# Patient Record
Sex: FEMALE | Race: WHITE | NOT HISPANIC OR LATINO | Employment: OTHER | URBAN - METROPOLITAN AREA
[De-identification: names, ages, dates, MRNs, and addresses within clinical notes are randomized per-mention and may not be internally consistent; named-entity substitution may affect disease eponyms.]

---

## 2017-08-08 ENCOUNTER — CONVERSION ENCOUNTER (OUTPATIENT)
Dept: GENERAL RADIOLOGY | Facility: HOSPITAL | Age: 62
End: 2017-08-08

## 2018-08-22 ENCOUNTER — OFFICE VISIT CONVERTED (OUTPATIENT)
Dept: FAMILY MEDICINE CLINIC | Facility: CLINIC | Age: 63
End: 2018-08-22
Attending: NURSE PRACTITIONER

## 2018-08-22 ENCOUNTER — CONVERSION ENCOUNTER (OUTPATIENT)
Dept: FAMILY MEDICINE CLINIC | Facility: CLINIC | Age: 63
End: 2018-08-22

## 2018-10-17 ENCOUNTER — CONVERSION ENCOUNTER (OUTPATIENT)
Dept: GENERAL RADIOLOGY | Facility: HOSPITAL | Age: 63
End: 2018-10-17

## 2018-11-05 ENCOUNTER — CONVERSION ENCOUNTER (OUTPATIENT)
Dept: GENERAL RADIOLOGY | Facility: HOSPITAL | Age: 63
End: 2018-11-05

## 2018-12-13 ENCOUNTER — CONVERSION ENCOUNTER (OUTPATIENT)
Dept: SURGERY | Facility: CLINIC | Age: 63
End: 2018-12-13

## 2018-12-13 ENCOUNTER — OFFICE VISIT CONVERTED (OUTPATIENT)
Dept: SURGERY | Facility: CLINIC | Age: 63
End: 2018-12-13
Attending: SURGERY

## 2019-01-21 ENCOUNTER — OFFICE VISIT CONVERTED (OUTPATIENT)
Dept: SURGERY | Facility: CLINIC | Age: 64
End: 2019-01-21
Attending: SURGERY

## 2019-01-21 ENCOUNTER — CONVERSION ENCOUNTER (OUTPATIENT)
Dept: SURGERY | Facility: CLINIC | Age: 64
End: 2019-01-21

## 2019-02-13 ENCOUNTER — OFFICE VISIT CONVERTED (OUTPATIENT)
Dept: FAMILY MEDICINE CLINIC | Facility: CLINIC | Age: 64
End: 2019-02-13
Attending: NURSE PRACTITIONER

## 2019-02-13 ENCOUNTER — HOSPITAL ENCOUNTER (OUTPATIENT)
Dept: LAB | Facility: HOSPITAL | Age: 64
Discharge: HOME | End: 2019-02-13
Attending: NURSE PRACTITIONER

## 2019-02-13 LAB
ALBUMIN SERPL-MCNC: 4 G/DL (ref 3.5–5)
ALBUMIN/GLOB SERPL: 1.1 {RATIO} (ref 1.4–2.6)
ALP SERPL-CCNC: 60 U/L (ref 43–160)
ALT SERPL-CCNC: 40 U/L (ref 10–40)
ANION GAP SERPL CALC-SCNC: 16 MMOL/L (ref 8–19)
AST SERPL-CCNC: 41 U/L (ref 15–50)
BASOPHILS # BLD AUTO: 0.07 10*3/UL (ref 0–0.2)
BASOPHILS NFR BLD AUTO: 0.55 % (ref 0–3)
BILIRUB SERPL-MCNC: 0.3 MG/DL (ref 0.2–1.3)
BUN SERPL-MCNC: 15 MG/DL (ref 5–25)
BUN/CREAT SERPL: 18 {RATIO} (ref 6–20)
CALCIUM SERPL-MCNC: 9.7 MG/DL (ref 8.7–10.4)
CHLORIDE SERPL-SCNC: 103 MMOL/L (ref 99–111)
CONV CO2: 29 MMOL/L (ref 22–32)
CONV TOTAL PROTEIN: 7.6 G/DL (ref 6.3–8.2)
CREAT UR-MCNC: 0.82 MG/DL (ref 0.5–0.9)
EOSINOPHIL # BLD AUTO: 0.27 10*3/UL (ref 0–0.7)
EOSINOPHIL # BLD AUTO: 2.18 % (ref 0–7)
ERYTHROCYTE [DISTWIDTH] IN BLOOD BY AUTOMATED COUNT: 12.5 % (ref 11.5–14.5)
GFR SERPLBLD BASED ON 1.73 SQ M-ARVRAT: >60 ML/MIN/{1.73_M2}
GLOBULIN UR ELPH-MCNC: 3.6 G/DL (ref 2–3.5)
GLUCOSE SERPL-MCNC: 85 MG/DL (ref 65–99)
HBA1C MFR BLD: 15.7 G/DL (ref 12–16)
HCT VFR BLD AUTO: 45.7 % (ref 37–47)
LYMPHOCYTES # BLD AUTO: 4.34 10*3/UL (ref 1–5)
MCH RBC QN AUTO: 30.2 PG (ref 27–31)
MCHC RBC AUTO-ENTMCNC: 34.4 G/DL (ref 33–37)
MCV RBC AUTO: 87.8 FL (ref 81–99)
MONOCYTES # BLD AUTO: 0.72 10*3/UL (ref 0.2–1.2)
MONOCYTES NFR BLD AUTO: 5.87 % (ref 3–10)
NEUTROPHILS # BLD AUTO: 6.93 10*3/UL (ref 2–8)
NEUTROPHILS NFR BLD AUTO: 56.2 % (ref 30–85)
NRBC BLD AUTO-RTO: 0 % (ref 0–0.01)
OSMOLALITY SERPL CALC.SUM OF ELEC: 298 MOSM/KG (ref 273–304)
PLATELET # BLD AUTO: 291 10*3/UL (ref 130–400)
PMV BLD AUTO: 7.9 FL (ref 7.4–10.4)
POTASSIUM SERPL-SCNC: 3.9 MMOL/L (ref 3.5–5.3)
RBC # BLD AUTO: 5.2 10*6/UL (ref 4.2–5.4)
SODIUM SERPL-SCNC: 144 MMOL/L (ref 135–147)
T4 FREE SERPL-MCNC: 1.2 NG/DL (ref 0.9–1.8)
TSH SERPL-ACNC: 2.24 M[IU]/L (ref 0.27–4.2)
VARIANT LYMPHS NFR BLD MANUAL: 35.2 % (ref 20–45)
WBC # BLD AUTO: 12.3 10*3/UL (ref 4.8–10.8)

## 2019-04-22 ENCOUNTER — HOSPITAL ENCOUNTER (OUTPATIENT)
Dept: OTHER | Facility: HOSPITAL | Age: 64
Discharge: HOME | End: 2019-04-22
Attending: PHYSICIAN ASSISTANT

## 2019-08-30 ENCOUNTER — HOSPITAL ENCOUNTER (OUTPATIENT)
Dept: GENERAL RADIOLOGY | Facility: HOSPITAL | Age: 64
Discharge: HOME | End: 2019-08-30
Attending: NURSE PRACTITIONER

## 2019-08-30 ENCOUNTER — OFFICE VISIT CONVERTED (OUTPATIENT)
Dept: FAMILY MEDICINE CLINIC | Facility: CLINIC | Age: 64
End: 2019-08-30
Attending: NURSE PRACTITIONER

## 2019-08-30 ENCOUNTER — CONVERSION ENCOUNTER (OUTPATIENT)
Dept: FAMILY MEDICINE CLINIC | Facility: CLINIC | Age: 64
End: 2019-08-30

## 2019-09-20 ENCOUNTER — OFFICE VISIT CONVERTED (OUTPATIENT)
Dept: FAMILY MEDICINE CLINIC | Facility: CLINIC | Age: 64
End: 2019-09-20
Attending: NURSE PRACTITIONER

## 2019-09-20 ENCOUNTER — CONVERSION ENCOUNTER (OUTPATIENT)
Dept: FAMILY MEDICINE CLINIC | Facility: CLINIC | Age: 64
End: 2019-09-20

## 2019-09-21 ENCOUNTER — HOSPITAL ENCOUNTER (OUTPATIENT)
Dept: OTHER | Facility: HOSPITAL | Age: 64
Discharge: HOME | End: 2019-09-21
Attending: NURSE PRACTITIONER

## 2019-09-21 LAB
ALBUMIN SERPL-MCNC: 4.3 G/DL (ref 3.5–5)
ALBUMIN/GLOB SERPL: 1.4 {RATIO} (ref 1.4–2.6)
ALP SERPL-CCNC: 61 U/L (ref 43–160)
ALT SERPL-CCNC: 42 U/L (ref 10–40)
ANION GAP SERPL CALC-SCNC: 15 MMOL/L (ref 8–19)
AST SERPL-CCNC: 38 U/L (ref 15–50)
BASOPHILS # BLD AUTO: 0.07 10*3/UL (ref 0–0.2)
BASOPHILS NFR BLD AUTO: 0.7 % (ref 0–3)
BILIRUB SERPL-MCNC: 0.47 MG/DL (ref 0.2–1.3)
BUN SERPL-MCNC: 19 MG/DL (ref 5–25)
BUN/CREAT SERPL: 24 {RATIO} (ref 6–20)
CALCIUM SERPL-MCNC: 9.5 MG/DL (ref 8.7–10.4)
CHLORIDE SERPL-SCNC: 102 MMOL/L (ref 99–111)
CHOLEST SERPL-MCNC: 145 MG/DL (ref 107–200)
CHOLEST/HDLC SERPL: 3.4 {RATIO} (ref 3–6)
CONV ABS IMM GRAN: 0.03 10*3/UL (ref 0–0.2)
CONV CO2: 29 MMOL/L (ref 22–32)
CONV IMMATURE GRAN: 0.3 % (ref 0–1.8)
CONV TOTAL PROTEIN: 7.4 G/DL (ref 6.3–8.2)
CREAT UR-MCNC: 0.79 MG/DL (ref 0.5–0.9)
DEPRECATED RDW RBC AUTO: 45.9 FL (ref 36.4–46.3)
EOSINOPHIL # BLD AUTO: 0.32 10*3/UL (ref 0–0.7)
EOSINOPHIL # BLD AUTO: 3.4 % (ref 0–7)
ERYTHROCYTE [DISTWIDTH] IN BLOOD BY AUTOMATED COUNT: 13.4 % (ref 11.7–14.4)
FERRITIN SERPL-MCNC: 147 NG/ML (ref 10–200)
GFR SERPLBLD BASED ON 1.73 SQ M-ARVRAT: >60 ML/MIN/{1.73_M2}
GLOBULIN UR ELPH-MCNC: 3.1 G/DL (ref 2–3.5)
GLUCOSE SERPL-MCNC: 104 MG/DL (ref 65–99)
HCT VFR BLD AUTO: 48.3 % (ref 37–47)
HDLC SERPL-MCNC: 43 MG/DL (ref 40–60)
HGB BLD-MCNC: 15.3 G/DL (ref 12–16)
IRON SATN MFR SERPL: 29 % (ref 20–55)
IRON SERPL-MCNC: 122 UG/DL (ref 60–170)
LDLC SERPL CALC-MCNC: 75 MG/DL (ref 70–100)
LYMPHOCYTES # BLD AUTO: 4.03 10*3/UL (ref 1–5)
LYMPHOCYTES NFR BLD AUTO: 42.2 % (ref 20–45)
MAGNESIUM SERPL-MCNC: 2.05 MG/DL (ref 1.6–2.3)
MCH RBC QN AUTO: 29.3 PG (ref 27–31)
MCHC RBC AUTO-ENTMCNC: 31.7 G/DL (ref 33–37)
MCV RBC AUTO: 92.4 FL (ref 81–99)
MONOCYTES # BLD AUTO: 0.57 10*3/UL (ref 0.2–1.2)
MONOCYTES NFR BLD AUTO: 6 % (ref 3–10)
NEUTROPHILS # BLD AUTO: 4.52 10*3/UL (ref 2–8)
NEUTROPHILS NFR BLD AUTO: 47.4 % (ref 30–85)
NRBC CBCN: 0 % (ref 0–0.7)
OSMOLALITY SERPL CALC.SUM OF ELEC: 297 MOSM/KG (ref 273–304)
PLATELET # BLD AUTO: 249 10*3/UL (ref 130–400)
PMV BLD AUTO: 10.2 FL (ref 9.4–12.3)
POTASSIUM SERPL-SCNC: 4.2 MMOL/L (ref 3.5–5.3)
RBC # BLD AUTO: 5.23 10*6/UL (ref 4.2–5.4)
SODIUM SERPL-SCNC: 142 MMOL/L (ref 135–147)
T4 FREE SERPL-MCNC: 1.1 NG/DL (ref 0.9–1.8)
TIBC SERPL-MCNC: 420 UG/DL (ref 245–450)
TRANSFERRIN SERPL-MCNC: 294 MG/DL (ref 250–380)
TRIGL SERPL-MCNC: 135 MG/DL (ref 40–150)
TSH SERPL-ACNC: 1.55 M[IU]/L (ref 0.27–4.2)
VLDLC SERPL-MCNC: 27 MG/DL (ref 5–37)
WBC # BLD AUTO: 9.54 10*3/UL (ref 4.8–10.8)

## 2019-10-21 ENCOUNTER — HOSPITAL ENCOUNTER (OUTPATIENT)
Dept: GENERAL RADIOLOGY | Facility: HOSPITAL | Age: 64
Discharge: HOME | End: 2019-10-21
Attending: NURSE PRACTITIONER

## 2019-11-26 ENCOUNTER — HOSPITAL ENCOUNTER (OUTPATIENT)
Dept: GENERAL RADIOLOGY | Facility: HOSPITAL | Age: 64
Discharge: HOME | End: 2019-11-26
Attending: NURSE PRACTITIONER

## 2020-01-16 ENCOUNTER — OFFICE VISIT CONVERTED (OUTPATIENT)
Dept: FAMILY MEDICINE CLINIC | Facility: CLINIC | Age: 65
End: 2020-01-16
Attending: NURSE PRACTITIONER

## 2020-08-31 ENCOUNTER — OFFICE VISIT CONVERTED (OUTPATIENT)
Dept: FAMILY MEDICINE CLINIC | Facility: CLINIC | Age: 65
End: 2020-08-31
Attending: NURSE PRACTITIONER

## 2020-09-03 ENCOUNTER — HOSPITAL ENCOUNTER (OUTPATIENT)
Dept: LAB | Facility: HOSPITAL | Age: 65
Discharge: HOME | End: 2020-09-03
Attending: NURSE PRACTITIONER

## 2020-09-03 LAB
BASOPHILS # BLD AUTO: 0.07 10*3/UL (ref 0–0.2)
BASOPHILS NFR BLD AUTO: 0.7 % (ref 0–3)
CONV ABS IMM GRAN: 0.05 10*3/UL (ref 0–0.2)
CONV IMMATURE GRAN: 0.5 % (ref 0–1.8)
DEPRECATED RDW RBC AUTO: 45.7 FL (ref 36.4–46.3)
EOSINOPHIL # BLD AUTO: 0.26 10*3/UL (ref 0–0.7)
EOSINOPHIL # BLD AUTO: 2.6 % (ref 0–7)
ERYTHROCYTE [DISTWIDTH] IN BLOOD BY AUTOMATED COUNT: 13.5 % (ref 11.7–14.4)
HCT VFR BLD AUTO: 48.8 % (ref 37–47)
HGB BLD-MCNC: 15.7 G/DL (ref 12–16)
LYMPHOCYTES # BLD AUTO: 3.41 10*3/UL (ref 1–5)
LYMPHOCYTES NFR BLD AUTO: 34.5 % (ref 20–45)
MCH RBC QN AUTO: 29.5 PG (ref 27–31)
MCHC RBC AUTO-ENTMCNC: 32.2 G/DL (ref 33–37)
MCV RBC AUTO: 91.7 FL (ref 81–99)
MONOCYTES # BLD AUTO: 0.71 10*3/UL (ref 0.2–1.2)
MONOCYTES NFR BLD AUTO: 7.2 % (ref 3–10)
NEUTROPHILS # BLD AUTO: 5.38 10*3/UL (ref 2–8)
NEUTROPHILS NFR BLD AUTO: 54.5 % (ref 30–85)
NRBC CBCN: 0 % (ref 0–0.7)
PLATELET # BLD AUTO: 273 10*3/UL (ref 130–400)
PMV BLD AUTO: 10.6 FL (ref 9.4–12.3)
RBC # BLD AUTO: 5.32 10*6/UL (ref 4.2–5.4)
WBC # BLD AUTO: 9.88 10*3/UL (ref 4.8–10.8)

## 2020-09-04 LAB
25(OH)D3 SERPL-MCNC: 41.9 NG/ML (ref 30–100)
ALBUMIN SERPL-MCNC: 4 G/DL (ref 3.5–5)
ALBUMIN/GLOB SERPL: 1.2 {RATIO} (ref 1.4–2.6)
ALP SERPL-CCNC: 49 U/L (ref 43–160)
ALT SERPL-CCNC: 39 U/L (ref 10–40)
ANION GAP SERPL CALC-SCNC: 19 MMOL/L (ref 8–19)
AST SERPL-CCNC: 55 U/L (ref 15–50)
BILIRUB SERPL-MCNC: 0.35 MG/DL (ref 0.2–1.3)
BUN SERPL-MCNC: 15 MG/DL (ref 5–25)
BUN/CREAT SERPL: 16 {RATIO} (ref 6–20)
CALCIUM SERPL-MCNC: 9.6 MG/DL (ref 8.7–10.4)
CHLORIDE SERPL-SCNC: 100 MMOL/L (ref 99–111)
CHOLEST SERPL-MCNC: 180 MG/DL (ref 107–200)
CHOLEST/HDLC SERPL: 5.8 {RATIO} (ref 3–6)
CONV CO2: 23 MMOL/L (ref 22–32)
CONV TOTAL PROTEIN: 7.3 G/DL (ref 6.3–8.2)
CREAT UR-MCNC: 0.93 MG/DL (ref 0.5–0.9)
GFR SERPLBLD BASED ON 1.73 SQ M-ARVRAT: >60 ML/MIN/{1.73_M2}
GLOBULIN UR ELPH-MCNC: 3.3 G/DL (ref 2–3.5)
GLUCOSE SERPL-MCNC: 110 MG/DL (ref 65–99)
HDLC SERPL-MCNC: 31 MG/DL (ref 40–60)
LDLC SERPL CALC-MCNC: 101 MG/DL (ref 70–100)
OSMOLALITY SERPL CALC.SUM OF ELEC: 287 MOSM/KG (ref 273–304)
POTASSIUM SERPL-SCNC: 3.8 MMOL/L (ref 3.5–5.3)
SODIUM SERPL-SCNC: 138 MMOL/L (ref 135–147)
T4 FREE SERPL-MCNC: 0.9 NG/DL (ref 0.9–1.8)
TRIGL SERPL-MCNC: 238 MG/DL (ref 40–150)
TSH SERPL-ACNC: 3.6 M[IU]/L (ref 0.27–4.2)
VLDLC SERPL-MCNC: 48 MG/DL (ref 5–37)

## 2020-09-10 ENCOUNTER — HOSPITAL ENCOUNTER (OUTPATIENT)
Dept: LAB | Facility: HOSPITAL | Age: 65
Discharge: HOME | End: 2020-09-10
Attending: NURSE PRACTITIONER

## 2020-09-10 LAB
EST. AVERAGE GLUCOSE BLD GHB EST-MCNC: 134 MG/DL
HBA1C MFR BLD: 6.3 % (ref 3.5–5.7)

## 2020-12-22 ENCOUNTER — HOSPITAL ENCOUNTER (OUTPATIENT)
Dept: GENERAL RADIOLOGY | Facility: HOSPITAL | Age: 65
Discharge: HOME | End: 2020-12-22
Attending: NURSE PRACTITIONER

## 2021-03-01 ENCOUNTER — OFFICE VISIT CONVERTED (OUTPATIENT)
Dept: INTERNAL MEDICINE | Facility: CLINIC | Age: 66
End: 2021-03-01
Attending: INTERNAL MEDICINE

## 2021-03-01 ENCOUNTER — CONVERSION ENCOUNTER (OUTPATIENT)
Dept: INTERNAL MEDICINE | Facility: CLINIC | Age: 66
End: 2021-03-01

## 2021-03-01 ENCOUNTER — HOSPITAL ENCOUNTER (OUTPATIENT)
Dept: INTERNAL MEDICINE | Facility: CLINIC | Age: 66
Discharge: HOME | End: 2021-03-01
Attending: INTERNAL MEDICINE

## 2021-03-01 LAB
ALBUMIN SERPL-MCNC: 3.8 G/DL (ref 3.5–5)
ALBUMIN/GLOB SERPL: 1.2 {RATIO} (ref 1.4–2.6)
ALP SERPL-CCNC: 61 U/L (ref 43–160)
ALT SERPL-CCNC: 35 U/L (ref 10–40)
AMPHET UR QL CFM: NEGATIVE
ANION GAP SERPL CALC-SCNC: 12 MMOL/L (ref 8–19)
AST SERPL-CCNC: 41 U/L (ref 15–50)
BARBITURATES UR QL: NEGATIVE
BASOPHILS # BLD AUTO: 0.08 10*3/UL (ref 0–0.2)
BASOPHILS NFR BLD AUTO: 0.7 % (ref 0–3)
BENZODIAZ UR QL SCN: NEGATIVE
BILIRUB SERPL-MCNC: 0.42 MG/DL (ref 0.2–1.3)
BUN SERPL-MCNC: 15 MG/DL (ref 5–25)
BUN/CREAT SERPL: 19 {RATIO} (ref 6–20)
CALCIUM SERPL-MCNC: 9.6 MG/DL (ref 8.7–10.4)
CHLORIDE SERPL-SCNC: 102 MMOL/L (ref 99–111)
CHOLEST SERPL-MCNC: 160 MG/DL (ref 107–200)
CHOLEST/HDLC SERPL: 4.6 {RATIO} (ref 3–6)
CONV ABS IMM GRAN: 0.05 10*3/UL (ref 0–0.2)
CONV AMP/METHAMP UR: NEGATIVE
CONV CO2: 28 MMOL/L (ref 22–32)
CONV COCAINE, UR: NEGATIVE
CONV IMMATURE GRAN: 0.5 % (ref 0–1.8)
CONV TOTAL PROTEIN: 7 G/DL (ref 6.3–8.2)
CREAT UR-MCNC: 0.79 MG/DL (ref 0.5–0.9)
DEPRECATED RDW RBC AUTO: 46.6 FL (ref 36.4–46.3)
EOSINOPHIL # BLD AUTO: 0.23 10*3/UL (ref 0–0.7)
EOSINOPHIL # BLD AUTO: 2.2 % (ref 0–7)
ERYTHROCYTE [DISTWIDTH] IN BLOOD BY AUTOMATED COUNT: 14 % (ref 11.7–14.4)
EST. AVERAGE GLUCOSE BLD GHB EST-MCNC: 137 MG/DL
GFR SERPLBLD BASED ON 1.73 SQ M-ARVRAT: >60 ML/MIN/{1.73_M2}
GLOBULIN UR ELPH-MCNC: 3.2 G/DL (ref 2–3.5)
GLUCOSE SERPL-MCNC: 103 MG/DL (ref 65–99)
HBA1C MFR BLD: 6.4 % (ref 3.5–5.7)
HCT VFR BLD AUTO: 50.5 % (ref 37–47)
HDLC SERPL-MCNC: 35 MG/DL (ref 40–60)
HGB BLD-MCNC: 15.9 G/DL (ref 12–16)
LDLC SERPL CALC-MCNC: 96 MG/DL (ref 70–100)
LYMPHOCYTES # BLD AUTO: 3.95 10*3/UL (ref 1–5)
LYMPHOCYTES NFR BLD AUTO: 37 % (ref 20–45)
MCH RBC QN AUTO: 28.5 PG (ref 27–31)
MCHC RBC AUTO-ENTMCNC: 31.5 G/DL (ref 33–37)
MCV RBC AUTO: 90.5 FL (ref 81–99)
MDMA UR QL SCN: NEGATIVE
METHADONE UR QL SCN: NEGATIVE
MONOCYTES # BLD AUTO: 0.65 10*3/UL (ref 0.2–1.2)
MONOCYTES NFR BLD AUTO: 6.1 % (ref 3–10)
NEUTROPHILS # BLD AUTO: 5.71 10*3/UL (ref 2–8)
NEUTROPHILS NFR BLD AUTO: 53.5 % (ref 30–85)
NRBC CBCN: 0 % (ref 0–0.7)
OPIATES UR QL SCN: NEGATIVE
OSMOLALITY SERPL CALC.SUM OF ELEC: 287 MOSM/KG (ref 273–304)
OXYCODONE UR QL SCN: NEGATIVE
PCP UR QL: NEGATIVE
PLATELET # BLD AUTO: 313 10*3/UL (ref 130–400)
PMV BLD AUTO: 10.7 FL (ref 9.4–12.3)
POTASSIUM SERPL-SCNC: 4.4 MMOL/L (ref 3.5–5.3)
RBC # BLD AUTO: 5.58 10*6/UL (ref 4.2–5.4)
SODIUM SERPL-SCNC: 138 MMOL/L (ref 135–147)
THC SERPLBLD CFM-MCNC: NEGATIVE NG/ML
TRIGL SERPL-MCNC: 144 MG/DL (ref 40–150)
TSH SERPL-ACNC: 1.89 M[IU]/L (ref 0.27–4.2)
VLDLC SERPL-MCNC: 29 MG/DL (ref 5–37)
WBC # BLD AUTO: 10.67 10*3/UL (ref 4.8–10.8)

## 2021-03-16 ENCOUNTER — HOSPITAL ENCOUNTER (OUTPATIENT)
Dept: VACCINE CLINIC | Facility: HOSPITAL | Age: 66
Discharge: HOME | End: 2021-03-16
Attending: INTERNAL MEDICINE

## 2021-03-29 ENCOUNTER — HOSPITAL ENCOUNTER (OUTPATIENT)
Dept: GENERAL RADIOLOGY | Facility: HOSPITAL | Age: 66
Discharge: HOME | End: 2021-03-29
Attending: INTERNAL MEDICINE

## 2021-04-06 ENCOUNTER — HOSPITAL ENCOUNTER (OUTPATIENT)
Dept: VACCINE CLINIC | Facility: HOSPITAL | Age: 66
Discharge: HOME | End: 2021-04-06
Attending: INTERNAL MEDICINE

## 2021-04-14 ENCOUNTER — HOSPITAL ENCOUNTER (OUTPATIENT)
Dept: GENERAL RADIOLOGY | Facility: HOSPITAL | Age: 66
Discharge: HOME | End: 2021-04-14
Attending: INTERNAL MEDICINE

## 2021-05-10 NOTE — H&P
"   History and Physical      Patient Name: Randi Fajardo   Patient ID: [id number]   Sex: Female   YOB: 1955    Primary Care Provider: Mainor Sanders MD   Referring Provider: Mainor Sanders MD    Visit Date: March 1, 2021    Provider: Mainor Sanders MD   Location: [facility] [place]   Location Address: [address] [place] [address]; [address] [place] [address]  [id number]   Location Phone: [phone]          Chief Complaint  · new patient - establish care  · medication refills  · dermatology referal      History Of Present Illness  Randi Fajardo is a 65 year old /White female who presents for evaluation and treatment of:      previous PCP - Dilma Shea  flu shot - yes  mammo- done 12/2020  Colon Ca-   DEXA- due  ppsv23- due  hep A- done  tobacco- pt reports starting at age 15. pt reports 1ppd x30 years.     HTN- pt denies HAs, dizziness, CP  hypothyroid- due for recheck.   GERD- doing well with Pepcid as needed.    RLS- doing well on requip.   anxiety- pt doing well onc Cymbalta. pt denies HI and SI.   impaired fasting glucose- due for recheck. pt has lost weight. pt is eating healthier.   lumbago - pt with DDD in back. pt reports help with gabapentin as needed. pt also on Cymbalta to help. pt does not take ibuprofen often       Vitals  Date Time BP Position Site L\R Cuff Size HR RR TEMP (F) WT  HT  BMI kg/m2 BSA m2 O2 Sat FR L/min FiO2 HC       01/16/2020 02:37 /70 Sitting    73 - R   283lbs 0oz 5'  3\" 50.13 2.39 99 %      08/31/2020 01:42 /59 Sitting    98 - R  98 289lbs 0oz 5'  3\" 51.19 2.41 98 %  21%    03/01/2021 11:54 /80 Sitting    91 - R 16 98.6 274lbs 0oz 5'  3.5\" 47.78 2.36 91 %  21%          Physical Examination  · Constitutional  o Appearance  o : no acute distress, well-nourished  · Head and Face  o Head  o :   § Inspection  § : atraumatic, normocephalic  · Eyes  o Eyes  o : extraocular movements intact, no scleral " icterus, no conjunctival injection  · Ears, Nose, Mouth and Throat  o Ears  o :   § External Ears  § : normal  o Nose  o :   § Intranasal Exam  § : nares patent  o Oral Cavity  o :   § Oral Mucosa  § : moist mucous membranes  · Respiratory  o Respiratory Effort  o : breathing comfortably, symmetric chest rise  o Auscultation of Lungs  o : clear to asculatation bilaterally, no wheezes, rales, or rhonchii  · Cardiovascular  o Heart  o :   § Auscultation of Heart  § : regular rate and rhythm, no murmurs, rubs, or gallops  o Peripheral Vascular System  o :   § Extremities  § : no edema  · Neurologic  o Mental Status Examination  o :   § Orientation  § : grossly oriented to person, place and time  o Gait and Station  o :   § Gait Screening  § : normal gait  · Psychiatric  o General  o : normal mood and affect          Results  · In-Office Procedures  o Lab procedure  § IOP - Urine Drug Screen In-House [facility] (01142)   § Amphetamines Ur Ql: Negative   § Barbiturates Ur Ql: Negative   § Buprenorphine+Nor Ur Ql Scn: Negative   § Benzodiaz Ur Ql: Negative   § Cocaine Ur Ql: Negative   § Methadone Ur Ql: Negative   § Methamphet Ur Ql: Negative   § MDMA Ur Ql Scn: Negative   § Opiates Ur Ql: Negative   § Oxycodone Ur Ql: Negative   § PCP Ur Ql: Negative   § THC Ur Ql: Negative   § Temp in Range?: Within/Acceptable   § Control Seen?: Yes       Assessment  · Need for pneumococcal vaccination     V03.82/Z23  · Screening for AAA (abdominal aortic aneurysm)     V81.2/Z13.6  · Anxiety disorder     300.00/F41.9  cont Cymbalta at current dose. pt doing well.   · Essential hypertension     401.9/I10  well controlled on regimen. check labs.   · GERD (gastroesophageal reflux disease)     530.81/K21.9  cont Pepcid as needed.   · Hypothyroidism     244.9/E03.9  check TSH and adjust Synthroid accordingly.   · Impaired fasting glucose     790.21/R73.01  check HGbA1c. encouraged by weight loss and pt encouraged to cont trend.   · Nicotine  dependence     305.1/F17.200  due for low dose chest CT.   · RLS (restless legs syndrome)     333.94/G25.81  cont requip as needed.       Plan  · Orders  o Abdominal Aortic Aneurysm Medicare Screening U/S [facility]. (20747, ) - V81.2/Z13.6 - 03/01/2021  o Hgb A1c [facility] (78168) - 790.21/R73.01 - 03/01/2021  o ACO-17: Screened for tobacco use AND received tobacco cessation intervention (4004F) - 305.1/F17.200 - 03/01/2021  o Low dose CT scan (LDCT) for lung cancer screening [facility] () - 305.1/F17.200 - 03/01/2021  o Immunization Admin Fee (Single) ([facility]) (88611) - - 03/01/2021  o Physical, Primary Care Panel (CBC, CMP, Lipid, TSH) [facility] (23587, 27556, 56698, 31669) - 244.9/E03.9, 401.9/I10 - 03/01/2021  o ACO-14: Influenza immunization administered or previously received [facility] () - - 03/01/2021  o ACO-39: Current medications updated and reviewed (, 1159F) - - 03/01/2021  o [id number] Vaccine (11771) - - 03/01/2021   Vaccine - [id number]; Dose: 0.5; Site: Left Deltoid; Route: Intramuscular; Date: 03/01/2021 13:18:00; Exp: 05/30/2021; Lot: [id number]; Mfg: [employer] TradeName: [id number]; Administered By: Radha Wan MA; Comment: Pt tolerated well and left office in stable condition  · Medications  o gabapentin 600 mg oral tablet   SIG: take 1 tablet by oral route 3 times a day for 90 days prn pain   DISP: (270) Tablet with 0 refills  Adjusted on 03/01/2021     o Cymbalta 60 mg oral capsule,delayed release(DR/EC)   SIG: take 1 capsule (60 mg) by oral route once daily   DISP: (90) Capsule with 3 refills  Refilled on 03/01/2021     o hydrochlorothiazide 25 mg oral tablet   SIG: take 1 tablet (25 mg) by oral route once daily   DISP: (90) Tablet with 3 refills  Refilled on 03/01/2021     o minocycline 100 mg oral capsule   SIG: take 1 capsule (100 mg) by oral route QD for 90 days   DISP: (90) Capsule with 3 refills  Refilled on 03/01/2021     o Synthroid 25 mcg oral tablet   SIG: take 1 tablet (25 mcg) by  oral route once daily for 90 days   DISP: (90) Tablet with 3 refills  Refilled on 03/01/2021     o Medications have been Reconciled  o Transition of Care or Provider Policy  · Instructions  o Patient was educated/instructed on their diagnosis, treatment and medications prior to discharge from the clinic today.  · Disposition  o f/u in 3 months  o labs done in clinic            Electronically Signed by: Mainor Sanders MD -Author on March 1, 2021 01:49:14 PM

## 2021-05-13 NOTE — PROGRESS NOTES
Progress Note      Patient Name: Randi Fajardo   Patient ID: [id number]   Sex: Female   YOB: 1955    Primary Care Provider: Dilma MOGRAN   Referring Provider: Dilma MORGAN    Visit Date: August 31, 2020    Provider: EDDIE Watkins   Location: [facility]   Location Address: [address] [place] [address], [address]  [place]  [id number]   Location Phone: [phone]          Chief Complaint  · med refills      History Of Present Illness  Randi Fajardo is a 64 year old /White female who presents for evaluation and treatment of:      Patient is here today to have medications refilled.    Patient was seen in office by a previous provider.     Pt is retired  and this is her first year of MCFP since Covid. She is enjoying her MCFP thus far.    Insomnia/ Restless leg syndrome: pt is taking Melatonin and Requip with good results.     Hypothyroidism: Synthroid 25mcg, pt has been stable on this dose. She does take correctly.    LE edema: HCTZ, taking daily with good results.     Depression: Cymbalta 60mg and doing well, states her symptoms are well controlled.  Pt is requesting refill for local pharmacy and 90 day supply for mail order.     Arthritis: taking gabapentin with good results.     GERD: Famotidine daily. Pt states this works well for her, she does not have to supplement with any additional medications.     Lesions on neck: Pt has history of neck boils and numerous I & Ds on neck. She was started on Minocycline and doing well. She takes daily.    Patient is doing well with all the medications and states that she has no complaints with them.           Past Medical History  Disease Name Date Onset Notes   *Other --  neck lesions   Anxiety (GREGG) --  --    Depressed --  --    Gastroesophageal Reflux --  --    Hypertension --  --    Hypothyroid --  --    Osteoarthritis --  --    Restless leg syndrome --  --    Sleep apnea in adult --  --          Past  Surgical History  Procedure Name Date Notes   Ankle repair  Rt ankle with hardware   Cesarian Section  --    Colonoscopy --  --    Neck  Neck lesion removed. Total of 3.         Medication List  Name Date Started Instructions   Calcium 500 + D 500 mg(1,250mg) -400 unit oral tablet 2020 take 1 tablet by oral route daily for 30 days   Cymbalta 60 mg oral capsule,delayed release(DR/EC) 2020 take 1 capsule (60 mg) by oral route once daily   famotidine 40 mg oral tablet 2020 take 1 tablet (40 mg) by oral route 2 times per day for 90 days   gabapentin 600 mg oral tablet 2020 take 1/2 am 1/2 noon and 1 po qhs   hydrochlorothiazide 25 mg oral tablet 2020 take 1 tablet (25 mg) by oral route once daily   ibuprofen 200 mg oral tablet  take 1 tablet (200 mg) by oral route every 6 hours as needed with food   melatonin 5 mg oral tablet  take 1 tablet by oral route once a day (at bedtime)   minocycline 100 mg oral capsule 2020 take 1 capsule (100 mg) by oral route QD for 90 days   multivitamin with iron oral tablet  take 1 tablet by oral route daily   Requip 1 mg oral tablet 2020 TAKE 1 TABLET 1-3 HOURS BEFORE BEDTIME   Synthroid 25 mcg oral tablet 2020 take 1 tablet (25 mcg) by oral route once daily for 90 days         Allergy List  Allergen Name Date Reaction Notes   Nexium --  --  --    Prilosec --  --  --          Family Medical History  Disease Name Relative/Age Notes   Neoplasm of Lungs, Malignant Father/   --    Heart Disease Father/   --    Colon Neoplasm Father/   --          Reproductive History  Menstrual   Age Menarche: 8   Pregnancy Summary   Total Pregnancies: 2 Full Term: 2 Premature: 0   Ab Induced: 0 Ab Spontaneous: 0 Ectopics: 0   Multiples: 0 Livin         Social History  Finding Status Start/Stop Quantity Notes   Alcohol Current some day --/-- rarely --     --  --/-- --  --    No known infection risk --  --/-- --  --   "  Tobacco Former 16/62 0.5 PPD 12/29/2017 - former 11/01/2017 - former 06/27/2017 - former          Immunizations  NameDate Admin Mfg Trade Name Lot Number Route Inj VIS Given VIS Publication   Arnifdcyq57/14/2019 [facility] Fluzone Quadrivalent  NE NE 01/16/2020    Comments: [facility]   Prevnar 1302/13/2019 [facility] PREVNAR 13 [id number] IM  02/14/2019 11/05/2015   Comments: tolerated well   Lmbufgxr77/21/2015 NE ZOSTAVAX  NE NE 05/21/2015    Comments: Given at [facility]   Tdap05/21/2015 [facility] BOOSTRIX [id number] IM RD 04/07/2016 02/24/2015   Comments: Pt tolerated well.         Review of Systems  · Constitutional  o Denies  o : fatigue  · Eyes  o Denies  o : blurred vision, changes in vision  · HENT  o Denies  o : headaches  · Cardiovascular  o Denies  o : chest pain, irregular heart beats, rapid heart rate, dyspnea on exertion  · Respiratory  o Denies  o : shortness of breath, wheezing, cough  · Gastrointestinal  o Denies  o : nausea, vomiting, diarrhea, constipation, abdominal pain, blood in stools, melena  · Genitourinary  o Denies  o : frequency, dysuria, hematuria  · Integument  o Denies  o : rash, new skin lesions  · Musculoskeletal  o Denies  o : joint pain, joint swelling, muscle pain  · Endocrine  o Denies  o : polyuria, polydipsia      Vitals  Date Time BP Position Site L\R Cuff Size HR RR TEMP (F) WT  HT  BMI kg/m2 BSA m2 O2 Sat        08/31/2020 01:42 /59 Sitting    98 - R  98 289lbs 0oz 5'  3\" 51.19 2.41 98 %          Physical Examination  · Constitutional  o Appearance  o : well developed, well-nourished, no acute distress  · Head and Face  o Head  o : normocephalic, atraumatic  · Neck  o Inspection/Palpation  o : normal appearance, no masses or tenderness, trachea midline  o Thyroid  o : gland size normal, nontender, no nodules or masses present on palpation  · Respiratory  o Respiratory Effort  o : breathing unlabored  o Inspection of Chest  o : chest rise symmetric bilaterally  o Auscultation of Lungs  o : " clear to auscultation bilaterally throughout inspiration and expiration  · Cardiovascular  o Heart  o :   § Auscultation of Heart  § : regular rate and rhythm, no murmurs, gallops or rubs  o Peripheral Vascular System  o :   § Extremities  § : no edema  · Lymphatic  o Neck  o : no cervical lymphadenopathy, no supraclavicular lymphadenopathy  · Psychiatric  o Mood and Affect  o : mood normal, affect appropriate          Results  · In-Office Procedures  o Lab procedure  § IOP - Urine Drug Screen In-House [facility] (63923)   § Amphetamines Ur Ql: Negative   § Barbiturates Ur Ql: Negative   § Buprenorphine+Nor Ur Ql Scn: Negative   § Benzodiaz Ur Ql: Negative   § Cocaine Ur Ql: Negative   § Methadone Ur Ql: Negative   § Methamphet Ur Ql: Negative   § MDMA Ur Ql Scn: Negative   § Opiates Ur Ql: Negative   § Oxycodone Ur Ql: Negative   § PCP Ur Ql: Negative   § THC Ur Ql: Negative   § Temp in Range?: Within/Acceptable   § Control Seen?: Yes       Assessment  · Screening for depression     V79.0/Z13.89  · Screening for lipid disorders     V77.91/Z13.220  · Visit for screening mammogram     V76.12/Z12.31  · Anxiety disorder     300.00/F41.9  · Depression     311/F32.9  · Essential hypertension     401.9/I10  · GERD (gastroesophageal reflux disease)     530.81/K21.9  · Hypothyroidism     244.9/E03.9  · Obesity     278.00/E66.9  · Osteopenia     733.90/M85.80  · Routine lab draw     V72.60/Z01.89  · Neuropathy     355.9/G62.9  · Restless leg syndrome     333.94/G25.81    Problems Reconciled  Plan  · Orders  o ACO-18: Positive screen for clinical depression using a standardized tool and a follow-up plan documented () - V79.0/Z13.89 - 08/31/2020  o Screening Mammography; Bilateral 3D (17433, 21672, ) - V76.12/Z12.31 - 08/31/2020   due after 10/21/2019  o HTN/Lipid Panel (CMP, Lipid) [facility] (02155, 00035) - 401.9/I10 - 08/31/2020  o CBC with Auto Diff [facility] (01469) - 401.9/I10 - 08/31/2020  o Thyroid Profile (THYII, 62826, 63700)  - 244.9/E03.9 - 08/31/2020  o LEON Report (KASPR) - - 08/31/2020  o ACO-39: Current medications updated and reviewed () - - 08/31/2020  o Vitamin D Level (61510) - 733.90/M85.80 - 08/31/2020  · Medications  o Cymbalta 60 mg oral capsule,delayed release(DR/EC)   SIG: take 1 capsule (60 mg) by oral route once daily   DISP: (90) capsules with 1 refills  Refilled on 08/31/2020     o ropinirole 0.5 mg oral tablet   SIG: TAKE 1 TABLET (0.5 MG) BY ORAL ROUTE 1-3 HOURS BEFORE BEDTIME   DISP: (90) Tablet with 2 refills  Discontinued on 08/31/2020     o Medications have been Reconciled  o Transition of Care or Provider Policy  · Instructions  o Depression Screen completed and scanned into the EMR under the designated folder within the patient's documents.  o Today's PHQ-9 result is 7__pt denies depression. She states the Covid pandemic is causing her issues, as her  has multiple co-morbid conditions and refuses to leave the house. She feels the Cymbalta is working well. Declines any medication or dose change at OV.  o Obtained a written consent for LEON query. Discussed the risk and benefits of the use of controlled substances with the patient, including the risk of tolerance and drug dependence. The patient has been counseled on the need to have an exit strategy, including potentially discontinuing the use of controlled substances. LEON has or will be reviewed as soon as it becomes avaliable.  o See note for indication of controlled substance. Leon and drug screen have been reviewed. Controlled substance agreement signed and scanned into chart. After discussion of risks and benefits of medication, I have determined patient is suitable for Rx while demonstrating the ability to safely follow and administer the medication plan. Patient understands the expectation that medication directions cannot be adjusted without a provider's written approval.   o Patient is taking medications as prescribed and doing  well.   o Patient was educated/instructed on their diagnosis, treatment and medications prior to discharge from the clinic today.  o Call the office with any concerns or questions.  o Discussed Covid-19 precautions including, but not limited to, social distancing, avoid touching your face, and hand washing.   o Electronically Identified Patient Education Materials Provided Electronically  · Disposition  o Follow Up in 6 months            Electronically Signed by: EDDIE Watkins -Author on August 31, 2020 06:29:41 PM

## 2021-05-14 VITALS
HEIGHT: 63 IN | WEIGHT: 274 LBS | BODY MASS INDEX: 48.55 KG/M2 | HEART RATE: 91 BPM | DIASTOLIC BLOOD PRESSURE: 80 MMHG | OXYGEN SATURATION: 91 % | RESPIRATION RATE: 16 BRPM | SYSTOLIC BLOOD PRESSURE: 131 MMHG | TEMPERATURE: 98.6 F

## 2021-05-14 VITALS
SYSTOLIC BLOOD PRESSURE: 114 MMHG | DIASTOLIC BLOOD PRESSURE: 59 MMHG | BODY MASS INDEX: 51.21 KG/M2 | HEIGHT: 63 IN | OXYGEN SATURATION: 98 % | HEART RATE: 98 BPM | TEMPERATURE: 98 F | WEIGHT: 289 LBS

## 2021-05-15 VITALS
DIASTOLIC BLOOD PRESSURE: 70 MMHG | BODY MASS INDEX: 50.14 KG/M2 | HEIGHT: 63 IN | SYSTOLIC BLOOD PRESSURE: 110 MMHG | WEIGHT: 283 LBS | OXYGEN SATURATION: 99 % | HEART RATE: 73 BPM

## 2021-05-15 VITALS
OXYGEN SATURATION: 94 % | SYSTOLIC BLOOD PRESSURE: 120 MMHG | DIASTOLIC BLOOD PRESSURE: 64 MMHG | WEIGHT: 272 LBS | HEART RATE: 89 BPM | HEIGHT: 63 IN | BODY MASS INDEX: 48.2 KG/M2

## 2021-05-15 VITALS
BODY MASS INDEX: 49.48 KG/M2 | DIASTOLIC BLOOD PRESSURE: 70 MMHG | WEIGHT: 279.25 LBS | OXYGEN SATURATION: 96 % | SYSTOLIC BLOOD PRESSURE: 118 MMHG | HEART RATE: 9 BPM | HEIGHT: 63 IN

## 2021-05-16 VITALS — RESPIRATION RATE: 16 BRPM | BODY MASS INDEX: 50.94 KG/M2 | HEIGHT: 63 IN | WEIGHT: 287.5 LBS

## 2021-05-16 VITALS — HEIGHT: 63 IN | WEIGHT: 287 LBS | RESPIRATION RATE: 14 BRPM | BODY MASS INDEX: 50.85 KG/M2

## 2021-05-16 VITALS
HEART RATE: 89 BPM | TEMPERATURE: 98.2 F | HEIGHT: 63 IN | BODY MASS INDEX: 49.52 KG/M2 | DIASTOLIC BLOOD PRESSURE: 62 MMHG | SYSTOLIC BLOOD PRESSURE: 140 MMHG | WEIGHT: 279.5 LBS

## 2021-05-16 VITALS
HEART RATE: 75 BPM | BODY MASS INDEX: 51.56 KG/M2 | DIASTOLIC BLOOD PRESSURE: 73 MMHG | SYSTOLIC BLOOD PRESSURE: 135 MMHG | WEIGHT: 291 LBS | HEIGHT: 63 IN

## 2021-05-23 ENCOUNTER — TRANSCRIBE ORDERS (OUTPATIENT)
Dept: CARDIOLOGY | Facility: HOSPITAL | Age: 66
End: 2021-05-23

## 2021-05-23 DIAGNOSIS — Z78.0 POST-MENOPAUSAL: Primary | ICD-10-CM

## 2021-06-01 ENCOUNTER — OFFICE VISIT CONVERTED (OUTPATIENT)
Dept: INTERNAL MEDICINE | Facility: CLINIC | Age: 66
End: 2021-06-01
Attending: INTERNAL MEDICINE

## 2021-06-01 ENCOUNTER — CONVERSION ENCOUNTER (OUTPATIENT)
Dept: INTERNAL MEDICINE | Facility: CLINIC | Age: 66
End: 2021-06-01

## 2021-06-01 ENCOUNTER — HOSPITAL ENCOUNTER (OUTPATIENT)
Dept: INTERNAL MEDICINE | Facility: CLINIC | Age: 66
Discharge: HOME | End: 2021-06-01
Attending: INTERNAL MEDICINE

## 2021-06-01 LAB
ALBUMIN SERPL-MCNC: 3.9 G/DL (ref 3.5–5)
ALBUMIN/GLOB SERPL: 1.1 {RATIO} (ref 1.4–2.6)
ALP SERPL-CCNC: 55 U/L (ref 43–160)
ALT SERPL-CCNC: 31 U/L (ref 10–40)
ANION GAP SERPL CALC-SCNC: 14 MMOL/L (ref 8–19)
AST SERPL-CCNC: 38 U/L (ref 15–50)
BASOPHILS # BLD AUTO: 0.08 10*3/UL (ref 0–0.2)
BASOPHILS NFR BLD AUTO: 0.8 % (ref 0–3)
BILIRUB SERPL-MCNC: 0.31 MG/DL (ref 0.2–1.3)
BUN SERPL-MCNC: 14 MG/DL (ref 5–25)
BUN/CREAT SERPL: 17 {RATIO} (ref 6–20)
CALCIUM SERPL-MCNC: 9.5 MG/DL (ref 8.7–10.4)
CHLORIDE SERPL-SCNC: 100 MMOL/L (ref 99–111)
CHOLEST SERPL-MCNC: 172 MG/DL (ref 107–200)
CHOLEST/HDLC SERPL: 4 {RATIO} (ref 3–6)
CONV ABS IMM GRAN: 0.04 10*3/UL (ref 0–0.2)
CONV CO2: 28 MMOL/L (ref 22–32)
CONV IMMATURE GRAN: 0.4 % (ref 0–1.8)
CONV TOTAL PROTEIN: 7.4 G/DL (ref 6.3–8.2)
CREAT UR-MCNC: 0.84 MG/DL (ref 0.5–0.9)
DEPRECATED RDW RBC AUTO: 46.8 FL (ref 36.4–46.3)
EOSINOPHIL # BLD AUTO: 0.22 10*3/UL (ref 0–0.7)
EOSINOPHIL # BLD AUTO: 2.3 % (ref 0–7)
ERYTHROCYTE [DISTWIDTH] IN BLOOD BY AUTOMATED COUNT: 14.3 % (ref 11.7–14.4)
EST. AVERAGE GLUCOSE BLD GHB EST-MCNC: 137 MG/DL
GFR SERPLBLD BASED ON 1.73 SQ M-ARVRAT: >60 ML/MIN/{1.73_M2}
GLOBULIN UR ELPH-MCNC: 3.5 G/DL (ref 2–3.5)
GLUCOSE SERPL-MCNC: 112 MG/DL (ref 65–99)
HBA1C MFR BLD: 6.4 % (ref 3.5–5.7)
HCT VFR BLD AUTO: 48.3 % (ref 37–47)
HDLC SERPL-MCNC: 43 MG/DL (ref 40–60)
HGB BLD-MCNC: 15.3 G/DL (ref 12–16)
LDLC SERPL CALC-MCNC: 108 MG/DL (ref 70–100)
LYMPHOCYTES # BLD AUTO: 3.39 10*3/UL (ref 1–5)
LYMPHOCYTES NFR BLD AUTO: 34.7 % (ref 20–45)
MCH RBC QN AUTO: 28.5 PG (ref 27–31)
MCHC RBC AUTO-ENTMCNC: 31.7 G/DL (ref 33–37)
MCV RBC AUTO: 90.1 FL (ref 81–99)
MONOCYTES # BLD AUTO: 0.59 10*3/UL (ref 0.2–1.2)
MONOCYTES NFR BLD AUTO: 6 % (ref 3–10)
NEUTROPHILS # BLD AUTO: 5.44 10*3/UL (ref 2–8)
NEUTROPHILS NFR BLD AUTO: 55.8 % (ref 30–85)
NRBC CBCN: 0 % (ref 0–0.7)
OSMOLALITY SERPL CALC.SUM OF ELEC: 287 MOSM/KG (ref 273–304)
PLATELET # BLD AUTO: 286 10*3/UL (ref 130–400)
PMV BLD AUTO: 10.5 FL (ref 9.4–12.3)
POTASSIUM SERPL-SCNC: 4.3 MMOL/L (ref 3.5–5.3)
RBC # BLD AUTO: 5.36 10*6/UL (ref 4.2–5.4)
SODIUM SERPL-SCNC: 138 MMOL/L (ref 135–147)
TRIGL SERPL-MCNC: 106 MG/DL (ref 40–150)
TSH SERPL-ACNC: 1.95 M[IU]/L (ref 0.27–4.2)
VLDLC SERPL-MCNC: 21 MG/DL (ref 5–37)
WBC # BLD AUTO: 9.76 10*3/UL (ref 4.8–10.8)

## 2021-06-05 NOTE — PROGRESS NOTES
"   Progress Note      Patient Name: Randi Fajardo   Patient ID: [id number]   Sex: Female   YOB: 1955    Primary Care Provider: Mainor Sanders MD   Referring Provider: Dilma MORGAN    Visit Date: June 1, 2021    Provider: Mainor Sanders MD   Location: [facility] [place]   Location Address: [address] [place] [address]; [address] [place] [address]  [id number]   Location Phone: [phone]          Chief Complaint  · follow up   · Middle finger on Left hand locks up sometimes   · there is a mole on left arm want to get checked out   · \"lump on right shoulder- wants to get checked out\"      History Of Present Illness  Randi Fajardo is a 65 year old /White female who presents for evaluation and treatment of:      impaired fasting glucose- due for recheck. pt has lost weight.   HTN- pt denies HAs, dizziness, CP.   pt reports left middle finger locks up, especially at nighttime. pt reports previously having trigger finger and got relief with steroid injections.   pt also wants left distal extremity lesion removed. pt reports hitting it frequently against things with recurrent bleeding. pt also concerned with hyperpigmented lesions on face that have grown in number.       Vitals  Date Time BP Position Site L\R Cuff Size HR RR TEMP (F) WT  HT  BMI kg/m2 BSA m2 O2 Sat FR L/min FiO2 HC       08/30/2019 03:11 /70 Sitting    9 - R   279lbs 4oz 5'  3\" 49.47 2.37 96 %      09/20/2019 12:09 /64 Sitting    89 - R   272lbs 0oz 5'  3\" 48.18 2.34 94 %      01/16/2020 02:37 /70 Sitting    73 - R   283lbs 0oz 5'  3\" 50.13 2.39 99 %      08/31/2020 01:42 /59 Sitting    98 - R  98 289lbs 0oz 5'  3\" 51.19 2.41 98 %  21%    03/01/2021 11:54 /80 Sitting    91 - R 16 98.6 274lbs 0oz 5'  3.5\" 47.78 2.36 91 %  21%    06/01/2021 11:14 /75 Sitting    88 - R  97.8 272lbs 2oz 5'  3.5\" 47.45 2.35 93 %  21%          Physical " Examination  · Constitutional  o Appearance  o : no acute distress, well-nourished  · Head and Face  o Head  o :   § Inspection  § : atraumatic, normocephalic  · Eyes  o Eyes  o : extraocular movements intact, no scleral icterus, no conjunctival injection  · Ears, Nose, Mouth and Throat  o Ears  o :   § External Ears  § : normal  o Nose  o :   § Intranasal Exam  § : nares patent  o Oral Cavity  o :   § Oral Mucosa  § : moist mucous membranes  · Respiratory  o Respiratory Effort  o : breathing comfortably, symmetric chest rise  o Auscultation of Lungs  o : clear to asculatation bilaterally, no wheezes, rales, or rhonchii  · Cardiovascular  o Heart  o :   § Auscultation of Heart  § : regular rate and rhythm, no murmurs, rubs, or gallops  o Peripheral Vascular System  o :   § Extremities  § : no edema  · Skin and Subcutaneous Tissue  o General Inspection  o : +popular, scabbed lesions approx. 1 cm in diameter on distal LUE without induration or exudate. multiple hyperpigmented lesions throughout sun exposed areas.   · Neurologic  o Mental Status Examination  o :   § Orientation  § : grossly oriented to person, place and time  o Gait and Station  o :   § Gait Screening  § : normal gait  · Psychiatric  o General  o : normal mood and affect          Assessment  · Essential hypertension     401.9/I10  well controlled currently  · GERD (gastroesophageal reflux disease)     530.81/K21.9  cont H2 blocker.   · Hypothyroidism     244.9/E03.9  check TSH and adjust synthroid accordingly  · Impaired fasting glucose     790.21/R73.01  check Hgba1c.   · Hyperpigmentation     709.00/L81.9  refer to dermatology for further eval and management of skin lesions  · Basal cell carcinoma     173.91/C44.91  · Epidermal inclusion cyst     706.2/L72.0    Problems Reconciled  Plan  · Orders  o Hgb A1c [facility] (13133) - 790.21/R73.01 - 06/01/2021  o Physical, Primary Care Panel (CBC, CMP, Lipid, TSH) [facility] (52916, 32221, 80871, 16011) - 401.9/I10,  244.9/E03.9 - 06/01/2021  o ACO-39: Current medications updated and reviewed (, 1159F) - - 06/01/2021  o DERMATOLOGY CONSULTATION (DERMA) - 709.00/L81.9, 173.91/C44.91, 706.2/L72.0 - 06/01/2021  · Medications  o Medications have been Reconciled  o Transition of Care or Provider Policy  · Instructions  o Patient was educated/instructed on their diagnosis, treatment and medications prior to discharge from the clinic today.  · Disposition  o f/u in 3 months  o labs done in clinic  o Care Transition  o MACK Sent            Electronically Signed by: Mainor Sanders MD -Author on Nancy 3, 2021 10:48:39 AM

## 2021-07-15 VITALS
BODY MASS INDEX: 48.21 KG/M2 | WEIGHT: 272.12 LBS | TEMPERATURE: 97.8 F | OXYGEN SATURATION: 93 % | HEART RATE: 88 BPM | HEIGHT: 63 IN | SYSTOLIC BLOOD PRESSURE: 119 MMHG | DIASTOLIC BLOOD PRESSURE: 75 MMHG

## 2021-08-20 ENCOUNTER — TELEPHONE (OUTPATIENT)
Dept: INTERNAL MEDICINE | Facility: CLINIC | Age: 66
End: 2021-08-20

## 2021-08-20 NOTE — TELEPHONE ENCOUNTER
Caller: Randi Fajardo    Relationship to patient: Self    Best call back number: [phone]     Patient is needing: PATIENT IS IN NEED OF ASYMPTOMATIC COVID TESTING.     AN ATTEMPT WAS MADE TO WARM TRANSFER, HOWEVER, I WAS UNABLE TO REACH THE OFFICE.

## 2021-08-25 ENCOUNTER — TELEPHONE (OUTPATIENT)
Dept: INTERNAL MEDICINE | Facility: CLINIC | Age: 66
End: 2021-08-25

## 2021-08-25 PROCEDURE — U0003 INFECTIOUS AGENT DETECTION BY NUCLEIC ACID (DNA OR RNA); SEVERE ACUTE RESPIRATORY SYNDROME CORONAVIRUS 2 (SARS-COV-2) (CORONAVIRUS DISEASE [COVID-19]), AMPLIFIED PROBE TECHNIQUE, MAKING USE OF HIGH THROUGHPUT TECHNOLOGIES AS DESCRIBED BY CMS-2020-01-R: HCPCS | Performed by: INTERNAL MEDICINE

## 2021-08-25 NOTE — TELEPHONE ENCOUNTER
Caller: Randi Fajardo    Relationship to patient: Self    Best call back number: [phone]    Patient is needing: PATIENT CALLED UPSET AND IS WANTING TO LEAVE A MESSAGE TO DR SOLIS. SHE STATED SHE WAS TOLD THAT THE RAPID TEST WOULD TAKE ABOUT 15 MINUTES AND NOBODY HAS CALLED HER BACK TO LET HER KNOW THE RESULTS AND SHE HAS CALLED ABOUT IT [facility] AND COULDN'T BE TRANSFERRED TO ANYBODY. PATIENT IS WANTING A CALL BACK TO LET HER KNOW THE RESULTS PLEASE ADVISE THANK YOU.

## 2021-08-25 NOTE — TELEPHONE ENCOUNTER
Caller: Randi Fajardo    Relationship: Self    Best call back number: [phone]    Caller requesting test results: PATIENT    What test was performed: RAPID COVID TEST    When was the test performed: 8/25/21    Where was the test performed: IN OFFICE    Additional notes: PATIENT WOULD LIKE THE RESULTS OF THE RAPID COVID TEST FROM [facility]. ATTEMPTED TO WARM TRANSFER. PLEASE CALL PATIENT TO ADVISE.

## 2021-09-02 ENCOUNTER — OFFICE VISIT (OUTPATIENT)
Dept: INTERNAL MEDICINE | Facility: CLINIC | Age: 66
End: 2021-09-02

## 2021-09-02 VITALS
WEIGHT: 282.8 LBS | BODY MASS INDEX: 48.28 KG/M2 | DIASTOLIC BLOOD PRESSURE: 80 MMHG | TEMPERATURE: 97.3 F | SYSTOLIC BLOOD PRESSURE: 143 MMHG | HEART RATE: 88 BPM | HEIGHT: 64 IN | OXYGEN SATURATION: 91 %

## 2021-09-02 DIAGNOSIS — I10 ESSENTIAL HYPERTENSION: Primary | ICD-10-CM

## 2021-09-02 DIAGNOSIS — E03.9 HYPOTHYROIDISM, UNSPECIFIED TYPE: ICD-10-CM

## 2021-09-02 DIAGNOSIS — R73.02 IMPAIRED GLUCOSE TOLERANCE: ICD-10-CM

## 2021-09-02 DIAGNOSIS — E78.5 HYPERLIPIDEMIA, UNSPECIFIED HYPERLIPIDEMIA TYPE: ICD-10-CM

## 2021-09-02 PROCEDURE — 85025 COMPLETE CBC W/AUTO DIFF WBC: CPT | Performed by: INTERNAL MEDICINE

## 2021-09-02 PROCEDURE — 80061 LIPID PANEL: CPT | Performed by: INTERNAL MEDICINE

## 2021-09-02 PROCEDURE — 84443 ASSAY THYROID STIM HORMONE: CPT | Performed by: INTERNAL MEDICINE

## 2021-09-02 PROCEDURE — 80053 COMPREHEN METABOLIC PANEL: CPT | Performed by: INTERNAL MEDICINE

## 2021-09-02 PROCEDURE — 83036 HEMOGLOBIN GLYCOSYLATED A1C: CPT | Performed by: INTERNAL MEDICINE

## 2021-09-02 PROCEDURE — 85027 COMPLETE CBC AUTOMATED: CPT | Performed by: INTERNAL MEDICINE

## 2021-09-02 PROCEDURE — 85007 BL SMEAR W/DIFF WBC COUNT: CPT | Performed by: INTERNAL MEDICINE

## 2021-09-02 PROCEDURE — 99214 OFFICE O/P EST MOD 30 MIN: CPT | Performed by: INTERNAL MEDICINE

## 2021-09-02 RX ORDER — ROPINIROLE 2 MG/1
2 TABLET, FILM COATED ORAL NIGHTLY
COMMUNITY
End: 2021-09-02 | Stop reason: SDUPTHER

## 2021-09-02 RX ORDER — DULOXETIN HYDROCHLORIDE 60 MG/1
60 CAPSULE, DELAYED RELEASE ORAL DAILY
COMMUNITY
Start: 2021-08-31 | End: 2022-03-14 | Stop reason: SDUPTHER

## 2021-09-02 RX ORDER — GABAPENTIN 600 MG/1
600 TABLET ORAL 3 TIMES DAILY PRN
COMMUNITY
Start: 2021-07-10 | End: 2022-03-14 | Stop reason: SDUPTHER

## 2021-09-02 RX ORDER — FAMOTIDINE 40 MG/1
40 TABLET, FILM COATED ORAL 2 TIMES DAILY
COMMUNITY
Start: 2021-08-31 | End: 2022-03-14 | Stop reason: SDUPTHER

## 2021-09-02 RX ORDER — HYDROCHLOROTHIAZIDE 25 MG/1
25 TABLET ORAL DAILY
COMMUNITY
Start: 2021-07-18 | End: 2022-03-14 | Stop reason: SDUPTHER

## 2021-09-02 RX ORDER — LEVOTHYROXINE SODIUM 25 MCG
25 TABLET ORAL DAILY
COMMUNITY
Start: 2021-07-18 | End: 2022-03-14 | Stop reason: SDUPTHER

## 2021-09-02 RX ORDER — MINOCYCLINE HYDROCHLORIDE 100 MG/1
100 CAPSULE ORAL DAILY
COMMUNITY
Start: 2021-07-18 | End: 2022-03-14 | Stop reason: SDUPTHER

## 2021-09-02 RX ORDER — ROPINIROLE 2 MG/1
2 TABLET, FILM COATED ORAL NIGHTLY
Qty: 90 TABLET | Refills: 3 | Status: SHIPPED | OUTPATIENT
Start: 2021-09-02 | End: 2022-03-13 | Stop reason: SDUPTHER

## 2021-09-02 NOTE — PROGRESS NOTES
"Chief Complaint  Follow-up DM    Subjective          Randi Fajardo presents to [facility]  History of Present Illness  OA- pt reports gabapentin helps tremendously. Pt reports she is able to stay active.  Hypothyroid- due for recheck  HTN- pt reports home readings 120s/70s.   Impaired glucose tolerance - due for recheck. Pt is trying to take better care of eating and exercising.     Objective   Vital Signs:   /80 (BP Location: Left arm, Patient Position: Sitting, Cuff Size: Large Adult)   Pulse 88   Temp 97.3 °F (36.3 °C) (Temporal)   Ht 161.3 cm (63.5\")   Wt 128 kg (282 lb 12.8 oz)   SpO2 91%   BMI 49.31 kg/m²     Physical Exam   Appearance: No acute distress, well-nourished  Head: normocephalic, atraumatic  Eyes: extraocular movements intact, no scleral icterus, no conjunctival injection  Ears, Nose, and Throat: external ears normal, nares patent, moist mucous membranes  Cardiovascular: regular rate and rhythm. no murmurs, rales, or rhonchi. no edema  Respiratory: breathing comfortably, symmetric chest rise, clear to auscultation bilaterally. No wheezes, rales, or rhonchi.  Neuro: alert and oriented to time, place, and person. Normal gait  Psych: normal mood and affect     Result Review :   The following data was reviewed by: Mainor Sanders Jr, MD on 09/02/2021:  Common labs    Common Labsle 9/10/20 3/1/21 3/1/21 6/1/21 6/1/21     1300 1300 1211 1211   Glucose  103 (A)  112 (A)    BUN  15  14    Creatinine  0.79  0.84    Sodium  138  138    Potassium  4.4  4.3    Chloride  102  100    Calcium  9.6  9.5    Albumin  3.8  3.9    Total Bilirubin  0.42  0.31    Alkaline Phosphatase  61  55    AST (SGOT)  41  38    ALT (SGPT)  35  31    WBC  10.67  9.76    Hemoglobin  15.9  15.3    Hematocrit  50.5 (A)  48.3 (A)    Platelets  313  286    Total Cholesterol  160  172    Triglycerides  144  106    HDL Cholesterol  35 (A)  43    LDL Cholesterol   96  108 " (A)    Hemoglobin A1C 6.3 (A)  6.4 (A)  6.4 (A)   (A) Abnormal value       Comments are available for some flowsheets but are not being displayed.           Assessment and Plan    Diagnoses and all orders for this visit:    1. Essential hypertension (Primary)  Comments:  well controlled on regimen  Orders:  -     CBC & Differential  -     Comprehensive Metabolic Panel    2. Hypothyroidism, unspecified type  -     TSH    3. Impaired glucose tolerance  -     Hemoglobin A1c    4. Hyperlipidemia, unspecified hyperlipidemia type  -     Lipid Panel    Other orders  -     rOPINIRole (REQUIP) 2 MG tablet; Take 1 tablet by mouth Every Night.  Dispense: 90 tablet; Refill: 3        Follow Up   Return in about 6 months (around 3/2/2022) for Recheck.  Patient was given instructions and counseling regarding her condition or for health maintenance advice. Please see specific information pulled into the AVS if appropriate.

## 2021-09-03 LAB
ALBUMIN SERPL-MCNC: 4 G/DL (ref 3.5–5.2)
ALBUMIN/GLOB SERPL: 1.5 G/DL
ALP SERPL-CCNC: 49 U/L (ref 39–117)
ALT SERPL W P-5'-P-CCNC: 45 U/L (ref 1–33)
ANION GAP SERPL CALCULATED.3IONS-SCNC: 10.5 MMOL/L (ref 5–15)
AST SERPL-CCNC: 44 U/L (ref 1–32)
BILIRUB SERPL-MCNC: 0.4 MG/DL (ref 0–1.2)
BUN SERPL-MCNC: 17 MG/DL (ref 8–23)
BUN/CREAT SERPL: 18.1 (ref 7–25)
CALCIUM SPEC-SCNC: 10.2 MG/DL (ref 8.6–10.5)
CHLORIDE SERPL-SCNC: 101 MMOL/L (ref 98–107)
CHOLEST SERPL-MCNC: 154 MG/DL (ref 0–200)
CO2 SERPL-SCNC: 24.5 MMOL/L (ref 22–29)
CREAT SERPL-MCNC: 0.94 MG/DL (ref 0.57–1)
DEPRECATED RDW RBC AUTO: 43.6 FL (ref 37–54)
EOSINOPHIL # BLD MANUAL: 0.22 10*3/MM3 (ref 0–0.4)
EOSINOPHIL NFR BLD MANUAL: 2.1 % (ref 0.3–6.2)
ERYTHROCYTE [DISTWIDTH] IN BLOOD BY AUTOMATED COUNT: 13.5 % (ref 12.3–15.4)
GFR SERPL CREATININE-BSD FRML MDRD: 59 ML/MIN/1.73
GLOBULIN UR ELPH-MCNC: 2.7 GM/DL
GLUCOSE SERPL-MCNC: 95 MG/DL (ref 65–99)
HBA1C MFR BLD: 6.58 % (ref 4.8–5.6)
HCT VFR BLD AUTO: 47.7 % (ref 34–46.6)
HDLC SERPL-MCNC: 36 MG/DL (ref 40–60)
HGB BLD-MCNC: 15.5 G/DL (ref 12–15.9)
LDLC SERPL CALC-MCNC: 91 MG/DL (ref 0–100)
LDLC/HDLC SERPL: 2.44 {RATIO}
LYMPHOCYTES # BLD MANUAL: 4.85 10*3/MM3 (ref 0.7–3.1)
LYMPHOCYTES NFR BLD MANUAL: 47.4 % (ref 19.6–45.3)
LYMPHOCYTES NFR BLD MANUAL: 5.2 % (ref 5–12)
MCH RBC QN AUTO: 28.8 PG (ref 26.6–33)
MCHC RBC AUTO-ENTMCNC: 32.5 G/DL (ref 31.5–35.7)
MCV RBC AUTO: 88.5 FL (ref 79–97)
MONOCYTES # BLD AUTO: 0.53 10*3/MM3 (ref 0.1–0.9)
NEUTROPHILS # BLD AUTO: 4.65 10*3/MM3 (ref 1.7–7)
NEUTROPHILS NFR BLD MANUAL: 45.4 % (ref 42.7–76)
PLAT MORPH BLD: NORMAL
PLATELET # BLD AUTO: 286 10*3/MM3 (ref 140–450)
PMV BLD AUTO: 10.8 FL (ref 6–12)
POTASSIUM SERPL-SCNC: 4.4 MMOL/L (ref 3.5–5.2)
PROT SERPL-MCNC: 6.7 G/DL (ref 6–8.5)
RBC # BLD AUTO: 5.39 10*6/MM3 (ref 3.77–5.28)
RBC MORPH BLD: NORMAL
SMUDGE CELLS BLD QL SMEAR: ABNORMAL
SODIUM SERPL-SCNC: 136 MMOL/L (ref 136–145)
TRIGL SERPL-MCNC: 151 MG/DL (ref 0–150)
TSH SERPL DL<=0.05 MIU/L-ACNC: 2.13 UIU/ML (ref 0.27–4.2)
VLDLC SERPL-MCNC: 27 MG/DL (ref 5–40)
WBC # BLD AUTO: 10.24 10*3/MM3 (ref 3.4–10.8)

## 2021-10-13 ENCOUNTER — PATIENT MESSAGE (OUTPATIENT)
Dept: INTERNAL MEDICINE | Facility: CLINIC | Age: 66
End: 2021-10-13

## 2021-10-16 NOTE — TELEPHONE ENCOUNTER
From: Randi Fajardo  To: Mainor Sanders Jr, MD  Sent: 10/13/2021 4:00 PM EDT  Subject: Non-Urgent Medical Question    Since I was recently diagnosed with diabetes, should I be monitoring my blood sugar on a regular basis or will the blood work done as part of my routine visits take care of that? Thank you!

## 2021-10-20 ENCOUNTER — TELEPHONE (OUTPATIENT)
Dept: INTERNAL MEDICINE | Facility: CLINIC | Age: 66
End: 2021-10-20

## 2021-10-20 NOTE — TELEPHONE ENCOUNTER
Caller: Randi Fajardo    Relationship: Self    Best call back number: [phone]    What medications are you currently taking:   Current Outpatient Medications on File Prior to Visit   Medication Sig Dispense Refill   • DULoxetine (CYMBALTA) 60 MG capsule      • famotidine (PEPCID) 40 MG tablet      • gabapentin (NEURONTIN) 600 MG tablet Take 600 mg by mouth 3 (Three) Times a Day As Needed.     • hydroCHLOROthiazide (HYDRODIURIL) 25 MG tablet      • metFORMIN (Glucophage) 500 MG tablet Take 1 tablet by mouth 2 (Two) Times a Day With Meals. 180 tablet 3   • minocycline (MINOCIN,DYNACIN) 100 MG capsule      • rOPINIRole (REQUIP) 2 MG tablet Take 1 tablet by mouth Every Night. 90 tablet 3   • Synthroid 25 MCG tablet        No current facility-administered medications on file prior to visit.          When did you start taking these medications: SEVERAL YEARS    Which medication are you concerned about: hydroCHLOROthiazide (HYDRODIURIL) 25 MG tablet    Who prescribed you this medication: MERCEDEZ GALVEZ    What are your concerns:PATIENT STATED SHE READ AN ARTICLE THAT THIS MEDICATION HAS BEEN RECALLED DUE TO CONTAINING CARCINOGENS.  SHE WOULD LIKE A CALL BACK FROM PCP TO DISCUSS.      How long have you had these concerns: TODAY 10/20/2021

## 2021-10-24 NOTE — TELEPHONE ENCOUNTER
Would call and ask pharmacy if her medication was included in the lots that were effected by the recall.

## 2021-10-25 NOTE — TELEPHONE ENCOUNTER
ATTEMPTED TO CONTACT PT PER PROVIDER'S INSTRUCTIONS     NO ANSWER     LEFT VOICEMAIL WITH INSTRUCTIONS TO RETURN CALL TO OFFICE AT [phone]    OK FOR HUB TO ADVISE/READ   PT hydroCHLOROthiazide (HYDRODIURIL) 25 MG tablet (07/18/2021) WAS NOT AFFECTED BY THE CURRENT RECALL OF SIMILAR MEDICATIONS

## 2021-10-25 NOTE — TELEPHONE ENCOUNTER
[facility]    PHARMACY STATES PT HASN'T PICKED UP ANY hydroCHLOROthiazide (HYDRODIURIL) 25 MG tablet (07/18/2021) IN THE LAST 18 MONTHS

## 2021-10-25 NOTE — TELEPHONE ENCOUNTER
CESAR TRANSFERRED TO PHARMACY TECH    PHARMACY [facility] STATES HCTZ IS NOT PART OF THE RECALL    PHARMACY STATES RECALLED IS FOR LOSARTAN VARIANTS, AND DOES NOT INCLUDE SOLE HCTZ VARIANTS

## 2021-10-25 NOTE — TELEPHONE ENCOUNTER
OPT 2 PROVIDER LINE    BRITTANY AT [facility]    CANNOT VALIDATE MEDICATION    PT WILL NEED TO CALL PHARMACY [facility] ON BACK ON INSURANCE CARD

## 2021-11-23 ENCOUNTER — OFFICE VISIT (OUTPATIENT)
Dept: INTERNAL MEDICINE | Facility: CLINIC | Age: 66
End: 2021-11-23

## 2021-11-23 VITALS
TEMPERATURE: 97.4 F | BODY MASS INDEX: 49.75 KG/M2 | HEIGHT: 63 IN | WEIGHT: 280.8 LBS | DIASTOLIC BLOOD PRESSURE: 67 MMHG | OXYGEN SATURATION: 92 % | SYSTOLIC BLOOD PRESSURE: 105 MMHG | HEART RATE: 96 BPM

## 2021-11-23 DIAGNOSIS — E03.9 HYPOTHYROIDISM, UNSPECIFIED TYPE: ICD-10-CM

## 2021-11-23 DIAGNOSIS — R73.02 IMPAIRED GLUCOSE TOLERANCE: ICD-10-CM

## 2021-11-23 DIAGNOSIS — R06.09 DOE (DYSPNEA ON EXERTION): ICD-10-CM

## 2021-11-23 DIAGNOSIS — R21 RASH: ICD-10-CM

## 2021-11-23 DIAGNOSIS — E78.5 HYPERLIPIDEMIA, UNSPECIFIED HYPERLIPIDEMIA TYPE: ICD-10-CM

## 2021-11-23 DIAGNOSIS — I10 ESSENTIAL HYPERTENSION: ICD-10-CM

## 2021-11-23 DIAGNOSIS — Z00.00 ANNUAL PHYSICAL EXAM: Primary | ICD-10-CM

## 2021-11-23 PROCEDURE — G0439 PPPS, SUBSEQ VISIT: HCPCS | Performed by: INTERNAL MEDICINE

## 2021-11-23 PROCEDURE — 1159F MED LIST DOCD IN RCRD: CPT | Performed by: INTERNAL MEDICINE

## 2021-11-23 PROCEDURE — 99213 OFFICE O/P EST LOW 20 MIN: CPT | Performed by: INTERNAL MEDICINE

## 2021-11-23 PROCEDURE — 1170F FXNL STATUS ASSESSED: CPT | Performed by: INTERNAL MEDICINE

## 2021-11-23 NOTE — PROGRESS NOTES
The ABCs of the Annual Wellness Visit  Welcome to Medicare Visit    Chief Complaint   Patient presents with   • Welcome To Medicare   • Skin Problem     referral to derm      Subjective {   History of Present Illness:  Randi Fajardo is a 66 y.o. female who presents for a  Welcome to Medicare Visit.    Patient reports increase FUENTES more than previously. Patient denies any chest pain. Patient without history of lung disease but did smoke for several years.   Patient needs updated referral to dermatology.     The following portions of the patient's history were reviewed and   updated as appropriate: allergies, current medications, past family history, past medical history, past social history, past surgical history and problem list.     Compared to one year ago, the patient feels her physical   health is the same.    Compared to one year ago, the patient feels her mental   health is the same.    Recent Hospitalizations:  She was not admitted to the hospital during the last year.       Current Medical Providers:  Patient Care Team:  Mainor Sanders Jr., MD as PCP - General (Internal Medicine)    Outpatient Medications Prior to Visit   Medication Sig Dispense Refill   • DULoxetine (CYMBALTA) 60 MG capsule      • famotidine (PEPCID) 40 MG tablet      • gabapentin (NEURONTIN) 600 MG tablet Take 600 mg by mouth 3 (Three) Times a Day As Needed.     • hydroCHLOROthiazide (HYDRODIURIL) 25 MG tablet      • metFORMIN (Glucophage) 500 MG tablet Take 1 tablet by mouth 2 (Two) Times a Day With Meals. 180 tablet 3   • minocycline (MINOCIN,DYNACIN) 100 MG capsule      • rOPINIRole (REQUIP) 2 MG tablet Take 1 tablet by mouth Every Night. 90 tablet 3   • Synthroid 25 MCG tablet        No facility-administered medications prior to visit.       No opioid medication identified on active medication list. I have reviewed chart for other potential  high risk medication/s and harmful drug interactions in the elderly.        Aspirin  "is not on active medication list.  Aspirin use is not indicated based on review of current medical condition/s. Risk of harm outweighs potential benefits.  .    Patient Active Problem List   Diagnosis   • Hypothyroidism   • Essential hypertension   • Impaired glucose tolerance   • Hyperlipidemia     Advance Care Planning  Advance Directive is not on file.  ACP discussion was held with the patient during this visit. Patient has an advance directive (not in EMR), copy requested.        Objective      Vitals:    11/23/21 1419   BP: 105/67   BP Location: Left arm   Patient Position: Sitting   Cuff Size: Large Adult   Pulse: 96   Temp: 97.4 °F (36.3 °C)   TempSrc: Temporal   SpO2: 92%   Weight: 127 kg (280 lb 12.8 oz)   Height: 160 cm (63\")     BMI Readings from Last 1 Encounters:   11/23/21 49.74 kg/m²   BMI is above normal parameters. Recommendations include: exercise counseling    Does the patient have evidence of cognitive impairment? No    Physical Exam  Appearance: No acute distress, well-nourished  Head: normocephalic, atraumatic  Eyes: extraocular movements intact, no scleral icterus, no conjunctival injection  Ears, Nose, and Throat: external ears normal, nares patent, moist mucous membranes  Cardiovascular: regular rate and rhythm. no murmurs, rales, or rhonchi. no edema  Respiratory: breathing comfortably, symmetric chest rise, clear to auscultation bilaterally. No wheezes, rales, or rhonchi.  Neuro: alert and oriented to time, place, and person. Normal gait  Psych: normal mood and affect     Lab Results   Component Value Date    TRIG 151 (H) 09/02/2021    HDL 36 (L) 09/02/2021    LDL 91 09/02/2021    VLDL 27 09/02/2021    HGBA1C 6.58 (H) 09/02/2021       HEALTH RISK ASSESSMENT    Smoking Status:  Social History     Tobacco Use   Smoking Status Former Smoker   • Packs/day: 0.50   • Types: Cigarettes   Smokeless Tobacco Former User   Tobacco Comment    started at age 14, stopped at 60     Alcohol " Consumption:  Social History     Substance and Sexual Activity   Alcohol Use Yes    Comment: rarely       Fall Risk Screen:    AJITHADI Fall Risk Assessment was completed, and patient is at MODERATE risk for falls. Assessment completed on:11/23/2021    Depression Screen:   PHQ-2/PHQ-9 Depression Screening 9/2/2021   Little interest or pleasure in doing things 0   Feeling down, depressed, or hopeless 0   Total Score 0       Health Habits and Functional and Cognitive Screening:  Functional & Cognitive Status 11/23/2021   Do you have difficulty preparing food and eating? No   Do you have difficulty bathing yourself, getting dressed or grooming yourself? No   Do you have difficulty using the toilet? No   Do you have difficulty moving around from place to place? No   Do you have trouble with steps or getting out of a bed or a chair? No   Current Diet Low Carb Diet   Dental Exam Up to date   Eye Exam Up to date   Exercise (times per week) 3 times per week   Current Exercises Include Home Fitness Gym   Do you need help using the phone?  No   Are you deaf or do you have serious difficulty hearing?  No   Do you need help with transportation? Yes   Do you need help shopping? No   Do you need help preparing meals?  No   Do you need help with housework?  No   Do you need help with laundry? No   Do you need help taking your medications? No   Do you need help managing money? No   Do you ever drive or ride in a car without wearing a seat belt? No   Have you felt unusual stress, anger or loneliness in the last month? Yes   Who do you live with? Alone   If you need help, do you have trouble finding someone available to you? No   Have you been bothered in the last four weeks by sexual problems? No   Do you have difficulty concentrating, remembering or making decisions? No       Visual Acuity:    No exam data present    Age-appropriate Screening Schedule:  Refer to the list below for future screening recommendations based on patient's  age, sex and/or medical conditions. Orders for these recommended tests are listed in the plan section. The patient has been provided with a written plan.    Health Maintenance   Topic Date Due   • DXA SCAN  11/26/2021   • LIPID PANEL  09/02/2022   • MAMMOGRAM  12/23/2022   • TDAP/TD VACCINES (2 - Td or Tdap) 05/21/2025   • INFLUENZA VACCINE  Completed   • ZOSTER VACCINE  Completed          Assessment/Plan   CMS Preventative Services Quick Reference  Risk Factors Identified During Encounter  Obesity/Overweight   Tobacco Use/Dependance (use dotphrase .tobaccocessation for documentation)  The above risks/problems have been discussed with the patient.  Pertinent information has been shared with the patient in the After Visit Summary.  Follow up plans and orders are seen below in the Assessment/Plan Section.    Diagnoses and all orders for this visit:    1. Annual physical exam (Primary)    2. Essential hypertension  Comments:  well controlled  Orders:  -     CBC & Differential  -     Comprehensive Metabolic Panel    3. Hypothyroidism, unspecified type  -     TSH    4. Hyperlipidemia, unspecified hyperlipidemia type  -     Lipid Panel  -     Comprehensive Metabolic Panel    5. Impaired glucose tolerance  -     Hemoglobin A1c    6. Rash  -     Ambulatory Referral to Dermatology    7. FUENTES (dyspnea on exertion)  Comments:  chest CT reviewed with pt. will obtain echo and PFT to further eval.   Orders:  -     Adult Transthoracic Echo Complete W/ Cont if Necessary Per Protocol; Future  -     Full Pulmonary Function Test With Bronchodilator; Future        Follow Up:   Return for Next scheduled follow up.     An After Visit Summary and PPPS were made available to the patient.

## 2021-11-29 ENCOUNTER — HOSPITAL ENCOUNTER (OUTPATIENT)
Dept: BONE DENSITY | Facility: HOSPITAL | Age: 66
End: 2021-11-29

## 2021-11-29 ENCOUNTER — TELEPHONE (OUTPATIENT)
Dept: INTERNAL MEDICINE | Facility: CLINIC | Age: 66
End: 2021-11-29

## 2021-11-29 DIAGNOSIS — N95.1 POSTMENOPAUSAL SYNDROME: Primary | ICD-10-CM

## 2021-12-02 ENCOUNTER — HOSPITAL ENCOUNTER (OUTPATIENT)
Dept: BONE DENSITY | Facility: HOSPITAL | Age: 66
Discharge: HOME | End: 2021-12-02
Admitting: INTERNAL MEDICINE

## 2021-12-02 PROCEDURE — 77080 DXA BONE DENSITY AXIAL: CPT

## 2021-12-15 ENCOUNTER — PATIENT MESSAGE (OUTPATIENT)
Dept: INTERNAL MEDICINE | Facility: CLINIC | Age: 66
End: 2021-12-15

## 2021-12-15 DIAGNOSIS — E11.9 TYPE 2 DIABETES MELLITUS WITHOUT COMPLICATION, WITHOUT LONG-TERM CURRENT USE OF INSULIN (HCC): Primary | ICD-10-CM

## 2021-12-17 RX ORDER — LANCETS 28 GAUGE
1 EACH MISCELLANEOUS AS NEEDED
Qty: 100 EACH | Refills: 3 | Status: SHIPPED | OUTPATIENT
Start: 2021-12-17 | End: 2022-01-12 | Stop reason: SDUPTHER

## 2021-12-17 RX ORDER — GLUCOSAMINE HCL/CHONDROITIN SU 500-400 MG
1 CAPSULE ORAL
Qty: 100 EACH | Refills: 3 | Status: SHIPPED | OUTPATIENT
Start: 2021-12-17 | End: 2022-01-12 | Stop reason: SDUPTHER

## 2021-12-17 RX ORDER — PEN NEEDLE, DIABETIC 31 GX5/16"
1 NEEDLE, DISPOSABLE MISCELLANEOUS
Qty: 200 EACH | Refills: 3 | Status: SHIPPED | OUTPATIENT
Start: 2021-12-17 | End: 2022-12-19

## 2021-12-17 RX ORDER — BLOOD-GLUCOSE METER
1 KIT MISCELLANEOUS ONCE
Qty: 1 EACH | Refills: 0 | Status: SHIPPED | OUTPATIENT
Start: 2021-12-17 | End: 2022-10-31

## 2021-12-17 NOTE — TELEPHONE ENCOUNTER
From: Randi Fajardo  To: Mainor Sanders Jr, MD  Sent: 12/15/2021 9:05 PM EST  Subject: Bone density results    After reviewing my bone density results, I have a question. How much calcium and vitamin D do you recommend? I currently take a calcium tablet daily with 600 mg of calcium and 20 mcg of D3. In addition my daily vitamin has 300 mg of calcium and 25 mcg of D3. I also eat quite a bit of cheese. Thank you!

## 2021-12-17 NOTE — TELEPHONE ENCOUNTER
From: Randi Fajardo  To: Mainor Sanders Jr, MD  Sent: 12/15/2021 8:42 PM EST  Subject: Diabetes    You recently diagnosed me with diabetes. I have wondered if I need to check my blood sugar periodically. I am trying very hard to control my diet, but I am concerned. I was recently on a trip with a friend who also has diabetes. She checks her blood sugar daily. She offered to check mine while on the trip. It was after the evening meal where I had eaten a biscuit. It was 173. That concerns me. My daughter stated that her diabetic patients check blood sugar at least three times per week. My friend said that she discovered that zucchini raises her blood sugar. Would monitoring my sugar help me find which foods affect my blood sugar the most? I really want to keep mine under control. I look forward to hearing from you. Thank you so much!

## 2021-12-20 PROBLEM — E11.9 TYPE 2 DIABETES MELLITUS WITHOUT COMPLICATION, WITHOUT LONG-TERM CURRENT USE OF INSULIN: Status: ACTIVE | Noted: 2021-12-20

## 2021-12-20 PROBLEM — R73.02 IMPAIRED GLUCOSE TOLERANCE: Status: RESOLVED | Noted: 2021-09-02 | Resolved: 2021-12-20

## 2021-12-23 ENCOUNTER — TELEPHONE (OUTPATIENT)
Dept: INTERNAL MEDICINE | Facility: CLINIC | Age: 66
End: 2021-12-23

## 2021-12-28 NOTE — TELEPHONE ENCOUNTER
PA APPROVED    Outcome  Approved today  PA Case: [id number], Status: Approved, Coverage Starts on: 9/28/2021 12:00:00 AM, Coverage Ends on: 12/28/2022 12:00:00 AM.  Drug  Accu-Chek Guide strips  Form  [employer] Medicare Electronic PA Form (2017 NCPDP)  Original Claim Info  76 PLAN ALLOWS QL UP /90 DAYS MAXIMUM DAILY DOSE OF 3.6000

## 2022-01-10 ENCOUNTER — EDUCATION (OUTPATIENT)
Dept: NUTRITION | Facility: HOSPITAL | Age: 67
End: 2022-01-10

## 2022-01-10 ENCOUNTER — HOSPITAL ENCOUNTER (OUTPATIENT)
Dept: NUTRITION | Facility: HOSPITAL | Age: 67
Discharge: HOME | End: 2022-01-10
Admitting: DIETITIAN, REGISTERED

## 2022-01-10 PROCEDURE — 97802 MEDICAL NUTRITION INDIV IN: CPT | Performed by: DIETITIAN, REGISTERED

## 2022-01-11 ENCOUNTER — PATIENT MESSAGE (OUTPATIENT)
Dept: INTERNAL MEDICINE | Facility: CLINIC | Age: 67
End: 2022-01-11

## 2022-01-12 RX ORDER — GLUCOSAMINE HCL/CHONDROITIN SU 500-400 MG
1 CAPSULE ORAL
Qty: 100 EACH | Refills: 3 | Status: SHIPPED | OUTPATIENT
Start: 2022-01-12 | End: 2022-02-08 | Stop reason: SDUPTHER

## 2022-01-12 RX ORDER — LANCETS 28 GAUGE
1 EACH MISCELLANEOUS AS NEEDED
Qty: 100 EACH | Refills: 3 | Status: SHIPPED | OUTPATIENT
Start: 2022-01-12 | End: 2022-02-08 | Stop reason: SDUPTHER

## 2022-01-12 NOTE — TELEPHONE ENCOUNTER
From: Randi Fajardo  To: Mainor Sanders Jr, MD  Sent: 1/11/2022 11:50 AM EST  Subject: Blood glucose monitoring supplies    I spoke with [facility] on Friday, because I have not received my blood glucose test strips and will need more than the 10 lancets that were received with my Accu-Chek. They requested that you call them for a prescription for the lancets. The test strips are being mailed. They suggested a short-term prescription to be filled locally for these supplies so that I can begin testing. I called your office yesterday to relay this information but have not received a call back (now 11:48 am) after number was left due to lengthy wait. Please advise. Thank you!

## 2022-01-13 ENCOUNTER — LAB (OUTPATIENT)
Dept: LAB | Facility: HOSPITAL | Age: 67
End: 2022-01-13

## 2022-01-13 LAB
BASOPHILS # BLD AUTO: 0.07 10*3/MM3 (ref 0–0.2)
BASOPHILS NFR BLD AUTO: 0.7 % (ref 0–1.5)
CHOLEST SERPL-MCNC: 169 MG/DL (ref 0–200)
DEPRECATED RDW RBC AUTO: 42.1 FL (ref 37–54)
EOSINOPHIL # BLD AUTO: 0.23 10*3/MM3 (ref 0–0.4)
EOSINOPHIL NFR BLD AUTO: 2.2 % (ref 0.3–6.2)
ERYTHROCYTE [DISTWIDTH] IN BLOOD BY AUTOMATED COUNT: 13.1 % (ref 12.3–15.4)
HBA1C MFR BLD: 6.1 % (ref 4.8–5.6)
HCT VFR BLD AUTO: 48.9 % (ref 34–46.6)
HDLC SERPL-MCNC: 38 MG/DL (ref 40–60)
HGB BLD-MCNC: 16.2 G/DL (ref 12–15.9)
IMM GRANULOCYTES # BLD AUTO: 0.05 10*3/MM3 (ref 0–0.05)
IMM GRANULOCYTES NFR BLD AUTO: 0.5 % (ref 0–0.5)
LDLC SERPL CALC-MCNC: 104 MG/DL (ref 0–100)
LDLC/HDLC SERPL: 2.64 {RATIO}
LYMPHOCYTES # BLD AUTO: 3.82 10*3/MM3 (ref 0.7–3.1)
LYMPHOCYTES NFR BLD AUTO: 36.9 % (ref 19.6–45.3)
MCH RBC QN AUTO: 29.2 PG (ref 26.6–33)
MCHC RBC AUTO-ENTMCNC: 33.1 G/DL (ref 31.5–35.7)
MCV RBC AUTO: 88.1 FL (ref 79–97)
MONOCYTES # BLD AUTO: 0.79 10*3/MM3 (ref 0.1–0.9)
MONOCYTES NFR BLD AUTO: 7.6 % (ref 5–12)
NEUTROPHILS NFR BLD AUTO: 5.38 10*3/MM3 (ref 1.7–7)
NEUTROPHILS NFR BLD AUTO: 52.1 % (ref 42.7–76)
NRBC BLD AUTO-RTO: 0 /100 WBC (ref 0–0.2)
PLATELET # BLD AUTO: 301 10*3/MM3 (ref 140–450)
PMV BLD AUTO: 10.9 FL (ref 6–12)
RBC # BLD AUTO: 5.55 10*6/MM3 (ref 3.77–5.28)
TRIGL SERPL-MCNC: 153 MG/DL (ref 0–150)
TSH SERPL DL<=0.05 MIU/L-ACNC: 2.23 UIU/ML (ref 0.27–4.2)
VLDLC SERPL-MCNC: 27 MG/DL (ref 5–40)
WBC NRBC COR # BLD: 10.34 10*3/MM3 (ref 3.4–10.8)

## 2022-01-13 PROCEDURE — 84443 ASSAY THYROID STIM HORMONE: CPT | Performed by: INTERNAL MEDICINE

## 2022-01-13 PROCEDURE — 85025 COMPLETE CBC W/AUTO DIFF WBC: CPT | Performed by: INTERNAL MEDICINE

## 2022-01-13 PROCEDURE — 83036 HEMOGLOBIN GLYCOSYLATED A1C: CPT | Performed by: INTERNAL MEDICINE

## 2022-01-13 PROCEDURE — 80061 LIPID PANEL: CPT | Performed by: INTERNAL MEDICINE

## 2022-01-19 VITALS — BODY MASS INDEX: 49.41 KG/M2 | WEIGHT: 278.88 LBS | HEIGHT: 63 IN

## 2022-01-19 NOTE — CONSULTS
"Randi Fajardo is a 66 y.o. female who presents to [facility] for nutrition consult r/t diagnosis of T2DM, newly diagnosed.  Randi Fajardo is referred by Dr. Sanders.     Past Medical History:   Diagnosis Date   • Depressed    • GREGG (generalized anxiety disorder)    • Gastroesophageal reflux    • HTN (hypertension)    • Hypothyroid    • Lesion of neck    • Osteoarthritis    • RLS (restless legs syndrome)    • Sleep apnea in adult        Anthropometrics  Ht Readings from Last 1 Encounters:   01/10/22 160 cm (63\")     Wt Readings from Last 1 Encounters:   01/10/22 126 kg (278 lb 14.1 oz)     Body mass index is 49.4 kg/m².    Diabetes History  Diabetes History  What type of diabetes do you have?: Type 2  Length of Diabetes Diagnosis: Newly diagnosed <6 months  Current DM knowledge: good  Do you test your blood sugar at home?: no    Education Preferences  Education Preferences  What areas of diabetes would you like to learn about?: diet information    Nutrition Information  Nutrition Information  Enter everything you can remember eating in the last 24 hours (1 day): breakfast/brunch- eggs and ring/grits w/ cheese/oatmeal; dinner- salad/quesadilla low carb wrap w/ chicken, salsa and cheese; snacks- cheese, pimento cheese, multigrain crackers; beverages- water, diet soda (rare), coffee w/ creamer  How many meals do you eat each day?: 2  How many snacks do you eat each day?: 2  What is the biggest challenge you have with your diet?: Knowledge    Education Needs  DM Education Needs  Medication: Oral  Healthy Eating: RD consult, Reviewed meal plan, Basic meal plan provided  Motivation: Engaged  Teaching Method: Explanation, Discussion, Handouts  Patient Response: Verbalized understanding    DM Goals  DM Goals  Healthy Eating - Goal: Today  Being Active - Goal: Today  Taking Medication - Goal: Today    Medications  Current Outpatient Medications   Medication Sig Dispense Refill   • Alcohol " Swabs (Alcohol Prep) pads 1 pad 5 (Five) Times a Day. 200 each 3   • DULoxetine (CYMBALTA) 60 MG capsule      • famotidine (PEPCID) 40 MG tablet      • gabapentin (NEURONTIN) 600 MG tablet Take 600 mg by mouth 3 (Three) Times a Day As Needed.     • Glucose Blood (Blood Glucose Test) strip 1 strip by Other route 5 (Five) Times a Day As Needed (blood glucose). 100 each 3   • hydroCHLOROthiazide (HYDRODIURIL) 25 MG tablet      • Lancets (freestyle) lancets 1 each by Other route As Needed (glucose check). 100 each 3   • metFORMIN (Glucophage) 500 MG tablet Take 1 tablet by mouth 2 (Two) Times a Day With Meals. 180 tablet 3   • minocycline (MINOCIN,DYNACIN) 100 MG capsule      • rOPINIRole (REQUIP) 2 MG tablet Take 1 tablet by mouth Every Night. 90 tablet 3   • Synthroid 25 MCG tablet        No current facility-administered medications for this visit.       Labs   Lab Results   Component Value Date    HGBA1C 6.10 (H) 01/13/2022       Nutrition counseling provided on carbohydrate counting, portion control, measuring and reading labels. Discussed eating out and gave suggestions on controlling carbohydrate intake and making healthier food choices.     Meal Plan:   Total Carbohydrates per meal: 2-3 carb servings/meal, at least 3 meals/day  Lean protein with meals.  Limit added fats.  Snacks: 1 carbohydrate serving (</= 22 g) + 1 protein serving.     Daily exercise encouraged (as recommended by healthcare provider). Discussed the benefits of exercise in lowering blood glucose, blood pressure, cholesterol, stress and controlling body weight.     Advised to continue daily blood glucose monitoring to assist with understanding of factors affecting blood glucose and assist with management of diabetes.  Discussed and provided with target BG ranges.     Literature provided: Diabetes Nutrition Placemat, Choose Your Foods Booklet    Dietitian contact number provided.  Patient encouraged to call with questions or concerns.     Time  spent with patient: 45 minutes    Any Castillo RDN, LD  01/10/2022

## 2022-02-08 DIAGNOSIS — E11.9 TYPE 2 DIABETES MELLITUS WITHOUT COMPLICATION, WITHOUT LONG-TERM CURRENT USE OF INSULIN: Primary | ICD-10-CM

## 2022-02-08 NOTE — TELEPHONE ENCOUNTER
Caller: Randi Fajardo    Relationship: Self    Best call back number: [phone]    Requested Prescriptions:   ACCUCHEK LANCETS  ACCUCHEK STRIPS    [facility] [place] [address] [facility] [phone] [facility] [phone] [facility]     Does the patient have less than a 3 day supply:  [x] Yes  [] No    Sina Cole Rep   02/08/22 15:56 EST

## 2022-02-09 RX ORDER — GLUCOSAMINE HCL/CHONDROITIN SU 500-400 MG
1 CAPSULE ORAL
Qty: 100 EACH | Refills: 3 | Status: SHIPPED | OUTPATIENT
Start: 2022-02-09 | End: 2022-03-14 | Stop reason: SDUPTHER

## 2022-02-09 RX ORDER — LANCETS 28 GAUGE
1 EACH MISCELLANEOUS AS NEEDED
Qty: 100 EACH | Refills: 3 | Status: SHIPPED | OUTPATIENT
Start: 2022-02-09 | End: 2022-03-14 | Stop reason: SDUPTHER

## 2022-02-10 NOTE — TELEPHONE ENCOUNTER
Call from [employer] in regards to lancets and test strips.    Verbal order to change lancets and test strips to work for AccuCheck meter.

## 2022-03-07 ENCOUNTER — OFFICE VISIT (OUTPATIENT)
Dept: INTERNAL MEDICINE | Facility: CLINIC | Age: 67
End: 2022-03-07

## 2022-03-07 VITALS
SYSTOLIC BLOOD PRESSURE: 115 MMHG | OXYGEN SATURATION: 95 % | TEMPERATURE: 97.7 F | DIASTOLIC BLOOD PRESSURE: 72 MMHG | BODY MASS INDEX: 47.86 KG/M2 | WEIGHT: 270.13 LBS | HEIGHT: 63 IN | HEART RATE: 78 BPM

## 2022-03-07 DIAGNOSIS — I10 ESSENTIAL HYPERTENSION: ICD-10-CM

## 2022-03-07 DIAGNOSIS — E11.9 TYPE 2 DIABETES MELLITUS WITHOUT COMPLICATION, WITHOUT LONG-TERM CURRENT USE OF INSULIN: Primary | ICD-10-CM

## 2022-03-07 PROCEDURE — 99214 OFFICE O/P EST MOD 30 MIN: CPT | Performed by: INTERNAL MEDICINE

## 2022-03-07 RX ORDER — MULTIPLE VITAMINS W/ MINERALS TAB 9MG-400MCG
1 TAB ORAL DAILY
COMMUNITY

## 2022-03-07 NOTE — PROGRESS NOTES
"Chief Complaint  Diabetes (6 month follow-up)    Subjective          Randi Fajardo presents to [facility]  History of Present Illness  hypertension- patient denies HAs, dizziness, chest pain.   DM2- patient is losing weight. Patient concerned with some lows.       Current Outpatient Medications   Medication Instructions   • Alcohol Swabs (Alcohol Prep) pads 1 pad, Does not apply, 5 Times Daily   • CALCIUM  mg, Oral, Daily   • DULoxetine (CYMBALTA) 60 mg, Oral, Daily   • famotidine (PEPCID) 40 mg, Oral, 2 Times Daily   • gabapentin (NEURONTIN) 600 mg, Oral, 3 Times Daily PRN   • Glucose Blood (Blood Glucose Test) strip 1 strip, Other, 5 Times Daily PRN   • hydroCHLOROthiazide (HYDRODIURIL) 25 mg, Oral, Daily   • Lancets (freestyle) lancets 1 each, Other, As Needed   • metFORMIN (GLUCOPHAGE) 500 mg, Oral, Daily With Dinner   • minocycline (MINOCIN,DYNACIN) 100 mg, Oral, Daily   • multivitamin with minerals (MULTIVITAMIN ADULTS 50+ PO) 1 tablet, Oral, Daily   • rOPINIRole (REQUIP) 2 mg, Oral, Nightly   • Synthroid 25 mcg, Oral, Daily       The following portions of the patient's history were reviewed and updated as appropriate: allergies, current medications, past family history, past medical history, past social history, past surgical history, and problem list.    Objective   Vital Signs:   /72 (BP Location: Left arm, Patient Position: Sitting, Cuff Size: Large Adult)   Pulse 78   Temp 97.7 °F (36.5 °C) (Temporal)   Ht 160 cm (63\")   Wt 123 kg (270 lb 2 oz)   SpO2 95%   BMI 47.85 kg/m²     Wt Readings from Last 3 Encounters:   03/07/22 123 kg (270 lb 2 oz)   01/10/22 126 kg (278 lb 14.1 oz)   11/23/21 127 kg (280 lb 12.8 oz)     BP Readings from Last 3 Encounters:   03/07/22 115/72   11/23/21 105/67   09/02/21 143/80     Diabetic foot exam:   Left: Filament test present   Pulses Dorsalis Pedis:  present   Vibratory sensation normal   Proprioception " normal   Sharp/dull discrimination normal       Right: Filament test present   Pulses Dorsalis Pedis:  present   Vibratory sensation normal   Proprioception normal   Sharp/dull discrimination normal      Physical Exam   Appearance: No acute distress, well-nourished  Head: normocephalic, atraumatic  Eyes: extraocular movements intact, no scleral icterus, no conjunctival injection  Ears, Nose, and Throat: external ears normal, nares patent, moist mucous membranes  Cardiovascular: regular rate and rhythm. no murmurs, rales, or rhonchi. no edema  Respiratory: breathing comfortably, symmetric chest rise, clear to auscultation bilaterally. No wheezes, rales, or rhonchi.  Neuro: alert and oriented to time, place, and person. Normal gait  Psych: normal mood and affect     Result Review :   The following data was reviewed by: Mainor Sanders Jr, MD on 03/07/2022:  Common labs    Common Labsle 6/1/21 6/1/21 9/2/21 9/2/21 9/2/21 9/2/21 1/13/22 1/13/22 1/13/22    1211 1211 1207 1207 1207 1207 1029 1029 1029   Glucose 112 (A)    95       BUN 14    17       Creatinine 0.84    0.94       eGFR Non  Am     59 (A)       Sodium 138    136       Potassium 4.3    4.4       Chloride 100    101       Calcium 9.5    10.2       Albumin 3.9    4.00       Total Bilirubin 0.31    0.4       Alkaline Phosphatase 55    49       AST (SGOT) 38    44 (A)       ALT (SGPT) 31    45 (A)       WBC 9.76  10.24    10.34     Hemoglobin 15.3  15.5    16.2 (A)     Hematocrit 48.3 (A)  47.7 (A)    48.9 (A)     Platelets 286  286    301     Total Cholesterol    154     169   Total Cholesterol 172           Triglycerides 106   151 (A)     153 (A)   HDL Cholesterol 43   36 (A)     38 (A)   LDL Cholesterol  108 (A)   91     104 (A)   Hemoglobin A1C  6.4 (A)    6.58 (A)  6.10 (A)    (A) Abnormal value       Comments are available for some flowsheets but are not being displayed.             Lab Results   Component Value Date    SARSANTIGEN Not  Detected 08/25/2021    COVID19 Not Detected 08/25/2021    FLUAAG Not Detected 08/25/2021    FLUBAG Not Detected 08/25/2021          Assessment and Plan {CC Problem List  Visit Diagnosis   ROS  Review (Popup)  Health Maintenance  Quality  BestPractice  Medications  SmartSets  SnapShot Encounters  Media :23}   Diagnoses and all orders for this visit:    1. Type 2 diabetes mellitus without complication, without long-term current use of insulin (HCC) (Primary)  Comments:  encourage dby weight loss. reduce metformin to once dialy dosing. f/u in approx 4 months for repeat eval.  Orders:  -     Ambulatory Referral for Diabetic Eye Exam-Ophthalmology  -     metFORMIN (Glucophage) 500 MG tablet; Take 1 tablet by mouth Daily With Dinner.  Dispense: 90 tablet; Refill: 1    2. Essential hypertension  Comments:  well controlled on current regimen. encouraged by weight loss. may be able to reduce HCTZ at next visit.           Medications Discontinued During This Encounter   Medication Reason   • metFORMIN (Glucophage) 500 MG tablet           Follow Up   No follow-ups on file.  Patient was given instructions and counseling regarding her condition or for health maintenance advice. Please see specific information pulled into the AVS if appropriate.       Mainor Sanders Jr, MD  03/07/22  13:08 EST

## 2022-03-11 ENCOUNTER — HOSPITAL ENCOUNTER (OUTPATIENT)
Dept: CARDIOLOGY | Facility: HOSPITAL | Age: 67
Discharge: HOME | End: 2022-03-11

## 2022-03-11 ENCOUNTER — HOSPITAL ENCOUNTER (OUTPATIENT)
Dept: RESPIRATORY THERAPY | Facility: HOSPITAL | Age: 67
Discharge: HOME | End: 2022-03-11

## 2022-03-11 DIAGNOSIS — R06.09 DOE (DYSPNEA ON EXERTION): ICD-10-CM

## 2022-03-11 PROCEDURE — 94060 EVALUATION OF WHEEZING: CPT | Performed by: INTERNAL MEDICINE

## 2022-03-11 PROCEDURE — 94060 EVALUATION OF WHEEZING: CPT

## 2022-03-11 PROCEDURE — 94726 PLETHYSMOGRAPHY LUNG VOLUMES: CPT

## 2022-03-11 PROCEDURE — 93306 TTE W/DOPPLER COMPLETE: CPT

## 2022-03-11 PROCEDURE — 94729 DIFFUSING CAPACITY: CPT

## 2022-03-11 PROCEDURE — 94726 PLETHYSMOGRAPHY LUNG VOLUMES: CPT | Performed by: INTERNAL MEDICINE

## 2022-03-11 PROCEDURE — 94729 DIFFUSING CAPACITY: CPT | Performed by: INTERNAL MEDICINE

## 2022-03-11 PROCEDURE — 93306 TTE W/DOPPLER COMPLETE: CPT | Performed by: INTERNAL MEDICINE

## 2022-03-11 RX ORDER — ALBUTEROL SULFATE 2.5 MG/3ML
2.5 SOLUTION RESPIRATORY (INHALATION) ONCE
Status: COMPLETED | OUTPATIENT
Start: 2022-03-11 | End: 2022-03-11

## 2022-03-11 RX ADMIN — ALBUTEROL SULFATE 2.5 MG: 2.5 SOLUTION RESPIRATORY (INHALATION) at 16:16

## 2022-03-12 LAB
BH CV ECHO MEAS - AO ROOT DIAM: 2.8 CM
BH CV ECHO MEAS - EF(MOD-BP): 62.3 %
BH CV ECHO MEAS - IVSD: 1.3 CM
BH CV ECHO MEAS - LA DIMENSION(2D): 2.5 CM
BH CV ECHO MEAS - LAT PEAK E' VEL: 5.2 CM/SEC
BH CV ECHO MEAS - LVIDD: 4.4 CM
BH CV ECHO MEAS - LVIDS: 2.3 CM
BH CV ECHO MEAS - LVPWD: 1.1 CM
BH CV ECHO MEAS - MED PEAK E' VEL: 5.8 CM/SEC
BH CV ECHO MEAS - MV A MAX VEL: 94.9 CM/SEC
BH CV ECHO MEAS - MV DEC TIME: 172 MSEC
BH CV ECHO MEAS - MV E MAX VEL: 63.9 CM/SEC
BH CV ECHO MEAS - MV E/A: 0.7
BH CV ECHO MEASUREMENTS AVERAGE E/E' RATIO: 11.62
LEFT ATRIUM VOLUME INDEX: 14.5 ML/M2
MAXIMAL PREDICTED HEART RATE: 152 BPM
STRESS TARGET HR: 129 BPM

## 2022-03-13 DIAGNOSIS — E11.9 TYPE 2 DIABETES MELLITUS WITHOUT COMPLICATION, WITHOUT LONG-TERM CURRENT USE OF INSULIN: ICD-10-CM

## 2022-03-14 DIAGNOSIS — R93.1 ECHOCARDIOGRAM FINDINGS ABNORMAL, WITHOUT DIAGNOSIS: Primary | ICD-10-CM

## 2022-03-14 RX ORDER — LANCETS 28 GAUGE
1 EACH MISCELLANEOUS
Qty: 100 EACH | Refills: 3 | Status: SHIPPED | OUTPATIENT
Start: 2022-03-14 | End: 2022-12-18 | Stop reason: SDUPTHER

## 2022-03-14 RX ORDER — MINOCYCLINE HYDROCHLORIDE 100 MG/1
100 CAPSULE ORAL DAILY
Qty: 90 CAPSULE | Refills: 3 | Status: SHIPPED | OUTPATIENT
Start: 2022-03-14 | End: 2023-03-17

## 2022-03-14 RX ORDER — HYDROCHLOROTHIAZIDE 25 MG/1
25 TABLET ORAL DAILY
Qty: 90 TABLET | Refills: 3 | Status: SHIPPED | OUTPATIENT
Start: 2022-03-14 | End: 2023-02-24

## 2022-03-14 RX ORDER — ROPINIROLE 2 MG/1
2 TABLET, FILM COATED ORAL NIGHTLY
Qty: 90 TABLET | Refills: 3 | Status: SHIPPED | OUTPATIENT
Start: 2022-03-14

## 2022-03-14 RX ORDER — GLUCOSAMINE HCL/CHONDROITIN SU 500-400 MG
1 CAPSULE ORAL
Qty: 100 EACH | Refills: 3 | Status: SHIPPED | OUTPATIENT
Start: 2022-03-14 | End: 2022-04-21 | Stop reason: SDUPTHER

## 2022-03-14 RX ORDER — LEVOTHYROXINE SODIUM 25 MCG
25 TABLET ORAL DAILY
Qty: 90 TABLET | Refills: 3 | Status: SHIPPED | OUTPATIENT
Start: 2022-03-14 | End: 2022-11-29 | Stop reason: ALTCHOICE

## 2022-03-14 RX ORDER — FAMOTIDINE 40 MG/1
40 TABLET, FILM COATED ORAL 2 TIMES DAILY
Qty: 90 TABLET | Refills: 3 | Status: SHIPPED | OUTPATIENT
Start: 2022-03-14 | End: 2022-10-05

## 2022-03-14 RX ORDER — GABAPENTIN 600 MG/1
600 TABLET ORAL 3 TIMES DAILY PRN
Qty: 90 TABLET | Refills: 0 | Status: SHIPPED | OUTPATIENT
Start: 2022-03-14 | End: 2022-05-16 | Stop reason: SDUPTHER

## 2022-03-14 RX ORDER — DULOXETIN HYDROCHLORIDE 60 MG/1
60 CAPSULE, DELAYED RELEASE ORAL DAILY
Qty: 90 CAPSULE | Refills: 3 | Status: SHIPPED | OUTPATIENT
Start: 2022-03-14 | End: 2023-02-24

## 2022-03-14 RX ORDER — ROPINIROLE 2 MG/1
2 TABLET, FILM COATED ORAL NIGHTLY
Qty: 90 TABLET | Refills: 3 | Status: SHIPPED | OUTPATIENT
Start: 2022-03-14 | End: 2022-03-14 | Stop reason: SDUPTHER

## 2022-03-14 NOTE — TELEPHONE ENCOUNTER
From: Randi Fajardo  To: Office of Mainor Sanders Jr, MD  Sent: 3/13/2022 8:48 PM EDT  Subject: Medication Renewal Request    Refills have been requested for the following medications:   Other - Synthroid, 25 Mcg    Preferred pharmacy: [facility] - [place] [address] [address] [facility] - [phone]  - [phone]  Delivery method: Mail      Medication renewals requested in this message routed separately:     rOPINIRole (REQUIP) 2 MG tablet [Mainor Sanders Jr]

## 2022-03-24 ENCOUNTER — PATIENT MESSAGE (OUTPATIENT)
Dept: INTERNAL MEDICINE | Facility: CLINIC | Age: 67
End: 2022-03-24

## 2022-03-24 DIAGNOSIS — R06.09 DOE (DYSPNEA ON EXERTION): Primary | ICD-10-CM

## 2022-03-24 NOTE — TELEPHONE ENCOUNTER
From: Randi Fajardo  To: Mainor Sanders Jr, MD  Sent: 3/24/2022 1:59 PM EDT  Subject: Pulmonary function test on March 11    Are there results from the pulmonary function test that was conducted on March 11?

## 2022-03-26 RX ORDER — TIOTROPIUM BROMIDE AND OLODATEROL 3.124; 2.736 UG/1; UG/1
2 SPRAY, METERED RESPIRATORY (INHALATION)
Qty: 4 G | Refills: 3 | Status: SHIPPED | OUTPATIENT
Start: 2022-03-26 | End: 2022-10-14

## 2022-03-31 ENCOUNTER — OFFICE VISIT (OUTPATIENT)
Dept: CARDIOLOGY | Facility: CLINIC | Age: 67
End: 2022-03-31

## 2022-03-31 VITALS
HEART RATE: 78 BPM | BODY MASS INDEX: 49.08 KG/M2 | WEIGHT: 277 LBS | DIASTOLIC BLOOD PRESSURE: 70 MMHG | HEIGHT: 63 IN | SYSTOLIC BLOOD PRESSURE: 140 MMHG

## 2022-03-31 DIAGNOSIS — R06.09 DYSPNEA ON EXERTION: Primary | ICD-10-CM

## 2022-03-31 DIAGNOSIS — G47.33 OBSTRUCTIVE SLEEP APNEA: ICD-10-CM

## 2022-03-31 DIAGNOSIS — I10 ESSENTIAL HYPERTENSION: ICD-10-CM

## 2022-03-31 PROCEDURE — 99214 OFFICE O/P EST MOD 30 MIN: CPT | Performed by: INTERNAL MEDICINE

## 2022-03-31 PROCEDURE — 93000 ELECTROCARDIOGRAM COMPLETE: CPT | Performed by: INTERNAL MEDICINE

## 2022-03-31 RX ORDER — CHLORAL HYDRATE 500 MG
CAPSULE ORAL
COMMUNITY

## 2022-03-31 NOTE — PROGRESS NOTES
Chief Complaint  Hypertension and Hyperlipidemia      History of Present Illness  Randi Fajardo presents to [facility]    This is a very pleasant 66-year-old lady with morbid obesity, hypertension, dyslipidemia, diabetes and obstructive sleep apnea was referred to clinic for cardiac evaluation.  She has dyspnea with mild to moderate exertion.  She states she has good days and bad days.  She feels good otherwise.  She takes a water pill for lower extremity swelling which has been well controlled.  She has no chest discomfort, palpitations, dizziness, presyncope or syncope.  She underwent recent pulmonary function test which demonstrated ?  Emphysema and was started on pulmonary inhalers.      Past Medical History:   Diagnosis Date   • Depressed    • GREGG (generalized anxiety disorder)    • Gastroesophageal reflux    • HTN (hypertension)    • Hypothyroid    • Lesion of neck    • Osteoarthritis    • RLS (restless legs syndrome)    • Sleep apnea in adult    • Type 2 diabetes mellitus (HCC)          Current Outpatient Medications:   •  Alcohol Swabs (Alcohol Prep) pads, 1 pad 5 (Five) Times a Day., Disp: 200 each, Rfl: 3  •  CALCIUM PO, Take 600 mg by mouth Daily., Disp: , Rfl:   •  DULoxetine (CYMBALTA) 60 MG capsule, Take 1 capsule by mouth Daily., Disp: 90 capsule, Rfl: 3  •  famotidine (PEPCID) 40 MG tablet, Take 1 tablet by mouth 2 (Two) Times a Day., Disp: 90 tablet, Rfl: 3  •  gabapentin (NEURONTIN) 600 MG tablet, Take 1 tablet by mouth 3 (Three) Times a Day As Needed (pain)., Disp: 90 tablet, Rfl: 0  •  Glucose Blood (Blood Glucose Test) strip, 1 strip by Other route 5 (Five) Times a Day As Needed (blood glucose)., Disp: 100 each, Rfl: 3  •  hydroCHLOROthiazide (HYDRODIURIL) 25 MG tablet, Take 1 tablet by mouth Daily., Disp: 90 tablet, Rfl: 3  •  Lancets (freestyle) lancets, 1 each by Other route 5 (Five) Times a Day As Needed (glucose check)., Disp: 100 each, Rfl: 3  •  metFORMIN  "(Glucophage) 500 MG tablet, Take 1 tablet by mouth Daily With Dinner., Disp: 90 tablet, Rfl: 3  •  minocycline (MINOCIN,DYNACIN) 100 MG capsule, Take 1 capsule by mouth Daily., Disp: 90 capsule, Rfl: 3  •  multivitamin with minerals tablet tablet, Take 1 tablet by mouth Daily., Disp: , Rfl:   •  Omega-3 Fatty Acids (fish oil) 1000 MG capsule capsule, Take  by mouth Daily With Breakfast., Disp: , Rfl:   •  rOPINIRole (REQUIP) 2 MG tablet, Take 1 tablet by mouth Every Night., Disp: 90 tablet, Rfl: 3  •  Synthroid 25 MCG tablet, Take 1 tablet by mouth Daily., Disp: 90 tablet, Rfl: 3  •  tiotropium bromide-olodaterol (Stiolto Respimat) 2.5-2.5 MCG/ACT aerosol solution inhaler, Inhale 2 puffs Daily., Disp: 4 g, Rfl: 3    There are no discontinued medications.  Allergies   Allergen Reactions   • Nexium [Esomeprazole] Hives   • Prilosec [Omeprazole] Hives        Social History     Tobacco Use   • Smoking status: Former Smoker     Packs/day: 0.50     Years: 40.00     Pack years: 20.00     Types: Cigarettes   • Smokeless tobacco: Never Used   • Tobacco comment: started at age 14, stopped at 60   Vaping Use   • Vaping Use: Never used   Substance Use Topics   • Alcohol use: Yes     Comment: Rarely   • Drug use: Never       Family History   Problem Relation Age of Onset   • Lung cancer Father    • Heart disease Father    • Colon cancer Father         Objective     /70   Pulse 78   Ht 160 cm (63\")   Wt 126 kg (277 lb)   BMI 49.07 kg/m²       Physical Exam  Constitutional:       General: Awake. Not in acute distress.     Appearance: Normal appearance.   Neck:      Vascular: No carotid bruit, hepatojugular reflux or JVD.   Cardiovascular:      Rate and Rhythm: Normal rate and regular rhythm.      Chest Wall: PMI is not displaced.      Heart sounds: Normal heart sounds, S1 normal and S2 normal.  Grade 2/6 systolic ejection murmur was noted at the base. No friction rub. No gallop. No S3 or S4 sounds.    Pulmonary:      " Effort: Pulmonary effort is normal.      Breath sounds: Diffusely reduced airway entry.  Normal breath sounds. No wheezing, rhonchi or rales.   Ext.      Right lower leg: No edema.      Left lower leg: No edema.   Skin:     General: Skin is warm and dry.      Coloration: Skin is not cyanotic.      Findings: No petechiae or rash.   Neurological:      Mental Status: Alert and oriented x 3  Psychiatric:         Behavior: Behavior is cooperative.       Result Review :     No results found for: PROBNP  CMP    CMP 6/1/21 9/2/21   Glucose 112 (A) 95   BUN 14 17   Creatinine 0.84 0.94   eGFR Non African Am  59 (A)   Sodium 138 136   Potassium 4.3 4.4   Chloride 100 101   Calcium 9.5 10.2   Albumin 3.9 4.00   Total Bilirubin 0.31 0.4   Alkaline Phosphatase 55 49   AST (SGOT) 38 44 (A)   ALT (SGPT) 31 45 (A)   (A) Abnormal value       Comments are available for some flowsheets but are not being displayed.           CBC w/diff    CBC w/Diff 6/1/21 9/2/21 1/13/22   WBC 9.76 10.24 10.34   RBC 5.36 5.39 (A) 5.55 (A)   Hemoglobin 15.3 15.5 16.2 (A)   Hematocrit 48.3 (A) 47.7 (A) 48.9 (A)   MCV 90.1 88.5 88.1   MCH 28.5 28.8 29.2   MCHC 31.7 (A) 32.5 33.1   RDW 14.3 13.5 13.1   Platelets 286 286 301   Neutrophil Rel % 55.8  52.1   Immature Granulocyte Rel %   0.5   Lymphocyte Rel % 34.7  36.9   Monocyte Rel % 6.0  7.6   Eosinophil Rel % 2.3  2.2   Basophil Rel % 0.8  0.7   (A) Abnormal value             Lab Results   Component Value Date    TSH 2.230 01/13/2022      Lab Results   Component Value Date    FREET4 0.9 09/03/2020      No results found for: DDIMERQUANT  Magnesium   Date Value Ref Range Status   09/21/2019 2.05 1.60 - 2.30 mg/dL Final      No results found for: DIGOXIN   No results found for: TROPONINT   No results found for: POCTROP(       Lipid Panel    Lipid Panel 6/1/21 9/2/21 1/13/22   Total Cholesterol  154 169   Total Cholesterol 172     Triglycerides 106 151 (A) 153 (A)   HDL Cholesterol 43 36 (A) 38 (A)   VLDL  Cholesterol 21 27 27   LDL Cholesterol  108 (A) 91 104 (A)   LDL/HDL Ratio  2.44 2.64   (A) Abnormal value       Comments are available for some flowsheets but are not being displayed.           Results for orders placed during the hospital encounter of 03/11/22    Adult Transthoracic Echo Complete W/ Cont if Necessary Per Protocol    Interpretation Summary  · Estimated left ventricular EF was in agreement with the calculated left ventricular EF. Left ventricular ejection fraction appears to be 56 - 60%. Left ventricular systolic function is normal.  · Abnormal motion of the interventricular septum is noted. Most likely secondary to underlying bundle branch block.  · Left ventricular diastolic function was indeterminate.  · The right ventricular cavity is mildly dilated.       ECG 12 Lead    Date/Time: 3/31/2022 1:29 PM  Performed by: Saturnino Mcnamara MD  Authorized by: Saturnino Mcnamara MD   Comparison: not compared with previous ECG   Previous ECG: no previous ECG available  Rhythm: sinus rhythm  Rate: normal  BPM: 76  Conduction: right bundle branch block  QRS axis: normal    Clinical impression: normal ECG           Results for orders placed during the hospital encounter of 03/11/22    Adult Transthoracic Echo Complete W/ Cont if Necessary Per Protocol    Interpretation Summary  · Estimated left ventricular EF was in agreement with the calculated left ventricular EF. Left ventricular ejection fraction appears to be 56 - 60%. Left ventricular systolic function is normal.  · Abnormal motion of the interventricular septum is noted. Most likely secondary to underlying bundle branch block.  · Left ventricular diastolic function was indeterminate.  · The right ventricular cavity is mildly dilated.     No results found for this or any previous visit.                Diagnoses and all orders for this visit:    1. Dyspnea on exertion (Primary)  -     Stress Test With Myocardial Perfusion Two Day; Future  -     ECG 12  Lead    2. Essential hypertension    3. Mixed dyslipidemia        Assessment:     -Dyspnea: Patient has been having dyspnea on exertion which seems to be multifactorial related to obesity, obstructive sleep apnea and pulmonary disease.  Cardiac etiologies need to be excluded.  She had recent echocardiogram which demonstrated normal LV systolic function and mildly dilated right ventricular cavity.  She will be scheduled for exercise nuclear stress test to rule out myocardial ischemia.  Continue current medical therapy.  Further recommendations to follow.    -Hypertension: Blood pressures well controlled on current regimen.  Continue the same.    -Obstructive sleep apnea: On nightly CPAP.      Follow Up       No follow-ups on file.    Patient was given instructions and counseling regarding her condition or for health maintenance advice. Please see specific information pulled into the AVS if appropriate.

## 2022-05-09 ENCOUNTER — CLINICAL SUPPORT (OUTPATIENT)
Dept: INTERNAL MEDICINE | Facility: CLINIC | Age: 67
End: 2022-05-09

## 2022-05-09 ENCOUNTER — PATIENT MESSAGE (OUTPATIENT)
Dept: INTERNAL MEDICINE | Facility: CLINIC | Age: 67
End: 2022-05-09

## 2022-05-09 ENCOUNTER — TELEPHONE (OUTPATIENT)
Dept: INTERNAL MEDICINE | Facility: CLINIC | Age: 67
End: 2022-05-09

## 2022-05-09 DIAGNOSIS — R05.9 COUGH: Primary | ICD-10-CM

## 2022-05-09 DIAGNOSIS — R53.83 OTHER FATIGUE: ICD-10-CM

## 2022-05-09 PROCEDURE — U0004 COV-19 TEST NON-CDC HGH THRU: HCPCS | Performed by: INTERNAL MEDICINE

## 2022-05-09 RX ORDER — BENZONATATE 200 MG/1
200 CAPSULE ORAL 3 TIMES DAILY PRN
Qty: 30 CAPSULE | Refills: 0 | Status: SHIPPED | OUTPATIENT
Start: 2022-05-09 | End: 2022-06-07

## 2022-05-09 NOTE — TELEPHONE ENCOUNTER
Caller: Randi Fajardo    Relationship: Self    Best call back number:[phone]    What medications are you currently taking:   Current Outpatient Medications on File Prior to Visit   Medication Sig Dispense Refill   • Alcohol Swabs (Alcohol Prep) pads 1 pad 5 (Five) Times a Day. 200 each 3   • benzonatate (TESSALON) 200 MG capsule Take 1 capsule by mouth 3 (Three) Times a Day As Needed for Cough. 30 capsule 0   • CALCIUM PO Take 600 mg by mouth Daily.     • DULoxetine (CYMBALTA) 60 MG capsule Take 1 capsule by mouth Daily. 90 capsule 3   • famotidine (PEPCID) 40 MG tablet Take 1 tablet by mouth 2 (Two) Times a Day. 90 tablet 3   • gabapentin (NEURONTIN) 600 MG tablet Take 1 tablet by mouth 3 (Three) Times a Day As Needed (pain). 90 tablet 0   • Glucose Blood (Blood Glucose Test) strip 1 strip by Other route 5 (Five) Times a Day As Needed (blood glucose). 100 each 3   • hydroCHLOROthiazide (HYDRODIURIL) 25 MG tablet Take 1 tablet by mouth Daily. 90 tablet 3   • Lancets (freestyle) lancets 1 each by Other route 5 (Five) Times a Day As Needed (glucose check). 100 each 3   • metFORMIN (Glucophage) 500 MG tablet Take 1 tablet by mouth Daily With Dinner. 90 tablet 3   • minocycline (MINOCIN,DYNACIN) 100 MG capsule Take 1 capsule by mouth Daily. 90 capsule 3   • multivitamin with minerals tablet tablet Take 1 tablet by mouth Daily.     • Omega-3 Fatty Acids (fish oil) 1000 MG capsule capsule Take  by mouth Daily With Breakfast.     • rOPINIRole (REQUIP) 2 MG tablet Take 1 tablet by mouth Every Night. 90 tablet 3   • Synthroid 25 MCG tablet Take 1 tablet by mouth Daily. 90 tablet 3   • tiotropium bromide-olodaterol (Stiolto Respimat) 2.5-2.5 MCG/ACT aerosol solution inhaler Inhale 2 puffs Daily. 4 g 3     No current facility-administered medications on file prior to visit.        Which medication are you concerned about: benzonatate (TESSALON) 200 MG capsule     Who prescribed you this medication: DR SOLIS    What  are your concerns: PATIENT STATES THAT THIS MEDICATION IS NOT COVERED ON HER INSURANCE AND IS NEEDING SOMETHING ELSE SENT IN PLACE OF THIS MEDICATION.       [facility]/pharm[id number] - [place] - [address] - [phone]  - [phone]   [phone]

## 2022-05-09 NOTE — TELEPHONE ENCOUNTER
Spoke with pharmacy. The patient has Medicare Part D. That insurance does not cover any cough or cold medication. I then informed doctor answer. He told me to tell the patient to take anything over the counter with DM in it.         Called patient and spoke with her. Verified . Patient is aware regarding the tessalon perles and insurance not covering them along with any other cold/cough medication. Patient was advised to take over the counter medication that has DM in it. Patient is of understanding.

## 2022-05-10 ENCOUNTER — TRANSCRIBE ORDERS (OUTPATIENT)
Dept: INTERNAL MEDICINE | Facility: CLINIC | Age: 67
End: 2022-05-10

## 2022-05-10 ENCOUNTER — TELEPHONE (OUTPATIENT)
Dept: INTERNAL MEDICINE | Facility: CLINIC | Age: 67
End: 2022-05-10

## 2022-05-10 ENCOUNTER — TRANSCRIBE ORDERS (OUTPATIENT)
Dept: ADMINISTRATIVE | Facility: HOSPITAL | Age: 67
End: 2022-05-10

## 2022-05-10 DIAGNOSIS — U07.1 COVID-19: Primary | ICD-10-CM

## 2022-05-10 LAB — SARS-COV-2 RNA PNL SPEC NAA+PROBE: DETECTED

## 2022-05-10 RX ORDER — BROMPHENIRAMINE MALEATE, PSEUDOEPHEDRINE HYDROCHLORIDE, AND DEXTROMETHORPHAN HYDROBROMIDE 2; 30; 10 MG/5ML; MG/5ML; MG/5ML
5 SYRUP ORAL 4 TIMES DAILY PRN
Qty: 118 ML | Refills: 0 | Status: SHIPPED | OUTPATIENT
Start: 2022-05-10 | End: 2022-06-07

## 2022-05-10 NOTE — TELEPHONE ENCOUNTER
PT(PATIENT) VERIFIED     CONTACTED PT(PATIENT) PER PROVIDER'S INSTRUCTIONS    PT(PATIENT) STATES SHE WOULD LIKE TO GET COVID-19 ANTIBODY INFUSION COMPLETED    PROVIDER PLEASE ADVISE

## 2022-05-10 NOTE — TELEPHONE ENCOUNTER
----- Message from Mainor Sanders Jr., MD sent at 5/10/2022 12:24 PM EDT -----  Covid positive. Will work to get antibody infusion treatment.

## 2022-05-11 RX ORDER — DIPHENHYDRAMINE HYDROCHLORIDE 50 MG/ML
50 INJECTION INTRAMUSCULAR; INTRAVENOUS ONCE AS NEEDED
Status: DISCONTINUED | OUTPATIENT
Start: 2022-05-13 | End: 2022-05-15 | Stop reason: HOSPADM

## 2022-05-11 RX ORDER — BEBTELOVIMAB 87.5 MG/ML
175 INJECTION, SOLUTION INTRAVENOUS ONCE
Status: COMPLETED | OUTPATIENT
Start: 2022-05-13 | End: 2022-05-13

## 2022-05-11 RX ORDER — SODIUM CHLORIDE 9 MG/ML
30 INJECTION, SOLUTION INTRAVENOUS ONCE
Status: DISCONTINUED | OUTPATIENT
Start: 2022-05-13 | End: 2022-05-15 | Stop reason: HOSPADM

## 2022-05-11 RX ORDER — DIPHENHYDRAMINE HCL 50 MG
50 CAPSULE ORAL ONCE AS NEEDED
Status: DISCONTINUED | OUTPATIENT
Start: 2022-05-13 | End: 2022-05-15 | Stop reason: HOSPADM

## 2022-05-11 RX ORDER — METHYLPREDNISOLONE SODIUM SUCCINATE 125 MG/2ML
125 INJECTION, POWDER, LYOPHILIZED, FOR SOLUTION INTRAMUSCULAR; INTRAVENOUS ONCE AS NEEDED
Status: DISCONTINUED | OUTPATIENT
Start: 2022-05-13 | End: 2022-05-15 | Stop reason: HOSPADM

## 2022-05-11 NOTE — TELEPHONE ENCOUNTER
ATTEMPTED TO CONTACT PT PER PROVIDER'S INSTRUCTIONS     NO ANSWER     UNABLE TO LEAVE A VOICEMAIL WITH INSTRUCTIONS TO RETURN CALL TO OFFICE AT [phone]    OK FOR HUB TO ADVISE/READ   INFUSION (05/13/2022) 8AM    PT(PATIENT) SENT INSTRUCTIONS VIA [employer]  Patient Message with Mychart, Generic Provider (05/10/2022)    APPOINTMENT IS FOR FRIDAY MAY 13TH AT 8 AM     INFUSION (05/13/2022)    ARRIVE BY 730AM    PARK AT MAIN ENTRANCE/WAIT IN CAR (THERE SHOULD BE SIGNS FOR ONS)    CALL  TO REGISTER OVER THE PHONE    REGISTRATION WILL DIRECT PATIENT WHEN IT IS SAFE TO COME INSIDE FOR TREATMENT    THANK YOU

## 2022-05-12 ENCOUNTER — APPOINTMENT (OUTPATIENT)
Dept: NUCLEAR MEDICINE | Facility: HOSPITAL | Age: 67
End: 2022-05-12

## 2022-05-13 ENCOUNTER — HOSPITAL ENCOUNTER (OUTPATIENT)
Dept: INFUSION THERAPY | Facility: HOSPITAL | Age: 67
Discharge: HOME | End: 2022-05-13
Admitting: INTERNAL MEDICINE

## 2022-05-13 ENCOUNTER — APPOINTMENT (OUTPATIENT)
Dept: NUCLEAR MEDICINE | Facility: HOSPITAL | Age: 67
End: 2022-05-13

## 2022-05-13 VITALS
DIASTOLIC BLOOD PRESSURE: 80 MMHG | SYSTOLIC BLOOD PRESSURE: 140 MMHG | HEIGHT: 63 IN | BODY MASS INDEX: 48.73 KG/M2 | TEMPERATURE: 98.5 F | RESPIRATION RATE: 20 BRPM | WEIGHT: 275 LBS

## 2022-05-13 DIAGNOSIS — U07.1 COVID-19: ICD-10-CM

## 2022-05-13 PROCEDURE — 96374 THER/PROPH/DIAG INJ IV PUSH: CPT

## 2022-05-13 PROCEDURE — 25010000002 INJECTION, BEBTELOVIMAB, 175 MG: Performed by: INTERNAL MEDICINE

## 2022-05-13 PROCEDURE — M0222 HC INJECTION BEBTELOVIMAB: HCPCS | Performed by: INTERNAL MEDICINE

## 2022-05-13 RX ADMIN — BEBTELOVIMAB 175 MG: 87.5 INJECTION, SOLUTION INTRAVENOUS at 08:32

## 2022-05-16 RX ORDER — GABAPENTIN 600 MG/1
600 TABLET ORAL 3 TIMES DAILY PRN
Qty: 90 TABLET | Refills: 0 | Status: SHIPPED | OUTPATIENT
Start: 2022-05-16 | End: 2022-06-07 | Stop reason: SDUPTHER

## 2022-05-17 ENCOUNTER — TELEPHONE (OUTPATIENT)
Dept: INTERNAL MEDICINE | Facility: CLINIC | Age: 67
End: 2022-05-17

## 2022-06-07 ENCOUNTER — OFFICE VISIT (OUTPATIENT)
Dept: INTERNAL MEDICINE | Facility: CLINIC | Age: 67
End: 2022-06-07

## 2022-06-07 VITALS
HEIGHT: 63 IN | WEIGHT: 276.4 LBS | SYSTOLIC BLOOD PRESSURE: 118 MMHG | BODY MASS INDEX: 48.97 KG/M2 | HEART RATE: 80 BPM | TEMPERATURE: 97.5 F | DIASTOLIC BLOOD PRESSURE: 76 MMHG | OXYGEN SATURATION: 93 %

## 2022-06-07 DIAGNOSIS — I10 ESSENTIAL HYPERTENSION: ICD-10-CM

## 2022-06-07 DIAGNOSIS — Z12.31 BREAST CANCER SCREENING BY MAMMOGRAM: ICD-10-CM

## 2022-06-07 DIAGNOSIS — E78.5 HYPERLIPIDEMIA, UNSPECIFIED HYPERLIPIDEMIA TYPE: ICD-10-CM

## 2022-06-07 DIAGNOSIS — E11.9 TYPE 2 DIABETES MELLITUS WITHOUT COMPLICATION, WITHOUT LONG-TERM CURRENT USE OF INSULIN: Primary | ICD-10-CM

## 2022-06-07 DIAGNOSIS — Z87.891 HISTORY OF TOBACCO ABUSE: ICD-10-CM

## 2022-06-07 DIAGNOSIS — E03.9 HYPOTHYROIDISM, UNSPECIFIED TYPE: ICD-10-CM

## 2022-06-07 DIAGNOSIS — G62.9 NEUROPATHY: ICD-10-CM

## 2022-06-07 DIAGNOSIS — Z23 NEED FOR PNEUMOCOCCAL VACCINATION: ICD-10-CM

## 2022-06-07 DIAGNOSIS — Z11.59 NEED FOR HEPATITIS C SCREENING TEST: ICD-10-CM

## 2022-06-07 LAB
ALBUMIN SERPL-MCNC: 4.2 G/DL (ref 3.5–5.2)
ALBUMIN UR-MCNC: <1.2 MG/DL
ALBUMIN/GLOB SERPL: 1.4 G/DL
ALP SERPL-CCNC: 49 U/L (ref 39–117)
ALT SERPL W P-5'-P-CCNC: 44 U/L (ref 1–33)
ANION GAP SERPL CALCULATED.3IONS-SCNC: 13.5 MMOL/L (ref 5–15)
AST SERPL-CCNC: 41 U/L (ref 1–32)
BASOPHILS # BLD AUTO: 0.07 10*3/MM3 (ref 0–0.2)
BASOPHILS NFR BLD AUTO: 0.8 % (ref 0–1.5)
BILIRUB SERPL-MCNC: 0.4 MG/DL (ref 0–1.2)
BUN SERPL-MCNC: 15 MG/DL (ref 8–23)
BUN/CREAT SERPL: 14.2 (ref 7–25)
CALCIUM SPEC-SCNC: 9.7 MG/DL (ref 8.6–10.5)
CHLORIDE SERPL-SCNC: 99 MMOL/L (ref 98–107)
CHOLEST SERPL-MCNC: 154 MG/DL (ref 0–200)
CO2 SERPL-SCNC: 24.5 MMOL/L (ref 22–29)
CREAT SERPL-MCNC: 1.06 MG/DL (ref 0.57–1)
CREAT UR-MCNC: 22 MG/DL
DEPRECATED RDW RBC AUTO: 43.6 FL (ref 37–54)
EGFRCR SERPLBLD CKD-EPI 2021: 57.3 ML/MIN/1.73
EOSINOPHIL # BLD AUTO: 0.23 10*3/MM3 (ref 0–0.4)
EOSINOPHIL NFR BLD AUTO: 2.6 % (ref 0.3–6.2)
ERYTHROCYTE [DISTWIDTH] IN BLOOD BY AUTOMATED COUNT: 13.5 % (ref 12.3–15.4)
GLOBULIN UR ELPH-MCNC: 2.9 GM/DL
GLUCOSE SERPL-MCNC: 87 MG/DL (ref 65–99)
HBA1C MFR BLD: 6 % (ref 4.8–5.6)
HCT VFR BLD AUTO: 47.5 % (ref 34–46.6)
HCV AB SER DONR QL: NORMAL
HDLC SERPL-MCNC: 34 MG/DL (ref 40–60)
HGB BLD-MCNC: 15.6 G/DL (ref 12–15.9)
IMM GRANULOCYTES # BLD AUTO: 0.04 10*3/MM3 (ref 0–0.05)
IMM GRANULOCYTES NFR BLD AUTO: 0.4 % (ref 0–0.5)
LDLC SERPL CALC-MCNC: 88 MG/DL (ref 0–100)
LDLC/HDLC SERPL: 2.45 {RATIO}
LYMPHOCYTES # BLD AUTO: 3.08 10*3/MM3 (ref 0.7–3.1)
LYMPHOCYTES NFR BLD AUTO: 34.5 % (ref 19.6–45.3)
MCH RBC QN AUTO: 29.3 PG (ref 26.6–33)
MCHC RBC AUTO-ENTMCNC: 32.8 G/DL (ref 31.5–35.7)
MCV RBC AUTO: 89.3 FL (ref 79–97)
MICROALBUMIN/CREAT UR: NORMAL MG/G{CREAT}
MONOCYTES # BLD AUTO: 0.6 10*3/MM3 (ref 0.1–0.9)
MONOCYTES NFR BLD AUTO: 6.7 % (ref 5–12)
NEUTROPHILS NFR BLD AUTO: 4.91 10*3/MM3 (ref 1.7–7)
NEUTROPHILS NFR BLD AUTO: 55 % (ref 42.7–76)
NRBC BLD AUTO-RTO: 0 /100 WBC (ref 0–0.2)
PLATELET # BLD AUTO: 297 10*3/MM3 (ref 140–450)
PMV BLD AUTO: 10.6 FL (ref 6–12)
POTASSIUM SERPL-SCNC: 4.4 MMOL/L (ref 3.5–5.2)
PROT SERPL-MCNC: 7.1 G/DL (ref 6–8.5)
RBC # BLD AUTO: 5.32 10*6/MM3 (ref 3.77–5.28)
SODIUM SERPL-SCNC: 137 MMOL/L (ref 136–145)
TRIGL SERPL-MCNC: 184 MG/DL (ref 0–150)
TSH SERPL DL<=0.05 MIU/L-ACNC: 1.83 UIU/ML (ref 0.27–4.2)
VLDLC SERPL-MCNC: 32 MG/DL (ref 5–40)
WBC NRBC COR # BLD: 8.93 10*3/MM3 (ref 3.4–10.8)

## 2022-06-07 PROCEDURE — 85025 COMPLETE CBC W/AUTO DIFF WBC: CPT | Performed by: INTERNAL MEDICINE

## 2022-06-07 PROCEDURE — G0009 ADMIN PNEUMOCOCCAL VACCINE: HCPCS | Performed by: INTERNAL MEDICINE

## 2022-06-07 PROCEDURE — 99214 OFFICE O/P EST MOD 30 MIN: CPT | Performed by: INTERNAL MEDICINE

## 2022-06-07 PROCEDURE — 80061 LIPID PANEL: CPT | Performed by: INTERNAL MEDICINE

## 2022-06-07 PROCEDURE — 82043 UR ALBUMIN QUANTITATIVE: CPT | Performed by: INTERNAL MEDICINE

## 2022-06-07 PROCEDURE — 83036 HEMOGLOBIN GLYCOSYLATED A1C: CPT | Performed by: INTERNAL MEDICINE

## 2022-06-07 PROCEDURE — 90677 PCV20 VACCINE IM: CPT | Performed by: INTERNAL MEDICINE

## 2022-06-07 PROCEDURE — 86803 HEPATITIS C AB TEST: CPT | Performed by: INTERNAL MEDICINE

## 2022-06-07 PROCEDURE — 84443 ASSAY THYROID STIM HORMONE: CPT | Performed by: INTERNAL MEDICINE

## 2022-06-07 PROCEDURE — 82570 ASSAY OF URINE CREATININE: CPT | Performed by: INTERNAL MEDICINE

## 2022-06-07 PROCEDURE — 80053 COMPREHEN METABOLIC PANEL: CPT | Performed by: INTERNAL MEDICINE

## 2022-06-07 RX ORDER — GABAPENTIN 600 MG/1
600 TABLET ORAL 3 TIMES DAILY PRN
Qty: 90 TABLET | Refills: 0 | Status: SHIPPED | OUTPATIENT
Start: 2022-06-07 | End: 2022-08-11

## 2022-06-07 NOTE — PROGRESS NOTES
"Chief Complaint  Hypertension and Diabetes (Type 2 )    Subjective          Randi Fajardo presents to [facility]  History of Present Illness  hypertension- patient denies Has, dizziness, chest pain.   Diabetes Mellitus 2- patient with blood glucose avg that rang . Patient denies hypoglycemic events. Patient reports lowest reading was 72.   Hyperlipidemia and hypothyroid- due for recheck.     Current Outpatient Medications   Medication Instructions   • Alcohol Swabs (Alcohol Prep) pads 1 pad, Does not apply, 5 Times Daily   • CALCIUM  mg, Oral, Daily   • DULoxetine (CYMBALTA) 60 mg, Oral, Daily   • famotidine (PEPCID) 40 mg, Oral, 2 Times Daily   • gabapentin (NEURONTIN) 600 mg, Oral, 3 Times Daily PRN   • Glucose Blood (Blood Glucose Test) strip 1 strip, Other, 5 Times Daily PRN   • hydroCHLOROthiazide (HYDRODIURIL) 25 mg, Oral, Daily   • Lancets (freestyle) lancets 1 each, Other, 5 Times Daily PRN   • metFORMIN (GLUCOPHAGE) 500 mg, Oral, 2 Times Daily With Meals   • minocycline (MINOCIN,DYNACIN) 100 mg, Oral, Daily   • multivitamin with minerals tablet tablet 1 tablet, Oral, Daily   • Omega-3 Fatty Acids (fish oil) 1000 MG capsule capsule Oral, Daily With Breakfast   • rOPINIRole (REQUIP) 2 mg, Oral, Nightly   • Synthroid 25 mcg, Oral, Daily   • tiotropium bromide-olodaterol (Stiolto Respimat) 2.5-2.5 MCG/ACT aerosol solution inhaler 2 puffs, Inhalation, Daily - RT       The following portions of the patient's history were reviewed and updated as appropriate: allergies, current medications, past family history, past medical history, past social history, past surgical history, and problem list.    Objective   Vital Signs:   /76 (BP Location: Left arm, Patient Position: Sitting, Cuff Size: Large Adult)   Pulse 80   Temp 97.5 °F (36.4 °C) (Temporal)   Ht 160 cm (63\")   Wt 125 kg (276 lb 6.4 oz)   SpO2 93%   BMI 48.96 kg/m²     Wt Readings from " Last 3 Encounters:   06/07/22 125 kg (276 lb 6.4 oz)   05/13/22 125 kg (275 lb)   03/31/22 126 kg (277 lb)     BP Readings from Last 3 Encounters:   06/07/22 118/76   05/13/22 140/80   03/31/22 140/70     Physical Exam   Appearance: No acute distress, well-nourished  Head: normocephalic, atraumatic  Eyes: extraocular movements intact, no scleral icterus, no conjunctival injection  Ears, Nose, and Throat: external ears normal, nares patent, moist mucous membranes  Cardiovascular: regular rate and rhythm. no murmurs, rubs, or gallops. no edema  Respiratory: breathing comfortably, symmetric chest rise, clear to auscultation bilaterally. No wheezes, rales, or rhonchi.  Neuro: alert and oriented to time, place, and person. Normal gait  Psych: normal mood and affect     Result Review :   The following data was reviewed by: Mainor Sanders Jr, MD on 06/07/2022:  Common labs    Common Labsle 9/2/21 9/2/21 9/2/21 9/2/21 1/13/22 1/13/22 1/13/22 6/7/22 6/7/22 6/7/22 6/7/22 6/7/22    1207 1207 1207 1207 1029 1029 1029 1524 1524 1524 1524 1524   Glucose   95       87     BUN   17       15     Creatinine   0.94       1.06 (A)     eGFR Non  Am   59 (A)            Sodium   136       137     Potassium   4.4       4.4     Chloride   101       99     Calcium   10.2       9.7     Albumin   4.00       4.20     Total Bilirubin   0.4       0.4     Alkaline Phosphatase   49       49     AST (SGOT)   44 (A)       41 (A)     ALT (SGPT)   45 (A)       44 (A)     WBC 10.24    10.34   8.93       Hemoglobin 15.5    16.2 (A)   15.6       Hematocrit 47.7 (A)    48.9 (A)   47.5 (A)       Platelets 286    301   297       Total Cholesterol  154     169    154    Triglycerides  151 (A)     153 (A)    184 (A)    HDL Cholesterol  36 (A)     38 (A)    34 (A)    LDL Cholesterol   91     104 (A)    88    Hemoglobin A1C    6.58 (A)  6.10 (A)   6.00 (A)      Microalbumin, Urine            <1.2   (A) Abnormal value              Lab Results    Component Value Date    SARSANTIGEN Not Detected 08/25/2021    COVID19 Detected (C) 05/09/2022    FLUAAG Not Detected 08/25/2021    FLUBAG Not Detected 08/25/2021     Last Urine Toxicity     LAST URINE TOXICITY RESULTS Latest Ref Rng & Units 6/7/2022 3/1/2021    CREATININE UR mg/dL 22.0 -    BARBITURATES SCREEN - - Negative    BENZODIAZEPINE SCREEN, URINE - - Negative    COCAINE SCREEN, URINE - - Negative    METHADONE SCREEN, URINE - - Negative             Assessment and Plan    Diagnoses and all orders for this visit:    1. Type 2 diabetes mellitus without complication, without long-term current use of insulin (HCC) (Primary)  Comments:  check labs. cont regimen.   Orders:  -     Hemoglobin A1c  -     Microalbumin / Creatinine Urine Ratio - Urine, Clean Catch  -     metFORMIN (Glucophage) 500 MG tablet; Take 1 tablet by mouth 2 (Two) Times a Day With Meals.  Dispense: 180 tablet; Refill: 3    2. Essential hypertension  Comments:  well controlled.   Orders:  -     CBC & Differential  -     Comprehensive Metabolic Panel    3. Hyperlipidemia, unspecified hyperlipidemia type  -     Comprehensive Metabolic Panel  -     Lipid Panel    4. Hypothyroidism, unspecified type  -     TSH    5. Need for hepatitis C screening test  -     Hepatitis C Antibody    6. History of tobacco abuse  -      CT Chest Low Dose Cancer Screening WO; Future    7. Need for pneumococcal vaccination  -     Pneumococcal Conjugate Vaccine 20-Valent All    8. Breast cancer screening by mammogram  -     Mammo Screening Bilateral With CAD; Future    9. Neuropathy  Comments:  doing well on gabapentin. UDS and nimisha reviewed.   Orders:  -     gabapentin (NEURONTIN) 600 MG tablet; Take 1 tablet by mouth 3 (Three) Times a Day As Needed (pain).  Dispense: 90 tablet; Refill: 0        Medications Discontinued During This Encounter   Medication Reason   • brompheniramine-pseudoephedrine-DM 30-2-10 MG/5ML syrup *Therapy completed   • benzonatate (TESSALON)  200 MG capsule *Therapy completed   • metFORMIN (Glucophage) 500 MG tablet Reorder   • gabapentin (NEURONTIN) 600 MG tablet Reorder        Follow Up   Return in about 6 months (around 12/7/2022) for Recheck, DM, HTN.  Patient was given instructions and counseling regarding her condition or for health maintenance advice. Please see specific information pulled into the AVS if appropriate.       Mainor Sanders Jr, MD  06/10/22  17:55 EDT

## 2022-06-09 ENCOUNTER — TRANSCRIBE ORDERS (OUTPATIENT)
Dept: ADMINISTRATIVE | Facility: HOSPITAL | Age: 67
End: 2022-06-09

## 2022-06-09 ENCOUNTER — TELEPHONE (OUTPATIENT)
Dept: INTERNAL MEDICINE | Facility: CLINIC | Age: 67
End: 2022-06-09

## 2022-06-09 DIAGNOSIS — Z12.31 VISIT FOR SCREENING MAMMOGRAM: Primary | ICD-10-CM

## 2022-06-09 NOTE — TELEPHONE ENCOUNTER
----- Message from Mainor Sanders Jr., MD sent at 6/9/2022  8:53 AM EDT -----  Elevated triglycerides, which are the storage form of sugar - recommend lower carbohydrate/sugar intake.     HDL low - this is protective cholesterol and generally like the number to be >50. encourage exercise to increase level.     HgbA1c and lilver enzymes stable

## 2022-06-16 ENCOUNTER — APPOINTMENT (OUTPATIENT)
Dept: NUCLEAR MEDICINE | Facility: HOSPITAL | Age: 67
End: 2022-06-16

## 2022-06-17 ENCOUNTER — APPOINTMENT (OUTPATIENT)
Dept: NUCLEAR MEDICINE | Facility: HOSPITAL | Age: 67
End: 2022-06-17

## 2022-07-20 ENCOUNTER — HOSPITAL ENCOUNTER (OUTPATIENT)
Dept: NUCLEAR MEDICINE | Facility: HOSPITAL | Age: 67
Discharge: HOME | End: 2022-07-20

## 2022-07-20 DIAGNOSIS — R06.09 DYSPNEA ON EXERTION: ICD-10-CM

## 2022-07-20 PROCEDURE — 93018 CV STRESS TEST I&R ONLY: CPT | Performed by: INTERNAL MEDICINE

## 2022-07-20 PROCEDURE — 78452 HT MUSCLE IMAGE SPECT MULT: CPT

## 2022-07-20 PROCEDURE — 25010000002 REGADENOSON 0.4 MG/5ML SOLUTION: Performed by: INTERNAL MEDICINE

## 2022-07-20 PROCEDURE — 93017 CV STRESS TEST TRACING ONLY: CPT

## 2022-07-20 PROCEDURE — A9502 TC99M TETROFOSMIN: HCPCS | Performed by: INTERNAL MEDICINE

## 2022-07-20 PROCEDURE — 78452 HT MUSCLE IMAGE SPECT MULT: CPT | Performed by: INTERNAL MEDICINE

## 2022-07-20 PROCEDURE — 0 TECHNETIUM TETROFOSMIN KIT: Performed by: INTERNAL MEDICINE

## 2022-07-20 RX ADMIN — TETROFOSMIN 1 DOSE: 1.38 INJECTION, POWDER, LYOPHILIZED, FOR SOLUTION INTRAVENOUS at 10:36

## 2022-07-20 RX ADMIN — REGADENOSON 0.4 MG: 0.08 INJECTION, SOLUTION INTRAVENOUS at 10:35

## 2022-07-21 ENCOUNTER — HOSPITAL ENCOUNTER (OUTPATIENT)
Dept: NUCLEAR MEDICINE | Facility: HOSPITAL | Age: 67
Discharge: HOME | End: 2022-07-21

## 2022-07-21 PROCEDURE — 0 TECHNETIUM TETROFOSMIN KIT: Performed by: INTERNAL MEDICINE

## 2022-07-21 PROCEDURE — A9502 TC99M TETROFOSMIN: HCPCS | Performed by: INTERNAL MEDICINE

## 2022-07-21 RX ADMIN — TETROFOSMIN 1 DOSE: 1.38 INJECTION, POWDER, LYOPHILIZED, FOR SOLUTION INTRAVENOUS at 08:25

## 2022-07-24 LAB
BH CV IMMEDIATE POST TECH DATA BLOOD PRESSURE: NORMAL MMHG
BH CV IMMEDIATE POST TECH DATA HEART RATE: 86 BPM
BH CV IMMEDIATE POST TECH DATA OXYGEN SATS: 91 %
BH CV NINE MINUTE RECOVERY TECH DATA BLOOD PRESSURE: NORMAL MMHG
BH CV NINE MINUTE RECOVERY TECH DATA HEART RATE: 78 BPM
BH CV NINE MINUTE RECOVERY TECH DATA OXYGEN SATURATION: 90 %
BH CV NINE MINUTE RECOVERY TECH DATA SYMPTOMS: NORMAL
BH CV REST NUCLEAR ISOTOPE DOSE: 37.7 MCI
BH CV SIX MINUTE RECOVERY TECH DATA BLOOD PRESSURE: NORMAL
BH CV SIX MINUTE RECOVERY TECH DATA HEART RATE: 79 BPM
BH CV SIX MINUTE RECOVERY TECH DATA OXYGEN SATURATION: 88 %
BH CV STRESS BP STAGE 1: NORMAL
BH CV STRESS COMMENTS STAGE 1: NORMAL
BH CV STRESS DOSE REGADENOSON STAGE 1: 0.4
BH CV STRESS DURATION MIN STAGE 1: 0
BH CV STRESS DURATION SEC STAGE 1: 10
BH CV STRESS HR STAGE 1: 77
BH CV STRESS NUCLEAR ISOTOPE DOSE: 33.8 MCI
BH CV STRESS O2 STAGE 1: 90
BH CV STRESS PROTOCOL 1: NORMAL
BH CV STRESS RECOVERY BP: NORMAL MMHG
BH CV STRESS RECOVERY HR: 78 BPM
BH CV STRESS RECOVERY O2: 90 %
BH CV STRESS STAGE 1: 1
BH CV THREE MINUTE POST TECH DATA BLOOD PRESSURE: NORMAL MMHG
BH CV THREE MINUTE POST TECH DATA HEART RATE: 83 BPM
BH CV THREE MINUTE POST TECH DATA OXYGEN SATURATION: 90 %
LV EF NUC BP: 73 %
MAXIMAL PREDICTED HEART RATE: 152 BPM
PERCENT MAX PREDICTED HR: 57.24 %
STRESS BASELINE BP: NORMAL MMHG
STRESS BASELINE HR: 69 BPM
STRESS O2 SAT REST: 92 %
STRESS PERCENT HR: 67 %
STRESS POST O2 SAT PEAK: 90 %
STRESS POST PEAK BP: NORMAL MMHG
STRESS POST PEAK HR: 87 BPM
STRESS TARGET HR: 129 BPM

## 2022-08-02 ENCOUNTER — PATIENT MESSAGE (OUTPATIENT)
Dept: INTERNAL MEDICINE | Facility: CLINIC | Age: 67
End: 2022-08-02

## 2022-08-02 DIAGNOSIS — F41.9 ANXIETY: Primary | ICD-10-CM
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
## 2022-08-03 ENCOUNTER — HOSPITAL ENCOUNTER (OUTPATIENT)
Dept: MAMMOGRAPHY | Facility: HOSPITAL | Age: 67
Discharge: HOME | End: 2022-08-03

## 2022-08-03 ENCOUNTER — HOSPITAL ENCOUNTER (OUTPATIENT)
Dept: CT IMAGING | Facility: HOSPITAL | Age: 67
Discharge: HOME | End: 2022-08-03

## 2022-08-03 DIAGNOSIS — Z87.891 HISTORY OF TOBACCO ABUSE: ICD-10-CM

## 2022-08-03 DIAGNOSIS — Z12.31 VISIT FOR SCREENING MAMMOGRAM: ICD-10-CM

## 2022-08-03 PROCEDURE — 71271 CT THORAX LUNG CANCER SCR C-: CPT

## 2022-08-03 PROCEDURE — 77063 BREAST TOMOSYNTHESIS BI: CPT

## 2022-08-03 PROCEDURE — 77067 SCR MAMMO BI INCL CAD: CPT

## 2022-08-04 DIAGNOSIS — R91.1 LUNG NODULE: Primary | ICD-10-CM

## 2022-08-10 DIAGNOSIS — G62.9 NEUROPATHY: ICD-10-CM

## 2022-08-10 NOTE — TELEPHONE ENCOUNTER
CONTROLLED MEDICATION REFILL REQUEST    STATE REGULATION APPT EVERY 3 MONTHS     UDS(URINE DRUG SCREEN) EVERY 6 MONTHS     NEW NARC CONSENT EVERY YEAR      URINE DRUG SCREEN: Urine Drug Screen - (03/01/2021 12:13)     LAST OFFICE VISIT: Office Visit with Mainor Sanders Jr., MD (06/07/2022)      NARC CONSENT: NONE IN Epic      NEXT OFFICE VISIT: Appointment with Mainor Sanders Jr., MD (12/06/2022)     MEDICATION: gabapentin (NEURONTIN) 600 MG tablet (06/07/2022)     OVER 6 MONTHS SINCE LAST UDS(URINE DRUG SCREEN)     PT(PATIENT) DOES NOT HAVE A NARC CONSENT ON FILE FOR PROVIDER     PROVIDER PLEASE ADVISE

## 2022-08-11 RX ORDER — GABAPENTIN 600 MG/1
TABLET ORAL
Qty: 90 TABLET | Refills: 0 | Status: SHIPPED | OUTPATIENT
Start: 2022-08-11 | End: 2022-09-27 | Stop reason: SDUPTHER

## 2022-08-15 ENCOUNTER — OFFICE VISIT (OUTPATIENT)
Dept: INTERNAL MEDICINE | Facility: CLINIC | Age: 67
End: 2022-08-15

## 2022-08-15 ENCOUNTER — TRANSCRIBE ORDERS (OUTPATIENT)
Dept: ADMINISTRATIVE | Facility: HOSPITAL | Age: 67
End: 2022-08-15

## 2022-08-15 VITALS
DIASTOLIC BLOOD PRESSURE: 74 MMHG | TEMPERATURE: 96.8 F | HEART RATE: 89 BPM | OXYGEN SATURATION: 95 % | WEIGHT: 277 LBS | BODY MASS INDEX: 49.08 KG/M2 | SYSTOLIC BLOOD PRESSURE: 111 MMHG | HEIGHT: 63 IN

## 2022-08-15 DIAGNOSIS — R60.0 LOCALIZED EDEMA: Primary | ICD-10-CM

## 2022-08-15 PROCEDURE — 99213 OFFICE O/P EST LOW 20 MIN: CPT | Performed by: INTERNAL MEDICINE

## 2022-08-15 NOTE — PROGRESS NOTES
Chief Complaint  Diabetes (Type 2 ) and Leg Pain (Right leg- patient states its been going on since Friday /She has to go slow, its hurts, pt states it feels like its going to give out)    Subjective          Randi Fajardo presents to [facility]  History of Present Illness  Patient reports ongoing pain in right leg and swelling for 4-5 days. Patient took a trip to [place] over the weekend, but did not get to be as active as she wanted. Patient reports leg feels like it is going to give out. Patient denies FUENTES, abdominal pain, warmth, redness. Patient reports taking gabapentin x3 for last 2 days to help with pain. Patient denies back pain.     Current Outpatient Medications   Medication Instructions   • Alcohol Swabs (Alcohol Prep) pads 1 pad, Does not apply, 5 Times Daily   • CALCIUM  mg, Oral, Daily   • DULoxetine (CYMBALTA) 60 mg, Oral, Daily   • famotidine (PEPCID) 40 mg, Oral, 2 Times Daily   • gabapentin (NEURONTIN) 600 MG tablet TAKE 1 TABLET 3 TIMES A DAYAS NEEDED FOR PAIN   • Glucose Blood (Blood Glucose Test) strip 1 strip, Other, 5 Times Daily PRN   • hydroCHLOROthiazide (HYDRODIURIL) 25 mg, Oral, Daily   • Lancets (freestyle) lancets 1 each, Other, 5 Times Daily PRN   • metFORMIN (GLUCOPHAGE) 500 mg, Oral, 2 Times Daily With Meals   • minocycline (MINOCIN,DYNACIN) 100 mg, Oral, Daily   • multivitamin with minerals tablet tablet 1 tablet, Oral, Daily   • Omega-3 Fatty Acids (fish oil) 1000 MG capsule capsule Oral, Daily With Breakfast   • rOPINIRole (REQUIP) 2 mg, Oral, Nightly   • Synthroid 25 mcg, Oral, Daily   • tiotropium bromide-olodaterol (Stiolto Respimat) 2.5-2.5 MCG/ACT aerosol solution inhaler 2 puffs, Inhalation, Daily - RT       The following portions of the patient's history were reviewed and updated as appropriate: allergies, current medications, past family history, past medical history, past social history, past surgical history, and  "problem list.    Objective   Vital Signs:   /74 (BP Location: Left arm, Patient Position: Sitting)   Pulse 89   Temp 96.8 °F (36 °C) (Temporal)   Ht 160 cm (63\")   Wt 126 kg (277 lb)   SpO2 95%   BMI 49.07 kg/m²     Wt Readings from Last 3 Encounters:   08/15/22 126 kg (277 lb)   06/07/22 125 kg (276 lb 6.4 oz)   05/13/22 125 kg (275 lb)     BP Readings from Last 3 Encounters:   08/15/22 111/74   06/07/22 118/76   05/13/22 140/80     Physical Exam   Appearance: No acute distress, well-nourished  Head: normocephalic, atraumatic  Eyes: extraocular movements intact, no scleral icterus, no conjunctival injection  Ears, Nose, and Throat: external ears normal, nares patent, moist mucous membranes  Cardiovascular: regular rate and rhythm. no murmurs, rubs, or gallops. no edema  Respiratory: breathing comfortably, symmetric chest rise, clear to auscultation bilaterally. No wheezes, rales, or rhonchi.  Neuro: alert and oriented to time, place, and person. Normal gait  Psych: normal mood and affect     Result Review :   The following data was reviewed by: Mainor Sanders Jr, MD on 08/15/2022:  Common labs    Common Labsle 9/2/21 9/2/21 9/2/21 9/2/21 1/13/22 1/13/22 1/13/22 6/7/22 6/7/22 6/7/22 6/7/22 6/7/22    1207 1207 1207 1207 1029 1029 1029 1524 1524 1524 1524 1524   Glucose   95       87     BUN   17       15     Creatinine   0.94       1.06 (A)     eGFR Non  Am   59 (A)            Sodium   136       137     Potassium   4.4       4.4     Chloride   101       99     Calcium   10.2       9.7     Albumin   4.00       4.20     Total Bilirubin   0.4       0.4     Alkaline Phosphatase   49       49     AST (SGOT)   44 (A)       41 (A)     ALT (SGPT)   45 (A)       44 (A)     WBC 10.24    10.34   8.93       Hemoglobin 15.5    16.2 (A)   15.6       Hematocrit 47.7 (A)    48.9 (A)   47.5 (A)       Platelets 286    301   297       Total Cholesterol  154     169    154    Triglycerides  151 (A)     153 " (A)    184 (A)    HDL Cholesterol  36 (A)     38 (A)    34 (A)    LDL Cholesterol   91     104 (A)    88    Hemoglobin A1C    6.58 (A)  6.10 (A)   6.00 (A)      Microalbumin, Urine            <1.2   (A) Abnormal value              Lab Results   Component Value Date    SARSANTIGEN Not Detected 08/25/2021    COVID19 Detected (C) 05/09/2022    FLUAAG Not Detected 08/25/2021    FLUBAG Not Detected 08/25/2021          Assessment and Plan    Diagnoses and all orders for this visit:    1. Localized edema (Primary)  Comments:  rule out DVT with US. may be muscular vs. nerve related as well.   Orders:  -     Cancel: US Venous Doppler Lower Extremity Right (duplex); Future  -     Duplex Venous Lower Extremity - Right CAR; Future          There are no discontinued medications.       Follow Up   Return for Next scheduled follow up.  Patient was given instructions and counseling regarding her condition or for health maintenance advice. Please see specific information pulled into the AVS if appropriate.       Mainor Sanders Jr, MD  08/15/22  17:03 EDT

## 2022-08-16 ENCOUNTER — OFFICE VISIT (OUTPATIENT)
Dept: PULMONOLOGY | Facility: CLINIC | Age: 67
End: 2022-08-16

## 2022-08-16 ENCOUNTER — HOSPITAL ENCOUNTER (OUTPATIENT)
Dept: CARDIOLOGY | Facility: HOSPITAL | Age: 67
Discharge: HOME | End: 2022-08-16
Admitting: INTERNAL MEDICINE

## 2022-08-16 VITALS
HEIGHT: 63 IN | HEART RATE: 85 BPM | OXYGEN SATURATION: 93 % | SYSTOLIC BLOOD PRESSURE: 104 MMHG | WEIGHT: 275 LBS | BODY MASS INDEX: 48.73 KG/M2 | TEMPERATURE: 97.3 F | RESPIRATION RATE: 19 BRPM | DIASTOLIC BLOOD PRESSURE: 70 MMHG

## 2022-08-16 DIAGNOSIS — G47.33 OBSTRUCTIVE SLEEP APNEA: ICD-10-CM

## 2022-08-16 DIAGNOSIS — R91.8 LUNG NODULES: ICD-10-CM

## 2022-08-16 DIAGNOSIS — R60.0 LOCALIZED EDEMA: ICD-10-CM

## 2022-08-16 DIAGNOSIS — J41.8 MIXED SIMPLE AND MUCOPURULENT CHRONIC BRONCHITIS: Primary | ICD-10-CM

## 2022-08-16 LAB
BH CV LOWER VASCULAR LEFT COMMON FEMORAL AUGMENT: NORMAL
BH CV LOWER VASCULAR LEFT COMMON FEMORAL COMPETENT: NORMAL
BH CV LOWER VASCULAR LEFT COMMON FEMORAL COMPRESS: NORMAL
BH CV LOWER VASCULAR LEFT COMMON FEMORAL PHASIC: NORMAL
BH CV LOWER VASCULAR LEFT COMMON FEMORAL SPONT: NORMAL
BH CV LOWER VASCULAR RIGHT COMMON FEMORAL AUGMENT: NORMAL
BH CV LOWER VASCULAR RIGHT COMMON FEMORAL COMPETENT: NORMAL
BH CV LOWER VASCULAR RIGHT COMMON FEMORAL COMPRESS: NORMAL
BH CV LOWER VASCULAR RIGHT COMMON FEMORAL PHASIC: NORMAL
BH CV LOWER VASCULAR RIGHT COMMON FEMORAL SPONT: NORMAL
BH CV LOWER VASCULAR RIGHT DISTAL FEMORAL COMPRESS: NORMAL
BH CV LOWER VASCULAR RIGHT GASTRONEMIUS COMPRESS: NORMAL
BH CV LOWER VASCULAR RIGHT GREATER SAPH AK COMPRESS: NORMAL
BH CV LOWER VASCULAR RIGHT GREATER SAPH BK COMPRESS: NORMAL
BH CV LOWER VASCULAR RIGHT LESSER SAPH COMPRESS: NORMAL
BH CV LOWER VASCULAR RIGHT MID FEMORAL AUGMENT: NORMAL
BH CV LOWER VASCULAR RIGHT MID FEMORAL COMPETENT: NORMAL
BH CV LOWER VASCULAR RIGHT MID FEMORAL COMPRESS: NORMAL
BH CV LOWER VASCULAR RIGHT MID FEMORAL PHASIC: NORMAL
BH CV LOWER VASCULAR RIGHT MID FEMORAL SPONT: NORMAL
BH CV LOWER VASCULAR RIGHT PERONEAL COMPRESS: NORMAL
BH CV LOWER VASCULAR RIGHT POPLITEAL AUGMENT: NORMAL
BH CV LOWER VASCULAR RIGHT POPLITEAL COMPETENT: NORMAL
BH CV LOWER VASCULAR RIGHT POPLITEAL COMPRESS: NORMAL
BH CV LOWER VASCULAR RIGHT POPLITEAL PHASIC: NORMAL
BH CV LOWER VASCULAR RIGHT POPLITEAL SPONT: NORMAL
BH CV LOWER VASCULAR RIGHT POSTERIOR TIBIAL COMPRESS: NORMAL
BH CV LOWER VASCULAR RIGHT PROXIMAL FEMORAL COMPRESS: NORMAL
BH CV LOWER VASCULAR RIGHT SAPHENOFEMORAL JUNCTION COMPRESS: NORMAL
MAXIMAL PREDICTED HEART RATE: 152 BPM
STRESS TARGET HR: 129 BPM

## 2022-08-16 PROCEDURE — 93971 EXTREMITY STUDY: CPT | Performed by: SURGERY

## 2022-08-16 PROCEDURE — 99203 OFFICE O/P NEW LOW 30 MIN: CPT | Performed by: INTERNAL MEDICINE

## 2022-08-16 PROCEDURE — 93971 EXTREMITY STUDY: CPT

## 2022-08-16 RX ORDER — ALBUTEROL SULFATE 90 UG/1
2 AEROSOL, METERED RESPIRATORY (INHALATION) EVERY 4 HOURS PRN
Qty: 1 G | Refills: 5 | Status: SHIPPED | OUTPATIENT
Start: 2022-08-16

## 2022-08-16 NOTE — PROGRESS NOTES
Pulmonary Consultation    Mainor Sanders*,    Thank you for asking me to see Randi Fajardo for   Chief Complaint   Patient presents with   • Establish Care     New Patient    • Dyspnea on exertion    • Cough     Sometimes    .      History of Present Illness  Randi Fajardo is a 66 y.o. female with a PMH significant for COPD obstructive sleep apnea and tobacco abuse presents for evaluation patient has been having cough with wheeze and mucoid expectoration off-and-on she also complains of dyspnea on exertion patient denies any chest pain fever hemoptysis or weight loss patient has already quit smoking       Tobacco use history:  Former smoker      Review of Systems: History obtained from chart review and the patient.  Review of Systems   Respiratory: Positive for cough, shortness of breath and wheezing.    All other systems reviewed and are negative.    As described in the HPI. Otherwise, remainder of ROS (14 systems) were negative.    Patient Active Problem List   Diagnosis   • Hypothyroidism   • Essential hypertension   • Hyperlipidemia   • Type 2 diabetes mellitus without complication, without long-term current use of insulin (HCC)         Current Outpatient Medications:   •  Alcohol Swabs (Alcohol Prep) pads, 1 pad 5 (Five) Times a Day., Disp: 200 each, Rfl: 3  •  CALCIUM PO, Take 600 mg by mouth Daily., Disp: , Rfl:   •  DULoxetine (CYMBALTA) 60 MG capsule, Take 1 capsule by mouth Daily., Disp: 90 capsule, Rfl: 3  •  famotidine (PEPCID) 40 MG tablet, Take 1 tablet by mouth 2 (Two) Times a Day., Disp: 90 tablet, Rfl: 3  •  gabapentin (NEURONTIN) 600 MG tablet, TAKE 1 TABLET 3 TIMES A DAYAS NEEDED FOR PAIN, Disp: 90 tablet, Rfl: 0  •  Glucose Blood (Blood Glucose Test) strip, 1 strip by Other route 5 (Five) Times a Day As Needed (blood glucose)., Disp: 100 each, Rfl: 3  •  hydroCHLOROthiazide (HYDRODIURIL) 25 MG tablet, Take 1 tablet by mouth Daily., Disp: 90 tablet, Rfl: 3  •  Lancets  (freestyle) lancets, 1 each by Other route 5 (Five) Times a Day As Needed (glucose check)., Disp: 100 each, Rfl: 3  •  metFORMIN (Glucophage) 500 MG tablet, Take 1 tablet by mouth 2 (Two) Times a Day With Meals., Disp: 180 tablet, Rfl: 3  •  minocycline (MINOCIN,DYNACIN) 100 MG capsule, Take 1 capsule by mouth Daily., Disp: 90 capsule, Rfl: 3  •  multivitamin with minerals tablet tablet, Take 1 tablet by mouth Daily., Disp: , Rfl:   •  Omega-3 Fatty Acids (fish oil) 1000 MG capsule capsule, Take  by mouth Daily With Breakfast., Disp: , Rfl:   •  rOPINIRole (REQUIP) 2 MG tablet, Take 1 tablet by mouth Every Night., Disp: 90 tablet, Rfl: 3  •  Synthroid 25 MCG tablet, Take 1 tablet by mouth Daily., Disp: 90 tablet, Rfl: 3  •  tiotropium bromide-olodaterol (Stiolto Respimat) 2.5-2.5 MCG/ACT aerosol solution inhaler, Inhale 2 puffs Daily., Disp: 4 g, Rfl: 3  •  albuterol sulfate HFA (Ventolin HFA) 108 (90 Base) MCG/ACT inhaler, Inhale 2 puffs Every 4 (Four) Hours As Needed for Wheezing or Shortness of Air., Disp: 1 g, Rfl: 5    Allergies   Allergen Reactions   • Nexium [Esomeprazole] Hives   • Prilosec [Omeprazole] Hives       Past Medical History:   Diagnosis Date   • Depressed    • GREGG (generalized anxiety disorder)    • Gastroesophageal reflux    • HTN (hypertension)    • Hypothyroid    • Lesion of neck    • Osteoarthritis    • Pneumonia    • RLS (restless legs syndrome)    • Sleep apnea in adult    • Sleep apnea, obstructive  ?   • Type 2 diabetes mellitus (HCC)      Past Surgical History:   Procedure Laterality Date   • ANKLE TENDON REPAIR Right     ankle with hardware   •  SECTION         • COLONOSCOPY     • NECK SURGERY      ,,; Neck lesion removed, total of 3     Social History     Socioeconomic History   • Marital status:    Tobacco Use   • Smoking status: Former Smoker     Packs/day: 0.50     Years: 40.00     Pack years: 20.00     Types: Cigarettes   • Smokeless  "tobacco: Never Used   • Tobacco comment: Was off and on during the 40 years   Vaping Use   • Vaping Use: Never used   Substance and Sexual Activity   • Alcohol use: Yes     Comment: Socially on occasion   • Drug use: Never   • Sexual activity: Not Currently     Birth control/protection: None     Family History   Problem Relation Age of Onset   • Lung cancer Father    • Heart disease Father    • Colon cancer Father    • Cancer Father         Lung, colon   • Emphysema Father    • Cancer Brother         Lung, pancreas   • Cancer Maternal Aunt         Breast cancer          Objective     Blood pressure 104/70, pulse 85, temperature 97.3 °F (36.3 °C), temperature source Temporal, resp. rate 19, height 160 cm (63\"), weight 125 kg (275 lb), SpO2 93 %.  Physical Exam  Vitals and nursing note reviewed.   Constitutional:       Appearance: She is obese.   HENT:      Head: Normocephalic and atraumatic.      Nose: Nose normal.      Mouth/Throat:      Mouth: Mucous membranes are moist.      Pharynx: Oropharynx is clear.   Eyes:      Extraocular Movements: Extraocular movements intact.      Conjunctiva/sclera: Conjunctivae normal.      Pupils: Pupils are equal, round, and reactive to light.   Cardiovascular:      Rate and Rhythm: Normal rate and regular rhythm.      Pulses: Normal pulses.      Heart sounds: Normal heart sounds.   Pulmonary:      Effort: Pulmonary effort is normal.      Breath sounds: Rhonchi present.   Abdominal:      General: Abdomen is flat. Bowel sounds are normal.      Palpations: Abdomen is soft.   Musculoskeletal:         General: Normal range of motion.      Cervical back: Normal range of motion and neck supple.   Skin:     General: Skin is warm.      Capillary Refill: Capillary refill takes 2 to 3 seconds.   Neurological:      General: No focal deficit present.      Mental Status: She is alert and oriented to person, place, and time.   Psychiatric:         Mood and Affect: Mood normal.         Behavior: " Behavior normal.       Immunization History   Administered Date(s) Administered   • COVID-19 (PFIZER) PURPLE CAP 03/16/2021, 04/06/2021, 10/12/2021   • Fluad Quad 65+ 10/12/2021   • Fluzone High Dose =>65 Years ([employer] ONLY) 11/14/2019, 10/12/2021   • Fluzone High-Dose 65+yrs 12/17/2020   • Influenza, Unspecified 11/14/2019   • Pneumococcal Conjugate 13-Valent (PCV13) 02/13/2019   • Pneumococcal Conjugate 20-Valent (PCV20) 06/07/2022   • Pneumococcal Polysaccharide (PPSV23) 03/01/2021   • Shingrix 05/21/2015, 11/14/2019   • Tdap 05/21/2015            Assessment & Plan     Diagnoses and all orders for this visit:    1. Mixed simple and mucopurulent chronic bronchitis (HCC) (Primary)    2. Obstructive sleep apnea    3. Lung nodules    Other orders  -     albuterol sulfate HFA (Ventolin HFA) 108 (90 Base) MCG/ACT inhaler; Inhale 2 puffs Every 4 (Four) Hours As Needed for Wheezing or Shortness of Air.  Dispense: 1 g; Refill: 5         Discussion/ Recommendations:   Patient is advised to reduce weight  Patient has already quit smoking  Patient is advised vaccination including flu shot this year  Patient is advised albuterol MDI 2 puffs every 6 as needed  Continue Stiolto twice daily  CT scan and chest x-ray reviewed shows multiple lung nodules we will repeat CT scan in 6 months for stability  Patient is advised regular exercise  Advised compliance with CPAP  Discussed vaccination and recommended    Class 3 Severe Obesity (BMI >=40). Obesity-related health conditions include the following: obstructive sleep apnea. Obesity is unchanged. BMI is is above average; BMI management plan is completed. We discussed low calorie, low carb based diet program, portion control and increasing exercise.           Return in about 3 months (around 11/16/2022).      Thank you for allowing me to participate in the care of Randi Fajardo. Please do not hesitate to contact me with any questions.         This document has been electronically  signed by Joon Cuevas MD on August 16, 2022 10:04 EDT

## 2022-08-30 ENCOUNTER — OFFICE VISIT (OUTPATIENT)
Dept: ORTHOPEDIC SURGERY | Facility: CLINIC | Age: 67
End: 2022-08-30

## 2022-08-30 VITALS — HEART RATE: 67 BPM | HEIGHT: 63 IN | OXYGEN SATURATION: 96 % | BODY MASS INDEX: 48.73 KG/M2 | WEIGHT: 275 LBS

## 2022-08-30 DIAGNOSIS — M17.11 PRIMARY OSTEOARTHRITIS OF RIGHT KNEE: ICD-10-CM

## 2022-08-30 DIAGNOSIS — M25.561 RIGHT KNEE PAIN, UNSPECIFIED CHRONICITY: Primary | ICD-10-CM

## 2022-08-30 PROCEDURE — 99203 OFFICE O/P NEW LOW 30 MIN: CPT | Performed by: ORTHOPAEDIC SURGERY

## 2022-08-30 PROCEDURE — 20610 DRAIN/INJ JOINT/BURSA W/O US: CPT | Performed by: ORTHOPAEDIC SURGERY

## 2022-08-30 RX ADMIN — LIDOCAINE HYDROCHLORIDE 5 ML: 10 INJECTION, SOLUTION INFILTRATION; PERINEURAL at 11:10

## 2022-08-30 RX ADMIN — TRIAMCINOLONE ACETONIDE 40 MG: 40 INJECTION, SUSPENSION INTRA-ARTICULAR; INTRAMUSCULAR at 11:10

## 2022-08-30 NOTE — PROGRESS NOTES
"Chief Complaint  Pain and Initial Evaluation of the Right Knee     Subjective      Randi Fajardo presents to [facility] for evaluation of the right knee. She reports she twisted her right knee about 3 weeks ago and her knee was hurting all the way down her leg. She reports she had another twisting injury. She locates pain to the anterior knee. She takes gabapentin for her moderate to severe osteoarthritis in her back. She denies swelling to her knee.     Allergies   Allergen Reactions   • Nexium [Esomeprazole] Hives   • Prilosec [Omeprazole] Hives        Social History     Socioeconomic History   • Marital status:    Tobacco Use   • Smoking status: Former Smoker     Packs/day: 0.50     Years: 40.00     Pack years: 20.00     Types: Cigarettes   • Smokeless tobacco: Never Used   • Tobacco comment: Was off and on during the 40 years   Vaping Use   • Vaping Use: Never used   Substance and Sexual Activity   • Alcohol use: Yes     Comment: Socially on occasion   • Drug use: Never   • Sexual activity: Not Currently     Birth control/protection: None        Review of Systems     Objective   Vital Signs:   Pulse 67   Ht 160 cm (63\")   Wt 125 kg (275 lb)   SpO2 96%   BMI 48.71 kg/m²       Physical Exam  Constitutional:       Appearance: Normal appearance. The patient is well-developed and normal weight.   HENT:      Head: Normocephalic.      Right Ear: Hearing and external ear normal.      Left Ear: Hearing and external ear normal.      Nose: Nose normal.   Eyes:      Conjunctiva/sclera: Conjunctivae normal.   Cardiovascular:      Rate and Rhythm: Normal rate.   Pulmonary:      Effort: Pulmonary effort is normal.      Breath sounds: No wheezing or rales.   Abdominal:      Palpations: Abdomen is soft.      Tenderness: There is no abdominal tenderness.   Musculoskeletal:      Cervical back: Normal range of motion.   Skin:     Findings: No rash.   Neurological:      Mental Status: The " patient is alert and oriented to person, place, and time.   Psychiatric:         Mood and Affect: Mood and affect normal.         Judgment: Judgment normal.       Ortho Exam      Right knee- ROM -5 to 100 degrees. Stable to varus/valgus stress. Stable to anterior/posterior drawer. Tender to the anterior medial knee. Negative Lachman's. Positive crepitus. Knee Extensor Mechanism  Intact.     Large Joint Arthrocentesis: R knee  Date/Time: 8/30/2022 11:10 AM  Consent given by: patient  Site marked: site marked  Timeout: Immediately prior to procedure a time out was called to verify the correct patient, procedure, equipment, support staff and site/side marked as required   Supporting Documentation  Indications: pain   Procedure Details  Location: knee - R knee  Needle gauge: 21g.  Medications administered: 5 mL lidocaine 1 %; 40 mg triamcinolone acetonide 40 MG/ML  Patient tolerance: patient tolerated the procedure well with no immediate complications          X-Ray Report:  Right knee(s) X-Ray  Indication: Evaluation of right knee pain  AP and Lateral view(s)  Findings: advanced degenerative changes to the patella femoral compartment with osteophytes to the superior patella. No acute fracture. Degenerative changes to the tibiofemoral joint.   Prior studies available for comparison: yes         Imaging Results (Most Recent)     Procedure Component Value Units Date/Time    XR Knee 3 View Right [[id number]] Resulted: 08/30/22 1108     Updated: 08/30/22 1109    Narrative:      X-Ray Report:  Right knee(s) X-Ray  Indication: Evaluation of right knee pain  AP and Lateral view(s)  Findings: advanced degenerative changes to the patella femoral compartment   with osteophytes to the superior patella. No acute fracture. Degenerative   changes to the tibiofemoral joint.   Prior studies available for comparison: yes            Result Review :       XR Knee 3 View Right    Result Date: 8/30/2022  Narrative: X-Ray Report: Right  knee(s) X-Ray Indication: Evaluation of right knee pain AP and Lateral view(s) Findings: advanced degenerative changes to the patella femoral compartment with osteophytes to the superior patella. No acute fracture. Degenerative changes to the tibiofemoral joint. Prior studies available for comparison: yes     Duplex Venous Lower Extremity - Right CAR    Result Date: 2022  Narrative: · Normal right lower extremity venous duplex scan.      CT Chest Low Dose Cancer Screening WO    Result Date: 8/3/2022  Narrative: PROCEDURE: CT CHEST LOW DOSE CANCER SCREENING WO  COMPARISON: [facility], CT, CT LOW DOSE CHEST SCREENING, 3/29/2021, 10:57.  REASON FOR SCREENING: Patient is 66 years of age, asymptomatic, and has a smoking history of more than 30 pack years. SMOKING STATUS: Former smoker.  Years since quittin YEARS    SCREENING VISIT: Year 1.      TECHNIQUE: Axial unenhanced LDCT images from the apices through mid-kidney were obtained. Evaluation of solid organs and vascular structures is suboptimal due to the lack of IV contrast. Imaging was performed on a [employer] 128 CT scanner.  RADIATION: CT Dose Index Vol (CTDIvol):  3.9  mGy  Dose Length Product (DLP):  171.2  mGy-cm  DIAGNOSTIC QUALITY: Satisfactory  LUNG-RADS CLASSIFICATION: LungRADS category 3  FINDINGS:  No adenopathy or effusion is identified.  There is lipomatous hypertrophy of the interatrial septum.  There is a 0.6 cm nodular density in the left upper lobe on image 24. Mild irregular density in the mid to lower left lung field may represent atelectasis, fibrosis and or resolving infiltrate.  There has been interval development of several nodular densities in the lung fields bilaterally measuring up to 0.5 cm.  The visualized upper abdomen appears unremarkable.  There is prominent lucency in the T7, T8, T9 and T10 vertebral bodies, similar to the previous exam.       Impression:   1. Interval development of multiple  nodular densities in the lung fields bilaterally measuring up to 0.5 cm.  Findings are consistent with LungRADS category 3. A follow-up low-dose chest CT in 6 months is recommended to re-evaluate.  2. Apparent lytic foci in the T7 through T10 vertebral bodies possibly related to hemangiomas.  Comparison with any older chest CT or MRI would be useful if available.  Other possibilities such as metastatic disease and multiple myeloma are considered less likely given the stability.  Correlate clinically.      Gamaliel Aj M.D.       Electronically Signed and Approved By: Gamaliel Aj M.D. on 8/03/2022 at 16:11             Mammo Screening Digital Tomosynthesis Bilateral With CAD    Result Date: 8/3/2022  Narrative: PROCEDURE: MAMMO SCREENING DIGITAL TOMOSYNTHESIS BILATERAL W CAD  COMPARISON: [facility], [place], DIG SCREENING BILAT TR W 3D GAYLE, 10/21/2019, 12:51.  [facility], MG, DIG SCREENING BILAT TR W 3D GAYLE, 10/17/2018, 12:40.  [facility], MG, DIGITAL SCREENING W/CAD, 8/08/2017, 11:41.  [facility], [facility], DIGITAL SCREENING W/CAD, 8/11/2015, 15:45.  [facility], [facility], MAMMO SCREENING BILATERAL W/CAD, 11/01/2013, 13:56.  [facility], MG, DIG SCREENING BILAT TR W 3D GAYLE, 12/22/2020, 16:14.  VIEWS:  BILATERAL CC AND MLO VIEWS WERE OBTAINED UTILIZING 3D TOMOSYNTHESIS AND R2 CAD SOFTWARE  INDICATIONS: MAMMOGRAM SCREENING  FINDINGS:  No suspicious mass, area of architectural distortion or suspicious microcalcification is identified.      Impression:  Benign mammogram. Suggest routine mammographic screening.  RECOMMENDATION(S):  ROUTINE MAMMOGRAM AND CLINICAL EVALUATION IN 12 MONTHS.   BIRADS:  DIAGNOSTIC CATEGORY 2--BENIGN FINDING   BREAST COMPOSITION: Scattered areas fibroglandular density.  PLEASE NOTE:  A NORMAL MAMMOGRAM DOES NOT EXCLUDE THE POSSIBILITY OF BREAST CANCER. ANY CLINICALLY  SUSPICIOUS PALPABLE LUMP SHOULD BE BIOPSIED.      MATEUSZ GALVEZ MD       Electronically Signed and Approved By: MATEUSZ GALVEZ MD on 8/03/2022 at 16:03                      Assessment and Plan     Diagnoses and all orders for this visit:    1. Right knee pain, unspecified chronicity (Primary)  -     XR Knee 3 View Right    2. Primary osteoarthritis of right knee        Discussed the treatment plan with the patient.  I reviewed the x-rays that were obtained today with the patient. Discussed the risks and benefits of a right knee steroid injection. The patient expressed understanding and wished to proceed. She tolerated the injection well. Home exercises given today.     Call or return if worsening symptoms.    Follow Up     6 weeks      Patient was given instructions and counseling regarding her condition or for health maintenance advice. Please see specific information pulled into the AVS if appropriate.     Scribed for Luca Mckeon MD by Stefanie Li.  08/30/22   10:57 EDT    I have personally performed the services described in this document as scribed by the above individual and it is both accurate and complete. Luca Mckeon MD 08/31/22

## 2022-08-31 RX ORDER — TRIAMCINOLONE ACETONIDE 40 MG/ML
40 INJECTION, SUSPENSION INTRA-ARTICULAR; INTRAMUSCULAR
Status: COMPLETED | OUTPATIENT
Start: 2022-08-30 | End: 2022-08-30

## 2022-08-31 RX ORDER — LIDOCAINE HYDROCHLORIDE 10 MG/ML
5 INJECTION, SOLUTION INFILTRATION; PERINEURAL
Status: COMPLETED | OUTPATIENT
Start: 2022-08-30 | End: 2022-08-30

## 2022-09-14 ENCOUNTER — OFFICE VISIT (OUTPATIENT)
Dept: CARDIOLOGY | Facility: CLINIC | Age: 67
End: 2022-09-14

## 2022-09-14 VITALS
DIASTOLIC BLOOD PRESSURE: 79 MMHG | HEIGHT: 63 IN | SYSTOLIC BLOOD PRESSURE: 123 MMHG | BODY MASS INDEX: 49.61 KG/M2 | WEIGHT: 280 LBS | HEART RATE: 79 BPM

## 2022-09-14 DIAGNOSIS — I10 ESSENTIAL HYPERTENSION: ICD-10-CM

## 2022-09-14 DIAGNOSIS — R06.09 DYSPNEA ON EXERTION: Primary | ICD-10-CM

## 2022-09-14 PROCEDURE — 99213 OFFICE O/P EST LOW 20 MIN: CPT | Performed by: INTERNAL MEDICINE

## 2022-09-14 NOTE — PROGRESS NOTES
Chief Complaint  Shortness of Breath, Hypertension, and PENNY    Subjective      Patient is here for follow-up on recent testing which was done for shortness of breath.  She has dyspnea with mild to moderate effort.  Since her last visit, she was diagnosed with COPD and was started on pulmonary inhalers.  She reports improvement of her symptoms.  She has no cardiac complaints today.  She has no chest discomfort, palpitations, presyncope or syncope.  Despite her dyspnea, she remains physically active.    Past Medical History:   Diagnosis Date   • Depressed    • GREGG (generalized anxiety disorder)    • Gastroesophageal reflux    • HTN (hypertension)    • Hypothyroid    • Lesion of neck    • Osteoarthritis    • Pneumonia 1960   • RLS (restless legs syndrome)    • Sleep apnea in adult    • Sleep apnea, obstructive 2005 ?   • Type 2 diabetes mellitus (HCC)          Current Outpatient Medications:   •  albuterol sulfate HFA (Ventolin HFA) 108 (90 Base) MCG/ACT inhaler, Inhale 2 puffs Every 4 (Four) Hours As Needed for Wheezing or Shortness of Air., Disp: 1 g, Rfl: 5  •  Alcohol Swabs (Alcohol Prep) pads, 1 pad 5 (Five) Times a Day., Disp: 200 each, Rfl: 3  •  CALCIUM PO, Take 600 mg by mouth Daily., Disp: , Rfl:   •  DULoxetine (CYMBALTA) 60 MG capsule, Take 1 capsule by mouth Daily., Disp: 90 capsule, Rfl: 3  •  famotidine (PEPCID) 40 MG tablet, Take 1 tablet by mouth 2 (Two) Times a Day., Disp: 90 tablet, Rfl: 3  •  gabapentin (NEURONTIN) 600 MG tablet, TAKE 1 TABLET 3 TIMES A DAYAS NEEDED FOR PAIN, Disp: 90 tablet, Rfl: 0  •  Glucose Blood (Blood Glucose Test) strip, 1 strip by Other route 5 (Five) Times a Day As Needed (blood glucose)., Disp: 100 each, Rfl: 3  •  hydroCHLOROthiazide (HYDRODIURIL) 25 MG tablet, Take 1 tablet by mouth Daily., Disp: 90 tablet, Rfl: 3  •  Lancets (freestyle) lancets, 1 each by Other route 5 (Five) Times a Day As Needed (glucose check)., Disp: 100 each, Rfl: 3  •  metFORMIN (Glucophage) 500  "MG tablet, Take 1 tablet by mouth 2 (Two) Times a Day With Meals., Disp: 180 tablet, Rfl: 3  •  minocycline (MINOCIN,DYNACIN) 100 MG capsule, Take 1 capsule by mouth Daily., Disp: 90 capsule, Rfl: 3  •  multivitamin with minerals tablet tablet, Take 1 tablet by mouth Daily., Disp: , Rfl:   •  Omega-3 Fatty Acids (fish oil) 1000 MG capsule capsule, Take  by mouth Daily With Breakfast., Disp: , Rfl:   •  rOPINIRole (REQUIP) 2 MG tablet, Take 1 tablet by mouth Every Night., Disp: 90 tablet, Rfl: 3  •  Synthroid 25 MCG tablet, Take 1 tablet by mouth Daily., Disp: 90 tablet, Rfl: 3  •  tiotropium bromide-olodaterol (Stiolto Respimat) 2.5-2.5 MCG/ACT aerosol solution inhaler, Inhale 2 puffs Daily., Disp: 4 g, Rfl: 3    There are no discontinued medications.  Allergies   Allergen Reactions   • Nexium [Esomeprazole] Hives   • Prilosec [Omeprazole] Hives        Social History     Tobacco Use   • Smoking status: Former Smoker     Packs/day: 0.50     Years: 40.00     Pack years: 20.00     Types: Cigarettes   • Smokeless tobacco: Never Used   • Tobacco comment: Was off and on during the 40 years   Vaping Use   • Vaping Use: Never used   Substance Use Topics   • Alcohol use: Yes     Comment: Socially on occasion   • Drug use: Never       Family History   Problem Relation Age of Onset   • Lung cancer Father    • Heart disease Father    • Colon cancer Father    • Cancer Father         Lung, colon   • Emphysema Father    • Cancer Brother         Lung, pancreas   • Cancer Maternal Aunt         Breast cancer        Objective     /79   Pulse 79   Ht 160 cm (63\")   Wt 127 kg (280 lb)   BMI 49.60 kg/m²       Physical Exam    General Appearance:   · no acute distress  · Alert and oriented x3  HENT:   · lips not cyanotic  · Atraumatic  Neck:  · No jvd   · supple  Respiratory:  · no respiratory distress  · normal breath sounds  · no rales  Cardiovascular:  · no S3, no S4   · no murmur  · no rub  Extremities  · No " cyanosis  · lower extremity edema: Trace pedal edema bilaterally  Skin:   · warm, dry  · No rashes      Result Review :     No results found for: PROBNP  CMP    CMP 6/7/22   Glucose 87   BUN 15   Creatinine 1.06 (A)   Sodium 137   Potassium 4.4   Chloride 99   Calcium 9.7   Albumin 4.20   Total Bilirubin 0.4   Alkaline Phosphatase 49   AST (SGOT) 41 (A)   ALT (SGPT) 44 (A)   (A) Abnormal value            CBC w/diff    CBC w/Diff 1/13/22 6/7/22   WBC 10.34 8.93   RBC 5.55 (A) 5.32 (A)   Hemoglobin 16.2 (A) 15.6   Hematocrit 48.9 (A) 47.5 (A)   MCV 88.1 89.3   MCH 29.2 29.3   MCHC 33.1 32.8   RDW 13.1 13.5   Platelets 301 297   Neutrophil Rel % 52.1 55.0   Immature Granulocyte Rel % 0.5 0.4   Lymphocyte Rel % 36.9 34.5   Monocyte Rel % 7.6 6.7   Eosinophil Rel % 2.2 2.6   Basophil Rel % 0.7 0.8   (A) Abnormal value             Lab Results   Component Value Date    TSH 1.830 06/07/2022      Lab Results   Component Value Date    FREET4 0.9 09/03/2020      No results found for: DDIMERQUANT  Magnesium   Date Value Ref Range Status   09/21/2019 2.05 1.60 - 2.30 mg/dL Final      No results found for: DIGOXIN   No results found for: TROPONINT        Lipid Panel    Lipid Panel 1/13/22 6/7/22   Total Cholesterol 169 154   Triglycerides 153 (A) 184 (A)   HDL Cholesterol 38 (A) 34 (A)   VLDL Cholesterol 27 32   LDL Cholesterol  104 (A) 88   LDL/HDL Ratio 2.64 2.45   (A) Abnormal value            No results found for: POCTROP    Results for orders placed during the hospital encounter of 03/11/22    Adult Transthoracic Echo Complete W/ Cont if Necessary Per Protocol    Interpretation Summary  · Estimated left ventricular EF was in agreement with the calculated left ventricular EF. Left ventricular ejection fraction appears to be 56 - 60%. Left ventricular systolic function is normal.  · Abnormal motion of the interventricular septum is noted. Most likely secondary to underlying bundle branch block.  · Left ventricular diastolic  function was indeterminate.  · The right ventricular cavity is mildly dilated.                 Diagnoses and all orders for this visit:    1. Dyspnea on exertion (Primary)    2. Essential hypertension      Assessment:    -Dyspnea: She has dyspnea on exertion which is multifactorial and related to COPD, obesity, obstructive sleep apnea and physical deconditioning.  Echocardiogram was mostly benign.  Myocardial perfusion scan showed possible myocardial ischemia in the distal anterior and apical segments without high risk features.  Patient has no chest pain per se.  Most likely, her stress test findings are related to attenuation artifact.  Her dyspnea improved with pulmonary inhalers.  Continue the same.  Lifestyle changes including regular exercise, dietary changes and weight loss were recommended.    -Hypertension: Well-controlled on current regimen.  Continue same.    Follow Up     Return in about 1 year (around 9/14/2023).        Patient was given instructions and counseling regarding her condition or for health maintenance advice. Please see specific information pulled into the AVS if appropriate.

## 2022-09-27 DIAGNOSIS — G62.9 NEUROPATHY: ICD-10-CM

## 2022-09-28 NOTE — TELEPHONE ENCOUNTER
Rx Refill Note  Requested Prescriptions     Pending Prescriptions Disp Refills   • gabapentin (NEURONTIN) 600 MG tablet 90 tablet 0      Last office visit with prescribing clinician: 8/15/2022      Next office visit with prescribing clinician: 12/6/2022            Casey Rodney  09/28/22, 12:45 EDT       Urine drug screen:03/01/2021  Controlled agreement:unsure not scanned into chart     Last fill 8/11/2022 with 90 tablet 0 RF

## 2022-09-29 RX ORDER — GABAPENTIN 600 MG/1
600 TABLET ORAL 3 TIMES DAILY PRN
Qty: 90 TABLET | Refills: 0 | Status: SHIPPED | OUTPATIENT
Start: 2022-09-29 | End: 2022-11-10 | Stop reason: SDUPTHER

## 2022-10-03 RX ORDER — FAMOTIDINE 40 MG/1
TABLET, FILM COATED ORAL
Qty: 90 TABLET | Refills: 3 | OUTPATIENT
Start: 2022-10-03

## 2022-10-05 RX ORDER — FAMOTIDINE 40 MG/1
TABLET, FILM COATED ORAL
Qty: 90 TABLET | Refills: 3 | Status: SHIPPED | OUTPATIENT
Start: 2022-10-05 | End: 2023-03-02

## 2022-10-14 RX ORDER — TIOTROPIUM BROMIDE AND OLODATEROL 3.124; 2.736 UG/1; UG/1
SPRAY, METERED RESPIRATORY (INHALATION)
Qty: 4 G | Refills: 3 | Status: SHIPPED | OUTPATIENT
Start: 2022-10-14 | End: 2023-01-16

## 2022-10-31 RX ORDER — BLOOD-GLUCOSE METER
EACH MISCELLANEOUS
Qty: 1 KIT | Refills: 0 | Status: SHIPPED | OUTPATIENT
Start: 2022-10-31 | End: 2023-02-15 | Stop reason: SDUPTHER

## 2022-11-10 ENCOUNTER — PATIENT MESSAGE (OUTPATIENT)
Dept: INTERNAL MEDICINE | Facility: CLINIC | Age: 67
End: 2022-11-10

## 2022-11-10 DIAGNOSIS — G62.9 NEUROPATHY: ICD-10-CM

## 2022-11-14 ENCOUNTER — APPOINTMENT (OUTPATIENT)
Dept: GENERAL RADIOLOGY | Facility: HOSPITAL | Age: 67
End: 2022-11-14

## 2022-11-14 ENCOUNTER — TELEPHONE (OUTPATIENT)
Dept: INTERNAL MEDICINE | Facility: CLINIC | Age: 67
End: 2022-11-14

## 2022-11-14 VITALS
DIASTOLIC BLOOD PRESSURE: 78 MMHG | SYSTOLIC BLOOD PRESSURE: 135 MMHG | BODY MASS INDEX: 49.38 KG/M2 | HEART RATE: 88 BPM | OXYGEN SATURATION: 97 % | TEMPERATURE: 97.9 F | HEIGHT: 63 IN | WEIGHT: 278.66 LBS | RESPIRATION RATE: 20 BRPM

## 2022-11-14 PROCEDURE — 99283 EMERGENCY DEPT VISIT LOW MDM: CPT

## 2022-11-14 PROCEDURE — 73502 X-RAY EXAM HIP UNI 2-3 VIEWS: CPT

## 2022-11-14 RX ORDER — GABAPENTIN 600 MG/1
600 TABLET ORAL 3 TIMES DAILY PRN
Qty: 90 TABLET | Refills: 0 | Status: SHIPPED | OUTPATIENT
Start: 2022-11-14 | End: 2022-12-22 | Stop reason: SDUPTHER

## 2022-11-14 NOTE — TELEPHONE ENCOUNTER
Pt called in through the hub stating she fell as her daughters on Saturday, she lost her balance as she was bending over and reaching down. She fell on concrete. I informed patient this would most possibly be an ER visit as she has not been evaluated since she fell. I informed the patient and ER visit is best to get XRays and an evaluation, afterwards I informed the patient to call and make an appointment with our office to further treat and evaluate.

## 2022-11-15 ENCOUNTER — OFFICE VISIT (OUTPATIENT)
Dept: PULMONOLOGY | Facility: CLINIC | Age: 67
End: 2022-11-15

## 2022-11-15 ENCOUNTER — TELEPHONE (OUTPATIENT)
Dept: INTERNAL MEDICINE | Facility: CLINIC | Age: 67
End: 2022-11-15

## 2022-11-15 ENCOUNTER — HOSPITAL ENCOUNTER (EMERGENCY)
Facility: HOSPITAL | Age: 67
Discharge: HOME | End: 2022-11-15
Attending: EMERGENCY MEDICINE | Admitting: EMERGENCY MEDICINE

## 2022-11-15 VITALS
SYSTOLIC BLOOD PRESSURE: 127 MMHG | OXYGEN SATURATION: 95 % | WEIGHT: 277 LBS | DIASTOLIC BLOOD PRESSURE: 76 MMHG | HEART RATE: 97 BPM | BODY MASS INDEX: 49.08 KG/M2 | RESPIRATION RATE: 18 BRPM | HEIGHT: 63 IN | TEMPERATURE: 98.2 F

## 2022-11-15 DIAGNOSIS — J41.8 MIXED SIMPLE AND MUCOPURULENT CHRONIC BRONCHITIS: Primary | ICD-10-CM

## 2022-11-15 DIAGNOSIS — G47.33 OBSTRUCTIVE SLEEP APNEA: ICD-10-CM

## 2022-11-15 DIAGNOSIS — E66.01 MORBID (SEVERE) OBESITY DUE TO EXCESS CALORIES: ICD-10-CM

## 2022-11-15 DIAGNOSIS — S70.02XA CONTUSION OF LEFT HIP, INITIAL ENCOUNTER: Primary | ICD-10-CM

## 2022-11-15 DIAGNOSIS — R91.8 LUNG NODULES: ICD-10-CM

## 2022-11-15 PROCEDURE — 99214 OFFICE O/P EST MOD 30 MIN: CPT | Performed by: INTERNAL MEDICINE

## 2022-11-15 RX ORDER — COVID-19 ANTIGEN TEST
KIT MISCELLANEOUS SEE ADMIN INSTRUCTIONS
COMMUNITY
Start: 2022-10-15 | End: 2022-11-28

## 2022-11-15 RX ORDER — CHOLECALCIFEROL (VITAMIN D3) 125 MCG
5 CAPSULE ORAL NIGHTLY
COMMUNITY

## 2022-11-15 NOTE — PROGRESS NOTES
Pulmonary Office Follow-up    Subjective     Randi Fajardo is seen today at the office for   Chief Complaint   Patient presents with   • Bronchitis   • Apnea   • Lung Nodule   • Shortness of Breath   • Cough   • Wheezing         HPI  Radni Fajardo is a 67 y.o. female with a PMH significant for COPD and tobacco abuse with obstructive sleep apnea presents for follow-up patient has been doing fairly well on her present bronchodilators and is using CPAP regularly at night for her sleep apnea  Patient denies any chest pain fever or hemoptysis and has already quit smoking      Tobacco use history:  Former smoker      Patient Active Problem List   Diagnosis   • Hypothyroidism   • Essential hypertension   • Hyperlipidemia   • Type 2 diabetes mellitus without complication, without long-term current use of insulin (HCC)       Review of Systems  Review of Systems   Respiratory: Positive for shortness of breath.    All other systems reviewed and are negative.    As described in the HPI. Otherwise, remainder of ROS (14 systems) were negative.    Medications, Allergies, Social, and Family Histories reviewed as per EMR.    Objective     Vitals:    11/15/22 1101   BP: 127/76   Pulse: 97   Resp: 18   Temp: 98.2 °F (36.8 °C)   SpO2: 95%         11/15/22  1101   Weight: 126 kg (277 lb)       Physical Exam  Vitals and nursing note reviewed.   Constitutional:       Appearance: She is obese.   HENT:      Head: Normocephalic and atraumatic.      Nose: Nose normal.      Mouth/Throat:      Mouth: Mucous membranes are moist.      Pharynx: Oropharynx is clear.   Eyes:      Conjunctiva/sclera: Conjunctivae normal.      Pupils: Pupils are equal, round, and reactive to light.   Cardiovascular:      Rate and Rhythm: Normal rate and regular rhythm.      Pulses: Normal pulses.      Heart sounds: Normal heart sounds.   Pulmonary:      Effort: Pulmonary effort is normal.      Breath sounds: Normal breath sounds.   Abdominal:      General:  Abdomen is flat. Bowel sounds are normal.      Palpations: Abdomen is soft.   Musculoskeletal:         General: Normal range of motion.      Cervical back: Normal range of motion and neck supple.   Skin:     General: Skin is warm.      Capillary Refill: Capillary refill takes 2 to 3 seconds.   Neurological:      General: No focal deficit present.      Mental Status: She is alert and oriented to person, place, and time.   Psychiatric:         Mood and Affect: Mood normal.         Behavior: Behavior normal.         No radiology results for the last 90 days.     Assessment & Plan     Diagnoses and all orders for this visit:    1. Mixed simple and mucopurulent chronic bronchitis (HCC) (Primary)    2. Obstructive sleep apnea    3. Lung nodules         Discussion/ Recommendations:   Patient is advised regular exercise and weight reduction her body mass index was 49.07  She is advised to continue her Stiolto daily along with albuterol inhaler as needed  Compliance with CPAP  Vaccinations discussed and recommended    Class 3 Severe Obesity (BMI >=40). Obesity-related health conditions include the following: obstructive sleep apnea. Obesity is unchanged. BMI is is above average; BMI management plan is completed. We discussed low calorie, low carb based diet program, portion control and increasing exercise.        Return in about 3 months (around 2/15/2023).          This document has been electronically signed by Joon Cuevas MD on November 15, 2022 11:15 EST

## 2022-11-15 NOTE — ED PROVIDER NOTES
Time: 01:40 EST  Arrived by: Private vehicle  Chief Complaint: Hip pain and injury  History provided by: Patient  History is limited by: N/A    History of Present Illness:  Patient is a 67 y.o. year old female that presents to the emergency department with hip pain      History provided by:  Patient  Hip Pain  Location:  Left  Quality:  Sore  Severity:  Moderate  Onset quality:  Sudden  Duration:  24 hours  Timing:  Constant  Progression:  Unchanged  Chronicity:  New  Context:  Patient went to go reach for something on the ground yesterday and lost her balance and fell over landing on her hip  Relieved by:  Patient's home gabapentin has seemed to help take some of the pain away  Worsened by:  Touch or walking  Associated symptoms: no abdominal pain, no chest pain and no shortness of breath    Fall  Associated symptoms: no abdominal pain, no back pain and no chest pain        Similar Symptoms Previously: No    Recently seen: No      Patient Care Team  Primary Care Provider: Marilyn    Past Medical History:     Allergies   Allergen Reactions   • Nexium [Esomeprazole] Hives   • Prilosec [Omeprazole] Hives     Past Medical History:   Diagnosis Date   • Depressed    • GREGG (generalized anxiety disorder)    • Gastroesophageal reflux    • HTN (hypertension)    • Hypothyroid    • Lesion of neck    • Osteoarthritis    • Pneumonia    • RLS (restless legs syndrome)    • Sleep apnea in adult    • Sleep apnea, obstructive  ?   • Type 2 diabetes mellitus (HCC)      Past Surgical History:   Procedure Laterality Date   • ANKLE TENDON REPAIR Right     ankle with hardware   •  SECTION         • COLONOSCOPY     • NECK SURGERY      ,,; Neck lesion removed, total of 3     Family History   Problem Relation Age of Onset   • Lung cancer Father    • Heart disease Father    • Colon cancer Father    • Cancer Father         Lung, colon   • Emphysema Father    • Cancer Brother         Lung, pancreas   •  Cancer Maternal Aunt         Breast cancer       Home Medications:  Prior to Admission medications    Medication Sig Start Date End Date Taking? Authorizing Provider   albuterol sulfate HFA (Ventolin HFA) 108 (90 Base) MCG/ACT inhaler Inhale 2 puffs Every 4 (Four) Hours As Needed for Wheezing or Shortness of Air. 8/16/22   Joon Cuevas MD   Alcohol Swabs (Alcohol Prep) pads 1 pad 5 (Five) Times a Day. 12/17/21   Mainor Sanders Jr., MD   Blood Glucose Monitoring Suppl (Accu-Chek Guide Me) w/Device kit USE AS DIRECTED. 10/31/22   Mainor Sanders Jr., MD   CALCIUM PO Take 600 mg by mouth Daily.    ProviderGeorgette MD   DULoxetine (CYMBALTA) 60 MG capsule Take 1 capsule by mouth Daily. 3/14/22   Mainor Sanders Jr., MD   famotidine (PEPCID) 40 MG tablet TAKE 1 TABLET TWICE A DAY 10/5/22   Mainor Sanders Jr., MD   gabapentin (NEURONTIN) 600 MG tablet Take 1 tablet by mouth 3 (Three) Times a Day As Needed (pain). 11/14/22   Mainor Sanders Jr., MD   Glucose Blood (Blood Glucose Test) strip 1 strip by Other route 5 (Five) Times a Day As Needed (blood glucose). 4/21/22   Mainor Sanders Jr., MD   hydroCHLOROthiazide (HYDRODIURIL) 25 MG tablet Take 1 tablet by mouth Daily. 3/14/22   Mainor Sanders Jr., MD   Lancets (freestyle) lancets 1 each by Other route 5 (Five) Times a Day As Needed (glucose check). 3/14/22   Mainor Sanders Jr., MD   metFORMIN (Glucophage) 500 MG tablet Take 1 tablet by mouth 2 (Two) Times a Day With Meals. 6/7/22   Mainor Sanders Jr., MD   minocycline (MINOCIN,DYNACIN) 100 MG capsule Take 1 capsule by mouth Daily. 3/14/22   Mainor Sanders Jr., MD   multivitamin with minerals tablet tablet Take 1 tablet by mouth Daily.    ProviderGeorgette MD   Omega-3 Fatty Acids (fish oil) 1000 MG capsule capsule Take  by mouth Daily With Breakfast.    Georgette Navarro MD   rOPINIRole (REQUIP) 2 MG tablet Take 1  "tablet by mouth Every Night. 3/14/22   Mainor Sanders Jr., MD   Stiolto Respimat 2.5-2.5 MCG/ACT aerosol solution inhaler USE 2 INHALATIONS ORALLY   DAILY 10/14/22   Mainor Sanders Jr., MD   Synthroid 25 MCG tablet Take 1 tablet by mouth Daily. 3/14/22   Mainor Sanders Jr., MD        Social History:   PT  reports that she has quit smoking. Her smoking use included cigarettes. She has a 20.00 pack-year smoking history. She has never used smokeless tobacco. She reports current alcohol use. She reports that she does not use drugs.    Record Review:  I have reviewed the patient's records in [facility].     Review of Systems  Review of Systems   Respiratory: Negative for shortness of breath.    Cardiovascular: Negative for chest pain.   Gastrointestinal: Negative for abdominal pain.   Genitourinary: Negative for flank pain.   Musculoskeletal: Positive for arthralgias ( Left hip) and gait problem ( Mildly painful.  Patient states she has a cane at home). Negative for back pain and joint swelling.   Skin: Negative.    Neurological: Negative for weakness and numbness.   Hematological: Negative.    Psychiatric/Behavioral: Negative.    All other systems reviewed and are negative.   Physical Exam  /78 (BP Location: Left arm, Patient Position: Sitting)   Pulse 88   Temp 97.9 °F (36.6 °C) (Oral)   Resp 20   Ht 160 cm (63\")   Wt 126 kg (278 lb 10.6 oz)   SpO2 97%   BMI 49.36 kg/m²     Physical Exam  Vitals and nursing note reviewed.   Constitutional:       Appearance: Normal appearance.   HENT:      Head: Atraumatic.      Nose: Nose normal.      Mouth/Throat:      Mouth: Mucous membranes are moist.   Eyes:      Conjunctiva/sclera: Conjunctivae normal.   Cardiovascular:      Pulses: Normal pulses.   Pulmonary:      Effort: Pulmonary effort is normal.   Musculoskeletal:         General: Tenderness ( left posterior hip mild) present. No swelling.      Cervical back: Normal range of motion.   Skin:   " "  General: Skin is warm and dry.      Capillary Refill: Capillary refill takes less than 2 seconds.   Neurological:      Mental Status: She is alert.      Sensory: No sensory deficit.      Motor: No weakness.      Gait: Gait abnormal ( mild limp with gait).   Psychiatric:         Mood and Affect: Mood normal.         Behavior: Behavior normal.          ED Course  /78 (BP Location: Left arm, Patient Position: Sitting)   Pulse 88   Temp 97.9 °F (36.6 °C) (Oral)   Resp 20   Ht 160 cm (63\")   Wt 126 kg (278 lb 10.6 oz)   SpO2 97%   BMI 49.36 kg/m²   Results for orders placed or performed during the hospital encounter of 08/16/22   Duplex Venous Lower Extremity - Right CAR   Result Value Ref Range    Target HR (85%) 129 bpm    Max. Pred. HR (100%) 152 bpm    Right Common Femoral Spont Y     Right Common Femoral Phasic Y     Right Common Femoral Augment Y     Right Common Femoral Competent Y     Right Common Femoral Compress C     Right Saphenofemoral Junction Compress C     Right Proximal Femoral Compress C     Right Mid Femoral Spont Y     Right Mid Femoral Phasic Y     Right Mid Femoral Augment Y     Right Mid Femoral Competent Y     Right Mid Femoral Compress C     Right Distal Femoral Compress C     Right Popliteal Spont Y     Right Popliteal Phasic Y     Right Popliteal Augment Y     Right Popliteal Competent Y     Right Popliteal Compress C     Right Posterior Tibial Compress C     Right Peroneal Compress C     Right Gastronemius Compress C     Right Greater Saph AK Compress C     Right Greater Saph BK Compress C     Right Lesser Saph Compress C     Left Common Femoral Spont Y     Left Common Femoral Phasic Y     Left Common Femoral Augment Y     Left Common Femoral Competent Y     Left Common Femoral Compress C      Medications - No data to display  No results found.      Medical Decision Making:                     MDM  Number of Diagnoses or Management Options  Contusion of left hip, initial " encounter  Diagnosis management comments: I have spoken with the patient. I have explained the patient´s condition, diagnoses and treatment plan based on the information available to me at this time. I have answered the patients questions and addressed any concerns. The patient has a good  understanding of the patient´s diagnosis, condition, and treatment plan as can be expected at this point. The vital signs have been stable. The patient´s condition is stable and appropriate for discharge from the emergency department.      The patient will pursue further outpatient evaluation with the primary care physician or other designated or consulting physician as outlined in the discharge instructions. They are agreeable to this plan of care and follow-up instructions have been explained in detail. The patient has received these instructions in written format and have expressed an understanding of the discharge instructions. The patient is aware that any significant change in condition or worsening of symptoms should prompt an immediate return to this or the closest emergency department or call to 911.         Amount and/or Complexity of Data Reviewed  Tests in the radiology section of CPT®: ordered and reviewed    Risk of Complications, Morbidity, and/or Mortality  Presenting problems: low  Diagnostic procedures: low  Management options: low    Patient Progress  Patient progress: stable       Final diagnoses:   Contusion of left hip, initial encounter        Disposition:  ED Disposition     ED Disposition   Discharge    Condition   Stable    Comment   --              Gilma Lucio, APRN  11/15/22 0244

## 2022-11-15 NOTE — DISCHARGE INSTRUCTIONS
Your x-ray was negative for any fracture     rest.  Ice.  Use your cane for ambulation as needed.    Take your home gabapentin or Tylenol for pain.    Follow-up with PCP if no better

## 2022-11-15 NOTE — ED PROVIDER NOTES
Time: 01:40 EST  Arrived by: Private vehicle  Chief Complaint: Hip pain and injury  History provided by: Patient  History is limited by: N/A    History of Present Illness:  Patient is a 67 y.o. year old female that presents to the emergency department with hip pain      History provided by:  Patient  Hip Pain  Location:  Left  Quality:  Sore  Severity:  Moderate  Onset quality:  Sudden  Duration:  24 hours  Timing:  Constant  Progression:  Unchanged  Chronicity:  New  Context:  Patient went to go reach for something on the ground yesterday and lost her balance and fell over landing on her hip  Relieved by:  Patient's home gabapentin has seemed to help take some of the pain away  Worsened by:  Touch or walking  Associated symptoms: no abdominal pain, no chest pain and no shortness of breath    Fall  Associated symptoms: no abdominal pain, no back pain and no chest pain        Similar Symptoms Previously: No    Recently seen: No      Patient Care Team  Primary Care Provider: Marliyn    Past Medical History:     Allergies   Allergen Reactions   • Nexium [Esomeprazole] Hives   • Prilosec [Omeprazole] Hives     Past Medical History:   Diagnosis Date   • Depressed    • GREGG (generalized anxiety disorder)    • Gastroesophageal reflux    • HTN (hypertension)    • Hypothyroid    • Lesion of neck    • Osteoarthritis    • Pneumonia    • RLS (restless legs syndrome)    • Sleep apnea in adult    • Sleep apnea, obstructive  ?   • Type 2 diabetes mellitus (HCC)      Past Surgical History:   Procedure Laterality Date   • ANKLE TENDON REPAIR Right     ankle with hardware   •  SECTION         • COLONOSCOPY     • NECK SURGERY      ,,; Neck lesion removed, total of 3     Family History   Problem Relation Age of Onset   • Lung cancer Father    • Heart disease Father    • Colon cancer Father    • Cancer Father         Lung, colon   • Emphysema Father    • Cancer Brother         Lung, pancreas   •  Cancer Maternal Aunt         Breast cancer       Home Medications:  Prior to Admission medications    Medication Sig Start Date End Date Taking? Authorizing Provider   albuterol sulfate HFA (Ventolin HFA) 108 (90 Base) MCG/ACT inhaler Inhale 2 puffs Every 4 (Four) Hours As Needed for Wheezing or Shortness of Air. 8/16/22   Joon Cuevas MD   Alcohol Swabs (Alcohol Prep) pads 1 pad 5 (Five) Times a Day. 12/17/21   Mainor Sanders Jr., MD   Blood Glucose Monitoring Suppl (Accu-Chek Guide Me) w/Device kit USE AS DIRECTED. 10/31/22   Mainor Sanders Jr., MD   CALCIUM PO Take 600 mg by mouth Daily.    ProviderGeorgette MD   DULoxetine (CYMBALTA) 60 MG capsule Take 1 capsule by mouth Daily. 3/14/22   Mainor Sanders Jr., MD   famotidine (PEPCID) 40 MG tablet TAKE 1 TABLET TWICE A DAY 10/5/22   Mainor Sanders Jr., MD   gabapentin (NEURONTIN) 600 MG tablet Take 1 tablet by mouth 3 (Three) Times a Day As Needed (pain). 11/14/22   Mainor Sanders Jr., MD   Glucose Blood (Blood Glucose Test) strip 1 strip by Other route 5 (Five) Times a Day As Needed (blood glucose). 4/21/22   Mainor Sanders Jr., MD   hydroCHLOROthiazide (HYDRODIURIL) 25 MG tablet Take 1 tablet by mouth Daily. 3/14/22   Mainor Sanders Jr., MD   Lancets (freestyle) lancets 1 each by Other route 5 (Five) Times a Day As Needed (glucose check). 3/14/22   Mainor Sanders Jr., MD   metFORMIN (Glucophage) 500 MG tablet Take 1 tablet by mouth 2 (Two) Times a Day With Meals. 6/7/22   Mainor Sanders Jr., MD   minocycline (MINOCIN,DYNACIN) 100 MG capsule Take 1 capsule by mouth Daily. 3/14/22   Mainor Sanders Jr., MD   multivitamin with minerals tablet tablet Take 1 tablet by mouth Daily.    ProviderGeorgette MD   Omega-3 Fatty Acids (fish oil) 1000 MG capsule capsule Take  by mouth Daily With Breakfast.    Georgette Navarro MD   rOPINIRole (REQUIP) 2 MG tablet Take 1  "tablet by mouth Every Night. 3/14/22   Mainor Sanders Jr., MD   Stiolto Respimat 2.5-2.5 MCG/ACT aerosol solution inhaler USE 2 INHALATIONS ORALLY   DAILY 10/14/22   Mainor Sanders Jr., MD   Synthroid 25 MCG tablet Take 1 tablet by mouth Daily. 3/14/22   Mainor Sanders Jr., MD        Social History:   PT  reports that she has quit smoking. Her smoking use included cigarettes. She has a 20.00 pack-year smoking history. She has never used smokeless tobacco. She reports current alcohol use. She reports that she does not use drugs.    Record Review:  I have reviewed the patient's records in [facility].     Review of Systems  Review of Systems   Respiratory: Negative for shortness of breath.    Cardiovascular: Negative for chest pain.   Gastrointestinal: Negative for abdominal pain.   Genitourinary: Negative for flank pain.   Musculoskeletal: Positive for arthralgias ( Left hip) and gait problem ( Mildly painful.  Patient states she has a cane at home). Negative for back pain and joint swelling.   Skin: Negative.    Neurological: Negative for weakness and numbness.   Hematological: Negative.    Psychiatric/Behavioral: Negative.    All other systems reviewed and are negative.   Physical Exam  /78 (BP Location: Left arm, Patient Position: Sitting)   Pulse 88   Temp 97.9 °F (36.6 °C) (Oral)   Resp 20   Ht 160 cm (63\")   Wt 126 kg (278 lb 10.6 oz)   SpO2 97%   BMI 49.36 kg/m²     Physical Exam  Vitals and nursing note reviewed.   Constitutional:       Appearance: Normal appearance.   HENT:      Head: Atraumatic.      Nose: Nose normal.      Mouth/Throat:      Mouth: Mucous membranes are moist.   Eyes:      Conjunctiva/sclera: Conjunctivae normal.   Cardiovascular:      Pulses: Normal pulses.   Pulmonary:      Effort: Pulmonary effort is normal.   Musculoskeletal:         General: Tenderness ( left posterior hip mild) present. No swelling.      Cervical back: Normal range of motion.   Skin:   " "  General: Skin is warm and dry.      Capillary Refill: Capillary refill takes less than 2 seconds.   Neurological:      Mental Status: She is alert.      Sensory: No sensory deficit.      Motor: No weakness.      Gait: Gait abnormal ( mild limp with gait).   Psychiatric:         Mood and Affect: Mood normal.         Behavior: Behavior normal.          ED Course  /78 (BP Location: Left arm, Patient Position: Sitting)   Pulse 88   Temp 97.9 °F (36.6 °C) (Oral)   Resp 20   Ht 160 cm (63\")   Wt 126 kg (278 lb 10.6 oz)   SpO2 97%   BMI 49.36 kg/m²   Results for orders placed or performed during the hospital encounter of 08/16/22   Duplex Venous Lower Extremity - Right CAR   Result Value Ref Range    Target HR (85%) 129 bpm    Max. Pred. HR (100%) 152 bpm    Right Common Femoral Spont Y     Right Common Femoral Phasic Y     Right Common Femoral Augment Y     Right Common Femoral Competent Y     Right Common Femoral Compress C     Right Saphenofemoral Junction Compress C     Right Proximal Femoral Compress C     Right Mid Femoral Spont Y     Right Mid Femoral Phasic Y     Right Mid Femoral Augment Y     Right Mid Femoral Competent Y     Right Mid Femoral Compress C     Right Distal Femoral Compress C     Right Popliteal Spont Y     Right Popliteal Phasic Y     Right Popliteal Augment Y     Right Popliteal Competent Y     Right Popliteal Compress C     Right Posterior Tibial Compress C     Right Peroneal Compress C     Right Gastronemius Compress C     Right Greater Saph AK Compress C     Right Greater Saph BK Compress C     Right Lesser Saph Compress C     Left Common Femoral Spont Y     Left Common Femoral Phasic Y     Left Common Femoral Augment Y     Left Common Femoral Competent Y     Left Common Femoral Compress C      Medications - No data to display  No results found.      Medical Decision Making:                     MDM  Number of Diagnoses or Management Options  Contusion of left hip, initial " encounter  Diagnosis management comments: I have spoken with the patient. I have explained the patient´s condition, diagnoses and treatment plan based on the information available to me at this time. I have answered the patients questions and addressed any concerns. The patient has a good  understanding of the patient´s diagnosis, condition, and treatment plan as can be expected at this point. The vital signs have been stable. The patient´s condition is stable and appropriate for discharge from the emergency department.      The patient will pursue further outpatient evaluation with the primary care physician or other designated or consulting physician as outlined in the discharge instructions. They are agreeable to this plan of care and follow-up instructions have been explained in detail. The patient has received these instructions in written format and have expressed an understanding of the discharge instructions. The patient is aware that any significant change in condition or worsening of symptoms should prompt an immediate return to this or the closest emergency department or call to 911.         Amount and/or Complexity of Data Reviewed  Tests in the radiology section of CPT®: ordered and reviewed    Risk of Complications, Morbidity, and/or Mortality  Presenting problems: low  Diagnostic procedures: low  Management options: low    Patient Progress  Patient progress: stable       Final diagnoses:   Contusion of left hip, initial encounter        Disposition:  ED Disposition     ED Disposition   Discharge    Condition   Stable    Comment   --

## 2022-11-16 NOTE — TELEPHONE ENCOUNTER
From: Randi Fajardo  To: Mainor Sanders Jr, MD  Sent: 11/10/2022 10:00 PM EST  Subject: Metformin    My previous prescription was for two tablets daily. The current one that was received says one daily with dinner. Which should I do?

## 2022-11-23 NOTE — TELEPHONE ENCOUNTER
Called pt no answer left vm to call back     Okay for hub to read/advise    Pa required Blood Glucose Monitoring Suppl (Accu-Chek Guide Me) w/Device kit (10/31/2022)  DENIED.  Please notify patient. It may be cheaper for her to by supplies OTC

## 2022-11-28 ENCOUNTER — OFFICE VISIT (OUTPATIENT)
Dept: INTERNAL MEDICINE | Facility: CLINIC | Age: 67
End: 2022-11-28

## 2022-11-28 VITALS
OXYGEN SATURATION: 97 % | DIASTOLIC BLOOD PRESSURE: 76 MMHG | WEIGHT: 283.4 LBS | BODY MASS INDEX: 50.21 KG/M2 | SYSTOLIC BLOOD PRESSURE: 115 MMHG | HEART RATE: 85 BPM | HEIGHT: 63 IN | TEMPERATURE: 97.5 F

## 2022-11-28 DIAGNOSIS — Z00.00 ANNUAL PHYSICAL EXAM: Primary | ICD-10-CM

## 2022-11-28 DIAGNOSIS — Z91.81 AT HIGH RISK FOR INJURY RELATED TO FALL: ICD-10-CM

## 2022-11-28 DIAGNOSIS — E78.5 HYPERLIPIDEMIA, UNSPECIFIED HYPERLIPIDEMIA TYPE: ICD-10-CM

## 2022-11-28 DIAGNOSIS — E11.9 TYPE 2 DIABETES MELLITUS WITHOUT COMPLICATION, WITHOUT LONG-TERM CURRENT USE OF INSULIN: ICD-10-CM

## 2022-11-28 DIAGNOSIS — E03.9 HYPOTHYROIDISM, UNSPECIFIED TYPE: ICD-10-CM

## 2022-11-28 DIAGNOSIS — I10 ESSENTIAL HYPERTENSION: ICD-10-CM

## 2022-11-28 LAB
ALBUMIN SERPL-MCNC: 3.9 G/DL (ref 3.5–5.2)
ALBUMIN/GLOB SERPL: 1.2 G/DL
ALP SERPL-CCNC: 47 U/L (ref 39–117)
ALT SERPL W P-5'-P-CCNC: 35 U/L (ref 1–33)
ANION GAP SERPL CALCULATED.3IONS-SCNC: 10.1 MMOL/L (ref 5–15)
AST SERPL-CCNC: 39 U/L (ref 1–32)
BASOPHILS # BLD AUTO: 0.08 10*3/MM3 (ref 0–0.2)
BASOPHILS NFR BLD AUTO: 0.7 % (ref 0–1.5)
BILIRUB SERPL-MCNC: <0.2 MG/DL (ref 0–1.2)
BUN SERPL-MCNC: 16 MG/DL (ref 8–23)
BUN/CREAT SERPL: 18 (ref 7–25)
CALCIUM SPEC-SCNC: 10 MG/DL (ref 8.6–10.5)
CHLORIDE SERPL-SCNC: 102 MMOL/L (ref 98–107)
CHOLEST SERPL-MCNC: 179 MG/DL (ref 0–200)
CO2 SERPL-SCNC: 27.9 MMOL/L (ref 22–29)
CREAT SERPL-MCNC: 0.89 MG/DL (ref 0.57–1)
DEPRECATED RDW RBC AUTO: 43.4 FL (ref 37–54)
EGFRCR SERPLBLD CKD-EPI 2021: 70.3 ML/MIN/1.73
EOSINOPHIL # BLD AUTO: 0.2 10*3/MM3 (ref 0–0.4)
EOSINOPHIL NFR BLD AUTO: 1.8 % (ref 0.3–6.2)
ERYTHROCYTE [DISTWIDTH] IN BLOOD BY AUTOMATED COUNT: 13.4 % (ref 12.3–15.4)
GLOBULIN UR ELPH-MCNC: 3.2 GM/DL
GLUCOSE SERPL-MCNC: 80 MG/DL (ref 65–99)
HBA1C MFR BLD: 5.8 % (ref 4.8–5.6)
HCT VFR BLD AUTO: 45 % (ref 34–46.6)
HDLC SERPL-MCNC: 38 MG/DL (ref 40–60)
HGB BLD-MCNC: 14.8 G/DL (ref 12–15.9)
IMM GRANULOCYTES # BLD AUTO: 0.06 10*3/MM3 (ref 0–0.05)
IMM GRANULOCYTES NFR BLD AUTO: 0.5 % (ref 0–0.5)
LDLC SERPL CALC-MCNC: 96 MG/DL (ref 0–100)
LDLC/HDLC SERPL: 2.32 {RATIO}
LYMPHOCYTES # BLD AUTO: 4.07 10*3/MM3 (ref 0.7–3.1)
LYMPHOCYTES NFR BLD AUTO: 37 % (ref 19.6–45.3)
MCH RBC QN AUTO: 29.1 PG (ref 26.6–33)
MCHC RBC AUTO-ENTMCNC: 32.9 G/DL (ref 31.5–35.7)
MCV RBC AUTO: 88.6 FL (ref 79–97)
MONOCYTES # BLD AUTO: 0.68 10*3/MM3 (ref 0.1–0.9)
MONOCYTES NFR BLD AUTO: 6.2 % (ref 5–12)
NEUTROPHILS NFR BLD AUTO: 5.92 10*3/MM3 (ref 1.7–7)
NEUTROPHILS NFR BLD AUTO: 53.8 % (ref 42.7–76)
NRBC BLD AUTO-RTO: 0 /100 WBC (ref 0–0.2)
PLATELET # BLD AUTO: 339 10*3/MM3 (ref 140–450)
PMV BLD AUTO: 10.6 FL (ref 6–12)
POTASSIUM SERPL-SCNC: 4.3 MMOL/L (ref 3.5–5.2)
PROT SERPL-MCNC: 7.1 G/DL (ref 6–8.5)
RBC # BLD AUTO: 5.08 10*6/MM3 (ref 3.77–5.28)
SODIUM SERPL-SCNC: 140 MMOL/L (ref 136–145)
TRIGL SERPL-MCNC: 264 MG/DL (ref 0–150)
TSH SERPL DL<=0.05 MIU/L-ACNC: 2.53 UIU/ML (ref 0.27–4.2)
VLDLC SERPL-MCNC: 45 MG/DL (ref 5–40)
WBC NRBC COR # BLD: 11.01 10*3/MM3 (ref 3.4–10.8)

## 2022-11-28 PROCEDURE — 1170F FXNL STATUS ASSESSED: CPT | Performed by: INTERNAL MEDICINE

## 2022-11-28 PROCEDURE — 1160F RVW MEDS BY RX/DR IN RCRD: CPT | Performed by: INTERNAL MEDICINE

## 2022-11-28 PROCEDURE — 84443 ASSAY THYROID STIM HORMONE: CPT | Performed by: INTERNAL MEDICINE

## 2022-11-28 PROCEDURE — 80061 LIPID PANEL: CPT | Performed by: INTERNAL MEDICINE

## 2022-11-28 PROCEDURE — 85025 COMPLETE CBC W/AUTO DIFF WBC: CPT | Performed by: INTERNAL MEDICINE

## 2022-11-28 PROCEDURE — G0439 PPPS, SUBSEQ VISIT: HCPCS | Performed by: INTERNAL MEDICINE

## 2022-11-28 PROCEDURE — 83036 HEMOGLOBIN GLYCOSYLATED A1C: CPT | Performed by: INTERNAL MEDICINE

## 2022-11-28 PROCEDURE — 80053 COMPREHEN METABOLIC PANEL: CPT | Performed by: INTERNAL MEDICINE

## 2022-11-28 NOTE — PROGRESS NOTES
The ABCs of the Annual Wellness Visit  Subsequent Medicare Wellness Visit    Chief Complaint   Patient presents with   • Medicare Wellness-subsequent   • Diabetes     Type 2    • lump on back      Several months   • balance is off       Subjective    History of Present Illness:  Randi Fajardo is a 67 y.o. female who presents for a Subsequent Medicare Wellness Visit.    The following portions of the patient's history were reviewed and   updated as appropriate: allergies, current medications, past family history, past medical history, past social history, past surgical history and problem list.    Compared to one year ago, the patient feels her physical   health is the same.    Compared to one year ago, the patient feels her mental   health is the same.     Recent Hospitalizations:  She was not admitted to the hospital during the last year.       Current Medical Providers:  Patient Care Team:  Mainor Sanders Jr., MD as PCP - General (Internal Medicine)    Outpatient Medications Prior to Visit   Medication Sig Dispense Refill   • albuterol sulfate HFA (Ventolin HFA) 108 (90 Base) MCG/ACT inhaler Inhale 2 puffs Every 4 (Four) Hours As Needed for Wheezing or Shortness of Air. 1 g 5   • Alcohol Swabs (Alcohol Prep) pads 1 pad 5 (Five) Times a Day. 200 each 3   • Blood Glucose Monitoring Suppl (Accu-Chek Guide Me) w/Device kit USE AS DIRECTED. 1 kit 0   • CALCIUM PO Take 600 mg by mouth Daily.     • DULoxetine (CYMBALTA) 60 MG capsule Take 1 capsule by mouth Daily. 90 capsule 3   • famotidine (PEPCID) 40 MG tablet TAKE 1 TABLET TWICE A DAY 90 tablet 3   • gabapentin (NEURONTIN) 600 MG tablet Take 1 tablet by mouth 3 (Three) Times a Day As Needed (pain). 90 tablet 0   • Glucose Blood (Blood Glucose Test) strip 1 strip by Other route 5 (Five) Times a Day As Needed (blood glucose). 100 each 3   • hydroCHLOROthiazide (HYDRODIURIL) 25 MG tablet Take 1 tablet by mouth Daily. 90 tablet 3   • Lancets (freestyle)  "lancets 1 each by Other route 5 (Five) Times a Day As Needed (glucose check). 100 each 3   • melatonin 5 MG tablet tablet Take 5 mg by mouth Every Night.     • metFORMIN (Glucophage) 500 MG tablet Take 1 tablet by mouth 2 (Two) Times a Day With Meals. 180 tablet 3   • minocycline (MINOCIN,DYNACIN) 100 MG capsule Take 1 capsule by mouth Daily. 90 capsule 3   • multivitamin with minerals tablet tablet Take 1 tablet by mouth Daily.     • Omega-3 Fatty Acids (fish oil) 1000 MG capsule capsule Take  by mouth Daily With Breakfast.     • rOPINIRole (REQUIP) 2 MG tablet Take 1 tablet by mouth Every Night. 90 tablet 3   • Stiolto Respimat 2.5-2.5 MCG/ACT aerosol solution inhaler USE 2 INHALATIONS ORALLY   DAILY 4 g 3   • Synthroid 25 MCG tablet Take 1 tablet by mouth Daily. 90 tablet 3   • Flowflex COVID-19 Ag Home Test kit See Admin Instructions.       No facility-administered medications prior to visit.       No opioid medication identified on active medication list. I have reviewed chart for other potential  high risk medication/s and harmful drug interactions in the elderly.          Aspirin is not on active medication list.  Aspirin use is not indicated based on review of current medical condition/s. Risk of harm outweighs potential benefits.  .    Patient Active Problem List   Diagnosis   • Hypothyroidism   • Essential hypertension   • Hyperlipidemia   • Type 2 diabetes mellitus without complication, without long-term current use of insulin (HCC)           Objective    Vitals:    11/28/22 1341   BP: 115/76   BP Location: Right arm   Pulse: 85   Temp: 97.5 °F (36.4 °C)   TempSrc: Temporal   SpO2: 97%   Weight: 129 kg (283 lb 6.4 oz)   Height: 160 cm (63\")     Estimated body mass index is 50.2 kg/m² as calculated from the following:    Height as of this encounter: 160 cm (63\").    Weight as of this encounter: 129 kg (283 lb 6.4 oz).    Wt Readings from Last 3 Encounters:   11/28/22 129 kg (283 lb 6.4 oz)   11/15/22 126 " kg (277 lb)   11/14/22 126 kg (278 lb 10.6 oz)       Does the patient have evidence of cognitive impairment? No    Physical Exam  Appearance: No acute distress, well-nourished  Head: normocephalic, atraumatic  Eyes: extraocular movements intact, no scleral icterus, no conjunctival injection  Ears, Nose, and Throat: external ears normal, nares patent, moist mucous membranes  Cardiovascular: regular rate and rhythm. no murmurs, rubs, or gallops. no edema  Respiratory: breathing comfortably, symmetric chest rise, clear to auscultation bilaterally. No wheezes, rales, or rhonchi.  Neuro: alert and oriented to time, place, and person. Normal gait  Psych: normal mood and affect       HEALTH RISK ASSESSMENT    Smoking Status:  Social History     Tobacco Use   Smoking Status Former   • Packs/day: 0.50   • Years: 40.00   • Pack years: 20.00   • Types: Cigarettes   Smokeless Tobacco Never   Tobacco Comments    Was off and on during the 40 years     Alcohol Consumption:  Social History     Substance and Sexual Activity   Alcohol Use Yes    Comment: Socially on occasion     Fall Risk Screen:    STEADI Fall Risk Assessment was completed, and patient is at MODERATE risk for falls. Assessment completed on:11/28/2022    Depression Screening:  PHQ-2/PHQ-9 Depression Screening 11/28/2022   Retired PHQ-9 Total Score -   Retired Total Score -   Little Interest or Pleasure in Doing Things 0-->not at all   Feeling Down, Depressed or Hopeless 1-->several days   PHQ-9: Brief Depression Severity Measure Score 1       Health Habits and Functional and Cognitive Screening:  Functional & Cognitive Status 11/28/2022   Do you have difficulty preparing food and eating? No   Do you have difficulty bathing yourself, getting dressed or grooming yourself? No   Do you have difficulty using the toilet? No   Do you have difficulty moving around from place to place? No   Do you have trouble with steps or getting out of a bed or a chair? Yes   Current Diet  Well Balanced Diet   Dental Exam Up to date   Eye Exam Up to date   Exercise (times per week) 0 times per week   Current Exercises Include No Regular Exercise   Do you need help using the phone?  No   Are you deaf or do you have serious difficulty hearing?  Yes   Do you need help with transportation? Yes   Do you need help shopping? No   Do you need help preparing meals?  No   Do you need help with housework?  No   Do you need help with laundry? No   Do you need help taking your medications? No   Do you need help managing money? No   Do you ever drive or ride in a car without wearing a seat belt? No   Have you felt unusual stress, anger or loneliness in the last month? No   Who do you live with? Alone   If you need help, do you have trouble finding someone available to you? No   Have you been bothered in the last four weeks by sexual problems? No   Do you have difficulty concentrating, remembering or making decisions? No       Age-appropriate Screening Schedule:  Refer to the list below for future screening recommendations based on patient's age, sex and/or medical conditions. Orders for these recommended tests are listed in the plan section. The patient has been provided with a written plan.    Health Maintenance   Topic Date Due   • HEMOGLOBIN A1C  12/07/2022   • DIABETIC FOOT EXAM  03/07/2023   • DIABETIC EYE EXAM  04/08/2023   • LIPID PANEL  06/07/2023   • URINE MICROALBUMIN  06/07/2023   • DXA SCAN  12/02/2023   • MAMMOGRAM  08/03/2024   • TDAP/TD VACCINES (2 - Td or Tdap) 05/21/2025   • INFLUENZA VACCINE  Completed   • ZOSTER VACCINE  Completed              Assessment & Plan   CMS Preventative Services Quick Reference  Risk Factors Identified During Encounter  Cardiovascular Disease  Fall Risk-High or Moderate  Obesity/Overweight   The above risks/problems have been discussed with the patient.  Follow up actions/plans if indicated are seen below in the Assessment/Plan Section.  Pertinent information has been  shared with the patient in the After Visit Summary.    Diagnoses and all orders for this visit:    1. Annual physical exam (Primary)    2. Essential hypertension  -     CBC & Differential  -     Comprehensive Metabolic Panel    3. Hyperlipidemia, unspecified hyperlipidemia type  -     Lipid Panel    4. Hypothyroidism, unspecified type  -     TSH    5. Type 2 diabetes mellitus without complication, without long-term current use of insulin (HCC)  -     Hemoglobin A1c    6. At high risk for injury related to fall  -     Ambulatory Referral to Physical Therapy Evaluate and treat        Follow Up:   Return for Next scheduled follow up.     An After Visit Summary and PPPS were made available to the patient.

## 2022-11-29 ENCOUNTER — TELEPHONE (OUTPATIENT)
Dept: INTERNAL MEDICINE | Facility: CLINIC | Age: 67
End: 2022-11-29

## 2022-11-29 RX ORDER — LEVOTHYROXINE SODIUM 0.05 MG/1
50 TABLET ORAL DAILY
Qty: 90 TABLET | Refills: 1 | Status: SHIPPED | OUTPATIENT
Start: 2022-11-29

## 2022-11-29 NOTE — TELEPHONE ENCOUNTER
----- Message from Mainor Sanders Jr., MD sent at 11/29/2022  1:27 PM EST -----  TSH midrange- can be more aggressive with synthroid to help with metabolism. Will send in 50mcg dosage.     Elevated triglycerides, which are the storage form of sugar - recommend lower carbohydrate/sugar intake.     HDL low - this is protective cholesterol and generally like the number to be >50. encourage exercise to increase level.     Good control of blood glucose. Keep up the good work!

## 2022-11-29 NOTE — TELEPHONE ENCOUNTER
Contacted pt regarding lab results, patient verified . I informed the patient of the below results. Patient is aware that new script of Synthroid will be coming in the mail. Patient voiced understanding and had no further questions.

## 2022-12-05 ENCOUNTER — OFFICE VISIT (OUTPATIENT)
Dept: ORTHOPEDIC SURGERY | Facility: CLINIC | Age: 67
End: 2022-12-05

## 2022-12-05 VITALS — BODY MASS INDEX: 50.18 KG/M2 | HEIGHT: 63 IN | WEIGHT: 283.2 LBS | OXYGEN SATURATION: 91 % | HEART RATE: 106 BPM

## 2022-12-05 DIAGNOSIS — M25.561 RIGHT KNEE PAIN, UNSPECIFIED CHRONICITY: Primary | ICD-10-CM

## 2022-12-05 DIAGNOSIS — M17.11 PRIMARY OSTEOARTHRITIS OF RIGHT KNEE: ICD-10-CM

## 2022-12-05 PROCEDURE — 99213 OFFICE O/P EST LOW 20 MIN: CPT | Performed by: PHYSICIAN ASSISTANT

## 2022-12-05 NOTE — PROGRESS NOTES
"Chief Complaint  Follow-up of the Right Knee    Subjective          Randi Fajardo is a 67 y.o. female  presents to [facility] for   History of Present Illness      Patient presents for follow-up evaluation of right knee pain, right knee osteoarthritis, right knee injury.  She was last seen by Dr. Mckeon on 8/30/2022, he examined her, did x-rays and gave her a steroid injection which she states helped and has continued to help.  She states she has occasional catching sensation in the knee but states her pain is gone and states she has full range of motion and strength.  She also takes gabapentin for other issues that helps her knee as well.  No new complaints today      Allergies   Allergen Reactions   • Nexium [Esomeprazole] Hives   • Prilosec [Omeprazole] Hives        Social History     Socioeconomic History   • Marital status:    Tobacco Use   • Smoking status: Former     Packs/day: 0.50     Years: 40.00     Pack years: 20.00     Types: Cigarettes   • Smokeless tobacco: Never   • Tobacco comments:     Was off and on during the 40 years   Vaping Use   • Vaping Use: Never used   Substance and Sexual Activity   • Alcohol use: Yes     Comment: Socially on occasion   • Drug use: Never   • Sexual activity: Not Currently     Birth control/protection: None        REVIEW OF SYSTEMS    Constitutional: Denies fevers, chills, weight loss  Cardiovascular: Denies chest pain, shortness of breath  Skin: Denies rashes, acute skin changes  Neurologic: Denies headache, loss of consciousness  MSK: Right knee pain      Objective   Vital Signs:   Pulse 106   Ht 160 cm (63\")   Wt 128 kg (283 lb 3.2 oz)   SpO2 91%   BMI 50.17 kg/m²     Body mass index is 50.17 kg/m².    Physical Exam    Right knee: Skin is intact, no erythema, ecchymosis, no swelling, no effusion, no signs of infection, full extension, flexion 120, stable anterior/posterior drawer, stable to varus/valgus stress.  Nontender " to palpation, no pain with range of motion.    Procedures    Imaging Results (Most Recent)     None           Result Review :   The following data was reviewed by: BRIGIDO Villa on 12/05/2022:               Assessment and Plan    Diagnoses and all orders for this visit:    1. Right knee pain, unspecified chronicity (Primary)    2. Primary osteoarthritis of right knee        Discussed diagnosis and treatment options with the patient, patient was advised recommend continued activity and weightbearing as tolerated if any new or concerning symptoms occur follow-up sooner otherwise follow-up as needed for future injections or treatment, patient agreed    Call or return if worsening symptoms.    Follow Up   Return if symptoms worsen or fail to improve.  Patient was given instructions and counseling regarding her condition or for health maintenance advice. Please see specific information pulled into the AVS if appropriate.

## 2022-12-06 ENCOUNTER — OFFICE VISIT (OUTPATIENT)
Dept: INTERNAL MEDICINE | Facility: CLINIC | Age: 67
End: 2022-12-06

## 2022-12-06 VITALS
HEIGHT: 63 IN | DIASTOLIC BLOOD PRESSURE: 74 MMHG | SYSTOLIC BLOOD PRESSURE: 109 MMHG | BODY MASS INDEX: 49.96 KG/M2 | OXYGEN SATURATION: 94 % | WEIGHT: 282 LBS | HEART RATE: 94 BPM | TEMPERATURE: 97.5 F

## 2022-12-06 DIAGNOSIS — I10 ESSENTIAL HYPERTENSION: Primary | ICD-10-CM

## 2022-12-06 DIAGNOSIS — E11.9 TYPE 2 DIABETES MELLITUS WITHOUT COMPLICATION, WITHOUT LONG-TERM CURRENT USE OF INSULIN: ICD-10-CM

## 2022-12-06 DIAGNOSIS — E78.5 HYPERLIPIDEMIA, UNSPECIFIED HYPERLIPIDEMIA TYPE: ICD-10-CM

## 2022-12-06 DIAGNOSIS — E03.9 HYPOTHYROIDISM, UNSPECIFIED TYPE: ICD-10-CM

## 2022-12-06 PROCEDURE — 99214 OFFICE O/P EST MOD 30 MIN: CPT | Performed by: INTERNAL MEDICINE

## 2022-12-06 NOTE — PROGRESS NOTES
Chief Complaint  Diabetes (Type 2 / Follow up )    Subjective     {Problem List  Visit Diagnosis   Encounters  Notes  Medications  Labs  Result Review Imaging  Media :23}     Randi Fajardo presents to [facility]  History of Present Illness  hypertension- patient denies Has, dizziness, chest pain.   DM2- improved from previous check. Patient is losing weight.    hyperlipidemia- labs reviewed with patient    Current Outpatient Medications   Medication Instructions   • albuterol sulfate HFA (Ventolin HFA) 108 (90 Base) MCG/ACT inhaler 2 puffs, Inhalation, Every 4 Hours PRN   • Alcohol Swabs (Alcohol Prep) pads 1 pad, Does not apply, 5 Times Daily   • Blood Glucose Monitoring Suppl (Accu-Chek Guide Me) w/Device kit USE AS DIRECTED.   • CALCIUM  mg, Oral, Daily   • DULoxetine (CYMBALTA) 60 mg, Oral, Daily   • famotidine (PEPCID) 40 MG tablet TAKE 1 TABLET TWICE A DAY   • gabapentin (NEURONTIN) 600 mg, Oral, 3 Times Daily PRN   • Glucose Blood (Blood Glucose Test) strip 1 strip, Other, 5 Times Daily PRN   • hydroCHLOROthiazide (HYDRODIURIL) 25 mg, Oral, Daily   • Lancets (freestyle) lancets 1 each, Other, 5 Times Daily PRN   • levothyroxine (SYNTHROID) 50 mcg, Oral, Daily   • melatonin 5 mg, Oral, Nightly   • metFORMIN (GLUCOPHAGE) 500 mg, Oral, 2 Times Daily With Meals   • minocycline (MINOCIN,DYNACIN) 100 mg, Oral, Daily   • multivitamin with minerals tablet tablet 1 tablet, Oral, Daily   • Omega-3 Fatty Acids (fish oil) 1000 MG capsule capsule Oral, Daily With Breakfast   • rOPINIRole (REQUIP) 2 mg, Oral, Nightly   • Stiolto Respimat 2.5-2.5 MCG/ACT aerosol solution inhaler USE 2 INHALATIONS ORALLY   DAILY       The following portions of the patient's history were reviewed and updated as appropriate: allergies, current medications, past family history, past medical history, past social history, past surgical history, and problem list.    Objective   Vital  "Signs:   /74 (BP Location: Right arm)   Pulse 94   Temp 97.5 °F (36.4 °C) (Temporal)   Ht 160 cm (63\")   Wt 128 kg (282 lb)   SpO2 94%   BMI 49.95 kg/m²     Wt Readings from Last 3 Encounters:   12/06/22 128 kg (282 lb)   12/05/22 128 kg (283 lb 3.2 oz)   11/28/22 129 kg (283 lb 6.4 oz)     BP Readings from Last 3 Encounters:   12/06/22 109/74   11/28/22 115/76   11/15/22 127/76     Physical Exam   Appearance: No acute distress, well-nourished  Head: normocephalic, atraumatic  Eyes: extraocular movements intact, no scleral icterus, no conjunctival injection  Ears, Nose, and Throat: external ears normal, nares patent, moist mucous membranes  Cardiovascular: regular rate and rhythm. no murmurs, rubs, or gallops. no edema  Respiratory: breathing comfortably, symmetric chest rise, clear to auscultation bilaterally. No wheezes, rales, or rhonchi.  Neuro: alert and oriented to time, place, and person. Normal gait  Psych: normal mood and affect     Result Review :   The following data was reviewed by: Mainor Sanders Jr, MD on 12/06/2022:  Common labs    Common Labs 1/13/22 1/13/22 1/13/22 6/7/22 6/7/22 6/7/22 6/7/22 6/7/22 11/28/22 11/28/22 11/28/22 11/28/22    1029 1029 1029 1524 1524 1524 1524 1524 1439 1439 1439 1439   Glucose      87     80    BUN      15     16    Creatinine      1.06 (A)     0.89    Sodium      137     140    Potassium      4.4     4.3    Chloride      99     102    Calcium      9.7     10.0    Albumin      4.20     3.90    Total Bilirubin      0.4     <0.2    Alkaline Phosphatase      49     47    AST (SGOT)      41 (A)     39 (A)    ALT (SGPT)      44 (A)     35 (A)    WBC 10.34   8.93     11.01 (A)      Hemoglobin 16.2 (A)   15.6     14.8      Hematocrit 48.9 (A)   47.5 (A)     45.0      Platelets 301   297     339      Total Cholesterol   169    154     179   Triglycerides   153 (A)    184 (A)     264 (A)   HDL Cholesterol   38 (A)    34 (A)     38 (A)   LDL Cholesterol    " 104 (A)    88     96   Hemoglobin A1C  6.10 (A)   6.00 (A)     5.80 (A)     Microalbumin, Urine        <1.2       (A) Abnormal value              Lab Results   Component Value Date    SARSANTIGEN Not Detected 08/25/2021    COVID19 Detected (C) 05/09/2022    FLUAAG Not Detected 08/25/2021    FLUBAG Not Detected 08/25/2021          Assessment and Plan    Diagnoses and all orders for this visit:    1. Essential hypertension (Primary)  Comments:  well controlled. may be able to reduce meds as pt cont to lose weight.     2. Hyperlipidemia, unspecified hyperlipidemia type  Comments:  lipid profile reviewed. cont current med regimen    3. Hypothyroidism, unspecified type  Comments:  TSH wnl. discussed risks/benefits of higher dose of synthroid and will inc dosage to help lida loss efforts.     4. Type 2 diabetes mellitus without complication, without long-term current use of insulin (HCC)  Comments:  encouraged by weight loss. labs reviewed with pt. cont regimen        There are no discontinued medications.     Follow Up   Return in about 6 months (around 6/6/2023) for Recheck.  Patient was given instructions and counseling regarding her condition or for health maintenance advice. Please see specific information pulled into the AVS if appropriate.       Mainor Sanders Jr, MD  12/06/22  12:49 EST

## 2022-12-07 ENCOUNTER — TELEPHONE (OUTPATIENT)
Dept: INTERNAL MEDICINE | Facility: CLINIC | Age: 67
End: 2022-12-07

## 2022-12-07 NOTE — TELEPHONE ENCOUNTER
CHELSEY WITH [employer] CALLED AND STATES THEY RECOMMEND SINCE THE PATIENT IS A DIABETIC THAT SHE BE PLACED STATIN TO DECREASE CARDIOVASCULAR RISK      [phone]

## 2022-12-18 DIAGNOSIS — E11.9 TYPE 2 DIABETES MELLITUS WITHOUT COMPLICATION, WITHOUT LONG-TERM CURRENT USE OF INSULIN: ICD-10-CM

## 2022-12-19 RX ORDER — GLUCOSAMINE HCL/CHONDROITIN SU 500-400 MG
1 CAPSULE ORAL
Qty: 100 EACH | Refills: 3 | Status: SHIPPED | OUTPATIENT
Start: 2022-12-19 | End: 2023-01-16

## 2022-12-19 RX ORDER — ISOPROPYL ALCOHOL 0.75 G/1
SWAB TOPICAL
Qty: 200 EACH | Refills: 3 | Status: SHIPPED | OUTPATIENT
Start: 2022-12-19

## 2022-12-19 RX ORDER — LANCETS 28 GAUGE
1 EACH MISCELLANEOUS
Qty: 100 EACH | Refills: 3 | Status: SHIPPED | OUTPATIENT
Start: 2022-12-19 | End: 2023-01-09 | Stop reason: SDUPTHER

## 2022-12-21 ENCOUNTER — TELEPHONE (OUTPATIENT)
Dept: INTERNAL MEDICINE | Facility: CLINIC | Age: 67
End: 2022-12-21

## 2022-12-21 NOTE — TELEPHONE ENCOUNTER
[facility] called our office stating patient contacted them and asked to change the current order of Lancets Freestyle to Accucheck soft click lancets -Used to check blood sugar 5 times day prn to monitor blood glucose.  I gave them a verbal for them to be able to do this for the patient

## 2022-12-22 DIAGNOSIS — G62.9 NEUROPATHY: ICD-10-CM

## 2022-12-22 RX ORDER — GABAPENTIN 600 MG/1
600 TABLET ORAL 3 TIMES DAILY PRN
Qty: 90 TABLET | Refills: 0 | Status: SHIPPED | OUTPATIENT
Start: 2022-12-22 | End: 2023-02-02 | Stop reason: SDUPTHER

## 2022-12-22 NOTE — TELEPHONE ENCOUNTER
CONTROLLED MEDICATION. PLEASE ADVISE.     Last Filled: 11/14/22 #90-no refills  Last Visit:  12/6/22  Next Appt: 6/6/23  Last UDS: 3/1/21

## 2022-12-27 ENCOUNTER — TELEPHONE (OUTPATIENT)
Dept: INTERNAL MEDICINE | Facility: CLINIC | Age: 67
End: 2022-12-27

## 2022-12-27 NOTE — TELEPHONE ENCOUNTER
"[facility] has not replied to your PA request. Turnaround time for review of a PA request is dependent upon insurance plan and can range from 24 hours to 5 calendar days. Depending on the information you've provided, additional questions may be returned by the plan.    You may close this dialog, return to your dashboard, and perform other tasks. To check for an update later, go to the \"Search\" tab and click on \"Search [facility] Detailed Status.\"    If this search option is not available or [facility] has not replied to your request within this timeframe, please contact the number on the back of the patient's insurance card.  "

## 2023-01-04 ENCOUNTER — TREATMENT (OUTPATIENT)
Dept: PHYSICAL THERAPY | Facility: CLINIC | Age: 68
End: 2023-01-04
Payer: MEDICARE

## 2023-01-04 DIAGNOSIS — Z91.81 AT HIGH RISK FOR FALLS: ICD-10-CM

## 2023-01-04 DIAGNOSIS — R26.89 BALANCE PROBLEM: Primary | ICD-10-CM

## 2023-01-04 PROCEDURE — 97161 PT EVAL LOW COMPLEX 20 MIN: CPT | Performed by: PHYSICAL THERAPIST

## 2023-01-04 PROCEDURE — 97110 THERAPEUTIC EXERCISES: CPT | Performed by: PHYSICAL THERAPIST

## 2023-01-04 NOTE — PROGRESS NOTES
Physical Therapy Initial Evaluation and Plan of Care                    [facility] [address] [place] [address]    Patient: Randi Fajardo   : 1955  Diagnosis/ICD-10 Code:  Balance problem [R26.89]  Referring practitioner: Mainor Sanders *  Date of Initial Visit: 2023  Today's Date: 2023  Patient seen for 1 sessions           Subjective Questionnaire: LEFS: 37/80 = 46.25% Function      Subjective Evaluation    History of Present Illness  Mechanism of injury: The patient presents to physical therapy with complaints of decreased balance and a recent history of a fall on 22. She bent over at her daughters house and fell over landing on her hip. She went to the ER for imaging on 11/15/22 which showed some left hip osteoarthritis and bone spurs in the right knee. She estimates that she has fallen 3-4 times in the past year. She isn't having any specific pains today. She is more unsteady on her feet if she is fatigued or has been standing/walking for too long. She has difficulty going up/down stairs. Her garage is in the basement so she normally drives around to the back of her house so that she only has to go up 3-4 stairs. She plans on starting to go to the gym with a friend in the coming months.      Patient Occupation: Retired (Teacher) Patient Goals  Patient goal: The patient would like to improve her balance and be able to go to the gym.            Objective          Neurological Testing     Additional Neurological Details  Sensation to light touch intact and equal bilaterally.    Strength/Myotome Testing     Left Hip   Planes of Motion   Flexion: 4  Extension: 4-  Abduction: 4-    Right Hip   Planes of Motion   Flexion: 4  Extension: 4-  Abduction: 4-    Left Knee   Flexion: 4+  Extension: 4+    Right Knee   Flexion: 4+  Extension: 4+    Left Ankle/Foot   Dorsiflexion: 4+    Right Ankle/Foot   Dorsiflexion: 4+    Ambulation     Ambulation: Level Surfaces      Additional Level Surfaces Ambulation Details  The patient ambulates without an AD with fairly normal gait mechanics. She demonstrates a mild limitation in toe clearance bilaterally.    Functional Assessment     Comments  TUG: 10.4'' without an AD  30 second sit to stand test: 13 reps  2 minute walk test: 385 feet    Narrow stance: 30'' with no sway  Narrow stance with eyes closed: 30'' with mild sway  Tandem stance (R forward): 27'' with moderate sway  Tandem stance (L forward): 15'' with moderate sway  SLS L: <3 seconds  SLS R: <3 seconds        See Exercise, Manual, and Modality Logs for complete treatment.     Assessment & Plan     Assessment  Impairments: abnormal gait, abnormal muscle firing, activity intolerance, impaired balance, impaired physical strength, lacks appropriate home exercise program, pain with function, safety issue and weight-bearing intolerance  Functional Limitations: walking, uncomfortable because of pain, standing and stooping  Assessment details: The patient presents to physical therapy with complaints of decreased balance and a recent history of falling. She presents with associated lower extremity weakness and functional deficits (LESF). She would benefit from skilled physical therapy as described below to address these limitations and allow the patient to return to her prior level of function.  Prognosis: good    Goals  Plan Goals: 1. The patient ambulates with a slow anoop.   LTG 1: 12 weeks:  The patient will ambulate 400 feet or greater on the 2 minute walk test to demonstrate improved ability to perform community ambulation.    STATUS:  New    2. The patient demonstrates weakness of the bilateral hips.   LTG 2: 12 weeks:  The patient will demonstrate 4+/5 strength for bilateral hip flexion, abduction,  and extension in order to improve hip stability.    STATUS:  New   STG 2a: 6 weeks:  The patient will demonstrate 4/5 strength for bilateral hip flexion, abduction,  and  extension.    STATUS:  New    3. The patient has gait dysfunction.   LTG 3: 12 weeks:  The patient will ambulate without assistive device, independently, for community distances with normal biomechanics in order to improve mobility and allow patient to perform activities such as grocery shopping with greater ease.    STATUS:  New   STG 3a: The patient will be independent in HEP.    STATUS:  New    4. Mobility: Walking/Moving Around Functional Limitation     LTG 4: 12 weeks:  The patient will demonstrate 25% limitation by achieving a score of 60/80 on the Lower Extremity Functional Scale.    STATUS:  New   STG 4a: 6 weeks:  The patient will demonstrate 37.5% limitation by achieving a score of 50/80 on the Lower Extremity Functional Scale.      STATUS:  New    Plan  Therapy options: will be seen for skilled therapy services  Planned modality interventions: cryotherapy, electrical stimulation/Russian stimulation and TENS  Planned therapy interventions: balance/weight-bearing training, ADL retraining, soft tissue mobilization, strengthening, stretching, therapeutic activities, joint mobilization, home exercise program, gait training, functional ROM exercises, flexibility, body mechanics training, postural training, neuromuscular re-education and manual therapy  Frequency: 3x week  Duration in weeks: 12  Treatment plan discussed with: patient        Visit Diagnoses:    ICD-10-CM ICD-9-CM   1. Balance problem  R26.89 781.99   2. At high risk for falls  Z91.81 V15.88       History # of Personal Factors and/or Comorbidities: MODERATE (1-2)  Examination of Body System(s): # of elements: LOW (1-2)  Clinical Presentation: STABLE   Clinical Decision Making: LOW       Timed:         Manual Therapy:    0     mins  71225;     Therapeutic Exercise:    10     mins  97303;     Neuromuscular Randa:    0    mins  66115;    Therapeutic Activity:     0     mins  42607;     Gait Trainin     mins  93342;     Ultrasound:     0      mins  04518;    Ionto                               0    mins   59069  Self Care                       0     mins   79168  Canalith Repos    0     mins 92419      Un-Timed:  Electrical Stimulation:    0     mins  15086 ( );  Dry Needling     0     mins self-pay  Traction     0     mins 29710  Low Eval     30     Mins  05068  Mod Eval     0     Mins  18502  High Eval                       0     Mins  00842  Re-Eval                           0    mins  25226    Timed Treatment:   10   mins   Total Treatment:     40   mins    PT SIGNATURE: Mike Gifford PT    Electronically signed 1/4/2023    [place] License: PT - [id number]      Initial Certification  Certification Period: 1/4/2023 thru 4/3/2023  I certify that the therapy services are furnished while this patient is under my care.  The services outlined above are required by this patient, and will be reviewed every 90 days.     PHYSICIAN: Mainor Sanders Jr., MD  NPI: [id number]      DATE:     Please sign and return via fax to [phone]. Thank you, [facility].

## 2023-01-06 ENCOUNTER — TREATMENT (OUTPATIENT)
Dept: PHYSICAL THERAPY | Facility: CLINIC | Age: 68
End: 2023-01-06
Payer: MEDICARE

## 2023-01-06 DIAGNOSIS — Z91.81 AT HIGH RISK FOR FALLS: ICD-10-CM

## 2023-01-06 DIAGNOSIS — R26.89 BALANCE PROBLEM: Primary | ICD-10-CM

## 2023-01-06 PROCEDURE — 97110 THERAPEUTIC EXERCISES: CPT | Performed by: PHYSICAL THERAPIST

## 2023-01-06 PROCEDURE — 97112 NEUROMUSCULAR REEDUCATION: CPT | Performed by: PHYSICAL THERAPIST

## 2023-01-06 NOTE — PROGRESS NOTES
Physical Therapy Daily Treatment Note      Patient: Randi Fajardo   : 1955  Referring practitioner: No ref. provider found  Date of Initial Visit: Type: THERAPY  Noted: 2023  Today's Date: 2023  Patient seen for 2 sessions           Subjective Questionnaire:       Subjective Evaluation    History of Present Illness    Subjective comment: Pt reported feeling good today and no pain.       Objective   See Exercise, Manual, and Modality Logs for complete treatment.       Assessment & Plan     Assessment    Assessment details: Pt required several standing rest breaks during session secondary to being SOA and reporting she has COPD.  Pt reported B anterior legs were hurting after session.  Continue with POC.        Visit Diagnoses:    ICD-10-CM ICD-9-CM   1. Balance problem  R26.89 781.99   2. At high risk for falls  Z91.81 V15.88       Progress per Plan of Care and Progress strengthening /stabilization /functional activity           Timed:  Manual Therapy:         mins  22934;  Therapeutic Exercise:    10     mins  77924;     Neuromuscular Randa:    14    mins  55232;    Therapeutic Activity:          mins  78589;     Gait Training:           mins  06706;     Ultrasound:          mins  53424;    Electrical Stimulation:         mins  65599 ( );  Aquatic Therapy          mins  25008    Untimed:  Electrical Stimulation:         mins  56623 ( );  Mechanical Traction:         mins  49073;     Timed Treatment:   24   mins   Total Treatment:     24   mins    Electronically signed    Lucinda Starr PTA  Physical Therapist Assistant    [place] license: [id number]

## 2023-01-09 ENCOUNTER — TREATMENT (OUTPATIENT)
Dept: PHYSICAL THERAPY | Facility: CLINIC | Age: 68
End: 2023-01-09
Payer: MEDICARE

## 2023-01-09 DIAGNOSIS — R26.89 BALANCE PROBLEM: Primary | ICD-10-CM

## 2023-01-09 DIAGNOSIS — Z91.81 AT HIGH RISK FOR FALLS: ICD-10-CM

## 2023-01-09 DIAGNOSIS — E11.9 TYPE 2 DIABETES MELLITUS WITHOUT COMPLICATION, WITHOUT LONG-TERM CURRENT USE OF INSULIN: ICD-10-CM

## 2023-01-09 PROCEDURE — 97110 THERAPEUTIC EXERCISES: CPT | Performed by: PHYSICAL THERAPIST

## 2023-01-09 PROCEDURE — 97112 NEUROMUSCULAR REEDUCATION: CPT | Performed by: PHYSICAL THERAPIST

## 2023-01-09 NOTE — PROGRESS NOTES
Physical Therapy Daily Treatment Note      Patient: Randi Fajardo   : 1955  Referring practitioner: Mainor Sanders *  Date of Initial Visit: Type: THERAPY  Noted: 2023  Today's Date: 2023  Patient seen for 3 sessions           Subjective Questionnaire:       Subjective Evaluation    History of Present Illness    Subjective comment: Pt had no new complaints today.       Objective   See Exercise, Manual, and Modality Logs for complete treatment.       Assessment & Plan     Assessment    Assessment details: Pt requires frequent rest breaks secondary to COPD otherwise tolerated session well.  Pt performs dynamic balance activties with CGA and 2 finger hold to counter. Continue with POC.        Visit Diagnoses:    ICD-10-CM ICD-9-CM   1. Balance problem  R26.89 781.99   2. At high risk for falls  Z91.81 V15.88       Progress per Plan of Care and Progress strengthening /stabilization /functional activity           Timed:  Manual Therapy:         mins  94565;  Therapeutic Exercise:    16     mins  67432;     Neuromuscular Randa:  10      mins  25168;    Therapeutic Activity:          mins  49268;     Gait Training:           mins  29373;     Ultrasound:          mins  49146;    Electrical Stimulation:         mins  62631 ( );  Aquatic Therapy          mins  84375    Untimed:  Electrical Stimulation:         mins  05306 ( );  Mechanical Traction:         mins  21887;     Timed Treatment:   26   mins   Total Treatment:     26   mins    Electronically signed    Lucinda Starr PTA  Physical Therapist Assistant    [place] license: [id number]

## 2023-01-10 RX ORDER — LANCETS 28 GAUGE
1 EACH MISCELLANEOUS
Qty: 100 EACH | Refills: 3 | Status: SHIPPED | OUTPATIENT
Start: 2023-01-10 | End: 2023-01-16

## 2023-01-14 DIAGNOSIS — E11.9 TYPE 2 DIABETES MELLITUS WITHOUT COMPLICATION, WITHOUT LONG-TERM CURRENT USE OF INSULIN: ICD-10-CM

## 2023-01-16 ENCOUNTER — TREATMENT (OUTPATIENT)
Dept: PHYSICAL THERAPY | Facility: CLINIC | Age: 68
End: 2023-01-16
Payer: MEDICARE

## 2023-01-16 DIAGNOSIS — Z91.81 AT HIGH RISK FOR FALLS: ICD-10-CM

## 2023-01-16 DIAGNOSIS — R26.89 BALANCE PROBLEM: Primary | ICD-10-CM

## 2023-01-16 PROCEDURE — 97112 NEUROMUSCULAR REEDUCATION: CPT | Performed by: PHYSICAL THERAPIST

## 2023-01-16 PROCEDURE — 97530 THERAPEUTIC ACTIVITIES: CPT | Performed by: PHYSICAL THERAPIST

## 2023-01-16 PROCEDURE — 97110 THERAPEUTIC EXERCISES: CPT | Performed by: PHYSICAL THERAPIST

## 2023-01-16 RX ORDER — TIOTROPIUM BROMIDE AND OLODATEROL 3.124; 2.736 UG/1; UG/1
SPRAY, METERED RESPIRATORY (INHALATION)
Qty: 4 G | Refills: 3 | Status: SHIPPED | OUTPATIENT
Start: 2023-01-16

## 2023-01-16 RX ORDER — LANCETS
EACH MISCELLANEOUS
Qty: 100 EACH | Refills: 3 | Status: SHIPPED | OUTPATIENT
Start: 2023-01-16

## 2023-01-16 RX ORDER — BLOOD SUGAR DIAGNOSTIC
STRIP MISCELLANEOUS
Qty: 100 EACH | Refills: 3 | Status: SHIPPED | OUTPATIENT
Start: 2023-01-16

## 2023-01-16 NOTE — PROGRESS NOTES
Physical Therapy Daily Treatment Note      Patient: Randi Fajardo   : 1955  Referring practitioner: Mainor Sanders *  Date of Initial Visit: Type: THERAPY  Noted: 2023  Today's Date: 2023  Patient seen for 4 sessions           Subjective Questionnaire:       Subjective Evaluation    History of Present Illness    Subjective comment: Pt reported there is nothing new today.       Objective   See Exercise, Manual, and Modality Logs for complete treatment.       Assessment & Plan     Assessment    Assessment details: Pt report was able to add 20 seconds to her marching on [employer] today.  Pt requires frequent rest breaks throughout session. 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 Continue with POC.        Visit Diagnoses:    ICD-10-CM ICD-9-CM   1. Balance problem  R26.89 781.99   2. At high risk for falls  Z91.81 V15.88       Progress per Plan of Care and Progress strengthening /stabilization /functional activity           Timed:  Manual Therapy:         mins  76005;  Therapeutic Exercise:   15     mins  36599;     Neuromuscular Randa:   15     mins  96889;    Therapeutic Activity:     8     mins  34748;     Gait Training:           mins  63495;     Ultrasound:          mins  30725;    Electrical Stimulation:         mins  31392 ( );  Aquatic Therapy          mins  06005    Untimed:  Electrical Stimulation:         mins  87229 ( );  Mechanical Traction:         mins  84758;     Timed Treatment:   38   mins   Total Treatment:     38   mins    Electronically signed    Lucinda Starr PTA  Physical Therapist Assistant    [place] license: [id number]

## 2023-01-18 ENCOUNTER — TELEPHONE (OUTPATIENT)
Dept: PHYSICAL THERAPY | Facility: CLINIC | Age: 68
End: 2023-01-18

## 2023-01-30 ENCOUNTER — TREATMENT (OUTPATIENT)
Dept: PHYSICAL THERAPY | Facility: CLINIC | Age: 68
End: 2023-01-30
Payer: MEDICARE

## 2023-01-30 DIAGNOSIS — R26.89 BALANCE PROBLEM: Primary | ICD-10-CM

## 2023-01-30 DIAGNOSIS — Z91.81 AT HIGH RISK FOR FALLS: ICD-10-CM

## 2023-01-30 PROCEDURE — 97110 THERAPEUTIC EXERCISES: CPT | Performed by: PHYSICAL THERAPIST

## 2023-01-30 PROCEDURE — 97112 NEUROMUSCULAR REEDUCATION: CPT | Performed by: PHYSICAL THERAPIST

## 2023-01-30 NOTE — PROGRESS NOTES
"Physical Therapy Daily Treatment Note      Patient: Randi Fajardo   : 1955  Referring practitioner: Mainor Sanders *  Date of Initial Visit: Type: THERAPY  Noted: 2023  Today's Date: 2023  Patient seen for 5 sessions           Subjective Questionnaire:       Subjective Evaluation    History of Present Illness    Subjective comment: Pt reported her arthritis is  acting up today.  Pt reports her her balance has been \"soso.\"  Pt reports balance has been better at times than others.       Objective   See Exercise, Manual, and Modality Logs for complete treatment.       Assessment & Plan     Assessment    Assessment details: Pt reports improved balance stating it is better at times than others she reports she doesn't \"wobble\" when ambulating.  Pt tolerated session well and will benefit from continued PT.        Visit Diagnoses:    ICD-10-CM ICD-9-CM   1. Balance problem  R26.89 781.99   2. At high risk for falls  Z91.81 V15.88       Progress per Plan of Care and Progress strengthening /stabilization /functional activity           Timed:  Manual Therapy:         mins  43217;  Therapeutic Exercise:    15     mins  75505;     Neuromuscular Randa:    14    mins  81289;    Therapeutic Activity:          mins  70861;     Gait Training:           mins  75086;     Ultrasound:          mins  91005;    Electrical Stimulation:         mins  95014 ( );  Aquatic Therapy          mins  18308    Untimed:  Electrical Stimulation:         mins  44177 ( );  Mechanical Traction:         mins  31392;     Timed Treatment:   29   mins   Total Treatment:     29   mins    Electronically signed    Lucinda Starr PTA  Physical Therapist Assistant    [place] license: [id number]    "

## 2023-02-01 ENCOUNTER — TREATMENT (OUTPATIENT)
Dept: PHYSICAL THERAPY | Facility: CLINIC | Age: 68
End: 2023-02-01
Payer: MEDICARE

## 2023-02-01 DIAGNOSIS — R26.89 BALANCE PROBLEM: Primary | ICD-10-CM

## 2023-02-01 DIAGNOSIS — Z91.81 AT HIGH RISK FOR FALLS: ICD-10-CM

## 2023-02-01 PROCEDURE — 97112 NEUROMUSCULAR REEDUCATION: CPT | Performed by: PHYSICAL THERAPIST

## 2023-02-01 PROCEDURE — 97530 THERAPEUTIC ACTIVITIES: CPT | Performed by: PHYSICAL THERAPIST

## 2023-02-01 PROCEDURE — 97110 THERAPEUTIC EXERCISES: CPT | Performed by: PHYSICAL THERAPIST

## 2023-02-01 NOTE — PROGRESS NOTES
Physical Therapy Daily Treatment Note  [facility] [place] [address] [place] [address]    Patient: Randi Fajardo   : 1955  Referring practitioner: Mainor Sanders *  Date of Initial Visit: Type: THERAPY  Noted: 2023  Today's Date: 2023  Patient seen for 6 sessions           Subjective  Randi Fajardo reports: she is aware of tightness in her knees during  total gym squats. Randi noted that her knees felt a little better after session, commenting that the squats may have aggravated her arthritis.         Objective   Mask makes it difficult for Randi to breath well, she was able to lift it often to take good breaths.    See Exercise, Manual, and Modality Logs for complete treatment.       Assessment/Plan  Randi tolerated advancement in resistance for hip strengthening today. Randi is to undergo progress note/reassessment next session for further determination as to progress made/further need for skilled care.     Visit Diagnoses:    ICD-10-CM ICD-9-CM   1. Balance problem  R26.89 781.99   2. At high risk for falls  Z91.81 V15.88                  Timed:  Manual Therapy:         mins  78565;  Therapeutic Exercise:    16     mins  67559;     Neuromuscular Randa:    10    mins  03918;    Therapeutic Activity:     13     mins  95847;     Gait Training:           mins  00991;     Ultrasound:          mins  17508;    Electrical Stimulation:         mins  13129 ( );  Aquatics  __   mins   31052    Untimed:  Electrical Stimulation:         mins  90640 ( );  Mechanical Traction:         mins  82925;     Timed Treatment:   39   mins   Total Treatment:     39   mins    Electronically Signed:  Adela Santacruz PTA  Physical Therapist Assistant    [place] PTA license [id number]

## 2023-02-02 DIAGNOSIS — G62.9 NEUROPATHY: ICD-10-CM

## 2023-02-02 NOTE — TELEPHONE ENCOUNTER
Refill request for  controlled substance.      Date of request: 2/2/2023   Medication requested: Gabapentin  Last OV: 12/06/2023  Last UDS: not on file  Contract signed: no    Date:none on file  Next office visit: 06/06/2023    Marly Peck

## 2023-02-03 RX ORDER — GABAPENTIN 600 MG/1
600 TABLET ORAL 3 TIMES DAILY PRN
Qty: 90 TABLET | Refills: 0 | Status: SHIPPED | OUTPATIENT
Start: 2023-02-03 | End: 2023-03-19 | Stop reason: SDUPTHER

## 2023-02-06 ENCOUNTER — CLINICAL SUPPORT (OUTPATIENT)
Dept: INTERNAL MEDICINE | Facility: CLINIC | Age: 68
End: 2023-02-06
Payer: MEDICARE

## 2023-02-06 ENCOUNTER — HOSPITAL ENCOUNTER (OUTPATIENT)
Dept: CT IMAGING | Facility: HOSPITAL | Age: 68
Discharge: HOME | End: 2023-02-06
Admitting: INTERNAL MEDICINE
Payer: MEDICARE

## 2023-02-06 DIAGNOSIS — R91.1 LUNG NODULE: ICD-10-CM

## 2023-02-06 DIAGNOSIS — Z79.899 ENCOUNTER FOR LONG-TERM (CURRENT) USE OF OTHER MEDICATIONS: Primary | ICD-10-CM

## 2023-02-06 LAB
AMPHET+METHAMPHET UR QL: NEGATIVE
AMPHETAMINE INTERNAL CONTROL: NORMAL
AMPHETAMINES UR QL: NEGATIVE
BARBITURATE INTERNAL CONTROL: NORMAL
BARBITURATES UR QL SCN: NEGATIVE
BENZODIAZ UR QL SCN: NEGATIVE
BENZODIAZEPINE INTERNAL CONTROL: NORMAL
BUPRENORPHINE INTERNAL CONTROL: NORMAL
BUPRENORPHINE SERPL-MCNC: NEGATIVE NG/ML
CANNABINOIDS SERPL QL: NEGATIVE
COCAINE INTERNAL CONTROL: NORMAL
COCAINE UR QL: NEGATIVE
CREAT BLDA-MCNC: 0.9 MG/DL
EGFRCR SERPLBLD CKD-EPI 2021: 69.3 ML/MIN/1.73
EXPIRATION DATE: NORMAL
Lab: NORMAL
MDMA (ECSTASY) INTERNAL CONTROL: NORMAL
MDMA UR QL SCN: NEGATIVE
METHADONE INTERNAL CONTROL: NORMAL
METHADONE UR QL SCN: NEGATIVE
METHAMPHETAMINE INTERNAL CONTROL: NORMAL
OPIATES INTERNAL CONTROL: NORMAL
OPIATES UR QL: NEGATIVE
OXYCODONE INTERNAL CONTROL: NORMAL
OXYCODONE UR QL SCN: NEGATIVE
PCP UR QL SCN: NEGATIVE
PHENCYCLIDINE INTERNAL CONTROL: NORMAL
THC INTERNAL CONTROL: NORMAL

## 2023-02-06 PROCEDURE — 71260 CT THORAX DX C+: CPT

## 2023-02-06 PROCEDURE — 82565 ASSAY OF CREATININE: CPT

## 2023-02-06 PROCEDURE — 80305 DRUG TEST PRSMV DIR OPT OBS: CPT | Performed by: INTERNAL MEDICINE

## 2023-02-06 PROCEDURE — 0 IOPAMIDOL PER 1 ML: Performed by: INTERNAL MEDICINE

## 2023-02-06 RX ADMIN — IOPAMIDOL 100 ML: 755 INJECTION, SOLUTION INTRAVENOUS at 11:27

## 2023-02-08 ENCOUNTER — TELEPHONE (OUTPATIENT)
Dept: INTERNAL MEDICINE | Facility: CLINIC | Age: 68
End: 2023-02-08
Payer: MEDICARE

## 2023-02-08 ENCOUNTER — TREATMENT (OUTPATIENT)
Dept: PHYSICAL THERAPY | Facility: CLINIC | Age: 68
End: 2023-02-08
Payer: MEDICARE

## 2023-02-08 DIAGNOSIS — R26.89 BALANCE PROBLEM: Primary | ICD-10-CM

## 2023-02-08 DIAGNOSIS — Z91.81 AT HIGH RISK FOR FALLS: ICD-10-CM

## 2023-02-08 PROCEDURE — 97112 NEUROMUSCULAR REEDUCATION: CPT | Performed by: PHYSICAL THERAPIST

## 2023-02-08 PROCEDURE — 97530 THERAPEUTIC ACTIVITIES: CPT | Performed by: PHYSICAL THERAPIST

## 2023-02-08 PROCEDURE — 97110 THERAPEUTIC EXERCISES: CPT | Performed by: PHYSICAL THERAPIST

## 2023-02-08 NOTE — TELEPHONE ENCOUNTER
Spoke with patient. Verified .     Patient has been made aware of results.  Patient asked if it stated anything about her liver and I told her no, Dr. Sanders did not comment on anything about it.   Patient expressed understanding.

## 2023-02-08 NOTE — PROGRESS NOTES
Progress Assessment  [address] [place] [id number]        Patient: Randi Fajardo   : 1955  Diagnosis/ICD-10 Code:  Balance problem [R26.89]  Referring practitioner: Mainor Sanders *  Date of Initial Visit: Type: THERAPY  Noted: 2023  Today's Date: 2023  Patient seen for 7 sessions      Subjective:     Subjective Questionnaire: LEFS: 40/80 = 50% limitation (50% function)  Clinical Progress: improved  Home Program Compliance: Yes  Treatment has included: therapeutic exercise, neuromuscular re-education, therapeutic activity and gait training    Subjective: Pt reporting she feels like she is improving, she has fallen once since starting therapy, at the movie theater, tripped over a transition in the floor, but no injury besides soreness. She feels like she is getting stronger and better balance in therapy and would like to continue.       Objective     Strength/Myotome Testing      Left Hip   Planes of Motion   Flexion: 4  Extension: 4  Abduction: 4     Right Hip   Planes of Motion   Flexion: 4  Extension: 4  Abduction: 4     Left Knee   Flexion: 4+  Extension: 4+     Right Knee   Flexion: 4+  Extension: 4+     Left Ankle/Foot   Dorsiflexion: 4+     Right Ankle/Foot   Dorsiflexion: 4+          Functional Assessment      Narrow stance: 30'' with no sway  Narrow stance with eyes closed: 30'' with mild sway  Tandem stance (R forward): 28'' with moderate sway  Tandem stance (L forward): 18'' with moderate sway  SLS L: <3 seconds  SLS R: <3 seconds           Assessment & Plan     Assessment  Impairments: abnormal gait, abnormal muscle firing, activity intolerance, impaired balance, impaired physical strength, lacks appropriate home exercise program, pain with function, safety issue and weight-bearing intolerance  Functional Limitations: walking, uncomfortable because of pain, standing and stooping  Assessment details: The patient presents to physical therapy with complaints of decreased  balance and a recent history of falling. She is showing improvements in balance and strength as she has progressed in therapy. The 2 minute walk test was not assessed today due to her not having inhaler. She presents with associated lower extremity weakness and functional deficits (LEFS). She would benefit from ongoing skilled physical therapy as described below to address these limitations and allow the patient to return to her prior level of function.  Prognosis: good    Goals  Plan Goals: 1. The patient ambulates with a slow anoop.   LTG 1: 12 weeks:  The patient will ambulate 400 feet or greater on the 2 minute walk test to demonstrate improved ability to perform community ambulation.    STATUS:  Not met (didn't assess today due to her not having inhaler)    2. The patient demonstrates weakness of the bilateral hips.   LTG 2: 12 weeks:  The patient will demonstrate 4+/5 strength for bilateral hip flexion, abduction,  and extension in order to improve hip stability.    STATUS:  Not met, progressing   STG 2a: 6 weeks:  The patient will demonstrate 4/5 strength for bilateral hip flexion, abduction,  and extension.    STATUS:  MET    3. The patient has gait dysfunction.   LTG 3: 12 weeks:  The patient will ambulate without assistive device, independently, for community distances with normal biomechanics in order to improve mobility and allow patient to perform activities such as grocery shopping with greater ease.    STATUS:  Not met, progressing   STG 3a: The patient will be independent in HEP.    STATUS:  MET, ongoing    4. Mobility: Walking/Moving Around Functional Limitation     LTG 4: 12 weeks:  The patient will demonstrate 25% limitation by achieving a score of 60/80 on the Lower Extremity Functional Scale.    STATUS:  Not met, progressing   STG 4a: 6 weeks:  The patient will demonstrate 37.5% limitation by achieving a score of 50/80 on the Lower Extremity Functional Scale.      STATUS:  Not met,  progressing    Plan  Therapy options: will be seen for skilled therapy services  Planned modality interventions: cryotherapy, electrical stimulation/Russian stimulation and TENS  Planned therapy interventions: balance/weight-bearing training, ADL retraining, soft tissue mobilization, strengthening, stretching, therapeutic activities, joint mobilization, home exercise program, gait training, functional ROM exercises, flexibility, body mechanics training, postural training, neuromuscular re-education and manual therapy  Frequency: 3x week  Duration in weeks: 12  Treatment plan discussed with: patient      Progress toward previous goals: Partially Met    See Exercise, Manual, and Modality Logs for complete treatment.         Recommendations: Continue as planned  Timeframe: 2 months  Prognosis to achieve goals: good    PT Signature: Sven Ospina, PT    Electronically signed 2023    [place] License: [id number] - [id number]     Based upon review of the patient's progress and continued therapy plan, it is my medical opinion that Randi Fajardo should continue physical therapy treatment at [facility]  [address]  [place] [address] [phone].      Timed:         Manual Therapy:    0     mins  54617;     Therapeutic Exercise:    10     mins  43308;     Neuromuscular Randa:    20    mins  91569;    Therapeutic Activity:     10     mins  25023;     Gait Trainin     mins  67781;     Ultrasound:     0     mins  00590;    Self Care                       0     mins   44606  Aquatic                          0     mins 50318          Timed Treatment:   40   mins   Total Treatment:     40   mins      I certify that the therapy services are furnished while this patient is under my care.  The services outlined above are required by this patient, and will be reviewed every 90 days.

## 2023-02-08 NOTE — TELEPHONE ENCOUNTER
----- Message from Mainor Sanders Jr., MD sent at 2/8/2023 11:03 AM EST -----  Lung nodule unchanged, which is good. Radiologist commented on some densities that may represent scarring or atelectasis - will will keep an eye on this as well, but I do not think it will be anything to worry about.

## 2023-02-15 NOTE — TELEPHONE ENCOUNTER
Rx Refill Note  Requested Prescriptions     Pending Prescriptions Disp Refills   • Blood Glucose Monitoring Suppl (Accu-Chek Guide Me) w/Device kit 1 kit 0     Sig: See Admin Instructions.      Last office visit with prescribing clinician: 12/6/2022   Last telemedicine visit with prescribing clinician: 6/6/2023   Next office visit with prescribing clinician: 6/6/2023                         Would you like a call back once the refill request has been completed: [] Yes [] No    If the office needs to give you a call back, can they leave a voicemail: [] Yes [] No    Casey Garvin  02/15/23, 14:03 EST

## 2023-02-16 ENCOUNTER — OFFICE VISIT (OUTPATIENT)
Dept: PULMONOLOGY | Facility: CLINIC | Age: 68
End: 2023-02-16
Payer: MEDICARE

## 2023-02-16 ENCOUNTER — TREATMENT (OUTPATIENT)
Dept: PHYSICAL THERAPY | Facility: CLINIC | Age: 68
End: 2023-02-16
Payer: MEDICARE

## 2023-02-16 VITALS
SYSTOLIC BLOOD PRESSURE: 111 MMHG | HEIGHT: 63 IN | HEART RATE: 84 BPM | RESPIRATION RATE: 18 BRPM | WEIGHT: 287 LBS | BODY MASS INDEX: 50.85 KG/M2 | DIASTOLIC BLOOD PRESSURE: 62 MMHG | OXYGEN SATURATION: 96 % | TEMPERATURE: 98.2 F

## 2023-02-16 DIAGNOSIS — G47.33 OBSTRUCTIVE SLEEP APNEA: ICD-10-CM

## 2023-02-16 DIAGNOSIS — R26.89 BALANCE PROBLEM: Primary | ICD-10-CM

## 2023-02-16 DIAGNOSIS — Z91.81 AT HIGH RISK FOR FALLS: ICD-10-CM

## 2023-02-16 DIAGNOSIS — E66.01 MORBID (SEVERE) OBESITY DUE TO EXCESS CALORIES: ICD-10-CM

## 2023-02-16 DIAGNOSIS — R91.8 LUNG NODULES: ICD-10-CM

## 2023-02-16 DIAGNOSIS — J41.8 MIXED SIMPLE AND MUCOPURULENT CHRONIC BRONCHITIS: Primary | ICD-10-CM

## 2023-02-16 PROCEDURE — 97110 THERAPEUTIC EXERCISES: CPT | Performed by: PHYSICAL THERAPIST

## 2023-02-16 PROCEDURE — 97112 NEUROMUSCULAR REEDUCATION: CPT | Performed by: PHYSICAL THERAPIST

## 2023-02-16 PROCEDURE — 99214 OFFICE O/P EST MOD 30 MIN: CPT | Performed by: INTERNAL MEDICINE

## 2023-02-16 PROCEDURE — 97530 THERAPEUTIC ACTIVITIES: CPT | Performed by: PHYSICAL THERAPIST

## 2023-02-16 RX ORDER — BLOOD-GLUCOSE METER
1 EACH MISCELLANEOUS SEE ADMIN INSTRUCTIONS
Qty: 1 KIT | Refills: 0 | Status: SHIPPED | OUTPATIENT
Start: 2023-02-16

## 2023-02-16 NOTE — PROGRESS NOTES
Physical Therapy Daily Treatment Note      Patient: Randi Fajardo   : 1955  Referring practitioner: Mainor Sanders *  Date of Initial Visit: Type: THERAPY  Noted: 2023  Today's Date: 2023  Patient seen for 8 sessions           Subjective Questionnaire:       Subjective Evaluation    History of Present Illness    Subjective comment: Pt reported she has had a busy week and had no new complaints.         Objective   See Exercise, Manual, and Modality Logs for complete treatment.       Assessment & Plan     Assessment    Assessment details: Pt tolerates dynamic balance activities with CGA and frequent rest breaks.  Pt able to perform strengthening activities with frequent rest breaks.  Pt has not had any new falls and reports she is getting better.  Continue with POC.        Visit Diagnoses:    ICD-10-CM ICD-9-CM   1. Balance problem  R26.89 781.99   2. At high risk for falls  Z91.81 V15.88       Progress per Plan of Care and Progress strengthening /stabilization /functional activity           Timed:  Manual Therapy:         mins  89472;  Therapeutic Exercise:    20     mins  48036;     Neuromuscular Randa:    8    mins  03365;    Therapeutic Activity:     10     mins  78383;     Gait Training:           mins  62350;     Ultrasound:          mins  03113;    Electrical Stimulation:         mins  87483 ( );  Aquatic Therapy          mins  93830    Untimed:  Electrical Stimulation:         mins  71778 ( );  Mechanical Traction:         mins  13382;     Timed Treatment:   38   mins   Total Treatment:     38   mins    Electronically signed    Lucinda Starr PTA  Physical Therapist Assistant    [place] license: [id number]

## 2023-02-16 NOTE — PROGRESS NOTES
Pulmonary Office Follow-up    Subjective     Randi Fajardo is seen today at the office for   Chief Complaint   Patient presents with   • Shortness of Breath     On exertion   • Follow-up         HPI  Randi Fajardo is a 67 y.o. female with a PMH significant for COPD and obstructive sleep apnea presents for follow-up patient has been doing well and complains of scant mucoid expectoration in the mornings patient is staying active and denies any chest pain fever or hemoptysis there is no history of weight loss      Tobacco use history:  Former smoker      Patient Active Problem List   Diagnosis   • Hypothyroidism   • Essential hypertension   • Hyperlipidemia   • Type 2 diabetes mellitus without complication, without long-term current use of insulin (HCC)   • Right knee pain   • Primary osteoarthritis of right knee       Review of Systems  Review of Systems   Respiratory: Positive for shortness of breath and wheezing.    All other systems reviewed and are negative.    As described in the HPI. Otherwise, remainder of ROS (14 systems) were negative.    Medications, Allergies, Social, and Family Histories reviewed as per EMR.    Objective     Vitals:    02/16/23 1119   BP: 111/62   Pulse: 84   Resp: 18   Temp: 98.2 °F (36.8 °C)   SpO2: 96%         02/16/23  1119   Weight: 130 kg (287 lb)       Physical Exam  Vitals and nursing note reviewed.   Constitutional:       Appearance: She is obese.   HENT:      Head: Normocephalic.      Nose: Nose normal.      Mouth/Throat:      Mouth: Mucous membranes are moist.      Pharynx: Oropharynx is clear.   Eyes:      Conjunctiva/sclera: Conjunctivae normal.      Pupils: Pupils are equal, round, and reactive to light.   Cardiovascular:      Rate and Rhythm: Normal rate and regular rhythm.      Pulses: Normal pulses.      Heart sounds: Normal heart sounds.   Pulmonary:      Effort: Pulmonary effort is normal.      Breath sounds: Normal breath sounds.   Abdominal:      General:  Abdomen is flat. Bowel sounds are normal.      Palpations: Abdomen is soft.   Musculoskeletal:         General: Normal range of motion.      Cervical back: Normal range of motion and neck supple.   Skin:     General: Skin is warm.      Capillary Refill: Capillary refill takes less than 2 seconds.   Neurological:      General: No focal deficit present.      Mental Status: She is alert and oriented to person, place, and time.   Psychiatric:         Mood and Affect: Mood normal.         Behavior: Behavior normal.         CT Chest With Contrast Diagnostic    Result Date: 2/6/2023    1. Scattered irregular densities in both lung fields favored to represent scarring or atelectasis. 2. Diffuse hepatic steatosis     Gamaliel Aj M.D.       Electronically Signed and Approved By: Gamaliel Aj M.D. on 2/06/2023 at 15:29                Assessment & Plan     Diagnoses and all orders for this visit:    1. Mixed simple and mucopurulent chronic bronchitis (HCC) (Primary)    2. Obstructive sleep apnea    3. Lung nodules    4. Morbid (severe) obesity due to excess calories (HCC)         Discussion/ Recommendations:   Patient is advised to reduce weight her BMI is 50.84  Advised to continue her physical therapy  Continue Stiolto daily  Albuterol inhaler 2 puffs every 6 hours as needed  Advised compliance with CPAP  CT scan reviewed with the patient showing bilateral scarring in her lungs no lung masses or nodules noted  Vaccinations discussed and recommended    Class 3 Severe Obesity (BMI >=40). Obesity-related health conditions include the following: obstructive sleep apnea. Obesity is unchanged. BMI is is above average; BMI management plan is completed. We discussed low calorie, low carb based diet program, portion control and increasing exercise.        Return in about 3 months (around 5/16/2023).          This document has been electronically signed by Joon Cuevas MD on February 16, 2023 11:27 EST

## 2023-02-20 ENCOUNTER — TREATMENT (OUTPATIENT)
Dept: PHYSICAL THERAPY | Facility: CLINIC | Age: 68
End: 2023-02-20
Payer: MEDICARE

## 2023-02-20 DIAGNOSIS — Z91.81 AT HIGH RISK FOR FALLS: ICD-10-CM

## 2023-02-20 DIAGNOSIS — R26.89 BALANCE PROBLEM: Primary | ICD-10-CM

## 2023-02-20 PROCEDURE — 97110 THERAPEUTIC EXERCISES: CPT | Performed by: PHYSICAL THERAPIST

## 2023-02-20 PROCEDURE — 97112 NEUROMUSCULAR REEDUCATION: CPT | Performed by: PHYSICAL THERAPIST

## 2023-02-20 NOTE — PROGRESS NOTES
Physical Therapy Daily Treatment Note      Patient: Randi Fajardo   : 1955  Referring practitioner: Mainor Sanders *  Date of Initial Visit: Type: THERAPY  Noted: 2023  Today's Date: 2023  Patient seen for 9 sessions           Subjective Questionnaire:       Subjective Evaluation    History of Present Illness    Subjective comment: Pt reports she hasn't had any falls and feels       Objective   See Exercise, Manual, and Modality Logs for complete treatment.       Assessment & Plan     Assessment    Assessment details: Pt tolerated session to include new dynamic balance activities and required min assist.  Pt is progressing well and benefit from continued PT.        Visit Diagnoses:    ICD-10-CM ICD-9-CM   1. Balance problem  R26.89 781.99   2. At high risk for falls  Z91.81 V15.88       Progress per Plan of Care and Progress strengthening /stabilization /functional activity           Timed:  Manual Therapy:         mins  67251;  Therapeutic Exercise:    14     mins  90597;     Neuromuscular Randa:    15    mins  95003;    Therapeutic Activity:          mins  60834;     Gait Training:           mins  20095;     Ultrasound:          mins  23915;    Electrical Stimulation:         mins  06042 ( );  Aquatic Therapy          mins  24891    Untimed:  Electrical Stimulation:         mins  89851 ( );  Mechanical Traction:         mins  91531;     Timed Treatment:   29   mins   Total Treatment:     29   mins    Electronically signed    Lucinda Starr PTA  Physical Therapist Assistant    [place] license: [id number]

## 2023-02-23 ENCOUNTER — TREATMENT (OUTPATIENT)
Dept: PHYSICAL THERAPY | Facility: CLINIC | Age: 68
End: 2023-02-23
Payer: MEDICARE

## 2023-02-23 DIAGNOSIS — Z91.81 AT HIGH RISK FOR FALLS: ICD-10-CM

## 2023-02-23 DIAGNOSIS — R26.89 BALANCE PROBLEM: Primary | ICD-10-CM

## 2023-02-23 PROCEDURE — 97112 NEUROMUSCULAR REEDUCATION: CPT | Performed by: PHYSICAL THERAPIST

## 2023-02-23 PROCEDURE — 97530 THERAPEUTIC ACTIVITIES: CPT | Performed by: PHYSICAL THERAPIST

## 2023-02-23 PROCEDURE — 97110 THERAPEUTIC EXERCISES: CPT | Performed by: PHYSICAL THERAPIST

## 2023-02-23 NOTE — PROGRESS NOTES
Physical Therapy Daily Treatment Note      Patient: Randi Fajardo   : 1955  Referring practitioner: Mainor Sanders *  Date of Initial Visit: Type: THERAPY  Noted: 2023  Today's Date: 2023  Patient seen for 10 sessions           Subjective Questionnaire:       Subjective Evaluation    History of Present Illness    Subjective comment: Pt reported feeling tired today with no other concerns.       Objective   See Exercise, Manual, and Modality Logs for complete treatment.       Assessment & Plan     Assessment    Assessment details: Pt reports she was able to navigate a couple of steps reciprocally yesterday.  Pt reports improved balance.  Pt is able to improve marching time on airex each time.  Pt reports she is not able to stand for prolonged period of time.  Pt reports she had L hip pain yesterday and pain went across low back and states she took a Gabapenten.  Pt exhibits improved endurance throughout session not taking as many seated rests between activity. Pt is able to perform cone taps without hoding counter.+         Visit Diagnoses:    ICD-10-CM ICD-9-CM   1. Balance problem  R26.89 781.99   2. At high risk for falls  Z91.81 V15.88       Progress per Plan of Care and Progress strengthening /stabilization /functional activity           Timed:  Manual Therapy:         mins  15550;  Therapeutic Exercise:    19     mins  20847;     Neuromuscular Randa:    24    mins  30274;    Therapeutic Activity:     10     mins  49005;     Gait Training:           mins  47009;     Ultrasound:          mins  51594;    Electrical Stimulation:         mins  66704 ( );  Aquatic Therapy          mins  86455    Untimed:  Electrical Stimulation:         mins  01043 ( );  Mechanical Traction:         mins  09643;     Timed Treatment:   53   mins   Total Treatment:     53   mins    Electronically signed    Lucinda Starr PTA  Physical Therapist Assistant    [place] license: [id number]

## 2023-02-24 RX ORDER — DULOXETIN HYDROCHLORIDE 60 MG/1
CAPSULE, DELAYED RELEASE ORAL
Qty: 90 CAPSULE | Refills: 3 | Status: SHIPPED | OUTPATIENT
Start: 2023-02-24

## 2023-02-24 RX ORDER — HYDROCHLOROTHIAZIDE 25 MG/1
TABLET ORAL
Qty: 90 TABLET | Refills: 3 | Status: SHIPPED | OUTPATIENT
Start: 2023-02-24

## 2023-02-27 ENCOUNTER — TREATMENT (OUTPATIENT)
Dept: PHYSICAL THERAPY | Facility: CLINIC | Age: 68
End: 2023-02-27
Payer: MEDICARE

## 2023-02-27 DIAGNOSIS — Z91.81 AT HIGH RISK FOR FALLS: ICD-10-CM

## 2023-02-27 DIAGNOSIS — R26.89 BALANCE PROBLEM: Primary | ICD-10-CM

## 2023-02-27 PROCEDURE — 97112 NEUROMUSCULAR REEDUCATION: CPT | Performed by: PHYSICAL THERAPIST

## 2023-02-27 PROCEDURE — 97110 THERAPEUTIC EXERCISES: CPT | Performed by: PHYSICAL THERAPIST

## 2023-02-27 NOTE — PROGRESS NOTES
Physical Therapy Daily Treatment Note  [address] [place] [id number]    Patient: Randi Fajardo   : 1955  Diagnosis/ICD-10 Code:  Balance problem [R26.89]  Referring practitioner: Mainor Sanders *  Date of Initial Visit: Type: THERAPY  Noted: 2023  Today's Date: 2023  Patient seen for 11 sessions           Subjective: Pt reporting she has been doing better, feels like the exercises have been helpful. She is considering the next two visits to be her last and to continue with HEP after this.    Objective   See Exercise, Manual, and Modality Logs for complete treatment.       Assessment/Plan: Pt tolerated today's session well, continued with strengthening and balance training. Pt is scheduled twice more, anticipating progressing to a home program after these two visits. Pt would benefit from continued skilled therapy to address deficits. Progress per plan of care.             Timed:  Manual Therapy:    0     mins  23818;  Therapeutic Exercise:    10     mins  17892;     Neuromuscular Randa:    14    mins  33429;    Therapeutic Activity:     0     mins  65850;     Gait Trainin     mins  51277;     Ultrasound:     0     mins  62602;    Aquatic Therapy    0     mins  40004;    Untimed:  Electrical Stimulation:    0     mins  31956 ( );  Dry Needling     0     mins self-pay;  Mechanical Traction    0     mins  42262  Moist Heat     0     mins  No charge  Canalith Repos              0     mins 96931    Timed Treatment:   24   mins   Total Treatment:     24   mins    Sven Ospina PT  Physical Therapist    Electronically signed 2023    [place] License: PT - [id number]

## 2023-03-02 ENCOUNTER — TREATMENT (OUTPATIENT)
Dept: PHYSICAL THERAPY | Facility: CLINIC | Age: 68
End: 2023-03-02
Payer: MEDICARE

## 2023-03-02 DIAGNOSIS — Z91.81 AT HIGH RISK FOR FALLS: ICD-10-CM

## 2023-03-02 DIAGNOSIS — R26.89 BALANCE PROBLEM: Primary | ICD-10-CM

## 2023-03-02 PROCEDURE — 97110 THERAPEUTIC EXERCISES: CPT | Performed by: PHYSICAL THERAPIST

## 2023-03-02 PROCEDURE — 97112 NEUROMUSCULAR REEDUCATION: CPT | Performed by: PHYSICAL THERAPIST

## 2023-03-02 RX ORDER — FAMOTIDINE 40 MG/1
TABLET, FILM COATED ORAL
Qty: 90 TABLET | Refills: 3 | Status: SHIPPED | OUTPATIENT
Start: 2023-03-02

## 2023-03-02 NOTE — PROGRESS NOTES
Physical Therapy Daily Treatment Note      Patient: Randi Fajardo   : 1955  Referring practitioner: Mainor Sanders *  Date of Initial Visit: Type: THERAPY  Noted: 2023  Today's Date: 3/2/2023  Patient seen for 12 sessions           Subjective Questionnaire:       Subjective Evaluation    History of Present Illness    Subjective comment: Pt reports feeling good.  Pt reports balance seems to be better.       Objective   See Exercise, Manual, and Modality Logs for complete treatment.       Assessment & Plan     Assessment    Assessment details: Pt was able to stand on airex and toe taps without holding.  Pt is ambulating with greater ease and improved balance.  Pt feels she will be ready for d/c after next visit.        Visit Diagnoses:    ICD-10-CM ICD-9-CM   1. Balance problem  R26.89 781.99   2. At high risk for falls  Z91.81 V15.88       Pt anticipates d/c next visit.            Timed:  Manual Therapy:        mins  10211;  Therapeutic Exercise:    22     mins  20691;     Neuromuscular Randa:    8    mins  88685;    Therapeutic Activity:          mins  47026;     Gait Training:          mins  57049;     Ultrasound:          mins  48009;    Electrical Stimulation:         mins  87047 ( );  Aquatic Therapy          mins  34396    Untimed:  Electrical Stimulation:         mins  46936 ( );  Mechanical Traction:         mins  78876;     Timed Treatment:   30   mins   Total Treatment:     30   mins    Electronically signed    Lucinda Starr PTA  Physical Therapist Assistant    [place] license: [id number]

## 2023-03-08 ENCOUNTER — TREATMENT (OUTPATIENT)
Dept: PHYSICAL THERAPY | Facility: CLINIC | Age: 68
End: 2023-03-08
Payer: MEDICARE

## 2023-03-08 DIAGNOSIS — R26.89 BALANCE PROBLEM: Primary | ICD-10-CM

## 2023-03-08 DIAGNOSIS — Z91.81 AT HIGH RISK FOR FALLS: ICD-10-CM

## 2023-03-08 PROCEDURE — 97112 NEUROMUSCULAR REEDUCATION: CPT | Performed by: PHYSICAL THERAPIST

## 2023-03-08 PROCEDURE — 97110 THERAPEUTIC EXERCISES: CPT | Performed by: PHYSICAL THERAPIST

## 2023-03-08 NOTE — PROGRESS NOTES
Physical Therapy Daily Treatment Note/Discharge  [address] [place] [address]    Patient: Randi Fajardo   : 1955  Diagnosis/ICD-10 Code:  Balance problem [R26.89]  Referring practitioner: Mainor Sanders *  Date of Initial Visit: Type: THERAPY  Noted: 2023  Today's Date: 3/8/2023  Patient seen for 13 sessions           Subjective : Pt reporting she feels like she has improved a lot since starting therapy and feels confident with continuing her exercises at home. She is additionally planning to return to the gym with one of her friends. She has been walking more and this is going well thus far.        Objective     Strength/Myotome Testing      Left Hip   Planes of Motion   Flexion: 4+  Extension: 4+  Abduction: 4+     Right Hip   Planes of Motion   Flexion: 4+  Extension: 4+  Abduction: 4+     Left Knee   Flexion: 4+  Extension: 4+     Right Knee   Flexion: 4+  Extension: 4+     Left Ankle/Foot   Dorsiflexion: 4+     Right Ankle/Foot   Dorsiflexion: 4+           Functional Assessment      Narrow stance: 30 sec  Narrow stance with eyes closed: 30 sec  Tandem stance (R forward): 30 sec  Tandem stance (L forward): 30 sec        See Exercise, Manual, and Modality Logs for complete treatment.       Assessment/Plan : Pt tolerated today's session well. Pt demonstrates improvements in balance and is expected to continue to progress independently. Discharge from therapy at this time.      Plan Goals: 1. The patient ambulates with a slow anoop.              LTG 1: 12 weeks:  The patient will ambulate 400 feet or greater on the 2 minute walk test to demonstrate improved ability to perform community ambulation.                          STATUS:  Not met (didn't assess today due to her not having inhaler)    2. The patient demonstrates weakness of the bilateral hips.              LTG 2: 12 weeks:  The patient will demonstrate 4+/5 strength for bilateral hip flexion, abduction,  and extension in  order to improve hip stability.                          STATUS:  MET              STG 2a: 6 weeks:  The patient will demonstrate 4/5 strength for bilateral hip flexion, abduction,  and extension.                          STATUS:  MET    3. The patient has gait dysfunction.              LTG 3: 12 weeks:  The patient will ambulate without assistive device, independently, for community distances with normal biomechanics in order to improve mobility and allow patient to perform activities such as grocery shopping with greater ease.                          STATUS:  Not met, progressing              STG 3a: The patient will be independent in [facility].                          STATUS:  MET, ongoing    4. Mobility: Walking/Moving Around Functional Limitation                               LTG 4: 12 weeks:  The patient will demonstrate 25% limitation by achieving a score of 60/80 on the Lower Extremity Functional Scale.                          STATUS:  Not met, progressing              STG 4a: 6 weeks:  The patient will demonstrate 37.5% limitation by achieving a score of 50/80 on the Lower Extremity Functional Scale.                            STATUS:  Not met, progressing       Timed:  Manual Therapy:    0     mins  89191;  Therapeutic Exercise:    10     mins  30409;     Neuromuscular Randa:    18    mins  65766;    Therapeutic Activity:     0     mins  45541;     Gait Trainin     mins  85963;     Ultrasound:     0     mins  83838;    Aquatic Therapy    0     mins  77615;        Timed Treatment:   28   mins   Total Treatment:     28   mins    Sven Ospina PT  Physical Therapist    Electronically signed 3/8/2023    [place] License: PT - [id number]

## 2023-03-17 DIAGNOSIS — E11.9 TYPE 2 DIABETES MELLITUS WITHOUT COMPLICATION, WITHOUT LONG-TERM CURRENT USE OF INSULIN: ICD-10-CM

## 2023-03-17 RX ORDER — MINOCYCLINE HYDROCHLORIDE 100 MG/1
CAPSULE ORAL
Qty: 90 CAPSULE | Refills: 3 | Status: SHIPPED | OUTPATIENT
Start: 2023-03-17

## 2023-03-19 DIAGNOSIS — G62.9 NEUROPATHY: ICD-10-CM

## 2023-03-20 RX ORDER — GABAPENTIN 600 MG/1
600 TABLET ORAL 3 TIMES DAILY PRN
Qty: 90 TABLET | Refills: 0 | Status: SHIPPED | OUTPATIENT
Start: 2023-03-20

## 2023-03-20 NOTE — TELEPHONE ENCOUNTER
Refill request for  controlled substance.      Date of request: 3/20/2023   Medication requested: Gabapentin  Last OV: 12/6/22  Last UDS: 2/6/23  Contract signed: yes    Date:2/6/23  Next office visit: 6/6/23    Marly Peck

## 2023-04-10 RX ORDER — ROPINIROLE 2 MG/1
TABLET, FILM COATED ORAL
Qty: 90 TABLET | Refills: 3 | Status: SHIPPED | OUTPATIENT
Start: 2023-04-10

## 2023-05-05 DIAGNOSIS — G62.9 NEUROPATHY: ICD-10-CM

## 2023-05-05 RX ORDER — GABAPENTIN 600 MG/1
600 TABLET ORAL 3 TIMES DAILY PRN
Qty: 90 TABLET | Refills: 0 | Status: SHIPPED | OUTPATIENT
Start: 2023-05-05

## 2023-05-05 NOTE — TELEPHONE ENCOUNTER
Rx Refill Note  Requested Prescriptions     Pending Prescriptions Disp Refills   • gabapentin (NEURONTIN) 600 MG tablet 90 tablet 0     Sig: Take 1 tablet by mouth 3 (Three) Times a Day As Needed (pain).      Last office visit with prescribing clinician: Visit date not found   Last telemedicine visit with prescribing clinician: 5/12/2023   Next office visit with prescribing clinician: Visit date not found                         Would you like a call back once the refill request has been completed: [] Yes [] No    If the office needs to give you a call back, can they leave a voicemail: [] Yes [] No    Casey Garvin  05/05/23, 13:41 EDT     Refill request for  controlled substance.      Date of request: 5/5/2023    Medication requested: Gabapentin  Last OV:  12/06/2022  Last UDS: 2/6/2023  Contract signed: yes    Date:2/6/2023  Next office visit: 5/12/2023  Casey Garvin

## 2023-05-12 ENCOUNTER — OFFICE VISIT (OUTPATIENT)
Dept: INTERNAL MEDICINE | Facility: CLINIC | Age: 68
End: 2023-05-12
Payer: MEDICARE

## 2023-05-12 VITALS
WEIGHT: 287.4 LBS | HEIGHT: 63 IN | TEMPERATURE: 97.8 F | OXYGEN SATURATION: 93 % | HEART RATE: 75 BPM | BODY MASS INDEX: 50.92 KG/M2 | SYSTOLIC BLOOD PRESSURE: 118 MMHG | DIASTOLIC BLOOD PRESSURE: 78 MMHG

## 2023-05-12 DIAGNOSIS — Z23 NEED FOR COVID-19 VACCINE: ICD-10-CM

## 2023-05-12 DIAGNOSIS — I10 ESSENTIAL HYPERTENSION: ICD-10-CM

## 2023-05-12 DIAGNOSIS — E78.49 OTHER HYPERLIPIDEMIA: ICD-10-CM

## 2023-05-12 DIAGNOSIS — E11.9 TYPE 2 DIABETES MELLITUS WITHOUT COMPLICATION, WITHOUT LONG-TERM CURRENT USE OF INSULIN: ICD-10-CM

## 2023-05-12 DIAGNOSIS — E03.9 ACQUIRED HYPOTHYROIDISM: ICD-10-CM

## 2023-05-12 DIAGNOSIS — M79.10 MYALGIA: ICD-10-CM

## 2023-05-12 DIAGNOSIS — F32.4 MAJOR DEPRESSIVE DISORDER WITH SINGLE EPISODE, IN PARTIAL REMISSION: Primary | ICD-10-CM

## 2023-05-12 LAB
ALBUMIN SERPL-MCNC: 4.3 G/DL (ref 3.5–5.2)
ALBUMIN UR-MCNC: <1.2 MG/DL
ALBUMIN/GLOB SERPL: 1.4 G/DL
ALP SERPL-CCNC: 53 U/L (ref 39–117)
ALT SERPL W P-5'-P-CCNC: 49 U/L (ref 1–33)
ANION GAP SERPL CALCULATED.3IONS-SCNC: 10 MMOL/L (ref 5–15)
AST SERPL-CCNC: 45 U/L (ref 1–32)
BASOPHILS # BLD AUTO: 0.08 10*3/MM3 (ref 0–0.2)
BASOPHILS NFR BLD AUTO: 0.8 % (ref 0–1.5)
BILIRUB SERPL-MCNC: 0.2 MG/DL (ref 0–1.2)
BUN SERPL-MCNC: 16 MG/DL (ref 8–23)
BUN/CREAT SERPL: 23.9 (ref 7–25)
CALCIUM SPEC-SCNC: 9.8 MG/DL (ref 8.6–10.5)
CHLORIDE SERPL-SCNC: 100 MMOL/L (ref 98–107)
CHOLEST SERPL-MCNC: 178 MG/DL (ref 0–200)
CO2 SERPL-SCNC: 28 MMOL/L (ref 22–29)
CREAT SERPL-MCNC: 0.67 MG/DL (ref 0.57–1)
CREAT UR-MCNC: 32.7 MG/DL
DEPRECATED RDW RBC AUTO: 41.3 FL (ref 37–54)
EGFRCR SERPLBLD CKD-EPI 2021: 94.7 ML/MIN/1.73
EOSINOPHIL # BLD AUTO: 0.24 10*3/MM3 (ref 0–0.4)
EOSINOPHIL NFR BLD AUTO: 2.5 % (ref 0.3–6.2)
ERYTHROCYTE [DISTWIDTH] IN BLOOD BY AUTOMATED COUNT: 13.4 % (ref 12.3–15.4)
FERRITIN SERPL-MCNC: 130 NG/ML (ref 13–150)
FOLATE SERPL-MCNC: >20 NG/ML (ref 4.78–24.2)
GLOBULIN UR ELPH-MCNC: 3 GM/DL
GLUCOSE SERPL-MCNC: 90 MG/DL (ref 65–99)
HBA1C MFR BLD: 6.2 % (ref 4.8–5.6)
HCT VFR BLD AUTO: 45.3 % (ref 34–46.6)
HDLC SERPL-MCNC: 35 MG/DL (ref 40–60)
HGB BLD-MCNC: 15.5 G/DL (ref 12–15.9)
IMM GRANULOCYTES # BLD AUTO: 0.04 10*3/MM3 (ref 0–0.05)
IMM GRANULOCYTES NFR BLD AUTO: 0.4 % (ref 0–0.5)
IRON 24H UR-MRATE: 63 MCG/DL (ref 37–145)
IRON SATN MFR SERPL: 13 % (ref 20–50)
LDLC SERPL CALC-MCNC: 96 MG/DL (ref 0–100)
LDLC/HDLC SERPL: 2.48 {RATIO}
LYMPHOCYTES # BLD AUTO: 3.58 10*3/MM3 (ref 0.7–3.1)
LYMPHOCYTES NFR BLD AUTO: 36.8 % (ref 19.6–45.3)
MAGNESIUM SERPL-MCNC: 1.9 MG/DL (ref 1.6–2.4)
MCH RBC QN AUTO: 29.1 PG (ref 26.6–33)
MCHC RBC AUTO-ENTMCNC: 34.2 G/DL (ref 31.5–35.7)
MCV RBC AUTO: 85.2 FL (ref 79–97)
MICROALBUMIN/CREAT UR: NORMAL MG/G{CREAT}
MONOCYTES # BLD AUTO: 0.71 10*3/MM3 (ref 0.1–0.9)
MONOCYTES NFR BLD AUTO: 7.3 % (ref 5–12)
NEUTROPHILS NFR BLD AUTO: 5.07 10*3/MM3 (ref 1.7–7)
NEUTROPHILS NFR BLD AUTO: 52.2 % (ref 42.7–76)
NRBC BLD AUTO-RTO: 0 /100 WBC (ref 0–0.2)
PLATELET # BLD AUTO: 307 10*3/MM3 (ref 140–450)
PMV BLD AUTO: 10.9 FL (ref 6–12)
POTASSIUM SERPL-SCNC: 4.3 MMOL/L (ref 3.5–5.2)
PROT SERPL-MCNC: 7.3 G/DL (ref 6–8.5)
RBC # BLD AUTO: 5.32 10*6/MM3 (ref 3.77–5.28)
SODIUM SERPL-SCNC: 138 MMOL/L (ref 136–145)
TIBC SERPL-MCNC: 501 MCG/DL (ref 298–536)
TRANSFERRIN SERPL-MCNC: 336 MG/DL (ref 200–360)
TRIGL SERPL-MCNC: 281 MG/DL (ref 0–150)
TSH SERPL DL<=0.05 MIU/L-ACNC: 1.61 UIU/ML (ref 0.27–4.2)
VIT B12 BLD-MCNC: 632 PG/ML (ref 211–946)
VLDLC SERPL-MCNC: 47 MG/DL (ref 5–40)
WBC NRBC COR # BLD: 9.72 10*3/MM3 (ref 3.4–10.8)

## 2023-05-12 PROCEDURE — 85025 COMPLETE CBC W/AUTO DIFF WBC: CPT | Performed by: INTERNAL MEDICINE

## 2023-05-12 PROCEDURE — 80061 LIPID PANEL: CPT | Performed by: INTERNAL MEDICINE

## 2023-05-12 PROCEDURE — 83540 ASSAY OF IRON: CPT | Performed by: INTERNAL MEDICINE

## 2023-05-12 PROCEDURE — 83735 ASSAY OF MAGNESIUM: CPT | Performed by: INTERNAL MEDICINE

## 2023-05-12 PROCEDURE — 80053 COMPREHEN METABOLIC PANEL: CPT | Performed by: INTERNAL MEDICINE

## 2023-05-12 PROCEDURE — 84466 ASSAY OF TRANSFERRIN: CPT | Performed by: INTERNAL MEDICINE

## 2023-05-12 PROCEDURE — 82570 ASSAY OF URINE CREATININE: CPT | Performed by: INTERNAL MEDICINE

## 2023-05-12 PROCEDURE — 84443 ASSAY THYROID STIM HORMONE: CPT | Performed by: INTERNAL MEDICINE

## 2023-05-12 PROCEDURE — 82746 ASSAY OF FOLIC ACID SERUM: CPT | Performed by: INTERNAL MEDICINE

## 2023-05-12 PROCEDURE — 83036 HEMOGLOBIN GLYCOSYLATED A1C: CPT | Performed by: INTERNAL MEDICINE

## 2023-05-12 PROCEDURE — 82043 UR ALBUMIN QUANTITATIVE: CPT | Performed by: INTERNAL MEDICINE

## 2023-05-12 PROCEDURE — 82728 ASSAY OF FERRITIN: CPT | Performed by: INTERNAL MEDICINE

## 2023-05-12 PROCEDURE — 82607 VITAMIN B-12: CPT | Performed by: INTERNAL MEDICINE

## 2023-05-12 RX ORDER — DULOXETIN HYDROCHLORIDE 60 MG/1
120 CAPSULE, DELAYED RELEASE ORAL DAILY
Qty: 180 CAPSULE | Refills: 3 | Status: SHIPPED | OUTPATIENT
Start: 2023-05-12

## 2023-05-12 NOTE — PROGRESS NOTES
Chief Complaint  Hypertension (Follow up ) and OTHER (Wants to talk to provider only )    Subjective          Randi Fajardo presents to [facility]  History of Present Illness  Patient reports her mental health isn't where she wants it to be. Patient reports mental health declined when she was scammed. Patient reports sleep and appetite have been ok. She reports her focus is difficult due to mental health. Patient denies HI and SI.   hypertension- patient denies Has, dizziness, chest pain.   DM2- patient reports home blood glucose seems somewhat higher recently. Patient reports no higher than 125 however. Patient denies hypoglycemic events.   Patient reports gabapentin helps with pain control. She typically only takes 2 per day. Patient does report having intermittent leg cramps.     Current Outpatient Medications   Medication Instructions   • Accu-Chek Guide test strip USE ONCE DAILY   • Accu-Chek Softclix Lancets lancets USE ONCE DAILY   • albuterol sulfate HFA (Ventolin HFA) 108 (90 Base) MCG/ACT inhaler 2 puffs, Inhalation, Every 4 Hours PRN   • Alcohol Swabs (B-D SINGLE USE SWABS REGULAR) pads USE AND DISCARD 1 SWAB FIVETIMES A DAY   • Blood Glucose Monitoring Suppl (Accu-Chek Guide Me) w/Device kit 1 kit, Subcutaneous, See Admin Instructions   • CALCIUM  mg, Oral, Daily   • DULoxetine (CYMBALTA) 120 mg, Oral, Daily   • famotidine (PEPCID) 40 MG tablet TAKE 1 TABLET TWICE A DAY   • gabapentin (NEURONTIN) 600 mg, Oral, 3 Times Daily PRN   • hydroCHLOROthiazide (HYDRODIURIL) 25 MG tablet TAKE 1 TABLET DAILY   • levothyroxine (SYNTHROID) 50 mcg, Oral, Daily   • melatonin 5 mg, Oral, Nightly   • metFORMIN (GLUCOPHAGE) 500 MG tablet TAKE 1 TABLET DAILY WITH   DINNER   • minocycline (MINOCIN,DYNACIN) 100 MG capsule TAKE 1 CAPSULE DAILY   • multivitamin with minerals tablet tablet 1 tablet, Oral, Daily   • Omega-3 Fatty Acids (fish oil) 1000 MG capsule capsule  "Oral, Daily With Breakfast   • rOPINIRole (REQUIP) 2 MG tablet TAKE 1 TABLET EVERY NIGHT   • Stiolto Respimat 2.5-2.5 MCG/ACT aerosol solution inhaler USE 2 INHALATIONS ORALLY   DAILY       The following portions of the patient's history were reviewed and updated as appropriate: allergies, current medications, past family history, past medical history, past social history, past surgical history, and problem list.    Objective   Vital Signs:   /78 (BP Location: Left arm)   Pulse 75   Temp 97.8 °F (36.6 °C) (Temporal)   Ht 160 cm (63\")   Wt 130 kg (287 lb 6.4 oz)   SpO2 93%   BMI 50.91 kg/m²     Wt Readings from Last 3 Encounters:   05/12/23 130 kg (287 lb 6.4 oz)   02/16/23 130 kg (287 lb)   12/06/22 128 kg (282 lb)     BP Readings from Last 3 Encounters:   05/12/23 118/78   02/16/23 111/62   12/06/22 109/74     Physical Exam   Appearance: No acute distress, well-nourished  Head: normocephalic, atraumatic  Eyes: extraocular movements intact, no scleral icterus, no conjunctival injection  Ears, Nose, and Throat: external ears normal, nares patent, moist mucous membranes  Cardiovascular: regular rate and rhythm. no murmurs, rubs, or gallops. no edema  Respiratory: breathing comfortably, symmetric chest rise, clear to auscultation bilaterally. No wheezes, rales, or rhonchi.  Neuro: alert and oriented to time, place, and person. Normal gait  Psych: normal mood and affect     Result Review :   The following data was reviewed by: Mainor Sanders Jr, MD on 05/12/2023:  Common labs        6/7/2022    15:24 11/28/2022    14:39 2/6/2023    11:06   Common Labs   Glucose 87   80      BUN 15   16      Creatinine 1.06   0.89   0.90     Sodium 137   140      Potassium 4.4   4.3      Chloride 99   102      Calcium 9.7   10.0      Albumin 4.20   3.90      Total Bilirubin 0.4   <0.2      Alkaline Phosphatase 49   47      AST (SGOT) 41   39      ALT (SGPT) 44   35      WBC 8.93   11.01      Hemoglobin 15.6   14.8    "   Hematocrit 47.5   45.0      Platelets 297   339      Total Cholesterol 154   179      Triglycerides 184   264      HDL Cholesterol 34   38      LDL Cholesterol  88   96      Hemoglobin A1C 6.00   5.80      Microalbumin, Urine <1.2           Lab Results   Component Value Date    SARSANTIGEN Not Detected 08/25/2021    COVID19 Detected (C) 05/09/2022    FLUAAG Not Detected 08/25/2021    FLUBAG Not Detected 08/25/2021          Assessment and Plan    Diagnoses and all orders for this visit:    1. Major depressive disorder with single episode, in partial remission (Primary)  Comments:  inc cymbalta to 2 tabs daily.   Orders:  -     DULoxetine (CYMBALTA) 60 MG capsule; Take 2 capsules by mouth Daily.  Dispense: 180 capsule; Refill: 3    2. Essential hypertension  Comments:  well controlled on curent regimen  Orders:  -     CBC & Differential  -     Comprehensive Metabolic Panel    3. Other hyperlipidemia  -     Lipid Panel    4. Acquired hypothyroidism  Comments:  check TSH and adjust synthroid accordingly.   Orders:  -     TSH    5. Type 2 diabetes mellitus without complication, without long-term current use of insulin  Comments:  check labs. cont metformin.   Orders:  -     Hemoglobin A1c  -     Microalbumin / Creatinine Urine Ratio - Urine, Clean Catch    6. Myalgia  Comments:  check labs as ordered. consider inc requip if needed and labs normal.   Orders:  -     Ferritin  -     Iron Profile  -     Vitamin B12 & Folate  -     Magnesium    7. Need for COVID-19 vaccine  -     COVID-19 Bivalent (Pfizer) 12+yrs        Medications Discontinued During This Encounter   Medication Reason   • DULoxetine (CYMBALTA) 60 MG capsule Reorder       Follow Up   Return in about 6 months (around 11/12/2023) for Recheck.  Patient was given instructions and counseling regarding her condition or for health maintenance advice. Please see specific information pulled into the AVS if appropriate.       Mainor Sanders Jr,  MD  05/12/23  15:31 EDT

## 2023-05-15 ENCOUNTER — TELEPHONE (OUTPATIENT)
Dept: INTERNAL MEDICINE | Facility: CLINIC | Age: 68
End: 2023-05-15
Payer: MEDICARE

## 2023-05-15 NOTE — TELEPHONE ENCOUNTER
----- Message from Mainor Sanders Jr., MD sent at 5/15/2023  8:08 AM EDT -----  HgbA1c higher than last check, but still with good control.     HDL low - this is protective cholesterol and generally like the number to be >50. encourage exercise to increase level.     Elevated triglycerides, which are the storage form of sugar - recommend lower carbohydrate/sugar intake.     Other labs reassuring.

## 2023-05-18 ENCOUNTER — OFFICE VISIT (OUTPATIENT)
Dept: PULMONOLOGY | Facility: CLINIC | Age: 68
End: 2023-05-18
Payer: MEDICARE

## 2023-05-18 VITALS
HEART RATE: 85 BPM | SYSTOLIC BLOOD PRESSURE: 108 MMHG | TEMPERATURE: 98.2 F | HEIGHT: 63 IN | DIASTOLIC BLOOD PRESSURE: 84 MMHG | OXYGEN SATURATION: 90 % | WEIGHT: 281.8 LBS | BODY MASS INDEX: 49.93 KG/M2

## 2023-05-18 DIAGNOSIS — R91.8 LUNG NODULES: ICD-10-CM

## 2023-05-18 DIAGNOSIS — G47.33 OBSTRUCTIVE SLEEP APNEA: ICD-10-CM

## 2023-05-18 DIAGNOSIS — J41.8 MIXED SIMPLE AND MUCOPURULENT CHRONIC BRONCHITIS: Primary | ICD-10-CM

## 2023-05-18 DIAGNOSIS — E66.01 MORBID (SEVERE) OBESITY DUE TO EXCESS CALORIES: ICD-10-CM

## 2023-05-18 NOTE — PROGRESS NOTES
Pulmonary Office Follow-up    Subjective     Randi Fajardo is seen today at the office for   Chief Complaint   Patient presents with   • Follow-up     3 month fu   • Bronchitis   • Sleep Apnea   • Lung Nodule         HPI  Randi Fajardo is a 67 y.o. female with a PMH significant for obstructive sleep apnea and COPD with obesity and lung nodules presents for follow-up patient has been doing well and has been compliant with her CPAP she denies any chest pain fever or hemoptysis she denies any significant expectoration and takes her Stiolto daily      Tobacco use history:  Former smoker      Patient Active Problem List   Diagnosis   • Acquired hypothyroidism   • Essential hypertension   • Other hyperlipidemia   • Type 2 diabetes mellitus without complication, without long-term current use of insulin   • Right knee pain   • Primary osteoarthritis of right knee       Review of Systems  Review of Systems   Respiratory: Positive for shortness of breath.    All other systems reviewed and are negative.    As described in the HPI. Otherwise, remainder of ROS (14 systems) were negative.    Medications, Allergies, Social, and Family Histories reviewed as per EMR.    Objective     Vitals:    05/18/23 1344   BP: 108/84   Pulse: 85   Temp: 98.2 °F (36.8 °C)   SpO2: 90%         05/18/23  1344   Weight: 128 kg (281 lb 12.8 oz)       Physical Exam  Vitals and nursing note reviewed.   Constitutional:       Appearance: She is obese.   HENT:      Head: Normocephalic and atraumatic.      Nose: Nose normal.      Mouth/Throat:      Mouth: Mucous membranes are moist.      Pharynx: Oropharynx is clear.   Eyes:      Extraocular Movements: Extraocular movements intact.      Conjunctiva/sclera: Conjunctivae normal.      Pupils: Pupils are equal, round, and reactive to light.   Cardiovascular:      Rate and Rhythm: Normal rate and regular rhythm.      Pulses: Normal pulses.      Heart sounds: Normal heart sounds.   Pulmonary:       Effort: Pulmonary effort is normal.      Breath sounds: Normal breath sounds.   Abdominal:      General: Abdomen is flat. Bowel sounds are normal.      Palpations: Abdomen is soft.   Musculoskeletal:         General: Normal range of motion.      Cervical back: Normal range of motion and neck supple.   Skin:     General: Skin is warm.      Capillary Refill: Capillary refill takes 2 to 3 seconds.   Neurological:      General: No focal deficit present.      Mental Status: She is alert and oriented to person, place, and time.   Psychiatric:         Mood and Affect: Mood normal.         Behavior: Behavior normal.         No radiology results for the last 90 days.     Assessment & Plan     Diagnoses and all orders for this visit:    1. Mixed simple and mucopurulent chronic bronchitis (Primary)    2. Obstructive sleep apnea    3. Lung nodules    4. Morbid (severe) obesity due to excess calories         Discussion/ Recommendations:   Advise regular exercise and weight reduction her BMI is 49.92  Continue Stiolto along with albuterol inhaler as needed her lung nodules have been stable    Vaccinations discussed and recommended    Class 3 Severe Obesity (BMI >=40). Obesity-related health conditions include the following: obstructive sleep apnea. Obesity is unchanged. BMI is is above average; BMI management plan is completed. We discussed low calorie, low carb based diet program, portion control and increasing exercise.        Return in about 3 months (around 8/18/2023).          This document has been electronically signed by Joon Cuevas MD on May 18, 2023 13:51 EDT

## 2023-06-16 DIAGNOSIS — G62.9 NEUROPATHY: ICD-10-CM

## 2023-06-16 RX ORDER — GABAPENTIN 600 MG/1
600 TABLET ORAL 3 TIMES DAILY PRN
Qty: 90 TABLET | Refills: 0 | Status: SHIPPED | OUTPATIENT
Start: 2023-06-16

## 2023-06-16 NOTE — TELEPHONE ENCOUNTER
Refill request for  controlled substance.      Date of request: 6/16/2023   Medication requested: Gabapentin  Last OV: 5/12/2023  Last UDS: 2/6/2023  Contract signed: yes    Date:2/6/2023  Next office visit: 11/14/2023    Marly Peck

## 2023-08-07 DIAGNOSIS — G62.9 NEUROPATHY: ICD-10-CM

## 2023-08-07 DIAGNOSIS — Z78.0 POSTMENOPAUSAL: Primary | ICD-10-CM

## 2023-08-07 RX ORDER — GABAPENTIN 600 MG/1
600 TABLET ORAL 3 TIMES DAILY PRN
Qty: 90 TABLET | Refills: 0 | Status: SHIPPED | OUTPATIENT
Start: 2023-08-07

## 2023-08-07 NOTE — TELEPHONE ENCOUNTER
Rx Refill Note  Requested Prescriptions     Pending Prescriptions Disp Refills    gabapentin (NEURONTIN) 600 MG tablet 90 tablet 0     Sig: Take 1 tablet by mouth 3 (Three) Times a Day As Needed (pain).      Last office visit with prescribing clinician: 5/12/2023   Last telemedicine visit with prescribing clinician: Visit date not found   Next office visit with prescribing clinician: 11/14/2023                         Would you like a call back once the refill request has been completed: [] Yes [] No    If the office needs to give you a call back, can they leave a voicemail: [] Yes [] No    Casey Garvin  08/07/23, 07:47 EDT    Refill request for  controlled substance.      Date of request: 8/7/2023  Please change date if request date is different than today's)  Medication requested: Gabapentin  Last OV: 5/12/2023  Last UDS: 2/6/2023  Contract signed: yes    Date:2/6/2023  Next office visit: 11/14/2023  LAST FILL 6/16/2023 WITH Landry Garvin

## 2023-08-09 RX ORDER — LEVOTHYROXINE SODIUM 0.05 MG/1
50 TABLET ORAL DAILY
Qty: 90 TABLET | Refills: 1 | Status: SHIPPED | OUTPATIENT
Start: 2023-08-09

## 2023-08-14 ENCOUNTER — OFFICE VISIT (OUTPATIENT)
Dept: PULMONOLOGY | Facility: CLINIC | Age: 68
End: 2023-08-14
Payer: MEDICARE

## 2023-08-14 VITALS
HEART RATE: 92 BPM | DIASTOLIC BLOOD PRESSURE: 65 MMHG | RESPIRATION RATE: 18 BRPM | HEIGHT: 63 IN | OXYGEN SATURATION: 92 % | TEMPERATURE: 98.2 F | SYSTOLIC BLOOD PRESSURE: 122 MMHG | WEIGHT: 281 LBS | BODY MASS INDEX: 49.79 KG/M2

## 2023-08-14 DIAGNOSIS — E66.01 MORBID (SEVERE) OBESITY DUE TO EXCESS CALORIES: ICD-10-CM

## 2023-08-14 DIAGNOSIS — R91.8 LUNG NODULES: ICD-10-CM

## 2023-08-14 DIAGNOSIS — J41.8 MIXED SIMPLE AND MUCOPURULENT CHRONIC BRONCHITIS: Primary | ICD-10-CM

## 2023-08-14 DIAGNOSIS — G47.33 OBSTRUCTIVE SLEEP APNEA: ICD-10-CM

## 2023-08-14 PROCEDURE — 1160F RVW MEDS BY RX/DR IN RCRD: CPT | Performed by: INTERNAL MEDICINE

## 2023-08-14 PROCEDURE — 3074F SYST BP LT 130 MM HG: CPT | Performed by: INTERNAL MEDICINE

## 2023-08-14 PROCEDURE — 99213 OFFICE O/P EST LOW 20 MIN: CPT | Performed by: INTERNAL MEDICINE

## 2023-08-14 PROCEDURE — 1159F MED LIST DOCD IN RCRD: CPT | Performed by: INTERNAL MEDICINE

## 2023-08-14 PROCEDURE — 3078F DIAST BP <80 MM HG: CPT | Performed by: INTERNAL MEDICINE

## 2023-08-14 NOTE — PROGRESS NOTES
Pulmonary Office Follow-up    Subjective     Randi Fajardo is seen today at the office for   Chief Complaint   Patient presents with    Follow-up    Sleep Apnea    Bronchitis    Cough         HPI  Randi Fajardo is a 67 y.o. female with a PMH significant for COPD and obstructive sleep apnea presents for follow-up patient appears to be doing well and denies any significant expectoration she does get dyspneic on exertion patient has already quit smoking she denies any chest pain fever or hemoptysis patient is compliant with her CPAP      Tobacco use history:  Former smoker      Patient Active Problem List   Diagnosis    Acquired hypothyroidism    Essential hypertension    Other hyperlipidemia    Type 2 diabetes mellitus without complication, without long-term current use of insulin    Right knee pain    Primary osteoarthritis of right knee       Review of Systems  Review of Systems   Respiratory:  Positive for cough, shortness of breath and wheezing.    All other systems reviewed and are negative.  As described in the HPI. Otherwise, remainder of ROS (14 systems) were negative.    Medications, Allergies, Social, and Family Histories reviewed as per EMR.    Objective     Vitals:    08/14/23 1325   BP: 122/65   Pulse: 92   Resp: 18   Temp: 98.2 øF (36.8 øC)   SpO2: 92%         08/14/23  1325   Weight: 127 kg (281 lb)       Physical Exam  Vitals and nursing note reviewed.   Constitutional:       Appearance: She is obese.   HENT:      Head: Normocephalic and atraumatic.      Nose: Nose normal.      Mouth/Throat:      Mouth: Mucous membranes are moist.   Eyes:      Extraocular Movements: Extraocular movements intact.      Pupils: Pupils are equal, round, and reactive to light.   Cardiovascular:      Rate and Rhythm: Normal rate and regular rhythm.      Pulses: Normal pulses.      Heart sounds: Normal heart sounds.   Pulmonary:      Effort: Pulmonary effort is normal.      Breath sounds: Normal breath sounds.    Abdominal:      General: Abdomen is flat. Bowel sounds are normal.      Palpations: Abdomen is soft.   Musculoskeletal:         General: Normal range of motion.      Cervical back: Normal range of motion and neck supple.   Skin:     General: Skin is warm.      Capillary Refill: Capillary refill takes less than 2 seconds.   Neurological:      General: No focal deficit present.      Mental Status: She is alert.   Psychiatric:         Mood and Affect: Mood normal.       No radiology results for the last 90 days.     Assessment & Plan     Diagnoses and all orders for this visit:    1. Mixed simple and mucopurulent chronic bronchitis (Primary)    2. Obstructive sleep apnea    3. Lung nodules    4. Morbid (severe) obesity due to excess calories         Discussion/ Recommendations:   Patient is advised to reduce weight her BMI is 49.78  Advised compliance with CPAP  Advised to continue her Stiolto daily  Vaccinations discussed and recommended             Return in about 3 months (around 11/14/2023).          This document has been electronically signed by Joon Cuevas MD on August 14, 2023 13:33 EDT

## 2023-09-08 DIAGNOSIS — G62.9 NEUROPATHY: ICD-10-CM

## 2023-09-11 RX ORDER — GABAPENTIN 600 MG/1
TABLET ORAL
Qty: 90 TABLET | Refills: 0 | Status: SHIPPED | OUTPATIENT
Start: 2023-09-11

## 2023-09-11 NOTE — TELEPHONE ENCOUNTER
Refill request for  controlled substance.      Date of request: 9/11/2023    Medication requested: Gabapentin  Last OV: 5/12/23  Last UDS: 2/6/23  Contract signed: yes    Date:2/6/23  Next office visit: 11/14/23    Marly Peck

## 2023-09-14 ENCOUNTER — OFFICE VISIT (OUTPATIENT)
Dept: CARDIOLOGY | Facility: CLINIC | Age: 68
End: 2023-09-14
Payer: MEDICARE

## 2023-09-14 VITALS
DIASTOLIC BLOOD PRESSURE: 68 MMHG | WEIGHT: 284.8 LBS | HEIGHT: 63 IN | HEART RATE: 96 BPM | SYSTOLIC BLOOD PRESSURE: 124 MMHG | BODY MASS INDEX: 50.46 KG/M2

## 2023-09-14 DIAGNOSIS — I45.10 RIGHT BUNDLE BRANCH BLOCK: ICD-10-CM

## 2023-09-14 DIAGNOSIS — R06.09 DYSPNEA ON EXERTION: Primary | ICD-10-CM

## 2023-09-14 DIAGNOSIS — I10 ESSENTIAL HYPERTENSION: ICD-10-CM

## 2023-09-14 DIAGNOSIS — E66.01 MORBID OBESITY WITH BMI OF 50.0-59.9, ADULT: ICD-10-CM

## 2023-09-14 PROCEDURE — 99214 OFFICE O/P EST MOD 30 MIN: CPT | Performed by: INTERNAL MEDICINE

## 2023-09-14 PROCEDURE — 93000 ELECTROCARDIOGRAM COMPLETE: CPT | Performed by: INTERNAL MEDICINE

## 2023-09-14 PROCEDURE — 3078F DIAST BP <80 MM HG: CPT | Performed by: INTERNAL MEDICINE

## 2023-09-14 PROCEDURE — 3074F SYST BP LT 130 MM HG: CPT | Performed by: INTERNAL MEDICINE

## 2023-09-14 NOTE — PROGRESS NOTES
Chief Complaint  Shortness of Breath    Subjective      Patient is clinic for follow-up on hypertension and shortness of breath.  Overall, she has been doing well.  She has no complaints or concerns today.  Her shortness of breath has improved with pulmonary inhalers.  She has no angina, palpitations, dizziness, presyncope or syncope.    Past Medical History:   Diagnosis Date    Depressed     GREGG (generalized anxiety disorder)     Gastroesophageal reflux     HTN (hypertension)     Hypothyroid     Lesion of neck     Osteoarthritis     Pneumonia 1960    RLS (restless legs syndrome)     Sleep apnea in adult     Sleep apnea, obstructive 2005 ?    Type 2 diabetes mellitus          Current Outpatient Medications:     Accu-Chek Guide test strip, USE ONCE DAILY, Disp: 100 each, Rfl: 3    Accu-Chek Softclix Lancets lancets, USE ONCE DAILY, Disp: 100 each, Rfl: 3    albuterol sulfate HFA (Ventolin HFA) 108 (90 Base) MCG/ACT inhaler, Inhale 2 puffs Every 4 (Four) Hours As Needed for Wheezing or Shortness of Air., Disp: 1 g, Rfl: 5    Alcohol Swabs (B-D SINGLE USE SWABS REGULAR) pads, USE AND DISCARD 1 SWAB FIVETIMES A DAY, Disp: 200 each, Rfl: 3    Blood Glucose Monitoring Suppl (Accu-Chek Guide Me) w/Device kit, Inject 1 kit under the skin into the appropriate area as directed See Admin Instructions., Disp: 1 kit, Rfl: 0    CALCIUM PO, Take 600 mg by mouth Daily., Disp: , Rfl:     DULoxetine (CYMBALTA) 60 MG capsule, Take 2 capsules by mouth Daily., Disp: 180 capsule, Rfl: 3    famotidine (PEPCID) 40 MG tablet, TAKE 1 TABLET TWICE A DAY, Disp: 90 tablet, Rfl: 3    gabapentin (NEURONTIN) 600 MG tablet, TAKE 1 TABLET 3 TIMES A DAYAS NEEDED FOR PAIN, Disp: 90 tablet, Rfl: 0    hydroCHLOROthiazide (HYDRODIURIL) 25 MG tablet, TAKE 1 TABLET DAILY, Disp: 90 tablet, Rfl: 3    levothyroxine (Synthroid) 50 MCG tablet, Take 1 tablet by mouth Daily., Disp: 90 tablet, Rfl: 1    melatonin 5 MG tablet tablet, Take 1 tablet by mouth Every  "Night., Disp: , Rfl:     metFORMIN (GLUCOPHAGE) 500 MG tablet, Take 1 tablet by mouth Daily With Dinner., Disp: 90 tablet, Rfl: 1    minocycline (MINOCIN,DYNACIN) 100 MG capsule, TAKE 1 CAPSULE DAILY, Disp: 90 capsule, Rfl: 3    multivitamin with minerals tablet tablet, Take 1 tablet by mouth Daily., Disp: , Rfl:     Omega-3 Fatty Acids (fish oil) 1000 MG capsule capsule, Take  by mouth Daily With Breakfast., Disp: , Rfl:     rOPINIRole (REQUIP) 2 MG tablet, TAKE 1 TABLET EVERY NIGHT, Disp: 90 tablet, Rfl: 3    tiotropium bromide-olodaterol (Stiolto Respimat) 2.5-2.5 MCG/ACT aerosol solution inhaler, Inhale 2 puffs Daily., Disp: 4 g, Rfl: 3    There are no discontinued medications.  Allergies   Allergen Reactions    Nexium [Esomeprazole] Hives    Prilosec [Omeprazole] Hives        Social History     Tobacco Use    Smoking status: Former     Packs/day: 0.50     Years: 40.00     Pack years: 20.00     Types: Cigarettes     Start date: 1969     Quit date: 2020     Years since quitting: 3.7     Passive exposure: Past    Smokeless tobacco: Never    Tobacco comments:     Was off and on during the 40 years   Vaping Use    Vaping Use: Never used   Substance Use Topics    Alcohol use: Yes     Comment: Socially on occasion    Drug use: Never       Family History   Problem Relation Age of Onset    Lung cancer Father     Heart disease Father     Colon cancer Father     Cancer Father         Lung, colon    Emphysema Father     Cancer Brother         Lung, pancreas    Cancer Maternal Aunt         Breast cancer        Objective     /68   Pulse 96   Ht 160 cm (62.99\")   Wt 129 kg (284 lb 12.8 oz)   BMI 50.46 kg/m²       Physical Exam    General Appearance:   no acute distress  Alert and oriented x3  HENT:   lips not cyanotic  Atraumatic  Neck:  No jvd   supple  Respiratory:  no respiratory distress  normal breath sounds  no rales  Cardiovascular:  no S3, no S4   2/6 systolic ejection murmur at the base  no " rub  Extremities  No cyanosis  lower extremity edema: none    Skin:   warm, dry  No rashes      Result Review :     No results found for: PROBNP  CMP          11/28/2022    14:39 2/6/2023    11:06 5/12/2023    15:22   CMP   Glucose 80   90    BUN 16   16    Creatinine 0.89  0.90  0.67    EGFR 70.3  69.3  94.7    Sodium 140   138    Potassium 4.3   4.3    Chloride 102   100    Calcium 10.0   9.8    Total Protein 7.1   7.3    Albumin 3.90   4.3    Globulin 3.2   3.0    Total Bilirubin <0.2   0.2    Alkaline Phosphatase 47   53    AST (SGOT) 39   45    ALT (SGPT) 35   49    Albumin/Globulin Ratio 1.2   1.4    BUN/Creatinine Ratio 18.0   23.9    Anion Gap 10.1   10.0      CBC w/diff          11/28/2022    14:39 5/12/2023    15:22   CBC w/Diff   WBC 11.01  9.72    RBC 5.08  5.32    Hemoglobin 14.8  15.5    Hematocrit 45.0  45.3    MCV 88.6  85.2    MCH 29.1  29.1    MCHC 32.9  34.2    RDW 13.4  13.4    Platelets 339  307    Neutrophil Rel % 53.8  52.2    Immature Granulocyte Rel % 0.5  0.4    Lymphocyte Rel % 37.0  36.8    Monocyte Rel % 6.2  7.3    Eosinophil Rel % 1.8  2.5    Basophil Rel % 0.7  0.8       Lab Results   Component Value Date    TSH 1.610 05/12/2023      Lab Results   Component Value Date    FREET4 0.9 09/03/2020      No results found for: DDIMERQUANT  Magnesium   Date Value Ref Range Status   05/12/2023 1.9 1.6 - 2.4 mg/dL Final      No results found for: DIGOXIN   No results found for: TROPONINT        Lipid Panel          11/28/2022    14:39 5/12/2023    15:22   Lipid Panel   Total Cholesterol 179  178    Triglycerides 264  281    HDL Cholesterol 38  35    VLDL Cholesterol 45  47    LDL Cholesterol  96  96    LDL/HDL Ratio 2.32  2.48      No results found for: POCTROP    Results for orders placed during the hospital encounter of 03/11/22    Adult Transthoracic Echo Complete W/ Cont if Necessary Per Protocol    Interpretation Summary  · Estimated left ventricular EF was in agreement with the calculated  left ventricular EF. Left ventricular ejection fraction appears to be 56 - 60%. Left ventricular systolic function is normal.  · Abnormal motion of the interventricular septum is noted. Most likely secondary to underlying bundle branch block.  · Left ventricular diastolic function was indeterminate.  · The right ventricular cavity is mildly dilated.       ECG 12 Lead    Date/Time: 9/14/2023 10:44 AM  Performed by: Saturnino Mcnamara MD  Authorized by: Saturnino Mcnamara MD   Comparison: compared with previous ECG   Similar to previous ECG  Rhythm: sinus rhythm  Conduction: right bundle branch block  QRS axis: left               Diagnoses and all orders for this visit:    1. Dyspnea on exertion (Primary)    2. Essential hypertension    3. Right bundle branch block    4. Morbid obesity with BMI of 50.0-59.9, adult    Other orders  -     ECG 12 Lead      Assessment:    Dyspnea on exertion: Multifactorial and related to COPD, obesity, obstructive sleep apnea and physical deconditioning.  Her shortness of breath improved with pulmonary inhalers.  Continue the same.  Previous echocardiogram and cardiac stress test were benign.    Essential hypertension: Stable on current regimen.  Continue the same.    Right bundle branch block: Related to obesity and underlying COPD/obstructive sleep apnea.  This was explained to the patient.    Morbid obesity: Lifestyle changes including dietary restrictions, regular exercise and weight loss were encouraged.  She might be a good candidate for Ozempic/Wegovy.  She will discuss it with her primary care doctor.      Follow Up     No follow-ups on file.        Patient was given instructions and counseling regarding her condition or for health maintenance advice. Please see specific information pulled into the AVS if appropriate.

## 2023-09-19 RX ORDER — FAMOTIDINE 40 MG/1
TABLET, FILM COATED ORAL
Qty: 90 TABLET | Refills: 3 | Status: SHIPPED | OUTPATIENT
Start: 2023-09-19

## 2023-09-29 ENCOUNTER — HOSPITAL ENCOUNTER (OUTPATIENT)
Dept: MAMMOGRAPHY | Facility: HOSPITAL | Age: 68
Discharge: HOME | End: 2023-09-29
Admitting: INTERNAL MEDICINE
Payer: MEDICARE

## 2023-09-29 DIAGNOSIS — Z12.31 SCREENING MAMMOGRAM FOR BREAST CANCER: ICD-10-CM

## 2023-09-29 PROCEDURE — 77067 SCR MAMMO BI INCL CAD: CPT

## 2023-09-29 PROCEDURE — 77063 BREAST TOMOSYNTHESIS BI: CPT

## 2023-10-29 DIAGNOSIS — G62.9 NEUROPATHY: ICD-10-CM

## 2023-10-30 RX ORDER — GABAPENTIN 600 MG/1
600 TABLET ORAL 3 TIMES DAILY PRN
Qty: 90 TABLET | Refills: 0 | Status: SHIPPED | OUTPATIENT
Start: 2023-10-30

## 2023-11-14 ENCOUNTER — OFFICE VISIT (OUTPATIENT)
Dept: INTERNAL MEDICINE | Facility: CLINIC | Age: 68
End: 2023-11-14
Payer: MEDICARE

## 2023-11-14 ENCOUNTER — OFFICE VISIT (OUTPATIENT)
Dept: PULMONOLOGY | Facility: CLINIC | Age: 68
End: 2023-11-14
Payer: MEDICARE

## 2023-11-14 VITALS
RESPIRATION RATE: 18 BRPM | DIASTOLIC BLOOD PRESSURE: 71 MMHG | WEIGHT: 280 LBS | TEMPERATURE: 97.8 F | OXYGEN SATURATION: 93 % | HEIGHT: 63 IN | BODY MASS INDEX: 49.61 KG/M2 | SYSTOLIC BLOOD PRESSURE: 114 MMHG | HEART RATE: 86 BPM

## 2023-11-14 VITALS
DIASTOLIC BLOOD PRESSURE: 77 MMHG | HEIGHT: 63 IN | OXYGEN SATURATION: 94 % | HEART RATE: 79 BPM | WEIGHT: 276.8 LBS | RESPIRATION RATE: 16 BRPM | BODY MASS INDEX: 49.04 KG/M2 | SYSTOLIC BLOOD PRESSURE: 115 MMHG | TEMPERATURE: 97.2 F

## 2023-11-14 DIAGNOSIS — Z23 NEED FOR COVID-19 VACCINE: ICD-10-CM

## 2023-11-14 DIAGNOSIS — J41.8 MIXED SIMPLE AND MUCOPURULENT CHRONIC BRONCHITIS: Primary | ICD-10-CM

## 2023-11-14 DIAGNOSIS — R10.11 RUQ PAIN: ICD-10-CM

## 2023-11-14 DIAGNOSIS — Z79.899 ENCOUNTER FOR LONG-TERM (CURRENT) USE OF OTHER MEDICATIONS: ICD-10-CM

## 2023-11-14 DIAGNOSIS — E03.9 ACQUIRED HYPOTHYROIDISM: ICD-10-CM

## 2023-11-14 DIAGNOSIS — G47.33 OBSTRUCTIVE SLEEP APNEA: ICD-10-CM

## 2023-11-14 DIAGNOSIS — I10 ESSENTIAL HYPERTENSION: ICD-10-CM

## 2023-11-14 DIAGNOSIS — E11.9 TYPE 2 DIABETES MELLITUS WITHOUT COMPLICATION, WITHOUT LONG-TERM CURRENT USE OF INSULIN: Primary | ICD-10-CM

## 2023-11-14 DIAGNOSIS — Z29.11 NEED FOR RSV VACCINATION: ICD-10-CM

## 2023-11-14 DIAGNOSIS — E66.01 MORBID (SEVERE) OBESITY DUE TO EXCESS CALORIES: ICD-10-CM

## 2023-11-14 DIAGNOSIS — Z23 NEED FOR INFLUENZA VACCINATION: ICD-10-CM

## 2023-11-14 DIAGNOSIS — E78.2 MIXED HYPERLIPIDEMIA: ICD-10-CM

## 2023-11-14 LAB
ALBUMIN SERPL-MCNC: 4.2 G/DL (ref 3.5–5.2)
ALBUMIN/GLOB SERPL: 1.6 G/DL
ALP SERPL-CCNC: 52 U/L (ref 39–117)
ALT SERPL W P-5'-P-CCNC: 46 U/L (ref 1–33)
AMPHET+METHAMPHET UR QL: NEGATIVE
AMPHETAMINE INTERNAL CONTROL: ABNORMAL
AMPHETAMINES UR QL: NEGATIVE
ANION GAP SERPL CALCULATED.3IONS-SCNC: 12.4 MMOL/L (ref 5–15)
AST SERPL-CCNC: 41 U/L (ref 1–32)
BARBITURATE INTERNAL CONTROL: ABNORMAL
BARBITURATES UR QL SCN: NEGATIVE
BASOPHILS # BLD AUTO: 0.05 10*3/MM3 (ref 0–0.2)
BASOPHILS NFR BLD AUTO: 0.6 % (ref 0–1.5)
BENZODIAZ UR QL SCN: NEGATIVE
BENZODIAZEPINE INTERNAL CONTROL: ABNORMAL
BILIRUB SERPL-MCNC: 0.3 MG/DL (ref 0–1.2)
BUN SERPL-MCNC: 15 MG/DL (ref 8–23)
BUN/CREAT SERPL: 15.3 (ref 7–25)
BUPRENORPHINE INTERNAL CONTROL: ABNORMAL
BUPRENORPHINE SERPL-MCNC: NEGATIVE NG/ML
CALCIUM SPEC-SCNC: 9.9 MG/DL (ref 8.6–10.5)
CANNABINOIDS SERPL QL: NEGATIVE
CHLORIDE SERPL-SCNC: 99 MMOL/L (ref 98–107)
CHOLEST SERPL-MCNC: 173 MG/DL (ref 0–200)
CO2 SERPL-SCNC: 26.6 MMOL/L (ref 22–29)
COCAINE INTERNAL CONTROL: ABNORMAL
COCAINE UR QL: NEGATIVE
CREAT SERPL-MCNC: 0.98 MG/DL (ref 0.57–1)
DEPRECATED RDW RBC AUTO: 41.9 FL (ref 37–54)
EGFRCR SERPLBLD CKD-EPI 2021: 62.2 ML/MIN/1.73
EOSINOPHIL # BLD AUTO: 0.22 10*3/MM3 (ref 0–0.4)
EOSINOPHIL NFR BLD AUTO: 2.5 % (ref 0.3–6.2)
ERYTHROCYTE [DISTWIDTH] IN BLOOD BY AUTOMATED COUNT: 13.4 % (ref 12.3–15.4)
EXPIRATION DATE: ABNORMAL
GLOBULIN UR ELPH-MCNC: 2.6 GM/DL
GLUCOSE SERPL-MCNC: 97 MG/DL (ref 65–99)
HBA1C MFR BLD: 6.5 % (ref 4.8–5.6)
HCT VFR BLD AUTO: 46.5 % (ref 34–46.6)
HDLC SERPL-MCNC: 38 MG/DL (ref 40–60)
HGB BLD-MCNC: 15.5 G/DL (ref 12–15.9)
IMM GRANULOCYTES # BLD AUTO: 0.03 10*3/MM3 (ref 0–0.05)
IMM GRANULOCYTES NFR BLD AUTO: 0.3 % (ref 0–0.5)
LDLC SERPL CALC-MCNC: 103 MG/DL (ref 0–100)
LDLC/HDLC SERPL: 2.6 {RATIO}
LYMPHOCYTES # BLD AUTO: 2.98 10*3/MM3 (ref 0.7–3.1)
LYMPHOCYTES NFR BLD AUTO: 34.1 % (ref 19.6–45.3)
Lab: ABNORMAL
MCH RBC QN AUTO: 28.8 PG (ref 26.6–33)
MCHC RBC AUTO-ENTMCNC: 33.3 G/DL (ref 31.5–35.7)
MCV RBC AUTO: 86.4 FL (ref 79–97)
MDMA (ECSTASY) INTERNAL CONTROL: ABNORMAL
MDMA UR QL SCN: NEGATIVE
METHADONE INTERNAL CONTROL: ABNORMAL
METHADONE UR QL SCN: NEGATIVE
METHAMPHETAMINE INTERNAL CONTROL: ABNORMAL
MONOCYTES # BLD AUTO: 0.64 10*3/MM3 (ref 0.1–0.9)
MONOCYTES NFR BLD AUTO: 7.3 % (ref 5–12)
NEUTROPHILS NFR BLD AUTO: 4.81 10*3/MM3 (ref 1.7–7)
NEUTROPHILS NFR BLD AUTO: 55.2 % (ref 42.7–76)
NRBC BLD AUTO-RTO: 0 /100 WBC (ref 0–0.2)
OPIATES INTERNAL CONTROL: ABNORMAL
OPIATES UR QL: NEGATIVE
OXYCODONE INTERNAL CONTROL: ABNORMAL
OXYCODONE UR QL SCN: NEGATIVE
PCP UR QL SCN: NEGATIVE
PHENCYCLIDINE INTERNAL CONTROL: ABNORMAL
PLATELET # BLD AUTO: 284 10*3/MM3 (ref 140–450)
PMV BLD AUTO: 10.5 FL (ref 6–12)
POTASSIUM SERPL-SCNC: 4.3 MMOL/L (ref 3.5–5.2)
PROT SERPL-MCNC: 6.8 G/DL (ref 6–8.5)
RBC # BLD AUTO: 5.38 10*6/MM3 (ref 3.77–5.28)
SODIUM SERPL-SCNC: 138 MMOL/L (ref 136–145)
THC INTERNAL CONTROL: ABNORMAL
TRIGL SERPL-MCNC: 181 MG/DL (ref 0–150)
VLDLC SERPL-MCNC: 32 MG/DL (ref 5–40)
WBC NRBC COR # BLD: 8.73 10*3/MM3 (ref 3.4–10.8)

## 2023-11-14 PROCEDURE — 84443 ASSAY THYROID STIM HORMONE: CPT | Performed by: INTERNAL MEDICINE

## 2023-11-14 PROCEDURE — 80053 COMPREHEN METABOLIC PANEL: CPT | Performed by: INTERNAL MEDICINE

## 2023-11-14 PROCEDURE — 80061 LIPID PANEL: CPT | Performed by: INTERNAL MEDICINE

## 2023-11-14 PROCEDURE — 85025 COMPLETE CBC W/AUTO DIFF WBC: CPT | Performed by: INTERNAL MEDICINE

## 2023-11-14 PROCEDURE — 83036 HEMOGLOBIN GLYCOSYLATED A1C: CPT | Performed by: INTERNAL MEDICINE

## 2023-11-14 NOTE — PROGRESS NOTES
Pulmonary Office Follow-up    Subjective     Randi Fajardo is seen today at the office for   Chief Complaint   Patient presents with    Follow-up     3 month follow-up         HPI  Randi Fajardo is a 68 y.o. female with a PMH significant for obstructive sleep apnea and COPD presents for follow-up patient appears to be doing really well and is compliant with her CPAP she denies any significant shortness of breath cough or wheeze at this time she has already quit smoking and is starting an exercise program      Tobacco use history:  Former smoker      Patient Active Problem List   Diagnosis    Acquired hypothyroidism    Essential hypertension    Mixed hyperlipidemia    Type 2 diabetes mellitus without complication, without long-term current use of insulin    Right knee pain    Primary osteoarthritis of right knee       Review of Systems  Review of Systems   All other systems reviewed and are negative.    As described in the HPI. Otherwise, remainder of ROS (14 systems) were negative.    Medications, Allergies, Social, and Family Histories reviewed as per EMR.    Objective     Vitals:    11/14/23 1318   BP: 114/71   Pulse: 86   Resp: 18   Temp: 97.8 °F (36.6 °C)   SpO2: 93%         11/14/23  1318   Weight: 127 kg (280 lb)       Physical Exam  Vitals and nursing note reviewed.   Constitutional:       Appearance: She is obese.   HENT:      Head: Normocephalic and atraumatic.      Nose: Nose normal.      Mouth/Throat:      Mouth: Mucous membranes are moist.      Pharynx: Oropharynx is clear.   Eyes:      Extraocular Movements: Extraocular movements intact.      Conjunctiva/sclera: Conjunctivae normal.      Pupils: Pupils are equal, round, and reactive to light.   Cardiovascular:      Rate and Rhythm: Normal rate and regular rhythm.      Pulses: Normal pulses.      Heart sounds: Normal heart sounds.   Pulmonary:      Effort: Pulmonary effort is normal.      Breath sounds: Normal breath sounds.   Abdominal:       General: Abdomen is flat. Bowel sounds are normal.      Palpations: Abdomen is soft.   Musculoskeletal:         General: Normal range of motion.      Cervical back: Normal range of motion and neck supple.   Skin:     General: Skin is warm.      Capillary Refill: Capillary refill takes 2 to 3 seconds.   Neurological:      General: No focal deficit present.      Mental Status: She is alert and oriented to person, place, and time.   Psychiatric:         Mood and Affect: Mood normal.         Behavior: Behavior normal.         Mammo Screening Digital Tomosynthesis Bilateral With CAD    Result Date: 10/2/2023   Benign mammogram. Suggest routine mammographic screening.  RECOMMENDATION(S):  ROUTINE MAMMOGRAM AND CLINICAL EVALUATION IN 12 MONTHS.   BIRADS:  DIAGNOSTIC CATEGORY 2--BENIGN FINDING   BREAST COMPOSITION: Scattered areas fibroglandular density.  PLEASE NOTE:  A NORMAL MAMMOGRAM DOES NOT EXCLUDE THE POSSIBILITY OF BREAST CANCER. ANY CLINICALLY SUSPICIOUS PALPABLE LUMP SHOULD BE BIOPSIED.      MATEUSZ GALVEZ MD       Electronically Signed and Approved By: MATEUSZ GALVEZ MD on 10/02/2023 at 8:07               Assessment & Plan     Diagnoses and all orders for this visit:    1. Mixed simple and mucopurulent chronic bronchitis (Primary)    2. Obstructive sleep apnea    3. Morbid (severe) obesity due to excess calories         Discussion/ Recommendations:   Patient is advised to reduce weight her BMI is 49.62  She is going to start an exercise program  Advised compliance with CPAP  Continue present medications  Vaccinations discussed and recommended             Return in about 6 months (around 5/14/2024).          This document has been electronically signed by Joon Cuevas MD on November 14, 2023 13:27 EST

## 2023-11-14 NOTE — PROGRESS NOTES
Chief Complaint  Hypertension (Follow up) and Abdominal Pain (When she bends over the pain worsens/Patient also stated at times it will be random times/Right under the right breast area)    Subjective          Randi Fajardo presents to [facility]  History of Present Illness  Hypertension- patient feels well. Patient without headaches, dizziness, chest pain.   DM2- patient is losing weight. She questions GLP-1s. Patient tolerating metformin.   Hypothyroid- due for recheck  Hyperlipidemia- due for recheck  Patient reports having RUQ pain intermittently. She notices it most with certain movements. Does not associated with with any type of food or with eating in general. No history of gallbladder disease.     Current Outpatient Medications   Medication Instructions    Accu-Chek Guide test strip USE ONCE DAILY    Accu-Chek Softclix Lancets lancets USE ONCE DAILY    albuterol sulfate HFA (Ventolin HFA) 108 (90 Base) MCG/ACT inhaler 2 puffs, Inhalation, Every 4 Hours PRN    Alcohol Swabs (B-D SINGLE USE SWABS REGULAR) pads USE AND DISCARD 1 SWAB FIVETIMES A DAY    Blood Glucose Monitoring Suppl (Accu-Chek Guide Me) w/Device kit 1 kit, Subcutaneous, See Admin Instructions    CALCIUM  mg, Oral, Daily    DULoxetine (CYMBALTA) 120 mg, Oral, Daily    famotidine (PEPCID) 40 MG tablet TAKE 1 TABLET TWICE A DAY    gabapentin (NEURONTIN) 600 mg, Oral, 3 Times Daily PRN    hydroCHLOROthiazide (HYDRODIURIL) 25 MG tablet TAKE 1 TABLET DAILY    levothyroxine (SYNTHROID) 50 mcg, Oral, Daily    melatonin 5 mg, Oral, Nightly    metFORMIN (GLUCOPHAGE) 500 mg, Oral, Daily With Dinner    minocycline (MINOCIN,DYNACIN) 100 MG capsule TAKE 1 CAPSULE DAILY    multivitamin with minerals tablet tablet 1 tablet, Oral, Daily    Omega-3 Fatty Acids (fish oil) 1000 MG capsule capsule Oral, Daily With Breakfast    rOPINIRole (REQUIP) 2 MG tablet TAKE 1 TABLET EVERY NIGHT    tiotropium  "bromide-olodaterol (Stiolto Respimat) 2.5-2.5 MCG/ACT aerosol solution inhaler 2 puffs, Inhalation, Daily    Zinc Sulfate (ZINC 15 PO) 15 mg, Oral, Daily       The following portions of the patient's history were reviewed and updated as appropriate: allergies, current medications, past family history, past medical history, past social history, past surgical history, and problem list.    Objective   Vital Signs:   /77 (BP Location: Left arm, Patient Position: Sitting, Cuff Size: Large Adult)   Pulse 79   Temp 97.2 °F (36.2 °C) (Temporal)   Resp 16   Ht 160 cm (62.99\")   Wt 126 kg (276 lb 12.8 oz)   SpO2 94%   BMI 49.05 kg/m²     BP Readings from Last 3 Encounters:   11/14/23 115/77   09/14/23 124/68   08/14/23 122/65     Wt Readings from Last 3 Encounters:   11/14/23 126 kg (276 lb 12.8 oz)   09/14/23 129 kg (284 lb 12.8 oz)   08/14/23 127 kg (281 lb)         Physical Exam   Appearance: No acute distress, well-nourished  Head: normocephalic, atraumatic  Eyes: extraocular movements intact, no scleral icterus, no conjunctival injection  Ears, Nose, and Throat: external ears normal, nares patent, moist mucous membranes  Cardiovascular: regular rate and rhythm. no murmurs, rubs, or gallops. no edema  Respiratory: breathing comfortably, symmetric chest rise, clear to auscultation bilaterally. No wheezes, rales, or rhonchi.  Neuro: alert and oriented to time, place, and person. Normal gait  Psych: normal mood and affect     Result Review :   The following data was reviewed by: Mainor Sanders Jr, MD on 11/14/2023:  Common labs          11/28/2022    14:39 2/6/2023    11:06 5/12/2023    15:22   Common Labs   Glucose 80   90    BUN 16   16    Creatinine 0.89  0.90  0.67    Sodium 140   138    Potassium 4.3   4.3    Chloride 102   100    Calcium 10.0   9.8    Albumin 3.90   4.3    Total Bilirubin <0.2   0.2    Alkaline Phosphatase 47   53    AST (SGOT) 39   45    ALT (SGPT) 35   49    WBC 11.01   9.72  "   Hemoglobin 14.8   15.5    Hematocrit 45.0   45.3    Platelets 339   307    Total Cholesterol 179   178    Triglycerides 264   281    HDL Cholesterol 38   35    LDL Cholesterol  96   96    Hemoglobin A1C 5.80   6.20    Microalbumin, Urine   <1.2        Lab Results   Component Value Date    SARSANTIGEN Not Detected 08/25/2021    COVID19 Detected (C) 05/09/2022    FLUAAG Not Detected 08/25/2021    FLUBAG Not Detected 08/25/2021       Last Urine Toxicity  More data exists         Latest Ref Rng & Units 11/14/2023 5/12/2023   LAST URINE TOXICITY RESULTS   Creatinine, Urine mg/dL - 32.7    Amphetamine, Urine Qual Negative Negative  -   Barbiturates Screen, Urine Negative Negative  -   Benzodiazepine Screen, Urine Negative Negative  -   Buprenorphine, Screen, Urine Negative Negative  -   Cocaine Screen, Urine Negative Negative  -   Methadone Screen , Urine Negative Negative  -   Methamphetamine, Ur Negative Negative  -          Assessment and Plan    Diagnoses and all orders for this visit:    1. Type 2 diabetes mellitus without complication, without long-term current use of insulin (Primary)  -     Hemoglobin A1c    2. Encounter for long-term (current) use of other medications  -     POC Urine Drug Screen [facility]    3. RUQ pain  -     US Gallbladder; Future    4. Acquired hypothyroidism  -     TSH Rfx On Abnormal To Free T4    5. Essential hypertension  -     CBC & Differential  -     Comprehensive Metabolic Panel    6. Mixed hyperlipidemia  -     Lipid Panel    7. Need for influenza vaccination  -     Fluzone High-Dose 65+yrs ([id number])    8. Need for RSV vaccination    9. Need for COVID-19 vaccine          There are no discontinued medications.       Follow Up   Return in about 6 months (around 5/14/2024).  Patient was given instructions and counseling regarding her condition or for health maintenance advice. Please see specific information pulled into the AVS if appropriate.       Mainor Sanders Jr,  MD  11/14/23  11:11 EST

## 2023-11-15 LAB — TSH SERPL DL<=0.05 MIU/L-ACNC: 2.72 UIU/ML (ref 0.27–4.2)

## 2023-11-21 ENCOUNTER — PATIENT MESSAGE (OUTPATIENT)
Dept: INTERNAL MEDICINE | Facility: CLINIC | Age: 68
End: 2023-11-21
Payer: MEDICARE

## 2023-11-21 DIAGNOSIS — R10.11 RUQ PAIN: Primary | ICD-10-CM

## 2023-11-21 NOTE — TELEPHONE ENCOUNTER
From: Randi Fajardo  To: Mainor Sanders  Sent: 11/21/2023 12:26 PM EST  Subject: Glad bladder test    I have my gall bladder test scheduled for Wed., Nov. 29. I wonder if they should also check my pancreas, because my brother was diagnosed with pancreatic cancer shortly before his death. If so, could you please send a referral to [facility]. That is where my test is scheduled for next Wed. Thank you! Have a blessed Thanksgiving!

## 2023-11-29 ENCOUNTER — PRIOR AUTHORIZATION (OUTPATIENT)
Dept: INTERNAL MEDICINE | Facility: CLINIC | Age: 68
End: 2023-11-29
Payer: MEDICARE

## 2023-11-29 ENCOUNTER — HOSPITAL ENCOUNTER (OUTPATIENT)
Dept: ULTRASOUND IMAGING | Facility: HOSPITAL | Age: 68
Discharge: HOME | End: 2023-11-29
Admitting: INTERNAL MEDICINE
Payer: MEDICARE

## 2023-11-29 DIAGNOSIS — R10.11 RUQ PAIN: ICD-10-CM

## 2023-11-29 PROCEDURE — 76705 ECHO EXAM OF ABDOMEN: CPT

## 2023-11-29 NOTE — TELEPHONE ENCOUNTER
FAX RECEIVED THAT PA WAS EXPIRING FOR FOLLOWING MEDICATION:    Accu-Chek Softclix Lancets lancets (01/16/2023)     INDEXED VIA ONBASE

## 2023-11-29 NOTE — TELEPHONE ENCOUNTER
PER CMM:    Available without authorization. The member is able to fill the requested drug at the pharmacy.

## 2023-11-30 ENCOUNTER — TELEPHONE (OUTPATIENT)
Dept: INTERNAL MEDICINE | Facility: CLINIC | Age: 68
End: 2023-11-30
Payer: MEDICARE
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
## 2023-11-30 NOTE — TELEPHONE ENCOUNTER
----- Message from Mainor Sanders Jr., MD sent at 11/29/2023  9:04 PM EST -----  Fatty liver noted, but otherwise normal without evidence of gallstones.

## 2023-11-30 NOTE — TELEPHONE ENCOUNTER
I would bet on it being bruised if she is ambulating still. I will order knee x-ray. If it does not continue to gradually improve, would obtain x-ray to evaluate.

## 2023-11-30 NOTE — TELEPHONE ENCOUNTER
Called PT, PT is aware and confirmed.   No further question or concerns     Patient stated that she fell at [employer] and she hurt her knee.  She stated that someone was going send us a message about this.     She stated that it was swollen and red.   She just wants to make sure she dont need a Xray or anything.   She is able to get around.   She is able to walk and get around, she stated that it does hurt though.

## 2023-12-04 ENCOUNTER — HOSPITAL ENCOUNTER (OUTPATIENT)
Dept: BONE DENSITY | Facility: HOSPITAL | Age: 68
Discharge: HOME | End: 2023-12-04
Admitting: INTERNAL MEDICINE
Payer: MEDICARE

## 2023-12-04 DIAGNOSIS — R06.09 DOE (DYSPNEA ON EXERTION): ICD-10-CM

## 2023-12-04 DIAGNOSIS — Z78.0 POSTMENOPAUSAL: ICD-10-CM

## 2023-12-04 PROCEDURE — 77080 DXA BONE DENSITY AXIAL: CPT

## 2023-12-04 RX ORDER — TIOTROPIUM BROMIDE AND OLODATEROL 3.124; 2.736 UG/1; UG/1
2 SPRAY, METERED RESPIRATORY (INHALATION) DAILY
Qty: 4 G | Refills: 3 | Status: SHIPPED | OUTPATIENT
Start: 2023-12-04

## 2023-12-07 ENCOUNTER — HOSPITAL ENCOUNTER (OUTPATIENT)
Dept: GENERAL RADIOLOGY | Facility: HOSPITAL | Age: 68
Discharge: HOME | End: 2023-12-07
Admitting: INTERNAL MEDICINE
Payer: MEDICARE
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
## 2023-12-07 PROCEDURE — 73562 X-RAY EXAM OF KNEE 3: CPT

## 2023-12-11 DIAGNOSIS — G62.9 NEUROPATHY: ICD-10-CM

## 2023-12-11 RX ORDER — GABAPENTIN 600 MG/1
600 TABLET ORAL 3 TIMES DAILY PRN
Qty: 90 TABLET | Refills: 0 | Status: SHIPPED | OUTPATIENT
Start: 2023-12-11

## 2023-12-11 NOTE — TELEPHONE ENCOUNTER
Refill request for  controlled substance.      Date of request: 12/11/2023    Medication requested: Gabapentin  Last OV: 11/14/23  Last UDS: 11/14/23  Contract signed: yes    Date:11/14/23  Next office visit: 1/12/24    Marly Peck

## 2023-12-21 DIAGNOSIS — E11.9 TYPE 2 DIABETES MELLITUS WITHOUT COMPLICATION, WITHOUT LONG-TERM CURRENT USE OF INSULIN: ICD-10-CM

## 2023-12-21 RX ORDER — BLOOD SUGAR DIAGNOSTIC
STRIP MISCELLANEOUS
Qty: 100 EACH | Refills: 3 | Status: SHIPPED | OUTPATIENT
Start: 2023-12-21

## 2023-12-24 DIAGNOSIS — E11.9 TYPE 2 DIABETES MELLITUS WITHOUT COMPLICATION, WITHOUT LONG-TERM CURRENT USE OF INSULIN: ICD-10-CM

## 2023-12-27 ENCOUNTER — HOSPITAL ENCOUNTER (OUTPATIENT)
Dept: ULTRASOUND IMAGING | Facility: HOSPITAL | Age: 68
Discharge: HOME | End: 2023-12-27
Admitting: INTERNAL MEDICINE
Payer: MEDICARE

## 2023-12-27 DIAGNOSIS — R10.11 RUQ PAIN: ICD-10-CM

## 2023-12-27 PROCEDURE — 76700 US EXAM ABDOM COMPLETE: CPT

## 2023-12-28 ENCOUNTER — TELEPHONE (OUTPATIENT)
Dept: INTERNAL MEDICINE | Facility: CLINIC | Age: 68
End: 2023-12-28
Payer: MEDICARE

## 2023-12-28 NOTE — TELEPHONE ENCOUNTER
----- Message from Mainor Sanders Jr., MD sent at 12/27/2023  3:31 PM EST -----  Fatty liver noted, but otherwise normal exam.

## 2024-01-08 DIAGNOSIS — E11.9 TYPE 2 DIABETES MELLITUS WITHOUT COMPLICATION, WITHOUT LONG-TERM CURRENT USE OF INSULIN: ICD-10-CM

## 2024-01-08 NOTE — TELEPHONE ENCOUNTER
Caller: Randi Fajardo    Relationship: Self    Best call back number: [phone]     Requested Prescriptions:   Requested Prescriptions     Pending Prescriptions Disp Refills    metFORMIN (GLUCOPHAGE) 500 MG tablet 90 tablet 1     Sig: Take 1 tablet by mouth Daily With Dinner.        Pharmacy where request should be sent: [facility][id number] - [place], [place] - [address] [facility] [phone] [facility] [phone] FX     Last office visit with prescribing clinician: 11/14/2023   Last telemedicine visit with prescribing clinician: Visit date not found   Next office visit with prescribing clinician: 1/12/2024     Additional details provided by patient: PATIENT RECENTLY HAD PRESCRIPTION CALLED INTO [facility] ON 12.26.2023. PATIENT IS COMPLETELY OUT OF MEDICATION. WHEN PATIENT CALLED [facility] TO GET STATUS OF MEDICATION, SHE WAS ADVISED THAT THEY ARE MOVING TO A NEW LOCATION. PATIENT WAS ADVISED THAT IT COULD BE UP TO TWO WEEKS BEFORE THEY CAN GET MEDICATION SENT OUT. PATIENT WOULD LIKE SHORT TERM SUPPLY CALLED IN AS SOON AS POSSIBLE. PATIENT HAS BEEN WITHOUT MEDICINE FOR A WEEK.    Does the patient have less than a 3 day supply:  [x] Yes  [] No    Would you like a call back once the refill request has been completed: [] Yes [] No    If the office needs to give you a call back, can they leave a voicemail: [] Yes [] No    Sina aCstro Rep   01/08/24 11:16 EST

## 2024-01-10 ENCOUNTER — CLINICAL SUPPORT (OUTPATIENT)
Dept: INTERNAL MEDICINE | Facility: CLINIC | Age: 69
End: 2024-01-10
Payer: MEDICARE

## 2024-01-10 ENCOUNTER — TELEPHONE (OUTPATIENT)
Dept: INTERNAL MEDICINE | Facility: CLINIC | Age: 69
End: 2024-01-10
Payer: MEDICARE

## 2024-01-10 DIAGNOSIS — R09.81 NASAL CONGESTION: Primary | ICD-10-CM

## 2024-01-10 LAB
EXPIRATION DATE: NORMAL
FLUAV AG UPPER RESP QL IA.RAPID: NOT DETECTED
FLUBV AG UPPER RESP QL IA.RAPID: NOT DETECTED
INTERNAL CONTROL: NORMAL
Lab: NORMAL
SARS-COV-2 AG UPPER RESP QL IA.RAPID: NOT DETECTED
SARS-COV-2 RNA RESP QL NAA+PROBE: NOT DETECTED

## 2024-01-10 PROCEDURE — 87635 SARS-COV-2 COVID-19 AMP PRB: CPT | Performed by: INTERNAL MEDICINE

## 2024-01-12 ENCOUNTER — OFFICE VISIT (OUTPATIENT)
Dept: INTERNAL MEDICINE | Facility: CLINIC | Age: 69
End: 2024-01-12
Payer: MEDICARE

## 2024-01-12 VITALS
HEART RATE: 86 BPM | OXYGEN SATURATION: 93 % | BODY MASS INDEX: 45.71 KG/M2 | TEMPERATURE: 97.5 F | WEIGHT: 258 LBS | SYSTOLIC BLOOD PRESSURE: 104 MMHG | DIASTOLIC BLOOD PRESSURE: 69 MMHG | HEIGHT: 63 IN

## 2024-01-12 DIAGNOSIS — G62.9 NEUROPATHY: ICD-10-CM

## 2024-01-12 DIAGNOSIS — Z23 NEED FOR COVID-19 VACCINE: ICD-10-CM

## 2024-01-12 DIAGNOSIS — F17.211 CIGARETTE NICOTINE DEPENDENCE IN REMISSION: ICD-10-CM

## 2024-01-12 DIAGNOSIS — Z79.899 LONG TERM USE OF DRUG: ICD-10-CM

## 2024-01-12 DIAGNOSIS — G89.29 CHRONIC PAIN OF RIGHT KNEE: ICD-10-CM

## 2024-01-12 DIAGNOSIS — M25.561 CHRONIC PAIN OF RIGHT KNEE: ICD-10-CM

## 2024-01-12 DIAGNOSIS — F17.210 CIGARETTE NICOTINE DEPENDENCE WITHOUT COMPLICATION: ICD-10-CM

## 2024-01-12 DIAGNOSIS — E03.9 ACQUIRED HYPOTHYROIDISM: ICD-10-CM

## 2024-01-12 DIAGNOSIS — E11.9 TYPE 2 DIABETES MELLITUS WITHOUT COMPLICATION, WITHOUT LONG-TERM CURRENT USE OF INSULIN: ICD-10-CM

## 2024-01-12 DIAGNOSIS — Z00.00 ANNUAL PHYSICAL EXAM: Primary | ICD-10-CM

## 2024-01-12 DIAGNOSIS — Z29.11 NEED FOR RSV VACCINATION: ICD-10-CM

## 2024-01-12 DIAGNOSIS — I10 ESSENTIAL HYPERTENSION: ICD-10-CM

## 2024-01-12 DIAGNOSIS — E78.2 MIXED HYPERLIPIDEMIA: ICD-10-CM

## 2024-01-12 NOTE — PROGRESS NOTES
The ABCs of the Annual Wellness Visit  Subsequent Medicare Wellness Visit    Subjective    Randi Fajardo is a 68 y.o. female who presents for a Subsequent Medicare Wellness Visit.    The following portions of the patient's history were reviewed and   updated as appropriate: allergies, current medications, past family history, past medical history, past social history, past surgical history, and problem list.    Compared to one year ago, the patient feels her physical   health is better.    Compared to one year ago, the patient feels her mental   health is better.    Recent Hospitalizations:  She was not admitted to the hospital during the last year.       Current Medical Providers:  Patient Care Team:  Mainor Sanders Jr., MD as PCP - General (Internal Medicine)    Outpatient Medications Prior to Visit   Medication Sig Dispense Refill    Accu-Chek Softclix Lancets lancets USE ONCE DAILY 100 each 3    albuterol sulfate HFA (Ventolin HFA) 108 (90 Base) MCG/ACT inhaler Inhale 2 puffs Every 4 (Four) Hours As Needed for Wheezing or Shortness of Air. 1 g 5    Alcohol Swabs (B-D SINGLE USE SWABS REGULAR) pads USE AND DISCARD 1 SWAB FIVETIMES A  each 3    Blood Glucose Monitoring Suppl (Accu-Chek Guide Me) w/Device kit Inject 1 kit under the skin into the appropriate area as directed See Admin Instructions. 1 kit 0    CALCIUM PO Take 600 mg by mouth Daily.      DULoxetine (CYMBALTA) 60 MG capsule Take 2 capsules by mouth Daily. 180 capsule 3    famotidine (PEPCID) 40 MG tablet TAKE 1 TABLET TWICE A DAY 90 tablet 3    gabapentin (NEURONTIN) 600 MG tablet Take 1 tablet by mouth 3 (Three) Times a Day As Needed (pain). 90 tablet 0    glucose blood (Accu-Chek Guide) test strip USE ONCE DAILY 100 each 3    hydroCHLOROthiazide (HYDRODIURIL) 25 MG tablet TAKE 1 TABLET DAILY 90 tablet 3    levothyroxine (Synthroid) 50 MCG tablet Take 1 tablet by mouth Daily. 90 tablet 1    melatonin 5 MG tablet tablet Take 1 tablet  "by mouth Every Night.      metFORMIN (GLUCOPHAGE) 500 MG tablet Take 1 tablet by mouth Daily With Dinner. 30 tablet 0    minocycline (MINOCIN,DYNACIN) 100 MG capsule TAKE 1 CAPSULE DAILY 90 capsule 3    multivitamin with minerals tablet tablet Take 1 tablet by mouth Daily.      Omega-3 Fatty Acids (fish oil) 1000 MG capsule capsule Take  by mouth Daily With Breakfast.      rOPINIRole (REQUIP) 2 MG tablet TAKE 1 TABLET EVERY NIGHT 90 tablet 3    tiotropium bromide-olodaterol (Stiolto Respimat) 2.5-2.5 MCG/ACT aerosol solution inhaler Inhale 2 puffs Daily. 4 g 3    Zinc Sulfate (ZINC 15 PO) Take 15 mg by mouth Daily.       No facility-administered medications prior to visit.       No opioid medication identified on active medication list. I have reviewed chart for other potential  high risk medication/s and harmful drug interactions in the elderly.        Aspirin is not on active medication list.  Aspirin use is not indicated based on review of current medical condition/s. Risk of harm outweighs potential benefits.  .    Patient Active Problem List   Diagnosis    Acquired hypothyroidism    Essential hypertension    Mixed hyperlipidemia    Type 2 diabetes mellitus without complication, without long-term current use of insulin    Right knee pain    Primary osteoarthritis of right knee     Advance Care Planning   Advance Care Planning     Advance Directive is not on file.  ACP discussion was held with the patient during this visit. Patient does not have an advance directive, information provided.     Objective    Vitals:    01/12/24 1341   BP: 104/69   BP Location: Left arm   Pulse: 86   Temp: 97.5 °F (36.4 °C)   TempSrc: Temporal   SpO2: 93%   Weight: 117 kg (258 lb)   Height: 160 cm (63\")     Wt Readings from Last 3 Encounters:   01/12/24 117 kg (258 lb)   12/12/23 127 kg (280 lb)   11/14/23 127 kg (280 lb)       Estimated body mass index is 45.7 kg/m² as calculated from the following:    Height as of this encounter: " "160 cm (63\").    Weight as of this encounter: 117 kg (258 lb).       Does the patient have evidence of cognitive impairment? No    Lab Results   Component Value Date    TRIG 181 (H) 2023    HDL 38 (L) 2023     (H) 2023    VLDL 32 2023    HGBA1C 6.50 (H) 2023        HEALTH RISK ASSESSMENT    Smoking Status:  Social History     Tobacco Use   Smoking Status Former    Packs/day: 0.50    Years: 40.00    Additional pack years: 0.00    Total pack years: 20.00    Types: Cigarettes    Start date:     Quit date:     Years since quittin.0    Passive exposure: Past   Smokeless Tobacco Never   Tobacco Comments    Was off and on during the 40 years     Alcohol Consumption:  Social History     Substance and Sexual Activity   Alcohol Use Yes    Comment: Socially on occasion     Fall Risk Screen:    CAROLA Fall Risk Assessment was completed, and patient is at HIGH risk for falls. Assessment completed on:2024    Depression Screenin/12/2024     1:43 PM   PHQ-2/PHQ-9 Depression Screening   Little Interest or Pleasure in Doing Things 0-->not at all   Feeling Down, Depressed or Hopeless 1-->several days   PHQ-9: Brief Depression Severity Measure Score 1       Health Habits and Functional and Cognitive Screenin/12/2024     1:44 PM   Functional & Cognitive Status   Do you have difficulty preparing food and eating? No   Do you have difficulty bathing yourself, getting dressed or grooming yourself? No   Do you have difficulty using the toilet? No   Do you have difficulty moving around from place to place? No   Do you have trouble with steps or getting out of a bed or a chair? No   Current Diet Well Balanced Diet   Dental Exam Up to date   Eye Exam Up to date   Exercise (times per week) 0 times per week   Current Exercises Include No Regular Exercise   Do you need help using the phone?  No   Are you deaf or do you have serious difficulty hearing?  No   Do you need help to " go to places out of walking distance? No   Do you need help shopping? No   Do you need help preparing meals?  No   Do you need help with housework?  No   Do you need help with laundry? No   Do you need help taking your medications? No   Do you need help managing money? No   Do you ever drive or ride in a car without wearing a seat belt? No   Have you felt unusual stress, anger or loneliness in the last month? Yes   Who do you live with? Alone   If you need help, do you have trouble finding someone available to you? No   Have you been bothered in the last four weeks by sexual problems? No   Do you have difficulty concentrating, remembering or making decisions? No       Age-appropriate Screening Schedule:  Refer to the list below for future screening recommendations based on patient's age, sex and/or medical conditions. Orders for these recommended tests are listed in the plan section. The patient has been provided with a written plan.    Health Maintenance   Topic Date Due    DIABETIC FOOT EXAM  03/07/2023    COVID-19 Vaccine (6 - 2023-24 season) 09/01/2023    ANNUAL WELLNESS VISIT  11/28/2023    COLORECTAL CANCER SCREENING  12/20/2023    LUNG CANCER SCREENING  02/06/2024    URINE MICROALBUMIN  05/12/2024    HEMOGLOBIN A1C  05/14/2024    BMI FOLLOWUP  05/18/2024    DIABETIC EYE EXAM  07/12/2024    LIPID PANEL  11/14/2024    TDAP/TD VACCINES (2 - Td or Tdap) 05/21/2025    MAMMOGRAM  09/29/2025    DXA SCAN  12/04/2025    HEPATITIS C SCREENING  Completed    INFLUENZA VACCINE  Completed    Pneumococcal Vaccine 65+  Completed    ZOSTER VACCINE  Completed            Last Urine Toxicity  More data exists         Latest Ref Rng & Units 11/14/2023 5/12/2023   LAST URINE TOXICITY RESULTS   Creatinine, Urine mg/dL - 32.7    Amphetamine, Urine Qual Negative Negative  -   Barbiturates Screen, Urine Negative Negative  -   Benzodiazepine Screen, Urine Negative Negative  -   Buprenorphine, Screen, Urine Negative Negative  -    Cocaine Screen, Urine Negative Negative  -   Methadone Screen , Urine Negative Negative  -   Methamphetamine, Ur Negative Negative  -       CMS Preventative Services Quick Reference  Risk Factors Identified During Encounter  Fall Risk-High or Moderate: Information on Fall Prevention Shared in After Visit Summary  Immunizations Discussed/Encouraged: COVID19 and RSV (Respiratory Syncytial Virus)  The above risks/problems have been discussed with the patient.  Pertinent information has been shared with the patient in the After Visit Summary.  An After Visit Summary and PPPS were made available to the patient.    Diagnoses and all orders for this visit:    1. Annual physical exam (Primary)    2. Essential hypertension  -     CBC & Differential; Future  -     Comprehensive Metabolic Panel; Future    3. Mixed hyperlipidemia  -     Lipid Panel; Future    4. Acquired hypothyroidism  -     TSH; Future    5. Chronic pain of right knee  -     Diclofenac Sodium (VOLTAREN) 1 % gel gel; Apply 4 g topically to the appropriate area as directed 4 (Four) Times a Day As Needed (pain).  Dispense: 350 g; Refill: 1    6. Type 2 diabetes mellitus without complication, without long-term current use of insulin  -     Hemoglobin A1c; Future    7. Neuropathy    8. Cigarette nicotine dependence in remission    9. Cigarette nicotine dependence without complication  -      CT Chest Low Dose Cancer Screening WO; Future    10. Long term use of drug  -     Cancel: POC Urine Drug Screen Clicks for a Cause    11. Need for COVID-19 vaccine  -     COVID-19 F23 (Pfizer) 12yrs+ (COMIRNATY)    12. Need for RSV vaccination          Follow Up:   Next Medicare Wellness visit to be scheduled in 1 year.

## 2024-01-26 ENCOUNTER — HOSPITAL ENCOUNTER (OUTPATIENT)
Dept: CT IMAGING | Facility: HOSPITAL | Age: 69
Discharge: HOME | End: 2024-01-26
Admitting: INTERNAL MEDICINE
Payer: MEDICARE

## 2024-01-26 DIAGNOSIS — F17.210 CIGARETTE NICOTINE DEPENDENCE WITHOUT COMPLICATION: ICD-10-CM

## 2024-01-26 PROCEDURE — 71271 CT THORAX LUNG CANCER SCR C-: CPT

## 2024-01-28 DIAGNOSIS — G62.9 NEUROPATHY: ICD-10-CM

## 2024-01-29 RX ORDER — GABAPENTIN 600 MG/1
600 TABLET ORAL 3 TIMES DAILY PRN
Qty: 90 TABLET | Refills: 0 | Status: SHIPPED | OUTPATIENT
Start: 2024-01-29

## 2024-01-29 NOTE — TELEPHONE ENCOUNTER
Refill request for controlled substance.      Date of request: 1/29/2024    Medication requested: Gabapentin  Last OV: 1/12/24  Last UDS: 11/14/23  Contract signed: yes    Date:11/14/23  Next office visit: 5/14/24    Marly Peck

## 2024-02-04 DIAGNOSIS — E11.9 TYPE 2 DIABETES MELLITUS WITHOUT COMPLICATION, WITHOUT LONG-TERM CURRENT USE OF INSULIN: ICD-10-CM

## 2024-02-05 DIAGNOSIS — E11.9 TYPE 2 DIABETES MELLITUS WITHOUT COMPLICATION, WITHOUT LONG-TERM CURRENT USE OF INSULIN: ICD-10-CM

## 2024-02-05 RX ORDER — BLOOD SUGAR DIAGNOSTIC
1 STRIP MISCELLANEOUS DAILY
Qty: 100 EACH | Refills: 3 | Status: SHIPPED | OUTPATIENT
Start: 2024-02-05

## 2024-02-09 RX ORDER — LEVOTHYROXINE SODIUM 0.05 MG/1
50 TABLET ORAL DAILY
Qty: 90 TABLET | Refills: 1 | Status: SHIPPED | OUTPATIENT
Start: 2024-02-09 | End: 2024-02-12

## 2024-02-09 RX ORDER — MINOCYCLINE HYDROCHLORIDE 100 MG/1
100 CAPSULE ORAL DAILY
Qty: 90 CAPSULE | Refills: 3 | Status: SHIPPED | OUTPATIENT
Start: 2024-02-09

## 2024-02-09 RX ORDER — HYDROCHLOROTHIAZIDE 25 MG/1
25 TABLET ORAL DAILY
Qty: 90 TABLET | Refills: 3 | Status: SHIPPED | OUTPATIENT
Start: 2024-02-09

## 2024-02-09 RX ORDER — LANCETS
1 EACH MISCELLANEOUS 3 TIMES DAILY
Qty: 200 EACH | Refills: 3 | Status: SHIPPED | OUTPATIENT
Start: 2024-02-09

## 2024-02-09 RX ORDER — ROPINIROLE 2 MG/1
2 TABLET, FILM COATED ORAL NIGHTLY
Qty: 90 TABLET | Refills: 3 | Status: SHIPPED | OUTPATIENT
Start: 2024-02-09

## 2024-02-09 RX ORDER — ISOPROPYL ALCOHOL 0.75 G/1
1 SWAB TOPICAL
Qty: 200 EACH | Refills: 3 | Status: SHIPPED | OUTPATIENT
Start: 2024-02-09

## 2024-02-10 DIAGNOSIS — G62.9 NEUROPATHY: ICD-10-CM

## 2024-02-12 ENCOUNTER — LAB (OUTPATIENT)
Dept: LAB | Facility: HOSPITAL | Age: 69
End: 2024-02-12
Payer: MEDICARE

## 2024-02-12 DIAGNOSIS — E11.9 TYPE 2 DIABETES MELLITUS WITHOUT COMPLICATION, WITHOUT LONG-TERM CURRENT USE OF INSULIN: ICD-10-CM

## 2024-02-12 DIAGNOSIS — I10 ESSENTIAL HYPERTENSION: ICD-10-CM

## 2024-02-12 DIAGNOSIS — E03.9 ACQUIRED HYPOTHYROIDISM: ICD-10-CM

## 2024-02-12 DIAGNOSIS — E78.2 MIXED HYPERLIPIDEMIA: ICD-10-CM

## 2024-02-12 LAB
ALBUMIN SERPL-MCNC: 4.1 G/DL (ref 3.5–5.2)
ALBUMIN/GLOB SERPL: 1.3 G/DL
ALP SERPL-CCNC: 56 U/L (ref 39–117)
ALT SERPL W P-5'-P-CCNC: 37 U/L (ref 1–33)
ANION GAP SERPL CALCULATED.3IONS-SCNC: 12 MMOL/L (ref 5–15)
AST SERPL-CCNC: 37 U/L (ref 1–32)
BASOPHILS # BLD AUTO: 0.1 10*3/MM3 (ref 0–0.2)
BASOPHILS NFR BLD AUTO: 0.9 % (ref 0–1.5)
BILIRUB SERPL-MCNC: 0.4 MG/DL (ref 0–1.2)
BUN SERPL-MCNC: 18 MG/DL (ref 8–23)
BUN/CREAT SERPL: 18.2 (ref 7–25)
CALCIUM SPEC-SCNC: 9.6 MG/DL (ref 8.6–10.5)
CHLORIDE SERPL-SCNC: 100 MMOL/L (ref 98–107)
CHOLEST SERPL-MCNC: 175 MG/DL (ref 0–200)
CO2 SERPL-SCNC: 28 MMOL/L (ref 22–29)
CREAT SERPL-MCNC: 0.99 MG/DL (ref 0.57–1)
DEPRECATED RDW RBC AUTO: 42.2 FL (ref 37–54)
EGFRCR SERPLBLD CKD-EPI 2021: 61.5 ML/MIN/1.73
EOSINOPHIL # BLD AUTO: 0.22 10*3/MM3 (ref 0–0.4)
EOSINOPHIL NFR BLD AUTO: 2 % (ref 0.3–6.2)
ERYTHROCYTE [DISTWIDTH] IN BLOOD BY AUTOMATED COUNT: 13.6 % (ref 12.3–15.4)
GLOBULIN UR ELPH-MCNC: 3.1 GM/DL
GLUCOSE SERPL-MCNC: 121 MG/DL (ref 65–99)
HBA1C MFR BLD: 6.3 % (ref 4.8–5.6)
HCT VFR BLD AUTO: 47 % (ref 34–46.6)
HDLC SERPL-MCNC: 38 MG/DL (ref 40–60)
HGB BLD-MCNC: 15.4 G/DL (ref 12–15.9)
IMM GRANULOCYTES # BLD AUTO: 0.06 10*3/MM3 (ref 0–0.05)
IMM GRANULOCYTES NFR BLD AUTO: 0.6 % (ref 0–0.5)
LDLC SERPL CALC-MCNC: 104 MG/DL (ref 0–100)
LDLC/HDLC SERPL: 2.6 {RATIO}
LYMPHOCYTES # BLD AUTO: 3.69 10*3/MM3 (ref 0.7–3.1)
LYMPHOCYTES NFR BLD AUTO: 34 % (ref 19.6–45.3)
MCH RBC QN AUTO: 28.5 PG (ref 26.6–33)
MCHC RBC AUTO-ENTMCNC: 32.8 G/DL (ref 31.5–35.7)
MCV RBC AUTO: 86.9 FL (ref 79–97)
MONOCYTES # BLD AUTO: 0.82 10*3/MM3 (ref 0.1–0.9)
MONOCYTES NFR BLD AUTO: 7.6 % (ref 5–12)
NEUTROPHILS NFR BLD AUTO: 5.96 10*3/MM3 (ref 1.7–7)
NEUTROPHILS NFR BLD AUTO: 54.9 % (ref 42.7–76)
NRBC BLD AUTO-RTO: 0 /100 WBC (ref 0–0.2)
PLATELET # BLD AUTO: 293 10*3/MM3 (ref 140–450)
PMV BLD AUTO: 10.3 FL (ref 6–12)
POTASSIUM SERPL-SCNC: 4.4 MMOL/L (ref 3.5–5.2)
PROT SERPL-MCNC: 7.2 G/DL (ref 6–8.5)
RBC # BLD AUTO: 5.41 10*6/MM3 (ref 3.77–5.28)
SODIUM SERPL-SCNC: 140 MMOL/L (ref 136–145)
TRIGL SERPL-MCNC: 191 MG/DL (ref 0–150)
TSH SERPL DL<=0.05 MIU/L-ACNC: 1.94 UIU/ML (ref 0.27–4.2)
VLDLC SERPL-MCNC: 33 MG/DL (ref 5–40)
WBC NRBC COR # BLD AUTO: 10.85 10*3/MM3 (ref 3.4–10.8)

## 2024-02-12 PROCEDURE — 36415 COLL VENOUS BLD VENIPUNCTURE: CPT

## 2024-02-12 PROCEDURE — 80053 COMPREHEN METABOLIC PANEL: CPT

## 2024-02-12 PROCEDURE — 80061 LIPID PANEL: CPT

## 2024-02-12 PROCEDURE — 85025 COMPLETE CBC W/AUTO DIFF WBC: CPT

## 2024-02-12 PROCEDURE — 83036 HEMOGLOBIN GLYCOSYLATED A1C: CPT

## 2024-02-12 PROCEDURE — 84443 ASSAY THYROID STIM HORMONE: CPT

## 2024-02-12 RX ORDER — LEVOTHYROXINE SODIUM 50 MCG
TABLET ORAL
Qty: 90 TABLET | Refills: 0 | Status: SHIPPED | OUTPATIENT
Start: 2024-02-12

## 2024-02-13 RX ORDER — GABAPENTIN 600 MG/1
600 TABLET ORAL 3 TIMES DAILY PRN
Qty: 90 TABLET | Refills: 0 | Status: SHIPPED | OUTPATIENT
Start: 2024-02-13

## 2024-02-13 RX ORDER — GABAPENTIN 600 MG/1
TABLET ORAL
Qty: 90 TABLET | Refills: 0 | OUTPATIENT
Start: 2024-02-13

## 2024-02-15 RX ORDER — ATORVASTATIN CALCIUM 10 MG/1
10 TABLET, FILM COATED ORAL NIGHTLY
Qty: 90 TABLET | Refills: 3 | Status: SHIPPED | OUTPATIENT
Start: 2024-02-15

## 2024-02-22 ENCOUNTER — OFFICE VISIT (OUTPATIENT)
Dept: INTERNAL MEDICINE | Facility: CLINIC | Age: 69
End: 2024-02-22
Payer: MEDICARE

## 2024-02-22 VITALS
DIASTOLIC BLOOD PRESSURE: 88 MMHG | OXYGEN SATURATION: 93 % | BODY MASS INDEX: 49.43 KG/M2 | HEART RATE: 86 BPM | WEIGHT: 279 LBS | HEIGHT: 63 IN | TEMPERATURE: 98.6 F | SYSTOLIC BLOOD PRESSURE: 131 MMHG

## 2024-02-22 DIAGNOSIS — E11.9 TYPE 2 DIABETES MELLITUS WITHOUT COMPLICATION, WITHOUT LONG-TERM CURRENT USE OF INSULIN: Primary | ICD-10-CM

## 2024-02-22 DIAGNOSIS — E78.2 MIXED HYPERLIPIDEMIA: ICD-10-CM

## 2024-02-22 DIAGNOSIS — E03.9 ACQUIRED HYPOTHYROIDISM: ICD-10-CM

## 2024-02-22 DIAGNOSIS — Z29.11 NEED FOR RSV VACCINATION: ICD-10-CM

## 2024-02-22 DIAGNOSIS — H10.33 ACUTE CONJUNCTIVITIS OF BOTH EYES, UNSPECIFIED ACUTE CONJUNCTIVITIS TYPE: ICD-10-CM

## 2024-02-22 DIAGNOSIS — I10 ESSENTIAL HYPERTENSION: ICD-10-CM

## 2024-02-22 RX ORDER — AZELASTINE HYDROCHLORIDE 0.5 MG/ML
1 SOLUTION/ DROPS OPHTHALMIC 2 TIMES DAILY
Qty: 6 ML | Refills: 0 | Status: SHIPPED | OUTPATIENT
Start: 2024-02-22

## 2024-02-22 NOTE — PROGRESS NOTES
Chief Complaint  Conjunctivitis (PT WAS TREATED FOR PINK EYE AT  AND ISN'T IMPROVING)    Subjective          Randi Fajardo presents to [facility]  History of Present Illness  Patient reports having conjunctivitis. She was diagnosed at [facility] about 1 week ago. She was Rx'd tobramycin, but reports things have not gotten better.   Hypertension- patient reports surprised by her Blood Pressure today. Her recent Blood Pressure at dentist was good. Patient reports rushing to get here this am.   Hypothyroid- patient reports going several days without herm edication due to delays in shipping her medication.   Diabetes Mellitus 2- patient now has her medication after missing for about 10 days. Recent HgbA1c stable.   Hyperlipidemia- LDL elevated.       Current Outpatient Medications   Medication Instructions    Accu-Chek Softclix Lancets lancets 1 each, Other, 3 times daily, Use as instructed    albuterol sulfate HFA (Ventolin HFA) 108 (90 Base) MCG/ACT inhaler 2 puffs, Inhalation, Every 4 Hours PRN    Alcohol Swabs (B-D SINGLE USE SWABS REGULAR) pads 1 Application, Topical, 5 Times Daily    atorvastatin (LIPITOR) 10 mg, Oral, Nightly    azelastine (OPTIVAR) 0.05 % ophthalmic solution 1 drop, Both Eyes, 2 Times Daily    Blood Glucose Monitoring Suppl (Accu-Chek Guide Me) w/Device kit 1 kit, Subcutaneous, See Admin Instructions    CALCIUM  mg, Oral, Daily    DULoxetine (CYMBALTA) 120 mg, Oral, Daily    famotidine (PEPCID) 40 MG tablet TAKE 1 TABLET TWICE A DAY    gabapentin (NEURONTIN) 600 mg, Oral, 3 Times Daily PRN    glucose blood (Accu-Chek Guide) test strip 1 each, Other, Daily, Use as instructed    hydroCHLOROthiazide 25 mg, Oral, Daily    melatonin 5 mg, Oral, Nightly    metFORMIN (GLUCOPHAGE) 500 mg, Oral, Daily With Dinner    minocycline (MINOCIN,DYNACIN) 100 mg, Oral, Daily    multivitamin with minerals tablet tablet 1 tablet, Oral, Daily    Omega-3 Fatty Acids  "(fish oil) 1000 MG capsule capsule Oral, Daily With Breakfast    rOPINIRole (REQUIP) 2 mg, Oral, Nightly    Synthroid 50 MCG tablet To be filled by Provider    tiotropium bromide-olodaterol (Stiolto Respimat) 2.5-2.5 MCG/ACT aerosol solution inhaler 2 puffs, Inhalation, Daily    tobramycin 0.3 % solution ophthalmic solution 2 drops every 4 hours first 48 hours then every 6 hours for 5 more days    Zinc Sulfate (ZINC 15 PO) 15 mg, Oral, Daily       The following portions of the patient's history were reviewed and updated as appropriate: allergies, current medications, past family history, past medical history, past social history, past surgical history, and problem list.    Objective   Vital Signs:   /88 (BP Location: Left arm, Patient Position: Sitting, Cuff Size: Large Adult)   Pulse 86   Temp 98.6 °F (37 °C) (Temporal)   Ht 160 cm (63\")   Wt 127 kg (279 lb)   SpO2 93%   BMI 49.42 kg/m²     BP Readings from Last 3 Encounters:   02/22/24 131/88   02/09/24 120/66   01/12/24 104/69     Wt Readings from Last 3 Encounters:   02/22/24 127 kg (279 lb)   02/09/24 127 kg (280 lb 4.8 oz)   01/12/24 117 kg (258 lb)         Physical Exam   Appearance: No acute distress, well-nourished  Head: normocephalic, atraumatic  Eyes: extraocular movements intact, no scleral icterus, no conjunctival injection  Ears, Nose, and Throat: external ears normal, nares patent, moist mucous membranes  Cardiovascular: regular rate and rhythm. no murmurs, rubs, or gallops. no edema  Respiratory: breathing comfortably, symmetric chest rise, clear to auscultation bilaterally. No wheezes, rales, or rhonchi.  Neuro: alert and oriented to time, place, and person. Normal gait  Psych: normal mood and affect       Result Review :   The following data was reviewed by: Mainor Sanders Jr, MD on 02/22/2024:  Common labs          5/12/2023    15:22 11/14/2023    10:18 2/12/2024    10:13   Common Labs   Glucose 90  97  121    BUN 16  15  18  "   Creatinine 0.67  0.98  0.99    Sodium 138  138  140    Potassium 4.3  4.3  4.4    Chloride 100  99  100    Calcium 9.8  9.9  9.6    Albumin 4.3  4.2  4.1    Total Bilirubin 0.2  0.3  0.4    Alkaline Phosphatase 53  52  56    AST (SGOT) 45  41  37    ALT (SGPT) 49  46  37    WBC 9.72  8.73  10.85    Hemoglobin 15.5  15.5  15.4    Hematocrit 45.3  46.5  47.0    Platelets 307  284  293    Total Cholesterol 178  173  175    Triglycerides 281  181  191    HDL Cholesterol 35  38  38    LDL Cholesterol  96  103  104    Hemoglobin A1C 6.20  6.50  6.30    Microalbumin, Urine <1.2          Lab Results   Component Value Date    SARSANTIGEN Not Detected 01/10/2024    COVID19 Not Detected 01/10/2024    FLUAAG Not Detected 01/10/2024    FLUBAG Not Detected 01/10/2024    RAPSCRN Negative 12/12/2023     Last Urine Toxicity  More data exists         Latest Ref Rng & Units 11/14/2023 5/12/2023   LAST URINE TOXICITY RESULTS   Creatinine, Urine mg/dL - 32.7    Amphetamine, Urine Qual Negative Negative  -   Barbiturates Screen, Urine Negative Negative  -   Benzodiazepine Screen, Urine Negative Negative  -   Buprenorphine, Screen, Urine Negative Negative  -   Cocaine Screen, Urine Negative Negative  -   Methadone Screen , Urine Negative Negative  -   Methamphetamine, Ur Negative Negative  -       Assessment and Plan    Diagnoses and all orders for this visit:    1. Type 2 diabetes mellitus without complication, without long-term current use of insulin (Primary)  Comments:  reviewed labs with pt. goal HgbA1c <7%. check labs again in 3 months.  Orders:  -     Hemoglobin A1c; Future    2. Essential hypertension  Comments:  well controlled on regimen. goal BP <130/80  Orders:  -     CBC & Differential; Future  -     Comprehensive Metabolic Panel; Future    3. Mixed hyperlipidemia  -     Lipid Panel; Future    4. Acquired hypothyroidism  -     TSH; Future    5. Need for RSV vaccination    6. Acute conjunctivitis of both eyes, unspecified  acute conjunctivitis type  Comments:  add azelastine to regimen. encourage close f/u with ophtho if does not resolve  Orders:  -     azelastine (OPTIVAR) 0.05 % ophthalmic solution; Administer 1 drop to both eyes 2 (Two) Times a Day.  Dispense: 6 mL; Refill: 0        Medications Discontinued During This Encounter   Medication Reason    Diclofenac Sodium (VOLTAREN) 1 % gel gel *Therapy completed        Follow Up   Return in about 4 months (around 6/22/2024) for Recheck, DM, HTN.  Patient was given instructions and counseling regarding her condition or for health maintenance advice. Please see specific information pulled into the AVS if appropriate.       Mainor Sanders Jr, MD  02/22/24  15:26 EST

## 2024-03-04 RX ORDER — FAMOTIDINE 40 MG/1
TABLET, FILM COATED ORAL
Qty: 90 TABLET | Refills: 0 | Status: SHIPPED | OUTPATIENT
Start: 2024-03-04

## 2024-03-11 DIAGNOSIS — R06.09 DOE (DYSPNEA ON EXERTION): ICD-10-CM

## 2024-03-11 RX ORDER — TIOTROPIUM BROMIDE AND OLODATEROL 3.124; 2.736 UG/1; UG/1
SPRAY, METERED RESPIRATORY (INHALATION)
Qty: 4 G | Refills: 1 | Status: SHIPPED | OUTPATIENT
Start: 2024-03-11

## 2024-03-15 ENCOUNTER — TELEPHONE (OUTPATIENT)
Dept: INTERNAL MEDICINE | Facility: CLINIC | Age: 69
End: 2024-03-15
Payer: MEDICARE

## 2024-03-15 DIAGNOSIS — H10.33 ACUTE CONJUNCTIVITIS OF BOTH EYES, UNSPECIFIED ACUTE CONJUNCTIVITIS TYPE: ICD-10-CM

## 2024-03-15 RX ORDER — AZELASTINE HYDROCHLORIDE 0.5 MG/ML
1 SOLUTION/ DROPS OPHTHALMIC 2 TIMES DAILY
Qty: 6 ML | Refills: 0 | Status: SHIPPED | OUTPATIENT
Start: 2024-03-15

## 2024-03-19 DIAGNOSIS — F32.4 MAJOR DEPRESSIVE DISORDER WITH SINGLE EPISODE, IN PARTIAL REMISSION: ICD-10-CM

## 2024-03-20 RX ORDER — DULOXETIN HYDROCHLORIDE 60 MG/1
120 CAPSULE, DELAYED RELEASE ORAL DAILY
Qty: 180 CAPSULE | Refills: 3 | Status: SHIPPED | OUTPATIENT
Start: 2024-03-20

## 2024-04-17 NOTE — PROGRESS NOTES
Chief Complaint  Toe Pain (Pt stated her left toe started hurting on Saturday but the last 2 days it has gotten worse. She iced it for a few hours in and off. )    Subjective          Randi Fajardo presents to [facility]  History of Present Illness  Patient reports worsening toe pain for approx 5 days. It is swollen and erythematous. Patient has been putting ice on it and taking ibuprofen. Patient without history of gout. Patient does not recalls specific trauma but has bumped her toe several times recently. Patient reports pain with ambulation    Current Outpatient Medications   Medication Instructions    Accu-Chek Softclix Lancets lancets 1 each, Other, 3 times daily, Use as instructed    albuterol sulfate HFA (Ventolin HFA) 108 (90 Base) MCG/ACT inhaler 2 puffs, Inhalation, Every 4 Hours PRN    Alcohol Swabs (B-D SINGLE USE SWABS REGULAR) pads 1 Application, Topical, 5 Times Daily    atorvastatin (LIPITOR) 10 mg, Oral, Nightly    Blood Glucose Monitoring Suppl (Accu-Chek Guide Me) w/Device kit 1 kit, Subcutaneous, See Admin Instructions    CALCIUM  mg, Oral, Daily    ciprofloxacin (CIPRO) 500 mg, Oral, 2 Times Daily    clindamycin (CLEOCIN) 300 mg, Oral, 2 Times Daily    colchicine 0.6 mg, Oral, Daily    DULoxetine (CYMBALTA) 120 mg, Oral, Daily    famotidine (PEPCID) 40 MG tablet TAKE 1 TABLET TWICE A DAY    furosemide (LASIX) 40 mg, Oral, Daily    gabapentin (NEURONTIN) 600 mg, Oral, 3 Times Daily PRN    glucose blood (Accu-Chek Guide) test strip 1 each, Other, Daily, Use as instructed    hydroCHLOROthiazide 25 mg, Oral, Daily    melatonin 5 mg, Oral, Nightly    metFORMIN (GLUCOPHAGE) 500 mg, Oral, Daily With Dinner    minocycline (MINOCIN,DYNACIN) 100 mg, Oral, Daily    multivitamin with minerals tablet tablet 1 tablet, Oral, Daily    Omega-3 Fatty Acids (fish oil) 1000 MG capsule capsule Oral, Daily With Breakfast    rOPINIRole (REQUIP) 2 mg, Oral,  "Nightly    Stiolto Respimat 2.5-2.5 MCG/ACT aerosol solution inhaler USE 2 INHALATIONS ORALLY   DAILY    Synthroid 50 MCG tablet To be filled by Provider    Zinc Sulfate (ZINC 15 PO) 15 mg, Oral, Daily       The following portions of the patient's history were reviewed and updated as appropriate: allergies, current medications, past family history, past medical history, past social history, past surgical history, and problem list.    Objective   Vital Signs:   /73 (BP Location: Right arm, Patient Position: Sitting, Cuff Size: Large Adult)   Pulse 83   Temp 98 °F (36.7 °C) (Temporal)   Ht 160 cm (63\")   Wt 133 kg (294 lb)   SpO2 94%   BMI 52.08 kg/m²     BP Readings from Last 3 Encounters:   04/18/24 107/73   02/22/24 131/88   02/09/24 120/66     Wt Readings from Last 3 Encounters:   04/18/24 133 kg (294 lb)   02/22/24 127 kg (279 lb)   02/09/24 127 kg (280 lb 4.8 oz)         Physical Exam   Appearance: No acute distress, well-nourished  Head: normocephalic, atraumatic  Eyes: extraocular movements intact, no scleral icterus, no conjunctival injection  Ears, Nose, and Throat: external ears normal, nares patent, moist mucous membranes  Cardiovascular: regular rate. 2+ pitting edema on left  Respiratory: breathing comfortably, symmetric chest rise  Neuro: alert and oriented to time, place, and person. Normal gait  Psych: normal mood and affect   Left foot: middle toe with swelling and erythema and warmth.     Result Review :   The following data was reviewed by: Mainor Sanders Jr, MD on 04/18/2024:  Common labs          5/12/2023    15:22 11/14/2023    10:18 2/12/2024    10:13   Common Labs   Glucose 90  97  121    BUN 16  15  18    Creatinine 0.67  0.98  0.99    Sodium 138  138  140    Potassium 4.3  4.3  4.4    Chloride 100  99  100    Calcium 9.8  9.9  9.6    Albumin 4.3  4.2  4.1    Total Bilirubin 0.2  0.3  0.4    Alkaline Phosphatase 53  52  56    AST (SGOT) 45  41  37    ALT (SGPT) 49  46  37 "    WBC 9.72  8.73  10.85    Hemoglobin 15.5  15.5  15.4    Hematocrit 45.3  46.5  47.0    Platelets 307  284  293    Total Cholesterol 178  173  175    Triglycerides 281  181  191    HDL Cholesterol 35  38  38    LDL Cholesterol  96  103  104    Hemoglobin A1C 6.20  6.50  6.30    Microalbumin, Urine <1.2          Lab Results   Component Value Date    SARSANTIGEN Not Detected 01/10/2024    COVID19 Not Detected 01/10/2024    FLUAAG Not Detected 01/10/2024    FLUBAG Not Detected 01/10/2024    RAPSCRN Negative 12/12/2023          Assessment and Plan    Diagnoses and all orders for this visit:    1. Tenosynovitis of toe (Primary)  Comments:  will treat with broad spectrum ABx and colchicine. thought less likely gout. will also Rx lasix to help with edema. low threshold for ER if no improvement.  Orders:  -     ciprofloxacin (Cipro) 500 MG tablet; Take 1 tablet by mouth 2 (Two) Times a Day for 7 days.  Dispense: 14 tablet; Refill: 0  -     clindamycin (CLEOCIN) 300 MG capsule; Take 1 capsule by mouth 2 (Two) Times a Day for 7 days.  Dispense: 14 capsule; Refill: 0  -     colchicine 0.6 MG tablet; Take 1 tablet by mouth Daily.  Dispense: 30 tablet; Refill: 0  -     furosemide (Lasix) 40 MG tablet; Take 1 tablet by mouth Daily for 5 days.  Dispense: 5 tablet; Refill: 0    2. Neuropathy  Comments:  doing well on gabapentin. UDS and nimisha reviewed.   Orders:  -     gabapentin (NEURONTIN) 600 MG tablet; Take 1 tablet by mouth 3 (Three) Times a Day As Needed (pain).  Dispense: 90 tablet; Refill: 0          Medications Discontinued During This Encounter   Medication Reason    tobramycin 0.3 % solution ophthalmic solution *Therapy completed    azelastine (OPTIVAR) 0.05 % ophthalmic solution *Therapy completed    gabapentin (NEURONTIN) 600 MG tablet Reorder          Follow Up   Return if symptoms worsen or fail to improve.  Patient was given instructions and counseling regarding her condition or for health maintenance advice.  Please see specific information pulled into the AVS if appropriate.       Mainor Sanders Jr, MD  04/18/24  14:13 EDT

## 2024-04-18 ENCOUNTER — OFFICE VISIT (OUTPATIENT)
Dept: INTERNAL MEDICINE | Facility: CLINIC | Age: 69
End: 2024-04-18
Payer: MEDICARE

## 2024-04-18 VITALS
DIASTOLIC BLOOD PRESSURE: 73 MMHG | SYSTOLIC BLOOD PRESSURE: 107 MMHG | BODY MASS INDEX: 51.91 KG/M2 | TEMPERATURE: 98 F | HEART RATE: 83 BPM | HEIGHT: 63 IN | OXYGEN SATURATION: 94 % | WEIGHT: 293 LBS

## 2024-04-18 DIAGNOSIS — G62.9 NEUROPATHY: ICD-10-CM

## 2024-04-18 DIAGNOSIS — M65.9: Primary | ICD-10-CM

## 2024-04-18 PROCEDURE — 3074F SYST BP LT 130 MM HG: CPT | Performed by: INTERNAL MEDICINE

## 2024-04-18 PROCEDURE — 3044F HG A1C LEVEL LT 7.0%: CPT | Performed by: INTERNAL MEDICINE

## 2024-04-18 PROCEDURE — 3078F DIAST BP <80 MM HG: CPT | Performed by: INTERNAL MEDICINE

## 2024-04-18 PROCEDURE — 99214 OFFICE O/P EST MOD 30 MIN: CPT | Performed by: INTERNAL MEDICINE

## 2024-04-18 RX ORDER — FUROSEMIDE 40 MG/1
40 TABLET ORAL DAILY
Qty: 5 TABLET | Refills: 0 | Status: SHIPPED | OUTPATIENT
Start: 2024-04-18 | End: 2024-04-23

## 2024-04-18 RX ORDER — GABAPENTIN 600 MG/1
600 TABLET ORAL 3 TIMES DAILY PRN
Qty: 90 TABLET | Refills: 0 | Status: SHIPPED | OUTPATIENT
Start: 2024-04-18

## 2024-04-18 RX ORDER — CLINDAMYCIN HYDROCHLORIDE 300 MG/1
300 CAPSULE ORAL 2 TIMES DAILY
Qty: 14 CAPSULE | Refills: 0 | Status: SHIPPED | OUTPATIENT
Start: 2024-04-18 | End: 2024-04-25

## 2024-04-18 RX ORDER — CIPROFLOXACIN 500 MG/1
500 TABLET, FILM COATED ORAL 2 TIMES DAILY
Qty: 14 TABLET | Refills: 0 | Status: SHIPPED | OUTPATIENT
Start: 2024-04-18 | End: 2024-04-25

## 2024-04-18 RX ORDER — COLCHICINE 0.6 MG/1
0.6 TABLET ORAL DAILY
Qty: 30 TABLET | Refills: 0 | Status: SHIPPED | OUTPATIENT
Start: 2024-04-18

## 2024-04-21 ENCOUNTER — HOSPITAL ENCOUNTER (EMERGENCY)
Facility: HOSPITAL | Age: 69
Discharge: HOME | End: 2024-04-21
Attending: EMERGENCY MEDICINE | Admitting: EMERGENCY MEDICINE
Payer: MEDICARE

## 2024-04-21 ENCOUNTER — APPOINTMENT (OUTPATIENT)
Dept: GENERAL RADIOLOGY | Facility: HOSPITAL | Age: 69
End: 2024-04-21
Payer: MEDICARE

## 2024-04-21 VITALS
OXYGEN SATURATION: 94 % | HEIGHT: 63 IN | RESPIRATION RATE: 18 BRPM | BODY MASS INDEX: 49.26 KG/M2 | WEIGHT: 278 LBS | TEMPERATURE: 99.5 F | DIASTOLIC BLOOD PRESSURE: 61 MMHG | SYSTOLIC BLOOD PRESSURE: 117 MMHG | HEART RATE: 91 BPM

## 2024-04-21 DIAGNOSIS — M65.9 TENOSYNOVITIS OF LEFT FOOT: Primary | ICD-10-CM

## 2024-04-21 PROCEDURE — 73660 X-RAY EXAM OF TOE(S): CPT

## 2024-04-21 PROCEDURE — 25010000002 DEXAMETHASONE PER 1 MG: Performed by: NURSE PRACTITIONER

## 2024-04-21 PROCEDURE — 99283 EMERGENCY DEPT VISIT LOW MDM: CPT

## 2024-04-21 RX ORDER — NAPROXEN 500 MG/1
500 TABLET ORAL 2 TIMES DAILY WITH MEALS
Qty: 40 TABLET | Refills: 0 | Status: SHIPPED | OUTPATIENT
Start: 2024-04-21 | End: 2024-05-11

## 2024-04-21 RX ORDER — INDOMETHACIN 50 MG/1
50 CAPSULE ORAL
Qty: 30 CAPSULE | Refills: 0 | Status: SHIPPED | OUTPATIENT
Start: 2024-04-21 | End: 2024-04-21

## 2024-04-21 RX ORDER — INDOMETHACIN 25 MG/1
25 CAPSULE ORAL ONCE
Status: COMPLETED | OUTPATIENT
Start: 2024-04-21 | End: 2024-04-21

## 2024-04-21 RX ADMIN — INDOMETHACIN 25 MG: 25 CAPSULE ORAL at 22:24

## 2024-04-21 RX ADMIN — DEXAMETHASONE SODIUM PHOSPHATE 10 MG: 10 INJECTION INTRAMUSCULAR; INTRAVENOUS at 22:24

## 2024-04-22 NOTE — DISCHARGE INSTRUCTIONS
Rest, ice, and elevate.  Try to stay off of that foot is much as possible.  You can complete your previously prescribed antibiotics.  Take your meds as prescribed today.  Stop the colchicine until you follow back up with Dr. Sanders.  You may also take over-the-counter acetaminophen with your medications as needed for pain.  Call Dr. Sanders tomorrow and follow-up with him on Monday or Tuesday for reevaluation and further treatment as necessary.  Return to the emergency department immediately for any acutely worsening redness, any increase in swelling, any inability to flex or extend your toe or foot or any new or worse concerns.

## 2024-04-22 NOTE — ED PROVIDER NOTES
Time: 9:58 PM EDT  Date of encounter:  2024  Independent Historian/Clinical History and Information was obtained by:   Patient    History is limited by: N/A    Chief Complaint: TOE PAIN/SWELLING      History of Present Illness:      The patient presents to the emergency department and states that a week ago Saturday she developed redness to her left third toe.  She states that she was seen at the primary care office on Thursday and was placed on colchicine and an antibiotic.  She states that in the mornings it is better and not as red but states by evening time it is very swollen and red.  She states that there is been no open wounds or draining.  She states that it does feel a little bit better when it is not as red.  She denies any injuries or trauma.  She states she is never had anything like this before.  She reports that joint pain in her left index finger joint states that she been told that was arthritis previously.  She denies any fevers.  She can flex and extend the toes and ankle without difficulties.      History provided by:  Patient   used: No        Patient Care Team  Primary Care Provider: Mainor Sanders Jr., MD    Past Medical History:     Allergies   Allergen Reactions    Nexium [Esomeprazole] Hives    Prilosec [Omeprazole] Hives     Past Medical History:   Diagnosis Date    Depressed     GREGG (generalized anxiety disorder)     Gastroesophageal reflux     HTN (hypertension)     Hypothyroid     Lesion of neck     Osteoarthritis     Pneumonia     RLS (restless legs syndrome)     Sleep apnea in adult     Sleep apnea, obstructive  ?    Type 2 diabetes mellitus      Past Surgical History:   Procedure Laterality Date    ANKLE TENDON REPAIR Right 1986    ankle with hardware     SECTION      /    COLONOSCOPY      NECK SURGERY      ,,; Neck lesion removed, total of 3     Family History   Problem Relation Age of Onset    Lung cancer Father      Heart disease Father     Colon cancer Father     Cancer Father         Lung, colon    Emphysema Father     Cancer Brother         Lung, pancreas    Cancer Maternal Aunt         Breast cancer       Home Medications:  Prior to Admission medications    Medication Sig Start Date End Date Taking? Authorizing Provider   Accu-Chek Softclix Lancets lancets 1 each by Other route 3 times a day. Use as instructed 2/9/24   Mainor Sanders Jr., MD   albuterol sulfate HFA (Ventolin HFA) 108 (90 Base) MCG/ACT inhaler Inhale 2 puffs Every 4 (Four) Hours As Needed for Wheezing or Shortness of Air. 8/16/22   Joon Cuevas MD   Alcohol Swabs (B-D SINGLE USE SWABS REGULAR) pads Apply 1 Application topically to the appropriate area as directed 5 (Five) Times a Day. 2/9/24   Mainor Sanders Jr., MD   atorvastatin (LIPITOR) 10 MG tablet Take 1 tablet by mouth Every Night. 2/15/24   Mainor Sanders Jr., MD   Blood Glucose Monitoring Suppl (Accu-Chek Guide Me) w/Device kit Inject 1 kit under the skin into the appropriate area as directed See Admin Instructions. 2/16/23   Mainor Sanders Jr., MD   CALCIUM PO Take 600 mg by mouth Daily.    Provider, MD Georgette   ciprofloxacin (Cipro) 500 MG tablet Take 1 tablet by mouth 2 (Two) Times a Day for 7 days. 4/18/24 4/25/24  Mainor Sanders Jr., MD   clindamycin (CLEOCIN) 300 MG capsule Take 1 capsule by mouth 2 (Two) Times a Day for 7 days. 4/18/24 4/25/24  Mainor Sanders Jr., MD   colchicine 0.6 MG tablet Take 1 tablet by mouth Daily. 4/18/24   Mainor Sanders Jr., MD   DULoxetine (CYMBALTA) 60 MG capsule Take 2 capsules by mouth Daily. 3/20/24   Mainor Sanders Jr., MD   famotidine (PEPCID) 40 MG tablet TAKE 1 TABLET TWICE A DAY 3/4/24   Mainor Sanders Jr., MD   furosemide (Lasix) 40 MG tablet Take 1 tablet by mouth Daily for 5 days. 4/18/24 4/23/24  Mainor Sanders Jr., MD   gabapentin (NEURONTIN) 600 MG  tablet Take 1 tablet by mouth 3 (Three) Times a Day As Needed (pain). 24   Mainor Sanders Jr., MD   glucose blood (Accu-Chek Guide) test strip 1 each by Other route Daily. Use as instructed 24   Mainor Sanders Jr., MD   hydroCHLOROthiazide (HYDRODIURIL) 25 MG tablet Take 1 tablet by mouth Daily. 24   Mainor Sanders Jr., MD   melatonin 5 MG tablet tablet Take 1 tablet by mouth Every Night.    ProviderGeorgette MD   metFORMIN (GLUCOPHAGE) 500 MG tablet Take 1 tablet by mouth Daily With Dinner. 24   Mainor Sanders Jr., MD   minocycline (MINOCIN,DYNACIN) 100 MG capsule Take 1 capsule by mouth Daily. 24   Mainor Sanders Jr., MD   multivitamin with minerals tablet tablet Take 1 tablet by mouth Daily.    Georgette Navarro MD   Omega-3 Fatty Acids (fish oil) 1000 MG capsule capsule Take  by mouth Daily With Breakfast.    Georgette Navarro MD   rOPINIRole (REQUIP) 2 MG tablet Take 1 tablet by mouth Every Night. 24   Mainor Sanders Jr., MD   Stiolto Respimat 2.5-2.5 MCG/ACT aerosol solution inhaler USE 2 INHALATIONS ORALLY   DAILY 3/11/24   Mainor Sanders Jr., MD   Synthroid 50 MCG tablet To be filled by Provider 24   Mainor Sanders Jr., MD   Zinc Sulfate (ZINC 15 PO) Take 15 mg by mouth Daily.    ProviderGeorgette MD        Social History:   Social History     Tobacco Use    Smoking status: Former     Current packs/day: 0.00     Average packs/day: 0.5 packs/day for 51.0 years (25.5 ttl pk-yrs)     Types: Cigarettes     Start date:      Quit date: 2020     Years since quittin.3     Passive exposure: Past    Smokeless tobacco: Never    Tobacco comments:     Was off and on during the 40 years   Vaping Use    Vaping status: Never Used   Substance Use Topics    Alcohol use: Yes     Comment: Socially on occasion    Drug use: Never         Review of Systems:  Review of Systems   Constitutional:  Negative for  "chills and fever.   HENT:  Negative for congestion, ear pain and sore throat.    Eyes:  Negative for pain.   Respiratory:  Negative for cough, chest tightness and shortness of breath.    Cardiovascular:  Negative for chest pain.   Gastrointestinal:  Negative for abdominal pain, diarrhea, nausea and vomiting.   Genitourinary:  Negative for flank pain and hematuria.   Musculoskeletal:  Positive for arthralgias, gait problem and joint swelling. Negative for back pain, neck pain and neck stiffness.   Skin:  Positive for color change. Negative for pallor and wound.   Neurological:  Negative for seizures and headaches.   All other systems reviewed and are negative.       Physical Exam:  /61   Pulse 91   Temp 99.5 °F (37.5 °C) (Oral)   Resp 18   Ht 160 cm (63\")   Wt 126 kg (278 lb)   SpO2 94%   BMI 49.25 kg/m²     Physical Exam  Vitals and nursing note reviewed.   Constitutional:       General: She is not in acute distress.     Appearance: Normal appearance. She is not ill-appearing or toxic-appearing.   HENT:      Head: Normocephalic and atraumatic.   Eyes:      General: No scleral icterus.     Conjunctiva/sclera: Conjunctivae normal.      Pupils: Pupils are equal, round, and reactive to light.   Cardiovascular:      Rate and Rhythm: Normal rate and regular rhythm.      Pulses: Normal pulses.   Pulmonary:      Effort: Pulmonary effort is normal. No respiratory distress.   Musculoskeletal:         General: Swelling and tenderness present. No deformity or signs of injury. Normal range of motion.      Cervical back: Normal range of motion.   Skin:     General: Skin is warm and dry.      Capillary Refill: Capillary refill takes less than 2 seconds.      Findings: Erythema present. No bruising, lesion or rash.   Neurological:      General: No focal deficit present.      Mental Status: She is alert and oriented to person, place, and time. Mental status is at baseline.   Psychiatric:         Mood and Affect: Mood " normal.         Behavior: Behavior normal.                Procedures:  Procedures      Medical Decision Making:      Comorbidities that affect care:    Neck lesion, depression, hypertension, osteoarthritis, sleep apnea, pneumonia, generalized anxiety disorder, GERD, hypothyroid, restless leg syndrome, type 2 diabetes    External Notes reviewed:    Previous Clinic Note: Dr. Zuluaga clinic note from this past Thursday.      The following orders were placed and all results were independently analyzed by me:  Orders Placed This Encounter   Procedures    XR Toe 2+ View Left       Medications Given in the Emergency Department:  Medications   dexAMETHasone (DECADRON) 10 MG/ML oral solution 10 mg (10 mg Oral Given 4/21/24 2224)   indomethacin (INDOCIN) capsule 25 mg (25 mg Oral Given 4/21/24 2224)        ED Course:         Labs:    Lab Results (last 24 hours)       ** No results found for the last 24 hours. **             Imaging:    XR Toe 2+ View Left    Result Date: 4/21/2024  XR TOE 2+ VW LEFT-  Date of Exam: 4/21/2024 9:30 PM  Indications: LEFT THIRD TOE PAIN/SWELLING.  Comparison: None available.  FINDINGS: 3 views were obtained. No acute fracture or acute malalignment is identified. Mild degenerative changes are seen. Enthesopathic changes are suspected. No retained radiopaque foreign body is seen. No radiographic evidence of osteomyelitis. No subcutaneous emphysema. Soft tissue swelling involves the left third toe and may represent cellulitis.  CONCLUSION: No acute fracture or acute malalignment is identified.  Electronically Signed By-Brennon Concepcion MD On:4/21/2024 9:56 PM         Differential Diagnosis and Discussion:    Extremity Pain: Differential diagnosis includes but is not limited to soft tissue sprain, tendonitis, tendon injury, dislocation, fracture, deep vein thrombosis, arterial insufficiency, osteoarthritis, bursitis, and ligamentous damage.  Joint Pain: Differential diagnosis includes but is not  limited to polyarticular arthritis, gout, tendinitis, hemarthrosis, septic arthritis, rheumatoid arthritis, bursitis, degenerative joint disease, joint effusion, autoimmune disorder, trauma, and occult neoplasm.    All X-rays impressions were independently interpreted by me.    MDM  Number of Diagnoses or Management Options  Tenosynovitis of left foot: new and requires workup     Amount and/or Complexity of Data Reviewed  Tests in the radiology section of CPT®: reviewed    Risk of Complications, Morbidity, and/or Mortality  Presenting problems: low  Diagnostic procedures: low  Management options: low    Patient Progress  Patient progress: stable         Patient Care Considerations:    LABS: I considered ordering labs, however considering the patient stable condition and complaint I did not feel it was necessary at this time.      Consultants/Shared Management Plan:    None    Social Determinants of Health:    Patient is independent, reliable, and has access to care.       Disposition and Care Coordination:    Discharged: The patient is suitable and stable for discharge with no need for consideration of admission.    I have explained the patient´s condition, diagnoses and treatment plan based on the information available to me at this time. I have answered questions and addressed any concerns. The patient has a good  understanding of the patient´s diagnosis, condition, and treatment plan as can be expected at this point. The vital signs have been stable. The patient´s condition is stable and appropriate for discharge from the emergency department.      The patient will pursue further outpatient evaluation with the primary care physician or other designated or consulting physician as outlined in the discharge instructions. They are agreeable to this plan of care and follow-up instructions have been explained in detail. The patient has received these instructions in written format and has expressed an understanding of  the discharge instructions. The patient is aware that any significant change in condition or worsening of symptoms should prompt an immediate return to this or the closest emergency department or call to 911.  I have explained discharge medications and the need for follow up with the patient/caretakers. This was also printed in the discharge instructions. Patient was discharged with the following medications and follow up:      Medication List        New Prescriptions      naproxen 500 MG EC tablet  Commonly known as: EC NAPROSYN  Take 1 tablet by mouth 2 (Two) Times a Day With Meals for 20 days.               Where to Get Your Medications        These medications were sent to [facility]/pharmacy #[id number] - [place], [place] - [address] - [phone]  - [phone]   [address] [place] [address] [address]      Hours: 24-hours Phone: [phone]   naproxen 500 MG EC tablet      Mainor Sanders Jr., MD  [address]  [address]  [place] [address]  [phone]    Call   MONDAY, FOR FOLLOW UP       Final diagnoses:   Tenosynovitis of left foot        ED Disposition       ED Disposition   Discharge    Condition   Stable    Comment   --               This medical record created using voice recognition software.             Veena Henson, EDDIE  04/22/24 1068

## 2024-04-23 NOTE — PROGRESS NOTES
Chief Complaint  Hospital Follow Up Visit    Subjective          Randi Fajardo presents to [facility]  History of Present Illness  Patient went to ER with ongoing toe pain. She had x-ray without concerns for soft tissue infection. She was given decadron and indomethacin with some help. Today is her last day of antibiotics. Patient has been taking naproxen regularly. She has not been taking colchicine with concerns for side effects.       Current Outpatient Medications   Medication Instructions    Accu-Chek Softclix Lancets lancets 1 each, Other, 3 times daily, Use as instructed    albuterol sulfate HFA (Ventolin HFA) 108 (90 Base) MCG/ACT inhaler 2 puffs, Inhalation, Every 4 Hours PRN    Alcohol Swabs (B-D SINGLE USE SWABS REGULAR) pads 1 Application, Topical, 5 Times Daily    atorvastatin (LIPITOR) 10 mg, Oral, Nightly    Blood Glucose Monitoring Suppl (Accu-Chek Guide Me) w/Device kit 1 kit, Subcutaneous, See Admin Instructions    CALCIUM  mg, Oral, Daily    ciprofloxacin (CIPRO) 500 mg, Oral, 2 Times Daily    clindamycin (CLEOCIN) 300 mg, Oral, 2 Times Daily    DULoxetine (CYMBALTA) 120 mg, Oral, Daily    famotidine (PEPCID) 40 MG tablet TAKE 1 TABLET TWICE A DAY    furosemide (LASIX) 40 mg, Oral, Daily    gabapentin (NEURONTIN) 600 mg, Oral, 3 Times Daily PRN    glucose blood (Accu-Chek Guide) test strip 1 each, Other, Daily, Use as instructed    hydroCHLOROthiazide 25 mg, Oral, Daily    melatonin 5 mg, Oral, Nightly    metFORMIN (GLUCOPHAGE) 500 mg, Oral, Daily With Dinner    methylPREDNISolone (MEDROL) 4 MG dose pack Take as directed on package instructions.    minocycline (MINOCIN,DYNACIN) 100 mg, Oral, Daily    multivitamin with minerals tablet tablet 1 tablet, Oral, Daily    naproxen (EC NAPROSYN) 500 mg, Oral, 2 Times Daily With Meals    Omega-3 Fatty Acids (fish oil) 1000 MG capsule capsule Oral, Daily With Breakfast    rOPINIRole (REQUIP) 2 mg,  "Oral, Nightly    Stiolto Respimat 2.5-2.5 MCG/ACT aerosol solution inhaler USE 2 INHALATIONS ORALLY   DAILY    Synthroid 50 MCG tablet To be filled by Provider    Zinc Sulfate (ZINC 15 PO) 15 mg, Oral, Daily       The following portions of the patient's history were reviewed and updated as appropriate: allergies, current medications, past family history, past medical history, past social history, past surgical history, and problem list.    Objective   Vital Signs:   /83 (BP Location: Right arm)   Pulse 74   Temp 98 °F (36.7 °C) (Temporal)   Ht 160 cm (63\")   Wt 129 kg (284 lb 6.4 oz)   SpO2 92%   BMI 50.38 kg/m²     BP Readings from Last 3 Encounters:   04/25/24 132/83   04/21/24 117/61   04/18/24 107/73     Wt Readings from Last 3 Encounters:   04/25/24 129 kg (284 lb 6.4 oz)   04/21/24 126 kg (278 lb)   04/18/24 133 kg (294 lb)         Physical Exam   Appearance: No acute distress, well-nourished  Head: normocephalic, atraumatic  Eyes: extraocular movements intact, no scleral icterus, no conjunctival injection  Ears, Nose, and Throat: external ears normal, nares patent, moist mucous membranes  Cardiovascular: regular rate and rhythm. no murmurs, rubs, or gallops. no edema  Respiratory: breathing comfortably, symmetric chest rise, clear to auscultation bilaterally. No wheezes, rales, or rhonchi.  Neuro: alert and oriented to time, place, and person. Normal gait  Psych: normal mood and affect     Result Review :   The following data was reviewed by: Mainor Sanders Jr, MD on 04/25/2024:  Common labs          5/12/2023    15:22 11/14/2023    10:18 2/12/2024    10:13   Common Labs   Glucose 90  97  121    BUN 16  15  18    Creatinine 0.67  0.98  0.99    Sodium 138  138  140    Potassium 4.3  4.3  4.4    Chloride 100  99  100    Calcium 9.8  9.9  9.6    Albumin 4.3  4.2  4.1    Total Bilirubin 0.2  0.3  0.4    Alkaline Phosphatase 53  52  56    AST (SGOT) 45  41  37    ALT (SGPT) 49  46  37    WBC " 9.72  8.73  10.85    Hemoglobin 15.5  15.5  15.4    Hematocrit 45.3  46.5  47.0    Platelets 307  284  293    Total Cholesterol 178  173  175    Triglycerides 281  181  191    HDL Cholesterol 35  38  38    LDL Cholesterol  96  103  104    Hemoglobin A1C 6.20  6.50  6.30    Microalbumin, Urine <1.2          Lab Results   Component Value Date    SARSANTIGEN Not Detected 01/10/2024    COVID19 Not Detected 01/10/2024    FLUAAG Not Detected 01/10/2024    FLUBAG Not Detected 01/10/2024    RAPSCRN Negative 12/12/2023        Assessment and Plan    Diagnoses and all orders for this visit:    1. Tenosynovitis of toe (Primary)  Comments:  will treat with broad spectrum ABx x10 days and steroids. x-ray neg. low threshold for advanced imaging if no improvement.  Orders:  -     ciprofloxacin (Cipro) 500 MG tablet; Take 1 tablet by mouth 2 (Two) Times a Day for 3 days.  Dispense: 6 tablet; Refill: 0  -     clindamycin (CLEOCIN) 300 MG capsule; Take 1 capsule by mouth 2 (Two) Times a Day for 3 days.  Dispense: 6 capsule; Refill: 0  -     methylPREDNISolone (MEDROL) 4 MG dose pack; Take as directed on package instructions.  Dispense: 21 tablet; Refill: 0      Medications Discontinued During This Encounter   Medication Reason    ciprofloxacin (Cipro) 500 MG tablet *Therapy completed    clindamycin (CLEOCIN) 300 MG capsule *Therapy completed    colchicine 0.6 MG tablet *Therapy completed        Follow Up   Return if symptoms worsen or fail to improve.  Patient was given instructions and counseling regarding her condition or for health maintenance advice. Please see specific information pulled into the AVS if appropriate.       Mainor Sanders Jr, MD  04/25/24  13:49 EDT

## 2024-04-25 ENCOUNTER — OFFICE VISIT (OUTPATIENT)
Dept: INTERNAL MEDICINE | Facility: CLINIC | Age: 69
End: 2024-04-25
Payer: MEDICARE

## 2024-04-25 VITALS
OXYGEN SATURATION: 92 % | HEART RATE: 74 BPM | BODY MASS INDEX: 50.39 KG/M2 | HEIGHT: 63 IN | SYSTOLIC BLOOD PRESSURE: 132 MMHG | DIASTOLIC BLOOD PRESSURE: 83 MMHG | TEMPERATURE: 98 F | WEIGHT: 284.4 LBS

## 2024-04-25 DIAGNOSIS — M65.9: Primary | ICD-10-CM

## 2024-04-25 DIAGNOSIS — Z79.899 ENCOUNTER FOR LONG-TERM (CURRENT) USE OF OTHER MEDICATIONS: ICD-10-CM

## 2024-04-25 LAB
AMPHET+METHAMPHET UR QL: NEGATIVE
AMPHETAMINE INTERNAL CONTROL: NORMAL
AMPHETAMINES UR QL: NEGATIVE
BARBITURATE INTERNAL CONTROL: NORMAL
BARBITURATES UR QL SCN: NEGATIVE
BENZODIAZ UR QL SCN: NEGATIVE
BENZODIAZEPINE INTERNAL CONTROL: NORMAL
BUPRENORPHINE INTERNAL CONTROL: NORMAL
BUPRENORPHINE SERPL-MCNC: NEGATIVE NG/ML
CANNABINOIDS SERPL QL: NEGATIVE
COCAINE INTERNAL CONTROL: NORMAL
COCAINE UR QL: NEGATIVE
EXPIRATION DATE: NORMAL
Lab: NORMAL
MDMA (ECSTASY) INTERNAL CONTROL: NORMAL
MDMA UR QL SCN: NEGATIVE
METHADONE INTERNAL CONTROL: NORMAL
METHADONE UR QL SCN: NEGATIVE
METHAMPHETAMINE INTERNAL CONTROL: NORMAL
MORPHINE INTERNAL CONTROL: NORMAL
MORPHINE/OPIATES SCREEN, URINE: NEGATIVE
OXYCODONE INTERNAL CONTROL: NORMAL
OXYCODONE UR QL SCN: NEGATIVE
PCP UR QL SCN: NEGATIVE
PHENCYCLIDINE INTERNAL CONTROL: NORMAL
THC INTERNAL CONTROL: NORMAL

## 2024-04-25 RX ORDER — CLINDAMYCIN HYDROCHLORIDE 300 MG/1
300 CAPSULE ORAL 2 TIMES DAILY
Qty: 6 CAPSULE | Refills: 0 | Status: SHIPPED | OUTPATIENT
Start: 2024-04-25 | End: 2024-04-28

## 2024-04-25 RX ORDER — METHYLPREDNISOLONE 4 MG/1
TABLET ORAL
Qty: 21 TABLET | Refills: 0 | Status: SHIPPED | OUTPATIENT
Start: 2024-04-25

## 2024-04-25 RX ORDER — CIPROFLOXACIN 500 MG/1
500 TABLET, FILM COATED ORAL 2 TIMES DAILY
Qty: 6 TABLET | Refills: 0 | Status: SHIPPED | OUTPATIENT
Start: 2024-04-25 | End: 2024-04-28

## 2024-05-07 DIAGNOSIS — R06.09 DOE (DYSPNEA ON EXERTION): ICD-10-CM

## 2024-05-07 RX ORDER — TIOTROPIUM BROMIDE AND OLODATEROL 3.124; 2.736 UG/1; UG/1
SPRAY, METERED RESPIRATORY (INHALATION)
Qty: 4 G | Refills: 1 | Status: SHIPPED | OUTPATIENT
Start: 2024-05-07

## 2024-05-10 DIAGNOSIS — G62.9 NEUROPATHY: ICD-10-CM

## 2024-05-10 DIAGNOSIS — E11.9 TYPE 2 DIABETES MELLITUS WITHOUT COMPLICATION, WITHOUT LONG-TERM CURRENT USE OF INSULIN: ICD-10-CM

## 2024-05-10 NOTE — TELEPHONE ENCOUNTER
Caller: Randi Fajardo    Relationship: Self    Best call back number: [phone]     Requested Prescriptions:   Requested Prescriptions     Pending Prescriptions Disp Refills    gabapentin (NEURONTIN) 600 MG tablet 90 tablet 0     Sig: Take 1 tablet by mouth 3 (Three) Times a Day As Needed (pain).    metFORMIN (GLUCOPHAGE) 500 MG tablet 90 tablet 0     Sig: Take 1 tablet by mouth Daily With Dinner.        Pharmacy where request should be sent: [facility][id number] [place] [facility] [address] [facility] [phone] [facility] [phone] [facility]     Last office visit with prescribing clinician: 4/25/2024   Last telemedicine visit with prescribing clinician: Visit date not found   Next office visit with prescribing clinician: 5/14/2024     Additional details provided by patient: PATIENT IS NEEDING A REFILL.     Does the patient have less than a 3 day supply:  [x] Yes  [] No    Would you like a call back once the refill request has been completed: [x] Yes [] No    If the office needs to give you a call back, can they leave a voicemail: [x] Yes [] No    Sina Self Rep   05/10/24 15:38 EDT

## 2024-05-13 ENCOUNTER — LAB (OUTPATIENT)
Dept: LAB | Facility: HOSPITAL | Age: 69
End: 2024-05-13
Payer: MEDICARE

## 2024-05-13 ENCOUNTER — OFFICE VISIT (OUTPATIENT)
Dept: PULMONOLOGY | Facility: CLINIC | Age: 69
End: 2024-05-13
Payer: MEDICARE

## 2024-05-13 VITALS
RESPIRATION RATE: 18 BRPM | HEART RATE: 85 BPM | WEIGHT: 285 LBS | DIASTOLIC BLOOD PRESSURE: 72 MMHG | OXYGEN SATURATION: 91 % | HEIGHT: 63 IN | BODY MASS INDEX: 50.5 KG/M2 | SYSTOLIC BLOOD PRESSURE: 131 MMHG | TEMPERATURE: 98.3 F

## 2024-05-13 DIAGNOSIS — I10 ESSENTIAL HYPERTENSION: ICD-10-CM

## 2024-05-13 DIAGNOSIS — J41.8 MIXED SIMPLE AND MUCOPURULENT CHRONIC BRONCHITIS: Primary | ICD-10-CM

## 2024-05-13 DIAGNOSIS — E78.2 MIXED HYPERLIPIDEMIA: ICD-10-CM

## 2024-05-13 DIAGNOSIS — E11.9 TYPE 2 DIABETES MELLITUS WITHOUT COMPLICATION, WITHOUT LONG-TERM CURRENT USE OF INSULIN: ICD-10-CM

## 2024-05-13 DIAGNOSIS — G47.33 OBSTRUCTIVE SLEEP APNEA: ICD-10-CM

## 2024-05-13 DIAGNOSIS — E66.01 MORBID (SEVERE) OBESITY DUE TO EXCESS CALORIES: ICD-10-CM

## 2024-05-13 DIAGNOSIS — E03.9 ACQUIRED HYPOTHYROIDISM: ICD-10-CM

## 2024-05-13 LAB
ALBUMIN SERPL-MCNC: 4.1 G/DL (ref 3.5–5.2)
ALBUMIN/GLOB SERPL: 1.6 G/DL
ALP SERPL-CCNC: 55 U/L (ref 39–117)
ALT SERPL W P-5'-P-CCNC: 29 U/L (ref 1–33)
ANION GAP SERPL CALCULATED.3IONS-SCNC: 9 MMOL/L (ref 5–15)
AST SERPL-CCNC: 23 U/L (ref 1–32)
BASOPHILS # BLD AUTO: 0.1 10*3/MM3 (ref 0–0.2)
BASOPHILS NFR BLD AUTO: 1.1 % (ref 0–1.5)
BILIRUB SERPL-MCNC: 0.3 MG/DL (ref 0–1.2)
BUN SERPL-MCNC: 17 MG/DL (ref 8–23)
BUN/CREAT SERPL: 18.5 (ref 7–25)
CALCIUM SPEC-SCNC: 8.9 MG/DL (ref 8.6–10.5)
CHLORIDE SERPL-SCNC: 104 MMOL/L (ref 98–107)
CHOLEST SERPL-MCNC: 118 MG/DL (ref 0–200)
CO2 SERPL-SCNC: 29 MMOL/L (ref 22–29)
CREAT SERPL-MCNC: 0.92 MG/DL (ref 0.57–1)
DEPRECATED RDW RBC AUTO: 44.6 FL (ref 37–54)
EGFRCR SERPLBLD CKD-EPI 2021: 67.1 ML/MIN/1.73
EOSINOPHIL # BLD AUTO: 0.46 10*3/MM3 (ref 0–0.4)
EOSINOPHIL NFR BLD AUTO: 4.9 % (ref 0.3–6.2)
ERYTHROCYTE [DISTWIDTH] IN BLOOD BY AUTOMATED COUNT: 13.8 % (ref 12.3–15.4)
GLOBULIN UR ELPH-MCNC: 2.6 GM/DL
GLUCOSE SERPL-MCNC: 112 MG/DL (ref 65–99)
HBA1C MFR BLD: 6.5 % (ref 4.8–5.6)
HCT VFR BLD AUTO: 46.8 % (ref 34–46.6)
HDLC SERPL-MCNC: 38 MG/DL (ref 40–60)
HGB BLD-MCNC: 15.4 G/DL (ref 12–15.9)
IMM GRANULOCYTES # BLD AUTO: 0.04 10*3/MM3 (ref 0–0.05)
IMM GRANULOCYTES NFR BLD AUTO: 0.4 % (ref 0–0.5)
LDLC SERPL CALC-MCNC: 53 MG/DL (ref 0–100)
LDLC/HDLC SERPL: 1.28 {RATIO}
LYMPHOCYTES # BLD AUTO: 3.97 10*3/MM3 (ref 0.7–3.1)
LYMPHOCYTES NFR BLD AUTO: 42 % (ref 19.6–45.3)
MCH RBC QN AUTO: 29.2 PG (ref 26.6–33)
MCHC RBC AUTO-ENTMCNC: 32.9 G/DL (ref 31.5–35.7)
MCV RBC AUTO: 88.6 FL (ref 79–97)
MONOCYTES # BLD AUTO: 0.75 10*3/MM3 (ref 0.1–0.9)
MONOCYTES NFR BLD AUTO: 7.9 % (ref 5–12)
NEUTROPHILS NFR BLD AUTO: 4.13 10*3/MM3 (ref 1.7–7)
NEUTROPHILS NFR BLD AUTO: 43.7 % (ref 42.7–76)
NRBC BLD AUTO-RTO: 0 /100 WBC (ref 0–0.2)
PLATELET # BLD AUTO: 249 10*3/MM3 (ref 140–450)
PMV BLD AUTO: 10.9 FL (ref 6–12)
POTASSIUM SERPL-SCNC: 4.1 MMOL/L (ref 3.5–5.2)
PROT SERPL-MCNC: 6.7 G/DL (ref 6–8.5)
RBC # BLD AUTO: 5.28 10*6/MM3 (ref 3.77–5.28)
SODIUM SERPL-SCNC: 142 MMOL/L (ref 136–145)
TRIGL SERPL-MCNC: 156 MG/DL (ref 0–150)
TSH SERPL DL<=0.05 MIU/L-ACNC: 1.84 UIU/ML (ref 0.27–4.2)
VLDLC SERPL-MCNC: 27 MG/DL (ref 5–40)
WBC NRBC COR # BLD AUTO: 9.45 10*3/MM3 (ref 3.4–10.8)

## 2024-05-13 PROCEDURE — 80061 LIPID PANEL: CPT

## 2024-05-13 PROCEDURE — 36415 COLL VENOUS BLD VENIPUNCTURE: CPT

## 2024-05-13 PROCEDURE — 80053 COMPREHEN METABOLIC PANEL: CPT

## 2024-05-13 PROCEDURE — 3075F SYST BP GE 130 - 139MM HG: CPT | Performed by: INTERNAL MEDICINE

## 2024-05-13 PROCEDURE — 3078F DIAST BP <80 MM HG: CPT | Performed by: INTERNAL MEDICINE

## 2024-05-13 PROCEDURE — 1160F RVW MEDS BY RX/DR IN RCRD: CPT | Performed by: INTERNAL MEDICINE

## 2024-05-13 PROCEDURE — 83036 HEMOGLOBIN GLYCOSYLATED A1C: CPT

## 2024-05-13 PROCEDURE — 99213 OFFICE O/P EST LOW 20 MIN: CPT | Performed by: INTERNAL MEDICINE

## 2024-05-13 PROCEDURE — 84443 ASSAY THYROID STIM HORMONE: CPT

## 2024-05-13 PROCEDURE — 85025 COMPLETE CBC W/AUTO DIFF WBC: CPT

## 2024-05-13 PROCEDURE — 1159F MED LIST DOCD IN RCRD: CPT | Performed by: INTERNAL MEDICINE

## 2024-05-13 RX ORDER — ALBUTEROL SULFATE 90 UG/1
2 AEROSOL, METERED RESPIRATORY (INHALATION) EVERY 4 HOURS PRN
Qty: 1 G | Refills: 5 | Status: SHIPPED | OUTPATIENT
Start: 2024-05-13

## 2024-05-13 NOTE — PROGRESS NOTES
Pulmonary Office Follow-up    Subjective     Randi Fajardo is seen today at the office for   Chief Complaint   Patient presents with    Mixed simple and mucopurulent chronic bronchitis    Follow-up     6 month          HPI  Randi Fajardo is a 68 y.o. female with a PMH significant for COPD and obstructive sleep apnea presents for follow-up patient has been eating the gym and has been more active she denies any significant cough or wheezing and she is compliant with her CPAP there is no swelling of her feet      Tobacco use history:  Former smoker      Patient Active Problem List   Diagnosis    Acquired hypothyroidism    Essential hypertension    Mixed hyperlipidemia    Type 2 diabetes mellitus without complication, without long-term current use of insulin    Right knee pain    Primary osteoarthritis of right knee       Review of Systems  Review of Systems   Respiratory:  Positive for shortness of breath.    All other systems reviewed and are negative.    As described in the HPI. Otherwise, remainder of ROS (14 systems) were negative.    Medications, Allergies, Social, and Family Histories reviewed as per EMR.    Result Review :            Objective     Vitals:    05/13/24 1316   BP: 131/72   Pulse: 85   Resp: 18   Temp: 98.3 °F (36.8 °C)   SpO2: 91%         05/13/24  1316   Weight: 129 kg (285 lb)       Physical Exam  Vitals and nursing note reviewed.   Constitutional:       Appearance: Normal appearance.   HENT:      Head: Normocephalic and atraumatic.      Nose: Nose normal.      Mouth/Throat:      Mouth: Mucous membranes are moist.      Pharynx: Oropharynx is clear.   Eyes:      Extraocular Movements: Extraocular movements intact.      Conjunctiva/sclera: Conjunctivae normal.      Pupils: Pupils are equal, round, and reactive to light.   Cardiovascular:      Rate and Rhythm: Normal rate and regular rhythm.      Pulses: Normal pulses.      Heart sounds: Normal heart sounds.   Pulmonary:      Effort:  Pulmonary effort is normal.      Breath sounds: Normal breath sounds.   Abdominal:      General: Abdomen is flat. Bowel sounds are normal.      Palpations: Abdomen is soft.   Musculoskeletal:         General: Normal range of motion.      Cervical back: Normal range of motion and neck supple.   Skin:     General: Skin is warm.      Capillary Refill: Capillary refill takes 2 to 3 seconds.   Neurological:      General: No focal deficit present.      Mental Status: She is alert and oriented to person, place, and time.   Psychiatric:         Mood and Affect: Mood normal.         Behavior: Behavior normal.         No radiology results for the last 90 days.     Assessment & Plan     Diagnoses and all orders for this visit:    1. Mixed simple and mucopurulent chronic bronchitis (Primary)    2. Obstructive sleep apnea    3. Morbid (severe) obesity due to excess calories         Discussion/ Recommendations:   Encouraged to continue regular exercise and weight reduction her BMI is 50.49  Continue compliance with her CPAP  Continue albuterol inhaler and Stiolto daily  Vaccinations discussed and recommended             Return in about 6 months (around 11/13/2024).          This document has been electronically signed by Joon Cuevas MD on May 13, 2024 13:26 EDT

## 2024-05-13 NOTE — TELEPHONE ENCOUNTER
Refill request for controlled substance.      Date of request: 5/13/2024   Medication requested: Gabapentin  Last OV: 4/25/24  Last UDS: 4/25/24  Contract signed: yes    Date:11/14/23  Next office visit: 5/14/24    Marly Peck

## 2024-05-14 ENCOUNTER — OFFICE VISIT (OUTPATIENT)
Dept: INTERNAL MEDICINE | Facility: CLINIC | Age: 69
End: 2024-05-14
Payer: MEDICARE

## 2024-05-14 VITALS
HEIGHT: 63 IN | BODY MASS INDEX: 50.04 KG/M2 | WEIGHT: 282.4 LBS | SYSTOLIC BLOOD PRESSURE: 111 MMHG | DIASTOLIC BLOOD PRESSURE: 59 MMHG | OXYGEN SATURATION: 93 % | HEART RATE: 93 BPM | TEMPERATURE: 97.2 F

## 2024-05-14 DIAGNOSIS — E11.9 TYPE 2 DIABETES MELLITUS WITHOUT COMPLICATION, WITHOUT LONG-TERM CURRENT USE OF INSULIN: ICD-10-CM

## 2024-05-14 DIAGNOSIS — M65.9: ICD-10-CM

## 2024-05-14 DIAGNOSIS — I10 ESSENTIAL HYPERTENSION: Primary | ICD-10-CM

## 2024-05-14 DIAGNOSIS — E78.2 MIXED HYPERLIPIDEMIA: ICD-10-CM

## 2024-05-14 DIAGNOSIS — E03.9 ACQUIRED HYPOTHYROIDISM: ICD-10-CM

## 2024-05-14 LAB
ALBUMIN UR-MCNC: <1.2 MG/DL
CREAT UR-MCNC: 28.2 MG/DL
MICROALBUMIN/CREAT UR: NORMAL MG/G{CREAT}

## 2024-05-14 PROCEDURE — 82570 ASSAY OF URINE CREATININE: CPT | Performed by: INTERNAL MEDICINE

## 2024-05-14 PROCEDURE — G2211 COMPLEX E/M VISIT ADD ON: HCPCS | Performed by: INTERNAL MEDICINE

## 2024-05-14 PROCEDURE — 1126F AMNT PAIN NOTED NONE PRSNT: CPT | Performed by: INTERNAL MEDICINE

## 2024-05-14 PROCEDURE — 82043 UR ALBUMIN QUANTITATIVE: CPT | Performed by: INTERNAL MEDICINE

## 2024-05-14 PROCEDURE — 3044F HG A1C LEVEL LT 7.0%: CPT | Performed by: INTERNAL MEDICINE

## 2024-05-14 PROCEDURE — 99214 OFFICE O/P EST MOD 30 MIN: CPT | Performed by: INTERNAL MEDICINE

## 2024-05-14 PROCEDURE — 3078F DIAST BP <80 MM HG: CPT | Performed by: INTERNAL MEDICINE

## 2024-05-14 PROCEDURE — 3074F SYST BP LT 130 MM HG: CPT | Performed by: INTERNAL MEDICINE

## 2024-05-14 RX ORDER — GABAPENTIN 600 MG/1
600 TABLET ORAL 3 TIMES DAILY PRN
Qty: 90 TABLET | Refills: 0 | Status: SHIPPED | OUTPATIENT
Start: 2024-05-14

## 2024-05-14 NOTE — PROGRESS NOTES
Chief Complaint  Follow-up (6 mo f/u) and Toe Injury (Here 04/25/24 for toe, would like it looked at again today)    Subjective          Randi Fajardo presents to [facility]  History of Present Illness  Patient reports her toe has improved. It is no longer red or painful.   DM2- patient reports home blood glucose range have been good with 74- 120. Patient denies hypoglycemic events.   Hyperlipidemia- her LDL has improved.  Patient reports she is trying to exercise more often.   Hypothyroid- TSH wnl recently.     Current Outpatient Medications   Medication Instructions    Accu-Chek Softclix Lancets lancets 1 each, Other, 3 times daily, Use as instructed    albuterol sulfate HFA (Ventolin HFA) 108 (90 Base) MCG/ACT inhaler 2 puffs, Inhalation, Every 4 Hours PRN    Alcohol Swabs (B-D SINGLE USE SWABS REGULAR) pads 1 Application, Topical, 5 Times Daily    atorvastatin (LIPITOR) 10 mg, Oral, Nightly    Blood Glucose Monitoring Suppl (Accu-Chek Guide Me) w/Device kit 1 kit, Subcutaneous, See Admin Instructions    CALCIUM  mg, Oral, Daily    DULoxetine (CYMBALTA) 120 mg, Oral, Daily    famotidine (PEPCID) 40 MG tablet TAKE 1 TABLET TWICE A DAY    gabapentin (NEURONTIN) 600 mg, Oral, 3 Times Daily PRN    glucose blood (Accu-Chek Guide) test strip 1 each, Other, Daily, Use as instructed    hydroCHLOROthiazide 25 mg, Oral, Daily    melatonin 5 mg, Oral, Nightly    metFORMIN (GLUCOPHAGE) 500 mg, Oral, Daily With Dinner    minocycline (MINOCIN,DYNACIN) 100 mg, Oral, Daily    multivitamin with minerals tablet tablet 1 tablet, Oral, Daily    Omega-3 Fatty Acids (fish oil) 1000 MG capsule capsule Oral, Daily With Breakfast    rOPINIRole (REQUIP) 2 mg, Oral, Nightly    Stiolto Respimat 2.5-2.5 MCG/ACT aerosol solution inhaler INHALE TWO PUFFS BY MOUTH EVERY DAY    Synthroid 50 MCG tablet To be filled by Provider    Zinc Sulfate (ZINC 15 PO) 15 mg, Oral, Daily       The  "following portions of the patient's history were reviewed and updated as appropriate: allergies, current medications, past family history, past medical history, past social history, past surgical history, and problem list.    Objective   Vital Signs:   /59 (BP Location: Left arm, Patient Position: Sitting, Cuff Size: Large Adult)   Pulse 93   Temp 97.2 °F (36.2 °C) (Temporal)   Ht 160 cm (63\")   Wt 128 kg (282 lb 6.4 oz)   SpO2 93%   BMI 50.02 kg/m²     BP Readings from Last 3 Encounters:   05/14/24 111/59   05/13/24 131/72   04/25/24 132/83     Wt Readings from Last 3 Encounters:   05/14/24 128 kg (282 lb 6.4 oz)   05/13/24 129 kg (285 lb)   04/25/24 129 kg (284 lb 6.4 oz)         Physical Exam   Appearance: No acute distress, well-nourished  Head: normocephalic, atraumatic  Eyes: extraocular movements intact, no scleral icterus, no conjunctival injection  Ears, Nose, and Throat: external ears normal, nares patent, moist mucous membranes  Cardiovascular: regular rate and rhythm. no murmurs, rubs, or gallops. no edema  Respiratory: breathing comfortably, symmetric chest rise, clear to auscultation bilaterally. No wheezes, rales, or rhonchi.  Neuro: alert and oriented to time, place, and person. Normal gait  Psych: normal mood and affect     Result Review :   The following data was reviewed by: Mainor Sanders Jr, MD on 05/14/2024:  Common labs          11/14/2023    10:18 2/12/2024    10:13 5/13/2024    10:14   Common Labs   Glucose 97  121  112    BUN 15  18  17    Creatinine 0.98  0.99  0.92    Sodium 138  140  142    Potassium 4.3  4.4  4.1    Chloride 99  100  104    Calcium 9.9  9.6  8.9    Albumin 4.2  4.1  4.1    Total Bilirubin 0.3  0.4  0.3    Alkaline Phosphatase 52  56  55    AST (SGOT) 41  37  23    ALT (SGPT) 46  37  29    WBC 8.73  10.85  9.45    Hemoglobin 15.5  15.4  15.4    Hematocrit 46.5  47.0  46.8    Platelets 284  293  249    Total Cholesterol 173  175  118    Triglycerides " 181  191  156    HDL Cholesterol 38  38  38    LDL Cholesterol  103  104  53    Hemoglobin A1C 6.50  6.30  6.50        Lab Results   Component Value Date    SARSANTIGEN Not Detected 01/10/2024    COVID19 Not Detected 01/10/2024    FLUAAG Not Detected 01/10/2024    FLUBAG Not Detected 01/10/2024    RAPSCRN Negative 12/12/2023          Assessment and Plan    Diagnoses and all orders for this visit:    1. Essential hypertension (Primary)  Comments:  well controlled on regimen. goal BP <130/80    2. Type 2 diabetes mellitus without complication, without long-term current use of insulin  Comments:  labs reviewed and reval good control. goal HgbA1c <7%  Orders:  -     Microalbumin / Creatinine Urine Ratio - Urine, Clean Catch    3. Acquired hypothyroidism  Comments:  TSH wnl and will cont current dosage of synthroid.    4. Mixed hyperlipidemia  Comments:  cont statin. LDL at goal.    5. Tenosynovitis of toe  Comments:  improved at this time.        There are no discontinued medications.       Follow Up   Return in about 6 months (around 11/14/2024) for HTN, DM.  Patient was given instructions and counseling regarding her condition or for health maintenance advice. Please see specific information pulled into the AVS if appropriate.       Mainor Sanders Jr, MD  05/14/24  10:57 EDT

## 2024-05-20 NOTE — PROGRESS NOTES
Chief Complaint  Toe Pain (Middle toe on the left foot is red again )    Subjective          Randi Fajardo presents to [facility]  History of Present Illness  Patient reports worsening toe pain, swelling, and redness x3-4 days. Patient denies fevers. She is ambulatory.     Current Outpatient Medications   Medication Instructions    Accu-Chek Softclix Lancets lancets 1 each, Other, 3 times daily, Use as instructed    albuterol sulfate HFA (Ventolin HFA) 108 (90 Base) MCG/ACT inhaler 2 puffs, Inhalation, Every 4 Hours PRN    Alcohol Swabs (B-D SINGLE USE SWABS REGULAR) pads 1 Application, Topical, 5 Times Daily    atorvastatin (LIPITOR) 10 mg, Oral, Nightly    Blood Glucose Monitoring Suppl (Accu-Chek Guide Me) w/Device kit 1 kit, Subcutaneous, See Admin Instructions    CALCIUM  mg, Oral, Daily    cephalexin (KEFLEX) 500 mg, Oral, 4 Times Daily    DULoxetine (CYMBALTA) 120 mg, Oral, Daily    famotidine (PEPCID) 40 MG tablet TAKE 1 TABLET TWICE A DAY    gabapentin (NEURONTIN) 600 mg, Oral, 3 Times Daily PRN    glucose blood (Accu-Chek Guide) test strip 1 each, Other, Daily, Use as instructed    hydroCHLOROthiazide 25 mg, Oral, Daily    melatonin 5 mg, Oral, Nightly    metFORMIN (GLUCOPHAGE) 500 mg, Oral, Daily With Dinner    minocycline (MINOCIN,DYNACIN) 100 mg, Oral, Daily    multivitamin with minerals tablet tablet 1 tablet, Oral, Daily    Omega-3 Fatty Acids (fish oil) 1000 MG capsule capsule Oral, Daily With Breakfast    rOPINIRole (REQUIP) 2 mg, Oral, Nightly    Stiolto Respimat 2.5-2.5 MCG/ACT aerosol solution inhaler INHALE TWO PUFFS BY MOUTH EVERY DAY    sulfamethoxazole-trimethoprim (Bactrim DS) 800-160 MG per tablet 1 tablet, Oral, 2 Times Daily    Synthroid 50 MCG tablet To be filled by Provider    Zinc Sulfate (ZINC 15 PO) 15 mg, Oral, Daily       The following portions of the patient's history were reviewed and updated as appropriate: allergies,  current medications, past family history, past medical history, past social history, past surgical history, and problem list.    Objective   Vital Signs:   /77 (BP Location: Left arm)   Pulse 88   Temp 97.2 °F (36.2 °C) (Temporal)   Wt 127 kg (281 lb)   SpO2 90%   BMI 49.78 kg/m²     BP Readings from Last 3 Encounters:   05/21/24 125/77   05/14/24 111/59   05/13/24 131/72     Wt Readings from Last 3 Encounters:   05/21/24 127 kg (281 lb)   05/14/24 128 kg (282 lb 6.4 oz)   05/13/24 129 kg (285 lb)        Physical Exam   Appearance: No acute distress, well-nourished  Head: normocephalic, atraumatic  Eyes: extraocular movements intact, no scleral icterus, no conjunctival injection  Ears, Nose, and Throat: external ears normal, nares patent, moist mucous membranes  Cardiovascular: regular rate. no edema  Respiratory: breathing comfortably, symmetric chest rise,  Neuro: alert and oriented to time, place, and person. Normal gait  Psych: normal mood and affect   Toe: +swelling, warmth, and redness of left 3rd toe. No excoriations noted.     Result Review :   The following data was reviewed by: Mainor Sanders Jr, MD on 05/21/2024:  Common labs          2/12/2024    10:13 5/13/2024    10:14 5/14/2024    11:15   Common Labs   Glucose 121  112     BUN 18  17     Creatinine 0.99  0.92     Sodium 140  142     Potassium 4.4  4.1     Chloride 100  104     Calcium 9.6  8.9     Albumin 4.1  4.1     Total Bilirubin 0.4  0.3     Alkaline Phosphatase 56  55     AST (SGOT) 37  23     ALT (SGPT) 37  29     WBC 10.85  9.45     Hemoglobin 15.4  15.4     Hematocrit 47.0  46.8     Platelets 293  249     Total Cholesterol 175  118     Triglycerides 191  156     HDL Cholesterol 38  38     LDL Cholesterol  104  53     Hemoglobin A1C 6.30  6.50     Microalbumin, Urine   <1.2          Lab Results   Component Value Date    SARSANTIGEN Not Detected 01/10/2024    COVID19 Not Detected 01/10/2024    FLUAAG Not Detected 01/10/2024     FLUBAG Not Detected 01/10/2024    RAPSCRN Negative 12/12/2023          Assessment and Plan    Diagnoses and all orders for this visit:    1. Tenosynovitis of toe (Primary)  Comments:  previously treated with cipro + clinda. will retreat with bactrim +keflex. obtain imaging to look for deep tissue infection.  Orders:  -     sulfamethoxazole-trimethoprim (Bactrim DS) 800-160 MG per tablet; Take 1 tablet by mouth 2 (Two) Times a Day for 14 days.  Dispense: 28 tablet; Refill: 0  -     cephalexin (Keflex) 500 MG capsule; Take 1 capsule by mouth 4 (Four) Times a Day for 14 days.  Dispense: 56 capsule; Refill: 0  -     MRI Foot Left With & Without Contrast; Future        There are no discontinued medications.       Follow Up   Return if symptoms worsen or fail to improve.  Patient was given instructions and counseling regarding her condition or for health maintenance advice. Please see specific information pulled into the AVS if appropriate.       Mainor Sanders Jr, MD  05/21/24  08:50 EDT

## 2024-05-21 ENCOUNTER — OFFICE VISIT (OUTPATIENT)
Dept: INTERNAL MEDICINE | Facility: CLINIC | Age: 69
End: 2024-05-21
Payer: MEDICARE

## 2024-05-21 VITALS
SYSTOLIC BLOOD PRESSURE: 125 MMHG | TEMPERATURE: 97.2 F | WEIGHT: 281 LBS | OXYGEN SATURATION: 90 % | HEART RATE: 88 BPM | DIASTOLIC BLOOD PRESSURE: 77 MMHG | BODY MASS INDEX: 49.78 KG/M2

## 2024-05-21 DIAGNOSIS — M65.9: Primary | ICD-10-CM

## 2024-05-21 PROCEDURE — 3078F DIAST BP <80 MM HG: CPT | Performed by: INTERNAL MEDICINE

## 2024-05-21 PROCEDURE — 3074F SYST BP LT 130 MM HG: CPT | Performed by: INTERNAL MEDICINE

## 2024-05-21 PROCEDURE — 1126F AMNT PAIN NOTED NONE PRSNT: CPT | Performed by: INTERNAL MEDICINE

## 2024-05-21 PROCEDURE — 3044F HG A1C LEVEL LT 7.0%: CPT | Performed by: INTERNAL MEDICINE

## 2024-05-21 PROCEDURE — 99214 OFFICE O/P EST MOD 30 MIN: CPT | Performed by: INTERNAL MEDICINE

## 2024-05-21 RX ORDER — CEPHALEXIN 500 MG/1
500 CAPSULE ORAL 4 TIMES DAILY
Qty: 56 CAPSULE | Refills: 0 | Status: SHIPPED | OUTPATIENT
Start: 2024-05-21 | End: 2024-05-23 | Stop reason: ALTCHOICE

## 2024-05-21 RX ORDER — SULFAMETHOXAZOLE AND TRIMETHOPRIM 800; 160 MG/1; MG/1
1 TABLET ORAL 2 TIMES DAILY
Qty: 28 TABLET | Refills: 0 | Status: SHIPPED | OUTPATIENT
Start: 2024-05-21 | End: 2024-05-23 | Stop reason: ALTCHOICE

## 2024-05-22 ENCOUNTER — HOSPITAL ENCOUNTER (OUTPATIENT)
Dept: MRI IMAGING | Facility: HOSPITAL | Age: 69
Discharge: HOME | End: 2024-05-22
Admitting: INTERNAL MEDICINE
Payer: MEDICARE

## 2024-05-22 DIAGNOSIS — M65.9: ICD-10-CM

## 2024-05-22 PROCEDURE — A9577 INJ MULTIHANCE: HCPCS | Performed by: INTERNAL MEDICINE

## 2024-05-22 PROCEDURE — 0 GADOBENATE DIMEGLUMINE 529 MG/ML SOLUTION: Performed by: INTERNAL MEDICINE

## 2024-05-22 PROCEDURE — 73720 MRI LWR EXTREMITY W/O&W/DYE: CPT

## 2024-05-22 RX ADMIN — GADOBENATE DIMEGLUMINE 20 ML: 529 INJECTION, SOLUTION INTRAVENOUS at 12:31

## 2024-05-23 ENCOUNTER — TELEPHONE (OUTPATIENT)
Dept: INTERNAL MEDICINE | Facility: CLINIC | Age: 69
End: 2024-05-23
Payer: MEDICARE

## 2024-05-23 ENCOUNTER — HOSPITAL ENCOUNTER (EMERGENCY)
Facility: HOSPITAL | Age: 69
Discharge: HOME | End: 2024-05-23
Attending: EMERGENCY MEDICINE
Payer: MEDICARE

## 2024-05-23 VITALS
HEIGHT: 63 IN | OXYGEN SATURATION: 90 % | RESPIRATION RATE: 20 BRPM | SYSTOLIC BLOOD PRESSURE: 106 MMHG | BODY MASS INDEX: 50.04 KG/M2 | WEIGHT: 282.41 LBS | DIASTOLIC BLOOD PRESSURE: 48 MMHG | TEMPERATURE: 99.2 F | HEART RATE: 95 BPM

## 2024-05-23 DIAGNOSIS — M86.9 OSTEOMYELITIS OF THIRD TOE OF LEFT FOOT: Primary | ICD-10-CM

## 2024-05-23 LAB
ALBUMIN SERPL-MCNC: 4.1 G/DL (ref 3.5–5.2)
ALBUMIN/GLOB SERPL: 1.4 G/DL
ALP SERPL-CCNC: 67 U/L (ref 39–117)
ALT SERPL W P-5'-P-CCNC: 25 U/L (ref 1–33)
ANION GAP SERPL CALCULATED.3IONS-SCNC: 10 MMOL/L (ref 5–15)
AST SERPL-CCNC: 27 U/L (ref 1–32)
BASOPHILS # BLD AUTO: 0.08 10*3/MM3 (ref 0–0.2)
BASOPHILS NFR BLD AUTO: 0.7 % (ref 0–1.5)
BILIRUB SERPL-MCNC: 0.3 MG/DL (ref 0–1.2)
BUN SERPL-MCNC: 14 MG/DL (ref 8–23)
BUN/CREAT SERPL: 13.2 (ref 7–25)
CALCIUM SPEC-SCNC: 9.8 MG/DL (ref 8.6–10.5)
CHLORIDE SERPL-SCNC: 100 MMOL/L (ref 98–107)
CO2 SERPL-SCNC: 28 MMOL/L (ref 22–29)
CREAT SERPL-MCNC: 1.06 MG/DL (ref 0.57–1)
CRP SERPL-MCNC: 0.57 MG/DL (ref 0–0.5)
D-LACTATE SERPL-SCNC: 1.7 MMOL/L (ref 0.5–2)
DEPRECATED RDW RBC AUTO: 47.6 FL (ref 37–54)
EGFRCR SERPLBLD CKD-EPI 2021: 56.6 ML/MIN/1.73
EOSINOPHIL # BLD AUTO: 0.37 10*3/MM3 (ref 0–0.4)
EOSINOPHIL NFR BLD AUTO: 3.3 % (ref 0.3–6.2)
ERYTHROCYTE [DISTWIDTH] IN BLOOD BY AUTOMATED COUNT: 14.4 % (ref 12.3–15.4)
ERYTHROCYTE [SEDIMENTATION RATE] IN BLOOD: 27 MM/HR (ref 0–30)
GLOBULIN UR ELPH-MCNC: 3 GM/DL
GLUCOSE SERPL-MCNC: 95 MG/DL (ref 65–99)
HCT VFR BLD AUTO: 48.8 % (ref 34–46.6)
HGB BLD-MCNC: 15.7 G/DL (ref 12–15.9)
HOLD SPECIMEN: NORMAL
IMM GRANULOCYTES # BLD AUTO: 0.06 10*3/MM3 (ref 0–0.05)
IMM GRANULOCYTES NFR BLD AUTO: 0.5 % (ref 0–0.5)
LYMPHOCYTES # BLD AUTO: 3.44 10*3/MM3 (ref 0.7–3.1)
LYMPHOCYTES NFR BLD AUTO: 30.3 % (ref 19.6–45.3)
MCH RBC QN AUTO: 29 PG (ref 26.6–33)
MCHC RBC AUTO-ENTMCNC: 32.2 G/DL (ref 31.5–35.7)
MCV RBC AUTO: 90 FL (ref 79–97)
MONOCYTES # BLD AUTO: 0.8 10*3/MM3 (ref 0.1–0.9)
MONOCYTES NFR BLD AUTO: 7 % (ref 5–12)
NEUTROPHILS NFR BLD AUTO: 58.2 % (ref 42.7–76)
NEUTROPHILS NFR BLD AUTO: 6.62 10*3/MM3 (ref 1.7–7)
NRBC BLD AUTO-RTO: 0 /100 WBC (ref 0–0.2)
PLATELET # BLD AUTO: 285 10*3/MM3 (ref 140–450)
PMV BLD AUTO: 10.1 FL (ref 6–12)
POTASSIUM SERPL-SCNC: 3.9 MMOL/L (ref 3.5–5.2)
PROT SERPL-MCNC: 7.1 G/DL (ref 6–8.5)
RBC # BLD AUTO: 5.42 10*6/MM3 (ref 3.77–5.28)
SODIUM SERPL-SCNC: 138 MMOL/L (ref 136–145)
WBC NRBC COR # BLD AUTO: 11.37 10*3/MM3 (ref 3.4–10.8)
WHOLE BLOOD HOLD COAG: NORMAL

## 2024-05-23 PROCEDURE — 80053 COMPREHEN METABOLIC PANEL: CPT

## 2024-05-23 PROCEDURE — 85025 COMPLETE CBC W/AUTO DIFF WBC: CPT

## 2024-05-23 PROCEDURE — 83605 ASSAY OF LACTIC ACID: CPT

## 2024-05-23 PROCEDURE — 87040 BLOOD CULTURE FOR BACTERIA: CPT

## 2024-05-23 PROCEDURE — 85652 RBC SED RATE AUTOMATED: CPT

## 2024-05-23 PROCEDURE — 99283 EMERGENCY DEPT VISIT LOW MDM: CPT

## 2024-05-23 PROCEDURE — 86140 C-REACTIVE PROTEIN: CPT

## 2024-05-23 PROCEDURE — 36415 COLL VENOUS BLD VENIPUNCTURE: CPT

## 2024-05-23 RX ORDER — DOXYCYCLINE 100 MG/1
100 CAPSULE ORAL 2 TIMES DAILY
Qty: 28 CAPSULE | Refills: 0 | Status: SHIPPED | OUTPATIENT
Start: 2024-05-23 | End: 2024-06-06

## 2024-05-23 NOTE — TELEPHONE ENCOUNTER
Name: Randi Fajardo    Relationship: Self    Best Callback Number: [phone]     [facility]UB PROVIDED THE RELAY MESSAGE FROM THE OFFICE   PATIENT VOICED UNDERSTANDING AND HAS NO FURTHER QUESTIONS AT THIS TIME

## 2024-05-23 NOTE — ED PROVIDER NOTES
"SHARED VISIT NOTE:    Patient is 68 y.o. year old female that presents to the ED for evaluation of toe pain.     Physical Exam    ED Course:    /64   Pulse 97   Temp 98.8 °F (37.1 °C) (Oral)   Resp 20   Ht 160 cm (63\")   Wt 128 kg (282 lb 6.6 oz)   SpO2 91%   BMI 50.03 kg/m²   Results for orders placed or performed in visit on 05/14/24   Microalbumin / Creatinine Urine Ratio - Urine, Clean Catch    Specimen: Urine, Clean Catch   Result Value Ref Range    Microalbumin/Creatinine Ratio      Creatinine, Urine 28.2 mg/dL    Microalbumin, Urine <1.2 mg/dL     Medications - No data to display  MRI Foot Left With & Without Contrast    Result Date: 5/22/2024  Narrative:  MRI FOOT LEFT W WO CONTRAST-  Date of Exam: 5/22/2024 11:50 AM  Indication: Swelling, warmth, and redness of left third toe. Evaluate for osteomyelitis.  Comparison: Radiographs April 21, 2024  Technique:  Routine multiplanar/multisequence sequence images of the left foot were obtained before and after the uneventful administration of 20 mL MultiHance.    Findings: There appears to be edema signal and enhancement in the soft tissues of the third digit. There is additional dorsal edema signal of the foot without significant enhancement. No localized fluid collection is seen.  There appears to be mild T2 hyperintense signal at the distal and middle phalanges of the third digit. There also appears to be corresponding T1 hypointense signal, most notably in the middle phalanx. These findings are suspicious for osteomyelitis.  There does not appear to be significant marrow signal abnormality of the proximal phalanx or other visualized osseous structures of the foot. On postcontrast images there is some hyperintense signal in the phalanges, but this is suspected to be due to inhomogeneous fat saturation.  There does not appear to be significant fluid signal or enhancement of the distal flexor and extensor tendons. No significant muscle edema or " enhancement. There appears to be mild diffuse muscle atrophy. No significant joint effusion. There appear to be mild to moderate degenerative changes of mid and forefoot joints. There are foci of subchondral edema/cyst formation at multiple midfoot joints.      Impression: Impression: 1.  Marrow signal changes at the third distal and middle phalanges suspicious for osteomyelitis. 2.  Findings of suspected skin and soft tissue infection of the third digit. No definite abscess is identified.    Electronically Signed By-Jaime Andres MD On:5/22/2024 1:10 PM       MDM:    Procedures          SHARED VISIT ATTESTATION:    This visit was performed by both myself and an APC.  I performed the substantive portion of the medical decision making. The management plan was made or approved by me, and I take responsibility for patient management.           Sukhi Louise DO  16:29 EDT  05/23/24         Sukhi Louise DO  05/23/24 [id number]

## 2024-05-23 NOTE — DISCHARGE INSTRUCTIONS
Please discontinue taking the Bactrim and Keflex and  doxycycline to take twice a day for 2 weeks until finished      Please follow-up with Dr. Greenwood, podiatrist in office    If any fevers, worsening swelling or bright red please return to the ED

## 2024-05-23 NOTE — TELEPHONE ENCOUNTER
OKAY FOR HUB TO READ/ADVISE     Concern for osteomyelitis based on MRI. Would recommend patient go to ER - likely needs IV antibiotics and evaluation by surgical team ASAP.

## 2024-05-23 NOTE — ED PROVIDER NOTES
Time: 4:19 PM EDT  Date of encounter:  2024  Independent Historian/Clinical History and Information was obtained by:   Patient    History is limited by: N/A    Chief Complaint   Patient presents with    Toe Pain         History of Present Illness:  Patient is a 68 y.o. year old female who presents to the emergency department for evaluation of possible osteomyelitis of left third toe.  Patient states that she has been having some swelling intermittently x 1 month.  States that she noticed the swelling over the weekend called her PCP and saw him on Monday was prescribed Keflex and Bactrim.  Yesterday she had an MRI of her foot and had a phone call from her PCP telling her to come to the ED.  Patient has a history of diabetes and states her A1c is 6.5 and she only takes metformin.  Denies injury to her foot    Patient Care Team  Primary Care Provider: Mainor Sanders Jr., MD    Past Medical History:     Allergies   Allergen Reactions    Nexium [Esomeprazole] Hives    Prilosec [Omeprazole] Hives     Past Medical History:   Diagnosis Date    Depressed     GREGG (generalized anxiety disorder)     Gastroesophageal reflux     HTN (hypertension)     Hypothyroid     Lesion of neck     Osteoarthritis     Pneumonia 1960    RLS (restless legs syndrome)     Sleep apnea in adult     Sleep apnea, obstructive  ?    Type 2 diabetes mellitus      Past Surgical History:   Procedure Laterality Date    ANKLE TENDON REPAIR Right     ankle with hardware     SECTION          COLONOSCOPY      NECK SURGERY      ,,; Neck lesion removed, total of 3     Family History   Problem Relation Age of Onset    Lung cancer Father     Heart disease Father     Colon cancer Father     Cancer Father         Lung, colon    Emphysema Father     Cancer Brother         Lung, pancreas    Cancer Maternal Aunt         Breast cancer       Home Medications:  Prior to Admission medications    Medication Sig Start Date  End Date Taking? Authorizing Provider   Accu-Chek Softclix Lancets lancets 1 each by Other route 3 times a day. Use as instructed 2/9/24   Mainor Sanders Jr., MD   albuterol sulfate HFA (Ventolin HFA) 108 (90 Base) MCG/ACT inhaler Inhale 2 puffs Every 4 (Four) Hours As Needed for Wheezing or Shortness of Air. 5/13/24   Joon Cuevas MD   Alcohol Swabs (B-D SINGLE USE SWABS REGULAR) pads Apply 1 Application topically to the appropriate area as directed 5 (Five) Times a Day. 2/9/24   Mainor Sanders Jr., MD   atorvastatin (LIPITOR) 10 MG tablet Take 1 tablet by mouth Every Night. 2/15/24   Mainor Sanders Jr., MD   Blood Glucose Monitoring Suppl (Accu-Chek Guide Me) w/Device kit Inject 1 kit under the skin into the appropriate area as directed See Admin Instructions. 2/16/23   Mainor Sanders Jr., MD   CALCIUM PO Take 600 mg by mouth Daily.    Provider, MD Georgette   cephalexin (Keflex) 500 MG capsule Take 1 capsule by mouth 4 (Four) Times a Day for 14 days. 5/21/24 6/4/24  Mainor Sanders Jr., MD   DULoxetine (CYMBALTA) 60 MG capsule Take 2 capsules by mouth Daily. 3/20/24   Mainor Sanders Jr., MD   famotidine (PEPCID) 40 MG tablet TAKE 1 TABLET TWICE A DAY 3/4/24   Mainor Sanders Jr., MD   gabapentin (NEURONTIN) 600 MG tablet Take 1 tablet by mouth 3 (Three) Times a Day As Needed (pain). 5/14/24   Mainor Sanders Jr., MD   glucose blood (Accu-Chek Guide) test strip 1 each by Other route Daily. Use as instructed 2/5/24   Mainor Sanders Jr., MD   hydroCHLOROthiazide (HYDRODIURIL) 25 MG tablet Take 1 tablet by mouth Daily. 2/9/24   Mainor Sanders Jr., MD   melatonin 5 MG tablet tablet Take 1 tablet by mouth Every Night.    ProviderGeorgette MD   metFORMIN (GLUCOPHAGE) 500 MG tablet Take 1 tablet by mouth Daily With Dinner. 5/13/24   Mainor Sanders Jr., MD   minocycline (MINOCIN,DYNACIN) 100 MG capsule Take 1 capsule  by mouth Daily. 24   Mainor Sanders Jr., MD   multivitamin with minerals tablet tablet Take 1 tablet by mouth Daily.    ProviderGeorgette MD   Omega-3 Fatty Acids (fish oil) 1000 MG capsule capsule Take  by mouth Daily With Breakfast.    Georgette Navarro MD   rOPINIRole (REQUIP) 2 MG tablet Take 1 tablet by mouth Every Night. 24   Mainor Sanders Jr., MD   Stiolto Respimat 2.5-2.5 MCG/ACT aerosol solution inhaler INHALE TWO PUFFS BY MOUTH EVERY DAY 24   Mainor Sanders Jr., MD   sulfamethoxazole-trimethoprim (Bactrim DS) 800-160 MG per tablet Take 1 tablet by mouth 2 (Two) Times a Day for 14 days. 24  Mainor Sanders Jr., MD   Synthroid 50 MCG tablet To be filled by Provider 24   Mainor Sanders Jr., MD   Zinc Sulfate (ZINC 15 PO) Take 15 mg by mouth Daily.    ProviderGeorgette MD        Social History:   Social History     Tobacco Use    Smoking status: Former     Current packs/day: 0.00     Average packs/day: 0.5 packs/day for 51.0 years (25.5 ttl pk-yrs)     Types: Cigarettes     Start date: 1969     Quit date:      Years since quittin.3     Passive exposure: Past    Smokeless tobacco: Never    Tobacco comments:     Was off and on during the 40 years   Vaping Use    Vaping status: Never Used   Substance Use Topics    Alcohol use: Yes     Comment: Socially on occasion    Drug use: Never         Review of Systems:  Review of Systems   Constitutional: Negative.    HENT: Negative.     Eyes: Negative.    Respiratory: Negative.     Cardiovascular: Negative.    Gastrointestinal: Negative.    Endocrine: Negative.    Genitourinary: Negative.    Musculoskeletal:  Positive for joint swelling.   Skin:  Positive for color change.   Allergic/Immunologic: Negative.    Neurological: Negative.    Hematological: Negative.    Psychiatric/Behavioral: Negative.          Physical Exam:  /48   Pulse 95   Temp 99.2 °F (37.3 °C)   Resp  "20   Ht 160 cm (63\")   Wt 128 kg (282 lb 6.6 oz)   SpO2 90%   BMI 50.03 kg/m²         Physical Exam  Vitals and nursing note reviewed.   Constitutional:       Appearance: Normal appearance.   HENT:      Head: Normocephalic and atraumatic.      Nose: Nose normal.      Mouth/Throat:      Mouth: Mucous membranes are moist.   Eyes:      Extraocular Movements: Extraocular movements intact.      Pupils: Pupils are equal, round, and reactive to light.   Cardiovascular:      Rate and Rhythm: Normal rate and regular rhythm.      Heart sounds: Normal heart sounds.   Pulmonary:      Effort: Pulmonary effort is normal.      Breath sounds: Normal breath sounds.   Musculoskeletal:         General: Swelling present. No tenderness. Normal range of motion.      Cervical back: Normal range of motion and neck supple.      Left foot: Normal capillary refill. Swelling present. No deformity or tenderness. Normal pulse.        Feet:    Skin:     General: Skin is warm and dry.      Capillary Refill: Capillary refill takes less than 2 seconds.      Findings: Erythema (mild) present.   Neurological:      General: No focal deficit present.      Mental Status: She is alert and oriented to person, place, and time.   Psychiatric:         Mood and Affect: Mood normal.         Behavior: Behavior normal.                 Procedures:  Procedures      Medical Decision Making:      Comorbidities that affect care:    Diabetes, Hypertension    External Notes reviewed:    Previous Clinic Note: Office visit with PCP 5/21/2024 for tenosynovitis of toe and Previous Radiological Studies: MRI left foot from yesterday showing middle phalanges suspicious for osteomyelitis      The following orders were placed and all results were independently analyzed by me:  Orders Placed This Encounter   Procedures    Blood Culture - Blood,    Blood Culture - Blood,    Comprehensive Metabolic Panel    Sedimentation Rate    C-reactive Protein    Lactic Acid, Plasma    CBC " Auto Differential    Ambulatory Referral to Podiatry    Inpatient Podiatry Consult    CBC & Differential    Extra Tubes    Gold Top - SST    Light Blue Top       Medications Given in the Emergency Department:  Medications - No data to display     ED Course:    The patient was initially evaluated in the triage area where orders were placed. The patient was later dispositioned by Karmen Graves PA-C.      The patient was advised to stay for completion of workup which includes but is not limited to communication of labs and radiological results, reassessment and plan. The patient was advised that leaving prior to disposition by a provider could result in critical findings that are not communicated to the patient.     ED Course as of 05/23/24 1807   Thu May 23, 2024   1801 Consulted with podiatrist Dr. Greenwood, he advises to DC Keflex and Bactrim and switch patient to 2 weeks of Doxy and follow-up in office [AJ]      ED Course User Index  [AJ] Karmen Graves PA-C       Labs:    Lab Results (last 24 hours)       Procedure Component Value Units Date/Time    CBC & Differential [[id number]]  (Abnormal) Collected: 05/23/24 1648    Specimen: Blood from Arm, Right Updated: 05/23/24 1656    Narrative:      The following orders were created for panel order CBC & Differential.  Procedure                               Abnormality         Status                     ---------                               -----------         ------                     CBC Auto Differential[[id number]]        Abnormal            Final result                 Please view results for these tests on the individual orders.    Comprehensive Metabolic Panel [[id number]]  (Abnormal) Collected: 05/23/24 1648    Specimen: Blood from Arm, Right Updated: 05/23/24 1713     Glucose 95 mg/dL      BUN 14 mg/dL      Creatinine 1.06 mg/dL      Sodium 138 mmol/L      Potassium 3.9 mmol/L      Comment: Slight hemolysis detected by analyzer. Result may be  falsely elevated.        Chloride 100 mmol/L      CO2 28.0 mmol/L      Calcium 9.8 mg/dL      Total Protein 7.1 g/dL      Albumin 4.1 g/dL      ALT (SGPT) 25 U/L      AST (SGOT) 27 U/L      Alkaline Phosphatase 67 U/L      Total Bilirubin 0.3 mg/dL      Globulin 3.0 gm/dL      A/G Ratio 1.4 g/dL      BUN/Creatinine Ratio 13.2     Anion Gap 10.0 mmol/L      eGFR 56.6 mL/min/1.73     Narrative:      GFR Normal >60  Chronic Kidney Disease <60  Kidney Failure <15      Sedimentation Rate [[id number]]  (Normal) Collected: 05/23/24 1648    Specimen: Blood from Arm, Right Updated: 05/23/24 1733     Sed Rate 27 mm/hr     C-reactive Protein [[id number]]  (Abnormal) Collected: 05/23/24 1648    Specimen: Blood from Arm, Right Updated: 05/23/24 1713     C-Reactive Protein 0.57 mg/dL     Blood Culture - Blood, Arm, Right [[id number]] Collected: 05/23/24 1648    Specimen: Blood from Arm, Right Updated: 05/23/24 1652    Lactic Acid, Plasma [[id number]]  (Normal) Collected: 05/23/24 1648    Specimen: Blood from Arm, Right Updated: 05/23/24 1709     Lactate 1.7 mmol/L     CBC Auto Differential [[id number]]  (Abnormal) Collected: 05/23/24 1648    Specimen: Blood from Arm, Right Updated: 05/23/24 1656     WBC 11.37 10*3/mm3      RBC 5.42 10*6/mm3      Hemoglobin 15.7 g/dL      Hematocrit 48.8 %      MCV 90.0 fL      MCH 29.0 pg      MCHC 32.2 g/dL      RDW 14.4 %      RDW-SD 47.6 fl      MPV 10.1 fL      Platelets 285 10*3/mm3      Neutrophil % 58.2 %      Lymphocyte % 30.3 %      Monocyte % 7.0 %      Eosinophil % 3.3 %      Basophil % 0.7 %      Immature Grans % 0.5 %      Neutrophils, Absolute 6.62 10*3/mm3      Lymphocytes, Absolute 3.44 10*3/mm3      Monocytes, Absolute 0.80 10*3/mm3      Eosinophils, Absolute 0.37 10*3/mm3      Basophils, Absolute 0.08 10*3/mm3      Immature Grans, Absolute 0.06 10*3/mm3      nRBC 0.0 /100 WBC     Blood Culture - Blood, Arm, Left [[id number]] Collected: 05/23/24 3897    Specimen: Blood from Arm, Left  Updated: 05/23/24 8454             Imaging:    No Radiology Exams Resulted Within Past 24 Hours      Differential Diagnosis and Discussion:      Extremity Pain: Differential diagnosis includes but is not limited to soft tissue sprain, tendonitis, tendon injury, dislocation, fracture, deep vein thrombosis, arterial insufficiency, osteoarthritis, bursitis, and ligamentous damage.    All labs were reviewed and interpreted by me.    MDM     Amount and/or Complexity of Data Reviewed  Clinical lab tests: reviewed                 Patient Care Considerations:    MRI: I considered ordering an MRI however patient had MRI completed yesterday      Consultants/Shared Management Plan:    Consultant: I have discussed the case with Dr. Greenwood, podiatrist who states he does not believe she needs to be admitted for IV antibiotics.  She can be discharged home and switch antibiotics to doxycycline for 2 weeks and follow-up in office    Social Determinants of Health:    Patient is independent, reliable, and has access to care.       Disposition and Care Coordination:    Discharged: The patient is suitable and stable for discharge with no need for consideration of admission.    I have explained the patient´s condition, diagnoses and treatment plan based on the information available to me at this time. I have answered questions and addressed any concerns. The patient has a good  understanding of the patient´s diagnosis, condition, and treatment plan as can be expected at this point. The vital signs have been stable. The patient´s condition is stable and appropriate for discharge from the emergency department.      The patient will pursue further outpatient evaluation with the primary care physician or other designated or consulting physician as outlined in the discharge instructions. They are agreeable to this plan of care and follow-up instructions have been explained in detail. The patient has received these instructions in written  format and has expressed an understanding of the discharge instructions. The patient is aware that any significant change in condition or worsening of symptoms should prompt an immediate return to this or the closest emergency department or call to 911.  I have explained discharge medications and the need for follow up with the patient/caretakers. This was also printed in the discharge instructions. Patient was discharged with the following medications and follow up:      Medication List        New Prescriptions      doxycycline 100 MG capsule  Commonly known as: MONODOX  Take 1 capsule by mouth 2 (Two) Times a Day for 14 days.               Where to Get Your Medications        These medications were sent to [facility]/pharmacy #[id number] - [place], [place] - [address] - [phone]  - [phone] FX  [address] [place] [address]      Hours: 24-hours Phone: [phone]   doxycycline 100 MG capsule      Michael Greenwood, DPM  [address]  [place] [id number] [phone]             Final diagnoses:   Osteomyelitis of third toe of left foot        ED Disposition       ED Disposition   Discharge    Condition   Stable    Comment   --               This medical record created using voice recognition software.             Karmen Graves PA-C  05/23/24 5277

## 2024-05-24 ENCOUNTER — TELEPHONE (OUTPATIENT)
Dept: INTERNAL MEDICINE | Facility: CLINIC | Age: 69
End: 2024-05-24
Payer: MEDICARE

## 2024-05-24 DIAGNOSIS — M65.9: Primary | ICD-10-CM

## 2024-05-24 NOTE — TELEPHONE ENCOUNTER
Caller: Randi Fajardo    Relationship: Self    Best call back number: [phone]     What is the medical concern/diagnosis: INFECTION OF MIDDLE LEFT TOE    What specialty or service is being requested: REFERRAL TO [facility]    What is the provider, practice or medical service name: [facility]    What is the office location: [place]    What is the office phone number: FAX:  [phone]    Any additional details: PATIENT WENT TO THE ER AND NEEDS A REFERRAL TO [facility] SINCE THE PODIATRIST THE ER REFERRED PATIENT TO IS BOOKED OUT.

## 2024-05-28 LAB
BACTERIA SPEC AEROBE CULT: NORMAL
BACTERIA SPEC AEROBE CULT: NORMAL

## 2024-05-28 NOTE — TELEPHONE ENCOUNTER
Caller: Randi Fajardo    Relationship to patient: Self    Best call back number: [phone]    Patient is needing: PATIENT CALLING TO CHECK ON THE STATUS OF HER REFERRAL REQUEST  FOR A PODIATRIST. PATIENT STATES SHE IS URGENTLY NEEDING A NEW REFERRAL SENT AND WOULD LIKE A CALL BACK AS SOON AS POSSIBLE.

## 2024-06-12 DIAGNOSIS — G62.9 NEUROPATHY: ICD-10-CM

## 2024-06-13 RX ORDER — GABAPENTIN 600 MG/1
600 TABLET ORAL 3 TIMES DAILY PRN
Qty: 90 TABLET | Refills: 0 | Status: SHIPPED | OUTPATIENT
Start: 2024-06-13

## 2024-06-13 RX ORDER — FAMOTIDINE 40 MG/1
40 TABLET, FILM COATED ORAL 2 TIMES DAILY
Qty: 90 TABLET | Refills: 0 | Status: SHIPPED | OUTPATIENT
Start: 2024-06-13

## 2024-06-13 NOTE — TELEPHONE ENCOUNTER
Refill request for controlled substance.      Date of request: 6/13/2024    Medication requested: Gabapentin  Last OV: 5/21/24  Last UDS: 4/25/24  Contract signed: yes    Date:11/14/23  Next office visit: 11/15/24    Marly Peck

## 2024-06-19 DIAGNOSIS — R06.09 DOE (DYSPNEA ON EXERTION): ICD-10-CM

## 2024-06-19 RX ORDER — TIOTROPIUM BROMIDE AND OLODATEROL 3.124; 2.736 UG/1; UG/1
SPRAY, METERED RESPIRATORY (INHALATION)
Qty: 4 G | Refills: 1 | Status: SHIPPED | OUTPATIENT
Start: 2024-06-19

## 2024-06-25 ENCOUNTER — PREP FOR SURGERY (OUTPATIENT)
Dept: OTHER | Facility: HOSPITAL | Age: 69
End: 2024-06-25
Payer: MEDICARE

## 2024-06-25 ENCOUNTER — OFFICE VISIT (OUTPATIENT)
Dept: SURGERY | Facility: CLINIC | Age: 69
End: 2024-06-25
Payer: MEDICARE

## 2024-06-25 VITALS
DIASTOLIC BLOOD PRESSURE: 97 MMHG | SYSTOLIC BLOOD PRESSURE: 133 MMHG | HEART RATE: 90 BPM | HEIGHT: 63 IN | BODY MASS INDEX: 50.5 KG/M2 | WEIGHT: 285 LBS

## 2024-06-25 DIAGNOSIS — Z12.11 SCREENING FOR MALIGNANT NEOPLASM OF COLON: Primary | ICD-10-CM

## 2024-06-25 DIAGNOSIS — Z80.0 FAMILY HISTORY OF COLON CANCER IN FATHER: ICD-10-CM

## 2024-06-25 PROCEDURE — 1160F RVW MEDS BY RX/DR IN RCRD: CPT | Performed by: NURSE PRACTITIONER

## 2024-06-25 PROCEDURE — 3075F SYST BP GE 130 - 139MM HG: CPT | Performed by: NURSE PRACTITIONER

## 2024-06-25 PROCEDURE — 3080F DIAST BP >= 90 MM HG: CPT | Performed by: NURSE PRACTITIONER

## 2024-06-25 PROCEDURE — 1159F MED LIST DOCD IN RCRD: CPT | Performed by: NURSE PRACTITIONER

## 2024-06-25 PROCEDURE — S0260 H&P FOR SURGERY: HCPCS | Performed by: NURSE PRACTITIONER

## 2024-06-25 RX ORDER — POLYETHYLENE GLYCOL 3350 17 G/17G
POWDER, FOR SOLUTION ORAL
Qty: 238 PACKET | Refills: 0 | Status: SHIPPED | OUTPATIENT
Start: 2024-06-25

## 2024-06-25 RX ORDER — SODIUM CHLORIDE 0.9 % (FLUSH) 0.9 %
3 SYRINGE (ML) INJECTION EVERY 12 HOURS SCHEDULED
OUTPATIENT
Start: 2024-06-25

## 2024-06-25 RX ORDER — SULFAMETHOXAZOLE AND TRIMETHOPRIM 800; 160 MG/1; MG/1
1 TABLET ORAL 2 TIMES DAILY WITH MEALS
COMMUNITY
Start: 2024-06-11

## 2024-06-25 RX ORDER — SODIUM CHLORIDE 9 MG/ML
40 INJECTION, SOLUTION INTRAVENOUS AS NEEDED
OUTPATIENT
Start: 2024-06-25

## 2024-06-25 RX ORDER — CEPHALEXIN 250 MG/1
250 CAPSULE ORAL 4 TIMES DAILY
COMMUNITY

## 2024-06-25 RX ORDER — SODIUM CHLORIDE 0.9 % (FLUSH) 0.9 %
10 SYRINGE (ML) INJECTION AS NEEDED
OUTPATIENT
Start: 2024-06-25

## 2024-06-25 NOTE — PROGRESS NOTES
Chief Complaint: Colonoscopy    Subjective      Colonoscopy consultation       History of Present Illness  Randi Fajardo is a 68 y.o. female presents to [facility] for colonoscopy consultation.    Patient presents today on referral from Dr. Ishmael Hurtado.    Patient presents today without complaints for screening colonoscopy.  She denies any abdominal pain, change in bowel habit, or rectal bleeding.  Admits to family history of colon cancer with her father.  Patient reports on her very first colonoscopy she did have polyps removed.    Patient admits to PENNY.  She does use her CPAP.    Denies any cardiac issues.  Denies taking any GLP-1 receptors.    : Colonoscopy ([facility]): normal colon.  Objective     Past Medical History:   Diagnosis Date    Colon polyp ?    Found during initial colonoscopy    COPD (chronic obstructive pulmonary disease)     Depressed     GREGG (generalized anxiety disorder)     Gastroesophageal reflux     HTN (hypertension)     Hypothyroid     Lesion of neck     Osteoarthritis     Pneumonia     RLS (restless legs syndrome)     Sleep apnea in adult     Sleep apnea, obstructive  ?    Type 2 diabetes mellitus        Past Surgical History:   Procedure Laterality Date    ANKLE TENDON REPAIR Right     ankle with hardware     SECTION          COLONOSCOPY      FRACTURE SURGERY  1984    Plate and screws in right ankle    NECK SURGERY      ,,; Neck lesion removed, total of 3       Outpatient Medications Marked as Taking for the 24 encounter (Office Visit) with Franky    Medication Sig Dispense Refill    Accu-Chek Softclix Lancets lancets 1 each by Other route 3 times a day. Use as instructed 200 each 3    albuterol sulfate HFA (Ventolin HFA) 108 (90 Base) MCG/ACT inhaler Inhale 2 puffs Every 4 (Four) Hours As Needed for Wheezing or Shortness of Air. 1 g 5    Alcohol Swabs (B-D SINGLE USE SWABS REGULAR) pads  Apply 1 Application topically to the appropriate area as directed 5 (Five) Times a Day. 200 each 3    atorvastatin (LIPITOR) 10 MG tablet Take 1 tablet by mouth Every Night. 90 tablet 3    Blood Glucose Monitoring Suppl (Accu-Chek Guide Me) w/Device kit Inject 1 kit under the skin into the appropriate area as directed See Admin Instructions. 1 kit 0    CALCIUM PO Take 600 mg by mouth Daily.      cephalexin (KEFLEX) 250 MG capsule Take 1 capsule by mouth 4 (Four) Times a Day.      DULoxetine (CYMBALTA) 60 MG capsule Take 2 capsules by mouth Daily. 180 capsule 3    famotidine (PEPCID) 40 MG tablet Take 1 tablet by mouth 2 (Two) Times a Day. 90 tablet 0    gabapentin (NEURONTIN) 600 MG tablet Take 1 tablet by mouth 3 (Three) Times a Day As Needed (pain). 90 tablet 0    glucose blood (Accu-Chek Guide) test strip 1 each by Other route Daily. Use as instructed 100 each 3    hydroCHLOROthiazide (HYDRODIURIL) 25 MG tablet Take 1 tablet by mouth Daily. 90 tablet 3    melatonin 5 MG tablet tablet Take 1 tablet by mouth Every Night.      metFORMIN (GLUCOPHAGE) 500 MG tablet Take 1 tablet by mouth Daily With Dinner. 90 tablet 0    minocycline (MINOCIN,DYNACIN) 100 MG capsule Take 1 capsule by mouth Daily. 90 capsule 3    multivitamin with minerals tablet tablet Take 1 tablet by mouth Daily.      Omega-3 Fatty Acids (fish oil) 1000 MG capsule capsule Take  by mouth Daily With Breakfast.      rOPINIRole (REQUIP) 2 MG tablet Take 1 tablet by mouth Every Night. 90 tablet 3    Stiolto Respimat 2.5-2.5 MCG/ACT aerosol solution inhaler INHALE TWO PUFFS BY MOUTH EVERY DAY 4 g 1    sulfamethoxazole-trimethoprim (BACTRIM DS,SEPTRA DS) 800-160 MG per tablet Take 1 tablet by mouth 2 (Two) Times a Day With Meals.      Synthroid 50 MCG tablet To be filled by Provider 90 tablet 0    Zinc Sulfate (ZINC 15 PO) Take 15 mg by mouth Daily.         Allergies   Allergen Reactions    Nexium [Esomeprazole] Hives    Prilosec [Omeprazole] Hives     "    Family History   Problem Relation Age of Onset    Lung cancer Father     Heart disease Father         Had pacemaker    Colon cancer Father     Cancer Father         Lung, colon    Emphysema Father     Cancer Brother         Lung, colon, and pancreas    Cancer Maternal Aunt         Breast cancer    Arthritis Mother     Arthritis Maternal Aunt     Arthritis Maternal Aunt     Hearing loss Maternal Aunt     Cancer Maternal Aunt        Social History     Socioeconomic History    Marital status:    Tobacco Use    Smoking status: Former     Current packs/day: 0.00     Average packs/day: 0.5 packs/day for 51.0 years (25.5 ttl pk-yrs)     Types: Cigarettes     Start date: 1969     Quit date:      Years since quittin.4     Passive exposure: Past    Smokeless tobacco: Never    Tobacco comments:     Was off and on over the 40 years   Vaping Use    Vaping status: Never Used   Substance and Sexual Activity    Alcohol use: Yes     Comment: Socially on occasion    Drug use: Never    Sexual activity: Not Currently     Birth control/protection: None       Review of Systems   Constitutional:  Negative for chills and fever.   Gastrointestinal:  Negative for abdominal distention, abdominal pain, anal bleeding, blood in stool, constipation, diarrhea and rectal pain.        Vital Signs:   /97 (BP Location: Right arm, Patient Position: Sitting, Cuff Size: Large Adult)   Pulse 90   Ht 160 cm (63\")   Wt 129 kg (285 lb)   BMI 50.49 kg/m²      Physical Exam  Vitals and nursing note reviewed.   Constitutional:       General: She is not in acute distress.     Appearance: Normal appearance. She is not ill-appearing.   HENT:      Head: Normocephalic and atraumatic.   Cardiovascular:      Rate and Rhythm: Normal rate.   Pulmonary:      Effort: Pulmonary effort is normal.      Breath sounds: No stridor.   Abdominal:      Palpations: Abdomen is soft.      Tenderness: There is no guarding.   Musculoskeletal:         " General: No deformity. Normal range of motion.   Skin:     General: Skin is warm and dry.      Coloration: Skin is not jaundiced.   Neurological:      General: No focal deficit present.      Mental Status: She is alert and oriented to person, place, and time.   Psychiatric:         Mood and Affect: Mood normal.         Thought Content: Thought content normal.          Result Review :          []  Laboratory  []  Radiology  []  Pathology  []  Microbiology  []  EKG/Telemetry   []  Cardiology/Vascular   []  Old records  I spent 15 minutes caring for Randi on this date of service. This time includes time spent by me in the following activities: reviewing tests, obtaining and/or reviewing a separately obtained history, performing a medically appropriate examination and/or evaluation, ordering medications, tests, or procedures, and documenting information in the medical record.     Assessment and Plan    Diagnoses and all orders for this visit:    1. Screening for malignant neoplasm of colon (Primary)    2. Family history of colon cancer in father    Other orders  -     polyethylene glycol (MIRALAX) 17 g packet; Take as directed.  Instructions given in office.  Dispense: 238 g bottle  Dispense: 238 packet; Refill: 0        Follow Up   Return for Schedule colonoscopy with Dr. Arriaga on 7/29/[facility].    Hospital arrival time: 0830    Possible risks/complications, benefits, and alternatives to surgical or invasive procedures have been explained to patient and/or legal guardian.    Patient has been evaluated and can tolerate anesthesia and/or sedation. Risks, benefits, and alternatives to anesthesia and sedation have been explained to the patient and/or legal guardian. Patient verbalizes understanding and is willing to proceed with the above plan.     Patient was given instructions and counseling regarding her condition or for health maintenance advice. Please see specific information pulled into the  AVS if appropriate.

## 2024-07-22 NOTE — PRE-PROCEDURE INSTRUCTIONS
No answer left message with detailed instructions, arrival time, and what entrance to use. Requested return call for confirmation of appointment.

## 2024-07-22 NOTE — PRE-PROCEDURE INSTRUCTIONS
"Instructed on date and arrival time of . Come to entrance \"C\". Must have  over age [address] to drive home.  May have two visitors; however, children under 12 must stay in waiting room.  Discussed clear liquid diet (no red or purple), bowel prep, and NPO.  May take medications as usual except for blood thinners, diabetic medications, and weight loss medications.  Verbalized understanding of instructions given.  Instructed to call for questions or concerns.  "

## 2024-07-24 ENCOUNTER — TELEPHONE (OUTPATIENT)
Dept: INTERNAL MEDICINE | Facility: CLINIC | Age: 69
End: 2024-07-24
Payer: MEDICARE

## 2024-07-24 NOTE — TELEPHONE ENCOUNTER
Tried to called due to a letter we received no answer left      OKAY FOR [facility] TO READ/ADVISE     RECALL OF DULOXETINE DELAYED RELEASED CAPSULE 60MG    LOT NUMBER / EXPIRATION DATE [id number] EXP:11/2025  [id number] EXP 12/2025    PATIENT WITH QUESTIONS REGARDING THE DRUG RECALL CAN CALL THE  [phone]    ADDITIONAL INFORMATION REGARDING THIS RECALL CAN BE FOUND ON THR FDA'S  MEDWATCH WEBSITE [email or web address]

## 2024-07-26 ENCOUNTER — ANESTHESIA EVENT (OUTPATIENT)
Dept: GASTROENTEROLOGY | Facility: HOSPITAL | Age: 69
End: 2024-07-26
Payer: MEDICARE

## 2024-07-26 DIAGNOSIS — G62.9 NEUROPATHY: ICD-10-CM

## 2024-07-26 RX ORDER — GABAPENTIN 600 MG/1
600 TABLET ORAL 3 TIMES DAILY PRN
Qty: 90 TABLET | Refills: 0 | Status: SHIPPED | OUTPATIENT
Start: 2024-07-26

## 2024-07-26 RX ORDER — FAMOTIDINE 40 MG/1
40 TABLET, FILM COATED ORAL 2 TIMES DAILY
Qty: 180 TABLET | Refills: 0 | Status: SHIPPED | OUTPATIENT
Start: 2024-07-26

## 2024-07-26 NOTE — ANESTHESIA PREPROCEDURE EVALUATION
Anesthesia Evaluation     Nursing notes reviewed   NPO Solid Status: > 8 hours  NPO Liquid Status: > 8 hours           Airway   Mallampati: I  TM distance: <3 FB  Neck ROM: full  Large neck circumference and Possible difficult intubation  Dental - normal exam     Pulmonary     breath sounds clear to auscultation  (+) pneumonia resolved , COPD (inhalers daily) moderate,shortness of breath (with exercise), sleep apnea on CPAP  Cardiovascular - normal exam  Exercise tolerance: poor (<4 METS)    ECG reviewed  Rhythm: regular  Rate: normal    (+) hypertension well controlled, hyperlipidemia      Neuro/Psych  (+) psychiatric history Depression  GI/Hepatic/Renal/Endo    (+) obesity, morbid obesity, GERD well controlled, diabetes mellitus type 2 well controlled, thyroid problem hypothyroidism    Musculoskeletal     Abdominal   (+) obese    Abdomen: soft.   Substance History      OB/GYN          Other   arthritis,     ROS/Med Hx Other: EKG 9/23: SR, left axis deviation, prolonged qt     ECHO 3/22: · EF 56 - 60%.   ·abnormal motion of the interventricular septum is noted. Most likely secondary to underlying bundle branch block    7/22 Stress Test: Baseline ECG showed normal sinus rhythm with right bundle branch block.  At peak stress, no ischemic ST-T changes were noted.  No significant arrhythmias were noted.  Gated and SPECT images were obtained.  Image quality is adequate.  Attenuation artifact is present.  There is a medium area of mild to moderate perfusion defect in the distal anterior and apical segments with partial reversibility on resting images which could represent myocardial ischemia in the right clinical setting.  The left ventricle was normal in size with a calculated ejection fraction exceeding 70%.  Overall, this represents an intermediate myocardial perfusion scan.                       Anesthesia Plan    ASA 4     general   total IV anesthesia  (Total IV Anesthesia    Patient understands anesthesia not  responsible for dental damage.  )  intravenous induction     Anesthetic plan, risks, benefits, and alternatives have been provided, discussed and informed consent has been obtained with: patient.  Pre-procedure education provided  Plan discussed with CRNA.    CODE STATUS:

## 2024-07-26 NOTE — TELEPHONE ENCOUNTER
Refill request for controlled substance.      Date of request: 7/26/2024   Medication requested: Gabapentin  Last OV: 5/21/24  Last UDS: 4/25/24  Contract signed: yes    Date:11/14/23  Next office visit: 11/15/24    Marly Peck

## 2024-07-29 ENCOUNTER — HOSPITAL ENCOUNTER (OUTPATIENT)
Facility: HOSPITAL | Age: 69
Setting detail: HOSPITAL OUTPATIENT SURGERY
Discharge: HOME | End: 2024-07-29
Attending: SURGERY | Admitting: SURGERY
Payer: MEDICARE

## 2024-07-29 ENCOUNTER — ANESTHESIA (OUTPATIENT)
Dept: GASTROENTEROLOGY | Facility: HOSPITAL | Age: 69
End: 2024-07-29
Payer: MEDICARE

## 2024-07-29 ENCOUNTER — TELEPHONE (OUTPATIENT)
Dept: CARDIOLOGY | Facility: CLINIC | Age: 69
End: 2024-07-29
Payer: MEDICARE

## 2024-07-29 VITALS
HEART RATE: 122 BPM | OXYGEN SATURATION: 90 % | WEIGHT: 283.07 LBS | RESPIRATION RATE: 20 BRPM | SYSTOLIC BLOOD PRESSURE: 122 MMHG | DIASTOLIC BLOOD PRESSURE: 63 MMHG | BODY MASS INDEX: 50.14 KG/M2 | TEMPERATURE: 97.8 F

## 2024-07-29 DIAGNOSIS — Z80.0 FAMILY HISTORY OF COLON CANCER IN FATHER: ICD-10-CM

## 2024-07-29 DIAGNOSIS — Z12.11 SCREENING FOR MALIGNANT NEOPLASM OF COLON: ICD-10-CM

## 2024-07-29 LAB
GLUCOSE BLDC GLUCOMTR-MCNC: 190 MG/DL (ref 70–99)
QT INTERVAL: 364 MS
QTC INTERVAL: 523 MS

## 2024-07-29 PROCEDURE — 93005 ELECTROCARDIOGRAM TRACING: CPT

## 2024-07-29 PROCEDURE — 25010000002 ESMOLOL 100 MG/10ML SOLUTION: Performed by: NURSE ANESTHETIST, CERTIFIED REGISTERED

## 2024-07-29 PROCEDURE — 25010000002 PROPOFOL 10 MG/ML EMULSION: Performed by: NURSE ANESTHETIST, CERTIFIED REGISTERED

## 2024-07-29 PROCEDURE — 25810000003 LACTATED RINGERS PER 1000 ML

## 2024-07-29 PROCEDURE — 82948 REAGENT STRIP/BLOOD GLUCOSE: CPT

## 2024-07-29 RX ORDER — LEVALBUTEROL INHALATION SOLUTION 1.25 MG/3ML
1.25 SOLUTION RESPIRATORY (INHALATION) ONCE
Status: COMPLETED | OUTPATIENT
Start: 2024-07-29 | End: 2024-07-29

## 2024-07-29 RX ORDER — PROPOFOL 10 MG/ML
VIAL (ML) INTRAVENOUS CONTINUOUS PRN
Status: DISCONTINUED | OUTPATIENT
Start: 2024-07-29 | End: 2024-07-29 | Stop reason: SURG

## 2024-07-29 RX ORDER — ESMOLOL HYDROCHLORIDE 10 MG/ML
INJECTION INTRAVENOUS AS NEEDED
Status: DISCONTINUED | OUTPATIENT
Start: 2024-07-29 | End: 2024-07-29 | Stop reason: SURG

## 2024-07-29 RX ORDER — ONDANSETRON 2 MG/ML
4 INJECTION INTRAMUSCULAR; INTRAVENOUS ONCE AS NEEDED
Status: DISCONTINUED | OUTPATIENT
Start: 2024-07-29 | End: 2024-07-29 | Stop reason: HOSPADM

## 2024-07-29 RX ORDER — SODIUM CHLORIDE 0.9 % (FLUSH) 0.9 %
3 SYRINGE (ML) INJECTION EVERY 12 HOURS SCHEDULED
Status: DISCONTINUED | OUTPATIENT
Start: 2024-07-29 | End: 2024-07-29 | Stop reason: HOSPADM

## 2024-07-29 RX ORDER — SODIUM CHLORIDE 9 MG/ML
40 INJECTION, SOLUTION INTRAVENOUS AS NEEDED
Status: DISCONTINUED | OUTPATIENT
Start: 2024-07-29 | End: 2024-07-29 | Stop reason: HOSPADM

## 2024-07-29 RX ORDER — IPRATROPIUM BROMIDE AND ALBUTEROL SULFATE 2.5; .5 MG/3ML; MG/3ML
3 SOLUTION RESPIRATORY (INHALATION) ONCE
Status: DISCONTINUED | OUTPATIENT
Start: 2024-07-29 | End: 2024-07-29

## 2024-07-29 RX ORDER — SODIUM CHLORIDE, SODIUM LACTATE, POTASSIUM CHLORIDE, CALCIUM CHLORIDE 600; 310; 30; 20 MG/100ML; MG/100ML; MG/100ML; MG/100ML
30 INJECTION, SOLUTION INTRAVENOUS CONTINUOUS
Status: DISCONTINUED | OUTPATIENT
Start: 2024-07-29 | End: 2024-07-29 | Stop reason: HOSPADM

## 2024-07-29 RX ORDER — ONDANSETRON 4 MG/1
4 TABLET, ORALLY DISINTEGRATING ORAL ONCE AS NEEDED
Status: DISCONTINUED | OUTPATIENT
Start: 2024-07-29 | End: 2024-07-29 | Stop reason: HOSPADM

## 2024-07-29 RX ORDER — SODIUM CHLORIDE 0.9 % (FLUSH) 0.9 %
10 SYRINGE (ML) INJECTION AS NEEDED
Status: DISCONTINUED | OUTPATIENT
Start: 2024-07-29 | End: 2024-07-29 | Stop reason: HOSPADM

## 2024-07-29 RX ADMIN — PROPOFOL 20 MCG/KG/MIN: 10 INJECTION, EMULSION INTRAVENOUS at 09:41

## 2024-07-29 RX ADMIN — LEVALBUTEROL HYDROCHLORIDE 1.25 MG: 1.25 SOLUTION RESPIRATORY (INHALATION) at 10:42

## 2024-07-29 RX ADMIN — SODIUM CHLORIDE, POTASSIUM CHLORIDE, SODIUM LACTATE AND CALCIUM CHLORIDE 30 ML/HR: 600; 310; 30; 20 INJECTION, SOLUTION INTRAVENOUS at 09:22

## 2024-07-29 RX ADMIN — ESMOLOL HYDROCHLORIDE 30 MG: 100 INJECTION, SOLUTION INTRAVENOUS at 09:54

## 2024-07-29 NOTE — TELEPHONE ENCOUNTER
Caller: Randi Fajardo    Relationship to patient: Self    Best call back number: [phone]     Chief complaint: AFIB / IRREGULAR HEART BEAT.     Type of visit: FOLLOW UP     Requested date: ASAP     If rescheduling, when is the original appointment:      Additional notes: PATIENT HAD AN COLONOSCOPY PROCEDURE TODAY. PATIENT STATES SHE WAS TOLD SHE HAD AFIB AND IRREGULAR HEART BEAT. EKG WAS DONE.

## 2024-07-29 NOTE — H&P
[facility]   HISTORY AND PHYSICAL    Patient Name: Randi Fajardo  : 1955  MRN: [id number]  Primary Care Physician:  Mainor Sanders Jr., MD  Date of admission: 2024    Subjective   Subjective     Chief Complaint: Colon cancer screening    HPI:    Randi Fajardo is a 68 y.o. female who presents for colon cancer screening.  She has a family history of colorectal cancer.    Review of Systems   Respiratory:  Negative for shortness of breath.    Cardiovascular:  Negative for chest pain.       Personal History     Past Medical History:   Diagnosis Date    Colon polyp ?    Found during initial colonoscopy    COPD (chronic obstructive pulmonary disease)     Depressed     GREGG (generalized anxiety disorder)     Gastroesophageal reflux     HTN (hypertension)     Hypothyroid     Lesion of neck     Osteoarthritis     Pneumonia     RLS (restless legs syndrome)     Sleep apnea in adult     Sleep apnea, obstructive  ?    Type 2 diabetes mellitus        Past Surgical History:   Procedure Laterality Date    ANKLE TENDON REPAIR Right     ankle with hardware     SECTION          COLONOSCOPY      FRACTURE SURGERY  1984    Plate and screws in right ankle    NECK SURGERY      ,,; Neck lesion removed, total of 3       Family History: family history includes Arthritis in her maternal aunt, maternal aunt, and mother; Cancer in her brother, father, maternal aunt, and maternal aunt; Colon cancer in her father; Emphysema in her father; Hearing loss in her maternal aunt; Heart disease in her father; Lung cancer in her father. Otherwise pertinent FHx was reviewed and not pertinent to current issue.    Social History:  reports that she quit smoking about 4 years ago. Her smoking use included cigarettes. She started smoking about 55 years ago. She has a 25.5 pack-year smoking history. She has been exposed to tobacco smoke. She has never used smokeless tobacco. She reports  current alcohol use. She reports that she does not use drugs.    Home Medications:  Accu-Chek Guide Me, Accu-Chek Softclix Lancets, B-D SINGLE USE SWABS REGULAR, Calcium, DULoxetine, Zinc Sulfate, albuterol sulfate HFA, atorvastatin, cephalexin, famotidine, fish oil, gabapentin, glucose blood, hydroCHLOROthiazide, levothyroxine, melatonin, metFORMIN, minocycline, multivitamin with minerals, polyethylene glycol, rOPINIRole, sulfamethoxazole-trimethoprim, and tiotropium bromide-olodaterol    Allergies:  Allergies   Allergen Reactions    Nexium [Esomeprazole] Hives    Prilosec [Omeprazole] Hives       Objective    Objective     Vitals:        Physical Exam  HENT:      Head: Normocephalic.   Cardiovascular:      Rate and Rhythm: Normal rate.   Pulmonary:      Effort: Pulmonary effort is normal.   Abdominal:      Palpations: Abdomen is soft.   Musculoskeletal:         General: Normal range of motion.      Cervical back: Normal range of motion.   Skin:     General: Skin is warm.   Neurological:      General: No focal deficit present.      Mental Status: She is alert.   Psychiatric:         Mood and Affect: Mood normal.         Result Review    Result Review:  I have personally reviewed the results from the time of this admission to 7/29/2024 08:50 EDT and agree with these findings:  []  Laboratory  []  Microbiology  []  Radiology  []  EKG/Telemetry   []  Cardiology/Vascular   []  Pathology  []  Old records  []  Other:  Most notable findings include:     Assessment & Plan   Assessment / Plan     Brief Patient Summary:  Randi Fajardo is a 68 y.o. female who presents for colon cancer screening.    Active Hospital Problems:  Active Hospital Problems    Diagnosis     Screening for malignant neoplasm of colon     Family history of colon cancer in father        Plan:   Will proceed with a colonoscopy.  Risk benefits alternatives were explained.    VTE Prophylaxis:  No VTE prophylaxis order currently exists.        CODE  STATUS:         Admission Status:  I believe this patient meets outpatient status.    Electronically signed by Kalin Arriaga MD, 07/29/24, 8:50 AM EDT.

## 2024-07-29 NOTE — ANESTHESIA POSTPROCEDURE EVALUATION
Patient: Randi Fajardo    Procedure Summary       Date: 07/29/24 Room / Location: [facility] ENDOSCOPY 3 / [facility] ENDOSCOPY    Anesthesia Start: 0940 Anesthesia Stop: 1009    Procedure: COLONOSCOPY Diagnosis:       Screening for malignant neoplasm of colon      Family history of colon cancer in father      (Screening for malignant neoplasm of colon [Z12.11])      (Family history of colon cancer in father [Z80.0])    Surgeons: Kalin Arriaga MD Provider: Holger Renae CRNA    Anesthesia Type: general ASA Status: 4            Anesthesia Type: general    Vitals  Vitals Value Taken Time   /63 07/29/24 1022   Temp 36.6 °C (97.8 °F) 07/29/24 1020   Pulse 121 07/29/24 1029   Resp 20 07/29/24 1020   SpO2 90 % 07/29/24 1029   Vitals shown include unfiled device data.        Post Anesthesia Care and Evaluation    Patient location during evaluation: bedside  Patient participation: complete - patient participated  Level of consciousness: awake  Pain management: adequate    Airway patency: patent  Anesthesia complication: EKG change intraoperatively - Aflutter.  PONV Status: controlled  Cardiovascular status: acceptable and hemodynamically stable  Respiratory status: acceptable    Comments: Patient had EKG changes intraoperatively - atrial flutter with RVR in 150bpm. Esmolol given by CRNA per intra-op record, and rate controlled to 90-100bpm. 12-lead EKG and cardiology consult ordered post-op. EDDIE James, notified and stated that Dr. Garibay made aware and would come see patient in endo recovery. Patient leaving AMA because she does not want to wait on cardiologist. Patient instructed to go to ER if having any palpitations, weakness, or changes in status. Patient additionally instructed to call cardiologist's office to make an earlier appointment for rhythm change.

## 2024-07-30 NOTE — TELEPHONE ENCOUNTER
SW patient. Atrial fibrillation is a new finding for this patient. Scheduled f/u with Dr Perez on 7/31 at 1:30 afternoon- prior Dr Mcnamara's patient.

## 2024-07-31 ENCOUNTER — OFFICE VISIT (OUTPATIENT)
Dept: CARDIOLOGY | Facility: CLINIC | Age: 69
End: 2024-07-31
Payer: MEDICARE

## 2024-07-31 VITALS
WEIGHT: 289.6 LBS | DIASTOLIC BLOOD PRESSURE: 66 MMHG | BODY MASS INDEX: 51.31 KG/M2 | HEIGHT: 63 IN | SYSTOLIC BLOOD PRESSURE: 117 MMHG | HEART RATE: 88 BPM

## 2024-07-31 DIAGNOSIS — G47.33 OSA (OBSTRUCTIVE SLEEP APNEA): ICD-10-CM

## 2024-07-31 DIAGNOSIS — I10 ESSENTIAL HYPERTENSION: ICD-10-CM

## 2024-07-31 DIAGNOSIS — R06.02 SOB (SHORTNESS OF BREATH): ICD-10-CM

## 2024-07-31 DIAGNOSIS — E66.01 MORBID OBESITY WITH BMI OF 50.0-59.9, ADULT: ICD-10-CM

## 2024-07-31 DIAGNOSIS — I48.92 ATRIAL FLUTTER WITH CONTROLLED RESPONSE: Primary | ICD-10-CM

## 2024-07-31 DIAGNOSIS — E78.2 MIXED HYPERLIPIDEMIA: ICD-10-CM

## 2024-07-31 RX ORDER — ATORVASTATIN CALCIUM 10 MG/1
40 TABLET, FILM COATED ORAL NIGHTLY
Qty: 90 TABLET | Refills: 3 | Status: SHIPPED | OUTPATIENT
Start: 2024-07-31

## 2024-07-31 NOTE — PROGRESS NOTES
[facility]  Interventional Cardiology Patient Visit Note      Referring Provider:  No referring provider defined for this encounter.    Reason for Referral:   Atrial flutter    History of Presenting Illness:  Randi Fajardo presents to clinic today for evaluation of atrial flutter.    Ms. Bryan is presenting today for evaluation for atrial flutter.  It was incidentally noted just recently when she was going for a colonoscopy procedure.  She was referred to us for further recommendations.  Ms. Bryan has experienced intermittent shortness of breath with exertion which is mild in nature and has been ongoing for a while.  She has a history of COPD, PENNY and morbid obesity.  She is able to climb a flight of stairs if she walks slowly, walk uphill and on a level ground with mild shortness of breath.  There is no significant change in pattern of shortness of breath for the last year or so according to the patient.  While undergoing colonoscopy it was a first time patient learned about atrial flutter.  She has experienced palpitations only occasionally lasting for few seconds with no association with chest discomfort, dyspnea, lightheadedness or syncope.    Patient has no prior history of cardiac procedures or coronary artery disease.  She has a longstanding history of hypertension which appears to be optimally controlled on medications.  Patient is well compliant with CPAP.  She denies smoking, drinking or doing illicit drug use.      Past Medical History  Past Medical History:   Diagnosis Date    Atrial fibrillation     Colon polyp ?    Found during initial colonoscopy    COPD (chronic obstructive pulmonary disease) 2023    Depressed     Elevated cholesterol     GREGG (generalized anxiety disorder)     Gastroesophageal reflux     HTN (hypertension)     Hyperlipidemia     Hypothyroid     Irregular heartbeat     Lesion of neck     Osteoarthritis     Pneumonia 1960    RLS (restless legs  syndrome)     Sleep apnea in adult     Sleep apnea, obstructive 2005 ?    Type 2 diabetes mellitus          Current Outpatient Medications:     Accu-Chek Softclix Lancets lancets, 1 each by Other route 3 times a day. Use as instructed, Disp: 200 each, Rfl: 3    albuterol sulfate HFA (Ventolin HFA) 108 (90 Base) MCG/ACT inhaler, Inhale 2 puffs Every 4 (Four) Hours As Needed for Wheezing or Shortness of Air., Disp: 1 g, Rfl: 5    Alcohol Swabs (B-D SINGLE USE SWABS REGULAR) pads, Apply 1 Application topically to the appropriate area as directed 5 (Five) Times a Day., Disp: 200 each, Rfl: 3    atorvastatin (LIPITOR) 10 MG tablet, Take 4 tablets by mouth Every Night., Disp: 90 tablet, Rfl: 3    Blood Glucose Monitoring Suppl (Accu-Chek Guide Me) w/Device kit, Inject 1 kit under the skin into the appropriate area as directed See Admin Instructions., Disp: 1 kit, Rfl: 0    CALCIUM PO, Take 600 mg by mouth Daily., Disp: , Rfl:     DULoxetine (CYMBALTA) 60 MG capsule, Take 2 capsules by mouth Daily., Disp: 180 capsule, Rfl: 3    famotidine (PEPCID) 40 MG tablet, Take 1 tablet by mouth 2 (Two) Times a Day., Disp: 180 tablet, Rfl: 0    gabapentin (NEURONTIN) 600 MG tablet, Take 1 tablet by mouth 3 (Three) Times a Day As Needed (pain)., Disp: 90 tablet, Rfl: 0    glucose blood (Accu-Chek Guide) test strip, 1 each by Other route Daily. Use as instructed, Disp: 100 each, Rfl: 3    hydroCHLOROthiazide (HYDRODIURIL) 25 MG tablet, Take 1 tablet by mouth Daily., Disp: 90 tablet, Rfl: 3    melatonin 5 MG tablet tablet, Take 1 tablet by mouth Every Night., Disp: , Rfl:     metFORMIN (GLUCOPHAGE) 500 MG tablet, Take 1 tablet by mouth Daily With Dinner., Disp: 90 tablet, Rfl: 0    minocycline (MINOCIN,DYNACIN) 100 MG capsule, Take 1 capsule by mouth Daily., Disp: 90 capsule, Rfl: 3    multivitamin with minerals tablet tablet, Take 1 tablet by mouth Daily., Disp: , Rfl:     Omega-3 Fatty Acids (fish oil) 1000 MG capsule capsule, Take  by  "mouth Daily With Breakfast., Disp: , Rfl:     rOPINIRole (REQUIP) 2 MG tablet, Take 1 tablet by mouth Every Night., Disp: 90 tablet, Rfl: 3    Stiolto Respimat 2.5-2.5 MCG/ACT aerosol solution inhaler, INHALE TWO PUFFS BY MOUTH EVERY DAY, Disp: 4 g, Rfl: 1    Synthroid 50 MCG tablet, To be filled by Provider, Disp: 90 tablet, Rfl: 0    Zinc Sulfate (ZINC 15 PO), Take 15 mg by mouth Daily., Disp: , Rfl:     apixaban (Eliquis) 5 MG tablet tablet, Take 1 tablet by mouth Every 12 (Twelve) Hours., Disp: 60 tablet, Rfl: 5  Current outpatient and discharge medications have been reconciled for the patient.  Reviewed by: Mahad Perez MD     Medications Discontinued During This Encounter   Medication Reason    atorvastatin (LIPITOR) 10 MG tablet        Allergies   Allergen Reactions    Nexium [Esomeprazole] Hives    Prilosec [Omeprazole] Hives        Social History     Tobacco Use    Smoking status: Former     Current packs/day: 0.00     Average packs/day: 0.5 packs/day for 51.0 years (25.5 ttl pk-yrs)     Types: Cigarettes     Start date: 1969     Quit date:      Years since quittin.5     Passive exposure: Past    Smokeless tobacco: Never    Tobacco comments:     Was off and on over the 40 years   Vaping Use    Vaping status: Never Used   Substance Use Topics    Alcohol use: Yes     Comment: Socially on occasion    Drug use: Never       Family History   Problem Relation Age of Onset    Lung cancer Father     Heart disease Father         Had pacemaker    Colon cancer Father     Cancer Father         Lung, colon    Emphysema Father     Cancer Brother         Lung, colon, and pancreas    Cancer Maternal Aunt         Breast cancer    Arthritis Mother     Arthritis Maternal Aunt     Arthritis Maternal Aunt     Hearing loss Maternal Aunt     Cancer Maternal Aunt           Objective   /66 (BP Location: Right arm, Patient Position: Sitting, Cuff Size: Large Adult)   Pulse 88   Ht 160 cm (63\")   Wt 131 kg (289 " "lb 9.6 oz)   BMI 51.30 kg/m²     Wt Readings from Last 3 Encounters:   07/31/24 131 kg (289 lb 9.6 oz)   07/29/24 128 kg (283 lb 1.1 oz)   06/25/24 129 kg (285 lb)     BP Readings from Last 3 Encounters:   07/31/24 117/66   07/29/24 122/63   06/25/24 133/97       Physical Exam  Constitutional:  Awake. Not in acute distress. Normal appearance.   Neck: No carotid bruit, hepatojugular reflux or JVD.   Cardiovascular:      Rate and Rhythm: Normal rate and regular rhythm.      Chest Wall: PMI is not displaced.      Heart sounds: Normal heart sounds, S1 normal and S2 normal. No murmur heard.       No friction rub. No gallop. No S3 or S4 sounds.    Pulmonary: Pulmonary effort is normal. Normal breath sounds. No wheezing, rhonchi or rales.   Extremities: No Bilateral edema is noted.   Skin: Warm and dry. Non cyanotic, No petechiae or rash.   Neurological: Alert and oriented x 3  Psychiatric:  Behavior is cooperative.       Result Review :   The following data was reviewed by Mahad Perez MD on 07/31/2024   No results found for: \"PROBNP\"  CMP          2/12/2024    10:13 5/13/2024    10:14 5/23/2024    16:48   CMP   Glucose 121  112  95    BUN 18  17  14    Creatinine 0.99  0.92  1.06    EGFR 61.5  67.1  56.6    Sodium 140  142  138    Potassium 4.4  4.1  3.9    Chloride 100  104  100    Calcium 9.6  8.9  9.8    Total Protein 7.2  6.7  7.1    Albumin 4.1  4.1  4.1    Globulin 3.1  2.6  3.0    Total Bilirubin 0.4  0.3  0.3    Alkaline Phosphatase 56  55  67    AST (SGOT) 37  23  27    ALT (SGPT) 37  29  25    Albumin/Globulin Ratio 1.3  1.6  1.4    BUN/Creatinine Ratio 18.2  18.5  13.2    Anion Gap 12.0  9.0  10.0      CBC w/diff          2/12/2024    10:13 5/13/2024    10:14 5/23/2024    16:48   CBC w/Diff   WBC 10.85  9.45  11.37    RBC 5.41  5.28  5.42    Hemoglobin 15.4  15.4  15.7    Hematocrit 47.0  46.8  48.8    MCV 86.9  88.6  90.0    MCH 28.5  29.2  29.0    MCHC 32.8  32.9  32.2    RDW 13.6  13.8  14.4    Platelets " "293  249  285    Neutrophil Rel % 54.9  43.7  58.2    Immature Granulocyte Rel % 0.6  0.4  0.5    Lymphocyte Rel % 34.0  42.0  30.3    Monocyte Rel % 7.6  7.9  7.0    Eosinophil Rel % 2.0  4.9  3.3    Basophil Rel % 0.9  1.1  0.7       Lab Results   Component Value Date    TSH 1.840 05/13/2024      Lab Results   Component Value Date    FREET4 0.9 09/03/2020      No results found for: \"DDIMERQUANT\"  Magnesium   Date Value Ref Range Status   05/12/2023 1.9 1.6 - 2.4 mg/dL Final      No results found for: \"DIGOXIN\"   No results found for: \"TROPONINT\"   No results found for: \"POCTROP\"       A1C Last 3 Results          11/14/2023    10:18 2/12/2024    10:13 5/13/2024    10:14   HGBA1C Last 3 Results   Hemoglobin A1C 6.50  6.30  6.50      Lipid Panel          11/14/2023    10:18 2/12/2024    10:13 5/13/2024    10:14   Lipid Panel   Total Cholesterol 173  175  118    Triglycerides 181  191  156    HDL Cholesterol 38  38  38    VLDL Cholesterol 32  33  27    LDL Cholesterol  103  104  53    LDL/HDL Ratio 2.60  2.60  1.28      Results for orders placed during the hospital encounter of 03/11/22    Adult Transthoracic Echo Complete W/ Cont if Necessary Per Protocol    Interpretation Summary  · Estimated left ventricular EF was in agreement with the calculated left ventricular EF. Left ventricular ejection fraction appears to be 56 - 60%. Left ventricular systolic function is normal.  · Abnormal motion of the interventricular septum is noted. Most likely secondary to underlying bundle branch block.  · Left ventricular diastolic function was indeterminate.  · The right ventricular cavity is mildly dilated.     Results for orders placed during the hospital encounter of 03/11/22    Adult Transthoracic Echo Complete W/ Cont if Necessary Per Protocol    Interpretation Summary  · Estimated left ventricular EF was in agreement with the calculated left ventricular EF. Left ventricular ejection fraction appears to be 56 - 60%. Left " ventricular systolic function is normal.  · Abnormal motion of the interventricular septum is noted. Most likely secondary to underlying bundle branch block.  · Left ventricular diastolic function was indeterminate.  · The right ventricular cavity is mildly dilated.     No results found for this or any previous visit.     Results for orders placed during the hospital encounter of 07/20/22    Stress Test With Myocardial Perfusion Two Day    Interpretation Summary  Patient received Lexiscan 0.4 mg IV fusion over 10 seconds.  Baseline ECG showed normal sinus rhythm with right bundle branch block.  At peak stress, no ischemic ST-T changes were noted.  No significant arrhythmias were noted.  Gated and SPECT images were obtained.  Image quality is adequate.  Attenuation artifact is present.  There is a medium area of mild to moderate perfusion defect in the distal anterior and apical segments with partial reversibility on resting images which could represent myocardial ischemia in the right clinical setting.  The left ventricle was normal in size with a calculated ejection fraction exceeding 70%.  Overall, this represents an intermediate myocardial perfusion scan.       The ASCVD Risk score (Jerad GOODMAN, et al., 2019) failed to calculate for the following reasons:    The valid total cholesterol range is 130 to 320 mg/dL        Assessment  1. Atrial flutter with controlled response    2. Essential hypertension    3. Mixed hyperlipidemia    4. PENNY (obstructive sleep apnea)    5. Morbid obesity with BMI of 50.0-59.9, adult    6. SOB (shortness of breath)        Plan  In comparison to EKG from 7/29/24, A-flutter is no longer present.  Patient's [id number] score is 4 putting her at 4 to 5% risk of annual stroke.  Started Eliquis 5 mg p.o. twice daily for stroke risk reduction.  Will monitor for major bleeding events.  With the BMI greater than 40 there is a risk for subtherapeutic level of Eliquis.  This was discussed with the  patient.  In case of a thromboembolic event, we will switch to Coumadin.  Blood pressure is optimally controlled, continue current medical regimen.  I have ordered, cardiac event monitor to determine average heart rate, a flutter burden or if the patient is switching between atrial fibrillation or atrial flutter.  Will initiate beta-blocker therapy based on average heart rate.  Increased Lipitor to 40 mg p.o. daily due to history of diabetes.    Patient has frequent APCs noted on EKG which in addition to obesity and PENNY increase risk of atrial fibrillation.      All questions were answered in detail.    Diagnoses and all orders for this visit:    1. Atrial flutter with controlled response (Primary)  -     Adult Transthoracic Echo Complete W/ Cont if Necessary Per Protocol; Future  -     apixaban (Eliquis) 5 MG tablet tablet; Take 1 tablet by mouth Every 12 (Twelve) Hours.  Dispense: 60 tablet; Refill: 5  -     Cancel: Cardiac Event Monitor; Future  -     Holter Monitor - 72 Hour Up To 15 Days; Future  -     ECG 12 Lead    2. Essential hypertension  -     Adult Transthoracic Echo Complete W/ Cont if Necessary Per Protocol; Future    3. Mixed hyperlipidemia  -     atorvastatin (LIPITOR) 10 MG tablet; Take 4 tablets by mouth Every Night.  Dispense: 90 tablet; Refill: 3    4. PENNY (obstructive sleep apnea)    5. Morbid obesity with BMI of 50.0-59.9, adult    6. SOB (shortness of breath)  -     Adult Transthoracic Echo Complete W/ Cont if Necessary Per Protocol; Future          ECG 12 Lead    Date/Time: 7/31/2024 4:30 PM  Performed by: Mahad Perez MD    Authorized by: Mahad Perez MD  Comparison: compared with previous ECG from 7/29/2024  Comparison to previous ECG: Atrial flutter with variable AV block  Right bundle branch block  Rhythm: sinus rhythm  Ectopy: atrial premature contractions  Conduction: right bundle branch block    Clinical impression: abnormal EKG           Follow Up     No follow-ups on  file.      Mahad Perez MD  Interventional Cardiology  07/31/2024  16:32 EDT      Patient was given instructions and counseling regarding her condition or for health maintenance advice. Please see specific information pulled into the AVS if appropriate.     Please note that portions of this document were completed using a voice recognition program.

## 2024-08-05 RX ORDER — LEVOTHYROXINE SODIUM 50 MCG
50 TABLET ORAL DAILY
Qty: 90 TABLET | Refills: 1 | Status: SHIPPED | OUTPATIENT
Start: 2024-08-05

## 2024-08-07 ENCOUNTER — TELEPHONE (OUTPATIENT)
Dept: SURGERY | Facility: CLINIC | Age: 69
End: 2024-08-07
Payer: MEDICARE

## 2024-08-07 NOTE — TELEPHONE ENCOUNTER
----- Message from April Franky sent at 8/5/2024  8:22 PM EDT -----  10 year colon recall. [facility]

## 2024-08-08 DIAGNOSIS — E11.9 TYPE 2 DIABETES MELLITUS WITHOUT COMPLICATION, WITHOUT LONG-TERM CURRENT USE OF INSULIN: ICD-10-CM

## 2024-08-13 ENCOUNTER — TELEPHONE (OUTPATIENT)
Dept: CARDIOLOGY | Facility: CLINIC | Age: 69
End: 2024-08-13
Payer: MEDICARE

## 2024-08-13 DIAGNOSIS — R06.02 SOB (SHORTNESS OF BREATH): Primary | ICD-10-CM

## 2024-08-13 NOTE — TELEPHONE ENCOUNTER
Patient called and left . Patient was started on Eliquis when seen by Dr Perez. Patient has been experiencing increased swelling in lower extremities as well as increased SOA with activities.    Patient states the swelling does go down throughout the night and increases during the day.     Patient does take HCTZ 25mg daily from PCP.     Patient is scheduled for ECHO on 8/16/24, Holter is on 8/31 in hospital. Patient is asking if she can get the Holter  moved up sooner if possible.     Patient reports heart rate running 70's, does not have BP readings.     Please advise. Please send any prescription to [facility].

## 2024-08-13 NOTE — TELEPHONE ENCOUNTER
I would check a chest xray, bnp, bmp. Let her know theres no need to move up the monitor at this point, there is no urgency to that. Im more concerned with the echo as she is having shortness of breath and increased swelling. That is just a few days away but we will go ahead and evaluate for CHF and start her on 20mg of lasix.    I agree with the recommendation above.  I would like to see you in person before starting you on any medications.  I cannot prescribe Lasix as you are taking hydrochlorothiazide it can have an exaggerated diuretic effect.  We can schedule a sooner follow-up

## 2024-08-14 ENCOUNTER — LAB (OUTPATIENT)
Dept: LAB | Facility: HOSPITAL | Age: 69
End: 2024-08-14
Payer: MEDICARE

## 2024-08-14 ENCOUNTER — HOSPITAL ENCOUNTER (OUTPATIENT)
Dept: GENERAL RADIOLOGY | Facility: HOSPITAL | Age: 69
Discharge: HOME | End: 2024-08-14
Payer: MEDICARE

## 2024-08-14 DIAGNOSIS — R06.02 SOB (SHORTNESS OF BREATH): ICD-10-CM

## 2024-08-14 LAB
ANION GAP SERPL CALCULATED.3IONS-SCNC: 14 MMOL/L (ref 5–15)
BUN SERPL-MCNC: 15 MG/DL (ref 8–23)
BUN/CREAT SERPL: 14.2 (ref 7–25)
CALCIUM SPEC-SCNC: 9.8 MG/DL (ref 8.6–10.5)
CHLORIDE SERPL-SCNC: 98 MMOL/L (ref 98–107)
CO2 SERPL-SCNC: 26 MMOL/L (ref 22–29)
CREAT SERPL-MCNC: 1.06 MG/DL (ref 0.57–1)
EGFRCR SERPLBLD CKD-EPI 2021: 56.6 ML/MIN/1.73
GLUCOSE SERPL-MCNC: 129 MG/DL (ref 65–99)
NT-PROBNP SERPL-MCNC: 938 PG/ML (ref 0–900)
POTASSIUM SERPL-SCNC: 4.3 MMOL/L (ref 3.5–5.2)
SODIUM SERPL-SCNC: 138 MMOL/L (ref 136–145)

## 2024-08-14 PROCEDURE — 36415 COLL VENOUS BLD VENIPUNCTURE: CPT

## 2024-08-14 PROCEDURE — 80048 BASIC METABOLIC PNL TOTAL CA: CPT

## 2024-08-14 PROCEDURE — 83880 ASSAY OF NATRIURETIC PEPTIDE: CPT

## 2024-08-14 PROCEDURE — 71046 X-RAY EXAM CHEST 2 VIEWS: CPT

## 2024-08-14 NOTE — TELEPHONE ENCOUNTER
VIVI patient. Went over information, orders and recommendations. Patient has been informed her holter at the hospital has been cancelled and can now be scheduled in the office. Patient will have her Xray, and labs drawn today- follow up is scheduled with Dr Perez tomorrow 8/15/24

## 2024-08-15 ENCOUNTER — OFFICE VISIT (OUTPATIENT)
Dept: CARDIOLOGY | Facility: CLINIC | Age: 69
End: 2024-08-15
Payer: MEDICARE

## 2024-08-15 VITALS
BODY MASS INDEX: 51.6 KG/M2 | HEART RATE: 91 BPM | WEIGHT: 291.2 LBS | SYSTOLIC BLOOD PRESSURE: 102 MMHG | HEIGHT: 63 IN | DIASTOLIC BLOOD PRESSURE: 67 MMHG

## 2024-08-15 DIAGNOSIS — I10 ESSENTIAL HYPERTENSION: ICD-10-CM

## 2024-08-15 DIAGNOSIS — I50.32 CHRONIC DIASTOLIC (CONGESTIVE) HEART FAILURE: Primary | ICD-10-CM

## 2024-08-15 RX ORDER — BUMETANIDE 2 MG/1
1 TABLET ORAL 2 TIMES DAILY PRN
Qty: 60 TABLET | Refills: 3 | Status: SHIPPED | OUTPATIENT
Start: 2024-08-15

## 2024-08-15 RX ORDER — SPIRONOLACTONE 25 MG/1
25 TABLET ORAL DAILY
Qty: 90 TABLET | Refills: 3 | Status: SHIPPED | OUTPATIENT
Start: 2024-08-15

## 2024-08-15 NOTE — PROGRESS NOTES
[facility]  Interventional Cardiology Patient Visit Note      History of Presenting Illness:  History of Present Illness    Ms. Bryan is a 68-year-old female with a history of atrial flutter on anticoagulation, COPD, PENNY on CPAP, morbid obesity, HTN, HLD.  She is visiting today for evaluation of shortness of breath.    She experiences shortness of breath when walking long distances, climbing stairs, or moving quickly, necessitating a slower pace. She also reports leg swelling. She is not sure if chronic Obstructive Pulmonary Disease (COPD) has been causing increased shortness of breath. However, she does not experience shortness of breath at night, possibly due to the use of the CPAP machine.    She has a diagnosis of sleep apnea and uses a CPAP machine nightly, without which she struggles to sleep. Occasionally, she naps with the CPAP machine but often only manages to sleep for a few minutes in a chair.    She has been monitoring her weight daily, which had increased by 4 to 5 pounds. Her weight this morning was 288 pounds, up from a previous low of 282 pounds.    Her blood pressure reading at home this morning was 137/88, which is higher than usual, causing her concern. She took her hydrochlorothiazide medication this morning. She believes the hydrochlorothiazide is not currently helping with fluid retention, although it has been effective in the past. She is unsure if this is due to starting Eliquis, increasing her cholesterol medication, or stress.    She has potassium supplements at home but has not been taking them. She has started taking magnesium supplements, which have somewhat alleviated her leg cramps.    Past Medical History  Past Medical History:   Diagnosis Date    Atrial fibrillation     Colon polyp ?    Found during initial colonoscopy    COPD (chronic obstructive pulmonary disease) 2023    Depressed     Elevated cholesterol     GREGG (generalized anxiety disorder)      Gastroesophageal reflux     HTN (hypertension)     Hyperlipidemia     Hypothyroid     Irregular heartbeat     Lesion of neck     Osteoarthritis     Pneumonia 1960    RLS (restless legs syndrome)     Sleep apnea in adult     Sleep apnea, obstructive 2005 ?    Type 2 diabetes mellitus          Current Outpatient Medications:     Accu-Chek Softclix Lancets lancets, 1 each by Other route 3 times a day. Use as instructed, Disp: 200 each, Rfl: 3    albuterol sulfate HFA (Ventolin HFA) 108 (90 Base) MCG/ACT inhaler, Inhale 2 puffs Every 4 (Four) Hours As Needed for Wheezing or Shortness of Air., Disp: 1 g, Rfl: 5    Alcohol Swabs (B-D SINGLE USE SWABS REGULAR) pads, Apply 1 Application topically to the appropriate area as directed 5 (Five) Times a Day., Disp: 200 each, Rfl: 3    apixaban (Eliquis) 5 MG tablet tablet, Take 1 tablet by mouth Every 12 (Twelve) Hours., Disp: 60 tablet, Rfl: 5    atorvastatin (LIPITOR) 10 MG tablet, Take 4 tablets by mouth Every Night., Disp: 90 tablet, Rfl: 3    Blood Glucose Monitoring Suppl (Accu-Chek Guide Me) w/Device kit, Inject 1 kit under the skin into the appropriate area as directed See Admin Instructions., Disp: 1 kit, Rfl: 0    CALCIUM PO, Take 600 mg by mouth Daily., Disp: , Rfl:     DULoxetine (CYMBALTA) 60 MG capsule, Take 2 capsules by mouth Daily., Disp: 180 capsule, Rfl: 3    famotidine (PEPCID) 40 MG tablet, Take 1 tablet by mouth 2 (Two) Times a Day., Disp: 180 tablet, Rfl: 0    gabapentin (NEURONTIN) 600 MG tablet, Take 1 tablet by mouth 3 (Three) Times a Day As Needed (pain)., Disp: 90 tablet, Rfl: 0    glucose blood (Accu-Chek Guide) test strip, 1 each by Other route Daily. Use as instructed, Disp: 100 each, Rfl: 3    melatonin 5 MG tablet tablet, Take 1 tablet by mouth Every Night., Disp: , Rfl:     metFORMIN (GLUCOPHAGE) 500 MG tablet, TAKE 1 TABLET BY MOUTH EVERY DAY WITH DINNER, Disp: 90 tablet, Rfl: 0    minocycline (MINOCIN,DYNACIN) 100 MG capsule, Take 1 capsule by  mouth Daily., Disp: 90 capsule, Rfl: 3    multivitamin with minerals tablet tablet, Take 1 tablet by mouth Daily., Disp: , Rfl:     Omega-3 Fatty Acids (fish oil) 1000 MG capsule capsule, Take  by mouth Daily With Breakfast., Disp: , Rfl:     rOPINIRole (REQUIP) 2 MG tablet, Take 1 tablet by mouth Every Night., Disp: 90 tablet, Rfl: 3    Stiolto Respimat 2.5-2.5 MCG/ACT aerosol solution inhaler, INHALE TWO PUFFS BY MOUTH EVERY DAY, Disp: 4 g, Rfl: 1    Synthroid 50 MCG tablet, TAKE ONE TABLET BY MOUTH EVERY DAY, Disp: 90 tablet, Rfl: 1    Zinc Sulfate (ZINC 15 PO), Take 15 mg by mouth Daily., Disp: , Rfl:     bumetanide (BUMEX) 2 MG tablet, Take 0.5 tablets by mouth 2 (Two) Times a Day As Needed (When overnight 2 lb weight gain is noted or 4-5 pounds over 48 hours.)., Disp: 60 tablet, Rfl: 3    empagliflozin (JARDIANCE) 10 MG tablet tablet, Take 1 tablet by mouth Daily., Disp: 30 tablet, Rfl: 3    spironolactone (ALDACTONE) 25 MG tablet, Take 1 tablet by mouth Daily., Disp: 90 tablet, Rfl: 3  Current outpatient and discharge medications have been reconciled for the patient.  Reviewed by: Mahad Perez MD     Medications Discontinued During This Encounter   Medication Reason    hydroCHLOROthiazide (HYDRODIURIL) 25 MG tablet Discontinued by another clinician     Allergies   Allergen Reactions    Nexium [Esomeprazole] Hives    Prilosec [Omeprazole] Hives      Social History     Tobacco Use    Smoking status: Former     Current packs/day: 0.00     Average packs/day: 0.5 packs/day for 51.0 years (25.5 ttl pk-yrs)     Types: Cigarettes     Start date: 1969     Quit date: 2020     Years since quittin.6     Passive exposure: Past    Smokeless tobacco: Never    Tobacco comments:     Was off and on over the 40 years   Vaping Use    Vaping status: Never Used   Substance Use Topics    Alcohol use: Yes     Comment: Socially on occasion    Drug use: Never     Family History   Problem Relation Age of Onset    Lung cancer  "Father     Heart disease Father         Had pacemaker    Colon cancer Father     Cancer Father         Lung, colon    Emphysema Father     Cancer Brother         Lung, colon, and pancreas    Cancer Maternal Aunt         Breast cancer    Arthritis Mother     Arthritis Maternal Aunt     Arthritis Maternal Aunt     Hearing loss Maternal Aunt     Cancer Maternal Aunt           Objective   /67 (BP Location: Right arm, Patient Position: Sitting, Cuff Size: Large Adult)   Pulse 91   Ht 160 cm (63\")   Wt 132 kg (291 lb 3.2 oz)   BMI 51.58 kg/m²     Wt Readings from Last 3 Encounters:   08/15/24 132 kg (291 lb 3.2 oz)   07/31/24 131 kg (289 lb 9.6 oz)   07/29/24 128 kg (283 lb 1.1 oz)     BP Readings from Last 3 Encounters:   08/15/24 102/67   07/31/24 117/66   07/29/24 122/63       Physical Exam  Constitutional:  Awake. Not in acute distress. Normal appearance.   Neck: JVD is difficult to assess due to presence of neck adipose tissue   cardiovascular:      Rate and Rhythm: Normal rate and regular rhythm.      Chest Wall: PMI is not displaced.      Heart sounds: Normal heart sounds, S1 normal and S2 normal. No murmur heard.       No friction rub. No gallop. No S3 or S4 sounds.    Pulmonary: Pulmonary effort is normal. Normal breath sounds. No wheezing, rhonchi or rales.   Extremities: Bilateral pitting edema  Skin: Warm and dry. Non cyanotic, No petechiae or rash.   Neurological: Alert and oriented x 3  Psychiatric:  Behavior is cooperative.       Result Review :   The following data was reviewed by Mahad Perez MD on 08/15/2024   proBNP   Date Value Ref Range Status   08/14/2024 938.0 (H) 0.0 - 900.0 pg/mL Final     CMP          5/13/2024    10:14 5/23/2024    16:48 8/14/2024    12:32   CMP   Glucose 112  95  129    BUN 17  14  15    Creatinine 0.92  1.06  1.06    EGFR 67.1  56.6  56.6    Sodium 142  138  138    Potassium 4.1  3.9  4.3    Chloride 104  100  98    Calcium 8.9  9.8  9.8    Total Protein 6.7  7.1   " "  Albumin 4.1  4.1     Globulin 2.6  3.0     Total Bilirubin 0.3  0.3     Alkaline Phosphatase 55  67     AST (SGOT) 23  27     ALT (SGPT) 29  25     Albumin/Globulin Ratio 1.6  1.4     BUN/Creatinine Ratio 18.5  13.2  14.2    Anion Gap 9.0  10.0  14.0      CBC w/diff          2/12/2024    10:13 5/13/2024    10:14 5/23/2024    16:48   CBC w/Diff   WBC 10.85  9.45  11.37    RBC 5.41  5.28  5.42    Hemoglobin 15.4  15.4  15.7    Hematocrit 47.0  46.8  48.8    MCV 86.9  88.6  90.0    MCH 28.5  29.2  29.0    MCHC 32.8  32.9  32.2    RDW 13.6  13.8  14.4    Platelets 293  249  285    Neutrophil Rel % 54.9  43.7  58.2    Immature Granulocyte Rel % 0.6  0.4  0.5    Lymphocyte Rel % 34.0  42.0  30.3    Monocyte Rel % 7.6  7.9  7.0    Eosinophil Rel % 2.0  4.9  3.3    Basophil Rel % 0.9  1.1  0.7       Lab Results   Component Value Date    TSH 1.840 05/13/2024      Lab Results   Component Value Date    FREET4 0.9 09/03/2020      No results found for: \"DDIMERQUANT\"  Magnesium   Date Value Ref Range Status   05/12/2023 1.9 1.6 - 2.4 mg/dL Final      No results found for: \"DIGOXIN\"   No results found for: \"TROPONINT\"   No results found for: \"POCTROP\"       A1C Last 3 Results          11/14/2023    10:18 2/12/2024    10:13 5/13/2024    10:14   HGBA1C Last 3 Results   Hemoglobin A1C 6.50  6.30  6.50      Lipid Panel          11/14/2023    10:18 2/12/2024    10:13 5/13/2024    10:14   Lipid Panel   Total Cholesterol 173  175  118    Triglycerides 181  191  156    HDL Cholesterol 38  38  38    VLDL Cholesterol 32  33  27    LDL Cholesterol  103  104  53    LDL/HDL Ratio 2.60  2.60  1.28      Results for orders placed during the hospital encounter of 03/11/22    Adult Transthoracic Echo Complete W/ Cont if Necessary Per Protocol    Interpretation Summary  · Estimated left ventricular EF was in agreement with the calculated left ventricular EF. Left ventricular ejection fraction appears to be 56 - 60%. Left ventricular systolic " function is normal.  · Abnormal motion of the interventricular septum is noted. Most likely secondary to underlying bundle branch block.  · Left ventricular diastolic function was indeterminate.  · The right ventricular cavity is mildly dilated.     Results for orders placed during the hospital encounter of 03/11/22    Adult Transthoracic Echo Complete W/ Cont if Necessary Per Protocol    Interpretation Summary  · Estimated left ventricular EF was in agreement with the calculated left ventricular EF. Left ventricular ejection fraction appears to be 56 - 60%. Left ventricular systolic function is normal.  · Abnormal motion of the interventricular septum is noted. Most likely secondary to underlying bundle branch block.  · Left ventricular diastolic function was indeterminate.  · The right ventricular cavity is mildly dilated.     No results found for this or any previous visit.     Results for orders placed during the hospital encounter of 07/20/22    Stress Test With Myocardial Perfusion Two Day    Interpretation Summary  Patient received Lexiscan 0.4 mg IV fusion over 10 seconds.  Baseline ECG showed normal sinus rhythm with right bundle branch block.  At peak stress, no ischemic ST-T changes were noted.  No significant arrhythmias were noted.  Gated and SPECT images were obtained.  Image quality is adequate.  Attenuation artifact is present.  There is a medium area of mild to moderate perfusion defect in the distal anterior and apical segments with partial reversibility on resting images which could represent myocardial ischemia in the right clinical setting.  The left ventricle was normal in size with a calculated ejection fraction exceeding 70%.  Overall, this represents an intermediate myocardial perfusion scan.     The ASCVD Risk score (Jerad DK, et al., 2019) failed to calculate for the following reasons:    The valid total cholesterol range is 130 to 320 mg/dL        Assessment  Assessment & Plan  1.   Chronic diastolic heart failure  2.  Essential hypertension  3.  Atrial flutter without RVR on anticoagulation  4.  PENNY on CPAP  5.  Hyperlipidemia    Symptoms of exertional dyspnea, leg swelling, and fluid retention suggest diastolic heart failure in context of elevated BNP with a normal creatinine clearance.  The x-ray findings do not indicate fluid accumulation. Baseline weight is approximately 282 pounds, currently increased to 288 pounds. There is a minimal elevation in creatinine levels, which is not indicative of significant kidney injury but warrants monitoring.   Blood pressure control is satisfactory. Hydrochlorothiazide will be discontinued, and Bumex 1 mg twice daily will be started for the next 5 days, along with potassium supplements.   Daily weight monitoring is recommended until weight decreases to 280 pounds. Magnesium supplementation is also suggested.   Entresto will be held off for now to monitor blood pressure trends.   Spironolactone will be started. Blood work is recommended in the next 3 to 4 days to ensure potassium levels are within the normal range.        Plan      Bumex 1 mg p.o. twice daily for 5 days and then continue it on as-needed basis based on weight monitoring.  Patient was educated regarding keeping a log for weight so she would be able to take Bumex when necessary.  For the next 5 days patient has agreed to take magnesium and potassium supplements.  The degree of hypokalemia will be minimized by taking spironolactone.  BMP will be repeated on Monday to make sure there is no kidney injury or hyperkalemia or hypokalemia.  Continue Jardiance 10 mg p.o. daily.  Monitor for urinary tract infections.  Continue spironolactone 25 mg p.o. daily.          Diagnoses and all orders for this visit:    1. Chronic diastolic (congestive) heart failure (Primary)  -     bumetanide (BUMEX) 2 MG tablet; Take 0.5 tablets by mouth 2 (Two) Times a Day As Needed (When overnight 2 lb weight gain is  noted or 4-5 pounds over 48 hours.).  Dispense: 60 tablet; Refill: 3  -     spironolactone (ALDACTONE) 25 MG tablet; Take 1 tablet by mouth Daily.  Dispense: 90 tablet; Refill: 3  -     empagliflozin (JARDIANCE) 10 MG tablet tablet; Take 1 tablet by mouth Daily.  Dispense: 30 tablet; Refill: 3  -     Basic Metabolic Panel; Future    2. Essential hypertension  -     spironolactone (ALDACTONE) 25 MG tablet; Take 1 tablet by mouth Daily.  Dispense: 90 tablet; Refill: 3      Follow Up     Return in about 8 weeks (around 10/10/2024) for With Dr. Perez.      Mahad Perez MD  Interventional Cardiology  08/15/2024  12:16 EDT      Patient was given instructions and counseling regarding her condition or for health maintenance advice. Please see specific information pulled into the AVS if appropriate.     Please note that portions of this document were completed using a voice recognition program.     Patient or patient representative verbalized consent for the use of Ambient Listening during the visit with  Mahad Perez MD for chart documentation. 8/15/2024  12:36 EDT

## 2024-08-16 ENCOUNTER — HOSPITAL ENCOUNTER (OUTPATIENT)
Dept: CARDIOLOGY | Facility: HOSPITAL | Age: 69
Discharge: HOME | End: 2024-08-16
Payer: MEDICARE

## 2024-08-16 DIAGNOSIS — R06.02 SOB (SHORTNESS OF BREATH): ICD-10-CM

## 2024-08-16 DIAGNOSIS — I10 ESSENTIAL HYPERTENSION: ICD-10-CM

## 2024-08-16 DIAGNOSIS — I48.92 ATRIAL FLUTTER WITH CONTROLLED RESPONSE: ICD-10-CM

## 2024-08-16 LAB
BH CV ECHO MEAS - AO MAX PG: 22.2 MMHG
BH CV ECHO MEAS - AO MEAN PG: 11.8 MMHG
BH CV ECHO MEAS - AO ROOT DIAM: 3.2 CM
BH CV ECHO MEAS - AO V2 MAX: 235.6 CM/SEC
BH CV ECHO MEAS - AO V2 VTI: 51.4 CM
BH CV ECHO MEAS - AVA(I,D): 1.31 CM2
BH CV ECHO MEAS - EDV(CUBED): 71.6 ML
BH CV ECHO MEAS - EDV(MOD-SP4): 81 ML
BH CV ECHO MEAS - EF(MOD-SP4): 62.8 %
BH CV ECHO MEAS - ESV(CUBED): 26.6 ML
BH CV ECHO MEAS - ESV(MOD-SP4): 30.1 ML
BH CV ECHO MEAS - FS: 28.1 %
BH CV ECHO MEAS - IVS/LVPW: 0.88 CM
BH CV ECHO MEAS - IVSD: 0.95 CM
BH CV ECHO MEAS - LA DIMENSION: 2.8 CM
BH CV ECHO MEAS - LAT PEAK E' VEL: 8.2 CM/SEC
BH CV ECHO MEAS - LV DIASTOLIC VOL/BSA (35-75): 35.3 CM2
BH CV ECHO MEAS - LV MASS(C)D: 137.7 GRAMS
BH CV ECHO MEAS - LV MAX PG: 4.7 MMHG
BH CV ECHO MEAS - LV MEAN PG: 2.24 MMHG
BH CV ECHO MEAS - LV SYSTOLIC VOL/BSA (12-30): 13.1 CM2
BH CV ECHO MEAS - LV V1 MAX: 108.3 CM/SEC
BH CV ECHO MEAS - LV V1 VTI: 20.9 CM
BH CV ECHO MEAS - LVIDD: 4.2 CM
BH CV ECHO MEAS - LVIDS: 3 CM
BH CV ECHO MEAS - LVOT AREA: 3.2 CM2
BH CV ECHO MEAS - LVOT DIAM: 2.02 CM
BH CV ECHO MEAS - LVPWD: 1.08 CM
BH CV ECHO MEAS - MED PEAK E' VEL: 6 CM/SEC
BH CV ECHO MEAS - MV A MAX VEL: 124.2 CM/SEC
BH CV ECHO MEAS - MV DEC SLOPE: 702.4 CM/SEC2
BH CV ECHO MEAS - MV DEC TIME: 0.18 SEC
BH CV ECHO MEAS - MV E MAX VEL: 128 CM/SEC
BH CV ECHO MEAS - MV E/A: 1.03
BH CV ECHO MEAS - MV MEAN PG: 3.5 MMHG
BH CV ECHO MEAS - MV V2 VTI: 32.5 CM
BH CV ECHO MEAS - MVA(VTI): 2.07 CM2
BH CV ECHO MEAS - RVDD: 2.9 CM
BH CV ECHO MEAS - SV(LVOT): 67.4 ML
BH CV ECHO MEAS - SV(MOD-SP4): 50.9 ML
BH CV ECHO MEAS - SVI(LVOT): 29.3 ML/M2
BH CV ECHO MEAS - SVI(MOD-SP4): 22.2 ML/M2
BH CV ECHO MEASUREMENTS AVERAGE E/E' RATIO: 18.03

## 2024-08-16 PROCEDURE — 93306 TTE W/DOPPLER COMPLETE: CPT

## 2024-08-16 PROCEDURE — 25010000002 SULFUR HEXAFLUORIDE MICROSPH 60.7-25 MG RECONSTITUTED SUSPENSION: Performed by: STUDENT IN AN ORGANIZED HEALTH CARE EDUCATION/TRAINING PROGRAM

## 2024-08-16 RX ADMIN — SULFUR HEXAFLUORIDE 2 ML: KIT at 16:02

## 2024-08-18 PROCEDURE — 99284 EMERGENCY DEPT VISIT MOD MDM: CPT

## 2024-08-18 PROCEDURE — 36415 COLL VENOUS BLD VENIPUNCTURE: CPT

## 2024-08-19 ENCOUNTER — APPOINTMENT (OUTPATIENT)
Dept: GENERAL RADIOLOGY | Facility: HOSPITAL | Age: 69
End: 2024-08-19
Payer: MEDICARE

## 2024-08-19 ENCOUNTER — HOSPITAL ENCOUNTER (EMERGENCY)
Facility: HOSPITAL | Age: 69
Discharge: HOME | End: 2024-08-19
Attending: EMERGENCY MEDICINE
Payer: MEDICARE

## 2024-08-19 VITALS
BODY MASS INDEX: 50 KG/M2 | WEIGHT: 282.19 LBS | TEMPERATURE: 99.8 F | OXYGEN SATURATION: 90 % | DIASTOLIC BLOOD PRESSURE: 84 MMHG | RESPIRATION RATE: 20 BRPM | HEART RATE: 88 BPM | HEIGHT: 63 IN | SYSTOLIC BLOOD PRESSURE: 102 MMHG

## 2024-08-19 DIAGNOSIS — J44.9 CHRONIC OBSTRUCTIVE PULMONARY DISEASE, UNSPECIFIED COPD TYPE: ICD-10-CM

## 2024-08-19 DIAGNOSIS — I48.92 ATRIAL FLUTTER WITH RAPID VENTRICULAR RESPONSE: Primary | ICD-10-CM

## 2024-08-19 LAB
ALBUMIN SERPL-MCNC: 3.9 G/DL (ref 3.5–5.2)
ALBUMIN/GLOB SERPL: 1 G/DL
ALP SERPL-CCNC: 66 U/L (ref 39–117)
ALT SERPL W P-5'-P-CCNC: 35 U/L (ref 1–33)
ANION GAP SERPL CALCULATED.3IONS-SCNC: 13.7 MMOL/L (ref 5–15)
AST SERPL-CCNC: 45 U/L (ref 1–32)
BASOPHILS # BLD AUTO: 0.07 10*3/MM3 (ref 0–0.2)
BASOPHILS NFR BLD AUTO: 0.5 % (ref 0–1.5)
BILIRUB SERPL-MCNC: 0.4 MG/DL (ref 0–1.2)
BUN SERPL-MCNC: 24 MG/DL (ref 8–23)
BUN/CREAT SERPL: 19.4 (ref 7–25)
CALCIUM SPEC-SCNC: 9.7 MG/DL (ref 8.6–10.5)
CHLORIDE SERPL-SCNC: 100 MMOL/L (ref 98–107)
CO2 SERPL-SCNC: 27.3 MMOL/L (ref 22–29)
CREAT SERPL-MCNC: 1.24 MG/DL (ref 0.57–1)
DEPRECATED RDW RBC AUTO: 49.1 FL (ref 37–54)
EGFRCR SERPLBLD CKD-EPI 2021: 46.9 ML/MIN/1.73
EOSINOPHIL # BLD AUTO: 0.2 10*3/MM3 (ref 0–0.4)
EOSINOPHIL NFR BLD AUTO: 1.4 % (ref 0.3–6.2)
ERYTHROCYTE [DISTWIDTH] IN BLOOD BY AUTOMATED COUNT: 14.8 % (ref 12.3–15.4)
GLOBULIN UR ELPH-MCNC: 3.9 GM/DL
GLUCOSE SERPL-MCNC: 108 MG/DL (ref 65–99)
HCT VFR BLD AUTO: 48.1 % (ref 34–46.6)
HGB BLD-MCNC: 15.6 G/DL (ref 12–15.9)
HOLD SPECIMEN: NORMAL
HOLD SPECIMEN: NORMAL
IMM GRANULOCYTES # BLD AUTO: 0.08 10*3/MM3 (ref 0–0.05)
IMM GRANULOCYTES NFR BLD AUTO: 0.6 % (ref 0–0.5)
LYMPHOCYTES # BLD AUTO: 3.63 10*3/MM3 (ref 0.7–3.1)
LYMPHOCYTES NFR BLD AUTO: 25.2 % (ref 19.6–45.3)
MAGNESIUM SERPL-MCNC: 2.1 MG/DL (ref 1.6–2.4)
MCH RBC QN AUTO: 29.5 PG (ref 26.6–33)
MCHC RBC AUTO-ENTMCNC: 32.4 G/DL (ref 31.5–35.7)
MCV RBC AUTO: 90.9 FL (ref 79–97)
MONOCYTES # BLD AUTO: 1.18 10*3/MM3 (ref 0.1–0.9)
MONOCYTES NFR BLD AUTO: 8.2 % (ref 5–12)
NEUTROPHILS NFR BLD AUTO: 64.1 % (ref 42.7–76)
NEUTROPHILS NFR BLD AUTO: 9.24 10*3/MM3 (ref 1.7–7)
NRBC BLD AUTO-RTO: 0 /100 WBC (ref 0–0.2)
NT-PROBNP SERPL-MCNC: 455.6 PG/ML (ref 0–900)
PLATELET # BLD AUTO: 337 10*3/MM3 (ref 140–450)
PMV BLD AUTO: 10 FL (ref 6–12)
POTASSIUM SERPL-SCNC: 3.8 MMOL/L (ref 3.5–5.2)
PROT SERPL-MCNC: 7.8 G/DL (ref 6–8.5)
QT INTERVAL: 333 MS
QT INTERVAL: 346 MS
QT INTERVAL: 393 MS
QTC INTERVAL: 470 MS
QTC INTERVAL: 502 MS
QTC INTERVAL: 552 MS
RBC # BLD AUTO: 5.29 10*6/MM3 (ref 3.77–5.28)
SODIUM SERPL-SCNC: 141 MMOL/L (ref 136–145)
T4 FREE SERPL-MCNC: 1.1 NG/DL (ref 0.92–1.68)
TROPONIN T SERPL HS-MCNC: 20 NG/L
TROPONIN T SERPL HS-MCNC: 21 NG/L
TSH SERPL DL<=0.05 MIU/L-ACNC: 3.71 UIU/ML (ref 0.27–4.2)
WBC NRBC COR # BLD AUTO: 14.4 10*3/MM3 (ref 3.4–10.8)
WHOLE BLOOD HOLD COAG: NORMAL
WHOLE BLOOD HOLD SPECIMEN: NORMAL

## 2024-08-19 PROCEDURE — 84443 ASSAY THYROID STIM HORMONE: CPT | Performed by: EMERGENCY MEDICINE

## 2024-08-19 PROCEDURE — 93005 ELECTROCARDIOGRAM TRACING: CPT

## 2024-08-19 PROCEDURE — 93005 ELECTROCARDIOGRAM TRACING: CPT | Performed by: EMERGENCY MEDICINE

## 2024-08-19 PROCEDURE — 83735 ASSAY OF MAGNESIUM: CPT | Performed by: EMERGENCY MEDICINE

## 2024-08-19 PROCEDURE — 83880 ASSAY OF NATRIURETIC PEPTIDE: CPT | Performed by: EMERGENCY MEDICINE

## 2024-08-19 PROCEDURE — 84484 ASSAY OF TROPONIN QUANT: CPT | Performed by: EMERGENCY MEDICINE

## 2024-08-19 PROCEDURE — 80053 COMPREHEN METABOLIC PANEL: CPT | Performed by: EMERGENCY MEDICINE

## 2024-08-19 PROCEDURE — 84439 ASSAY OF FREE THYROXINE: CPT | Performed by: EMERGENCY MEDICINE

## 2024-08-19 PROCEDURE — 36415 COLL VENOUS BLD VENIPUNCTURE: CPT

## 2024-08-19 PROCEDURE — 85025 COMPLETE CBC W/AUTO DIFF WBC: CPT

## 2024-08-19 RX ORDER — DILTIAZEM HYDROCHLORIDE EXTENDED-RELEASE TABLETS 120 MG/1
120 TABLET, EXTENDED RELEASE ORAL DAILY
Qty: 30 TABLET | Refills: 0 | Status: SHIPPED | OUTPATIENT
Start: 2024-08-19 | End: 2024-08-22 | Stop reason: ALTCHOICE

## 2024-08-19 RX ORDER — DILTIAZEM HYDROCHLORIDE 120 MG/1
120 CAPSULE, COATED, EXTENDED RELEASE ORAL ONCE
Status: COMPLETED | OUTPATIENT
Start: 2024-08-19 | End: 2024-08-19

## 2024-08-19 RX ORDER — DILTIAZEM HYDROCHLORIDE 5 MG/ML
20 INJECTION INTRAVENOUS ONCE
Status: DISCONTINUED | OUTPATIENT
Start: 2024-08-19 | End: 2024-08-19

## 2024-08-19 RX ORDER — SODIUM CHLORIDE 0.9 % (FLUSH) 0.9 %
10 SYRINGE (ML) INJECTION AS NEEDED
Status: DISCONTINUED | OUTPATIENT
Start: 2024-08-19 | End: 2024-08-19 | Stop reason: HOSPADM

## 2024-08-19 RX ADMIN — DILTIAZEM HYDROCHLORIDE 120 MG: 120 CAPSULE, EXTENDED RELEASE ORAL at 06:20

## 2024-08-19 NOTE — DISCHARGE INSTRUCTIONS
Please continue your current medication including your Eliquis    No exertional activity to released by your cardiologist    Please return to the emergency immediately for sustained high heart rate, chest pain, chest pressure, worsening shortness of breath, near passing out, passing out, unusual fatigue, usual sweating, nausea or vomiting or any new symptoms you are concerned with

## 2024-08-19 NOTE — ED PROVIDER NOTES
Time: 2:28 AM EDT  Date of encounter:  8/18/2024  Independent Historian/Clinical History and Information was obtained by:   Patient  Chief Complaint: Palpitations    History is limited by: N/A    History of Present Illness:  Patient is a 68 y.o. year old female who presents to the emergency department for evaluation of palpitations.  The patient notes that around 1030 she began having palpitations which she describes as a very fast heart rate.  She states that she was able to check her heart rate by her pulse ox and blood pressure cuff and her heart rate was up to the highest of 178 bpm.  Patient states he symptoms lasted for 10 minutes.  She does feel that may had 2 other reoccurrences since her arrival in emergency room although she is better now.  She has COPD.  She does have chronic shortness of breath but she does not say that she is particular more short of breath at this time than normal.  She states that she did have some sweating prior to and she felt jittery.  She has had no nausea or vomiting.  She has had no near-syncope or syncope.  She did note some fatigue during that fast rapid heart rate.  Again, she feels much better at this time.  She does not have a history of atrial fibrillation or atrial flutter chronically.  She does note that she had a colonoscopy several weeks ago where it was recorded that she went into A-fib but then resolved.  She did follow-up with cardiology last week for that occurrence.  They did place her on a blood thinner Eliquis and another medication that she is not aware of.  She had blood work performed at that time as well as an echo but she does not know those results.    HPI    Patient Care Team  Primary Care Provider: Mainor Sanders Jr., MD    Past Medical History:     Allergies   Allergen Reactions    Nexium [Esomeprazole] Hives    Prilosec [Omeprazole] Hives     Past Medical History:   Diagnosis Date    Atrial fibrillation     Colon polyp ?    Found during  initial colonoscopy    COPD (chronic obstructive pulmonary disease)     Depressed     Elevated cholesterol     GREGG (generalized anxiety disorder)     Gastroesophageal reflux     HTN (hypertension)     Hyperlipidemia     Hypothyroid     Irregular heartbeat     Lesion of neck     Osteoarthritis     Pneumonia 1960    RLS (restless legs syndrome)     Sleep apnea in adult     Sleep apnea, obstructive  ?    Type 2 diabetes mellitus      Past Surgical History:   Procedure Laterality Date    ANKLE TENDON REPAIR Right 1986    ankle with hardware    CARPAL TUNNEL RELEASE Bilateral      SECTION          COLONOSCOPY      COLONOSCOPY N/A 2024    Procedure: COLONOSCOPY;  Surgeon: Kalin Arriaga MD;  Location: [facility] ENDOSCOPY;  Service: General;  Laterality: N/A;  NORMAL    FRACTURE SURGERY  1984    Plate and screws in right ankle    NECK SURGERY      ,,; Neck lesion removed, total of 3     Family History   Problem Relation Age of Onset    Lung cancer Father     Heart disease Father         Had pacemaker    Colon cancer Father     Cancer Father         Lung, colon    Emphysema Father     Cancer Brother         Lung, colon, and pancreas    Cancer Maternal Aunt         Breast cancer    Arthritis Mother     Arthritis Maternal Aunt     Arthritis Maternal Aunt     Hearing loss Maternal Aunt     Cancer Maternal Aunt        Home Medications:  Prior to Admission medications    Medication Sig Start Date End Date Taking? Authorizing Provider   Accu-Chek Softclix Lancets lancets 1 each by Other route 3 times a day. Use as instructed 24   Mainor Sanders Jr., MD   albuterol sulfate HFA (Ventolin HFA) 108 (90 Base) MCG/ACT inhaler Inhale 2 puffs Every 4 (Four) Hours As Needed for Wheezing or Shortness of Air. 24   Joon Cuevas MD   Alcohol Swabs (B-D SINGLE USE SWABS REGULAR) pads Apply 1 Application topically to the appropriate area as directed 5 (Five) Times a Day. 24    Mainor Sanders Jr., MD   apixaban (Eliquis) 5 MG tablet tablet Take 1 tablet by mouth Every 12 (Twelve) Hours. 7/31/24   Mahad Perez MD   atorvastatin (LIPITOR) 10 MG tablet Take 4 tablets by mouth Every Night. 7/31/24   Mahad Perez MD   Blood Glucose Monitoring Suppl (Accu-Chek Guide Me) w/Device kit Inject 1 kit under the skin into the appropriate area as directed See Admin Instructions. 2/16/23   Mainor Sanders Jr., MD   bumetanide (BUMEX) 2 MG tablet Take 0.5 tablets by mouth 2 (Two) Times a Day As Needed (When overnight 2 lb weight gain is noted or 4-5 pounds over 48 hours.). 8/15/24   Mahad Perez MD   CALCIUM PO Take 600 mg by mouth Daily.    Georgette Navarro MD   DULoxetine (CYMBALTA) 60 MG capsule Take 2 capsules by mouth Daily. 3/20/24   Mainor Sanders Jr., MD   empagliflozin (JARDIANCE) 10 MG tablet tablet Take 1 tablet by mouth Daily. 8/15/24   Mahad Perez MD   famotidine (PEPCID) 40 MG tablet Take 1 tablet by mouth 2 (Two) Times a Day. 7/26/24   Mainor Sanders Jr., MD   gabapentin (NEURONTIN) 600 MG tablet Take 1 tablet by mouth 3 (Three) Times a Day As Needed (pain). 7/26/24   Mainor Sanders Jr., MD   glucose blood (Accu-Chek Guide) test strip 1 each by Other route Daily. Use as instructed 2/5/24   Mainor Sanders Jr., MD   melatonin 5 MG tablet tablet Take 1 tablet by mouth Every Night.    Georgette Navarro MD   metFORMIN (GLUCOPHAGE) 500 MG tablet TAKE 1 TABLET BY MOUTH EVERY DAY WITH DINNER 8/8/24   Mainor Sanders Jr., MD   minocycline (MINOCIN,DYNACIN) 100 MG capsule Take 1 capsule by mouth Daily. 2/9/24   Mainor Sanders Jr., MD   multivitamin with minerals tablet tablet Take 1 tablet by mouth Daily.    Georgette Navraro MD   Omega-3 Fatty Acids (fish oil) 1000 MG capsule capsule Take  by mouth Daily With Breakfast.    Georgette Navarro MD   rOPINIRole (REQUIP) 2 MG tablet Take 1 tablet by mouth  Every Night. 24   Mainor Sanders Jr., MD   spironolactone (ALDACTONE) 25 MG tablet Take 1 tablet by mouth Daily. 8/15/24   Mahad Perez MD   Stiolto Respimat 2.5-2.5 MCG/ACT aerosol solution inhaler INHALE TWO PUFFS BY MOUTH EVERY DAY 24   Mainor Sanders Jr., MD   Synthroid 50 MCG tablet TAKE ONE TABLET BY MOUTH EVERY DAY 24   Mainor Sanders Jr., MD   Zinc Sulfate (ZINC 15 PO) Take 15 mg by mouth Daily.    Provider, MD Georgette        Social History:   Social History     Tobacco Use    Smoking status: Former     Current packs/day: 0.00     Average packs/day: 0.5 packs/day for 51.0 years (25.5 ttl pk-yrs)     Types: Cigarettes     Start date: 1969     Quit date:      Years since quittin.6     Passive exposure: Past    Smokeless tobacco: Never    Tobacco comments:     Was off and on over the 40 years   Vaping Use    Vaping status: Never Used   Substance Use Topics    Alcohol use: Yes     Comment: Socially on occasion    Drug use: Never         Review of Systems:  Review of Systems   Constitutional:  Positive for fatigue. Negative for chills, diaphoresis and fever.   HENT:  Negative for congestion, postnasal drip, rhinorrhea and sore throat.    Eyes:  Negative for photophobia.   Respiratory:  Positive for shortness of breath. Negative for cough and chest tightness.    Cardiovascular:  Positive for palpitations and leg swelling. Negative for chest pain.   Gastrointestinal:  Negative for abdominal pain, diarrhea, nausea and vomiting.   Genitourinary:  Negative for difficulty urinating, dysuria, flank pain, frequency, hematuria and urgency.   Musculoskeletal:  Negative for neck pain and neck stiffness.   Skin:  Negative for pallor and rash.   Neurological:  Positive for light-headedness. Negative for dizziness, syncope, weakness, numbness and headaches.   Hematological:  Negative for adenopathy. Does not bruise/bleed easily.   Psychiatric/Behavioral: Negative.       "    Physical Exam:  /67   Pulse 87   Temp 99.8 °F (37.7 °C) (Oral)   Resp 20   Ht 160 cm (63\")   Wt 128 kg (282 lb 3 oz)   SpO2 90%   BMI 49.99 kg/m²     Physical Exam  Vitals and nursing note reviewed.   Constitutional:       General: She is not in acute distress.     Appearance: Normal appearance. She is not ill-appearing, toxic-appearing or diaphoretic.   HENT:      Head: Normocephalic and atraumatic.      Mouth/Throat:      Mouth: Mucous membranes are moist.   Eyes:      Pupils: Pupils are equal, round, and reactive to light.   Cardiovascular:      Rate and Rhythm: Normal rate and regular rhythm.      Pulses: Normal pulses.           Carotid pulses are 2+ on the right side and 2+ on the left side.       Radial pulses are 2+ on the right side and 2+ on the left side.        Femoral pulses are 2+ on the right side and 2+ on the left side.       Popliteal pulses are 2+ on the right side and 2+ on the left side.        Dorsalis pedis pulses are 2+ on the right side and 2+ on the left side.        Posterior tibial pulses are 2+ on the right side and 2+ on the left side.      Heart sounds: Normal heart sounds. No murmur heard.  Pulmonary:      Effort: Pulmonary effort is normal. No accessory muscle usage, respiratory distress or retractions.      Breath sounds: Examination of the right-upper field reveals decreased breath sounds and wheezing. Examination of the left-upper field reveals decreased breath sounds and wheezing. Examination of the right-middle field reveals decreased breath sounds. Examination of the left-middle field reveals decreased breath sounds. Examination of the right-lower field reveals decreased breath sounds. Examination of the left-lower field reveals decreased breath sounds. Decreased breath sounds and wheezing present. No rhonchi or rales.   Chest:      Chest wall: No mass or tenderness.   Abdominal:      General: Abdomen is flat. There is no distension.      Palpations: Abdomen is " soft. There is no mass or pulsatile mass.      Tenderness: There is no abdominal tenderness. There is no right CVA tenderness, left CVA tenderness, guarding or rebound.      Comments: No rigidity   Musculoskeletal:         General: No swelling, tenderness or deformity.      Cervical back: Neck supple. No tenderness.      Right lower leg: No tenderness. Edema present.      Left lower leg: No tenderness. Edema present.      Comments: Patient has mild bilateral nonpitting edema in the legs   Skin:     General: Skin is warm and dry.      Capillary Refill: Capillary refill takes less than 2 seconds.      Coloration: Skin is not jaundiced or pale.      Findings: No erythema.   Neurological:      General: No focal deficit present.      Mental Status: She is alert and oriented to person, place, and time. Mental status is at baseline.      Cranial Nerves: Cranial nerves 2-12 are intact. No cranial nerve deficit.      Sensory: Sensation is intact. No sensory deficit.      Motor: Motor function is intact. No weakness or pronator drift.      Coordination: Coordination is intact. Coordination normal.   Psychiatric:         Mood and Affect: Mood normal.         Behavior: Behavior normal.                  Procedures:  Procedures      Medical Decision Making:      Comorbidities that affect care:    GERD, hypertension, hypothyroid, diabetes, sleep apnea, COPD, hyperlipidemia, recent atrial fibrillation    External Notes reviewed:    None      The following orders were placed and all results were independently analyzed by me:  Orders Placed This Encounter   Procedures    [place] Draw    Comprehensive Metabolic Panel    BNP    Single High Sensitivity Troponin T    CBC Auto Differential    Magnesium    T4, Free    TSH    High Sensitivity Troponin T    High Sensitivity Troponin T 2Hr    NPO Diet NPO Type: Strict NPO    Undress & Gown    Continuous Pulse Oximetry    Vital Signs    General MD Inpatient Consult    Oxygen Therapy- Nasal  Cannula; Titrate 1-6 LPM Per SpO2; 90 - 95%    ECG 12 Lead ED Triage Standing Order; SOA    ECG 12 Lead Rhythm Change    ECG 12 Lead Rhythm Change    Insert Peripheral IV    CBC & Differential    Green Top (Gel)    Lavender Top    Gold Top - SST    Light Blue Top       Medications Given in the Emergency Department:  Medications   sodium chloride 0.9 % flush 10 mL (has no administration in time range)   dilTIAZem CD (CARDIZEM CD) 24 hr capsule 120 mg (has no administration in time range)        ED Course:    ED Course as of 08/19/24 0619   Mon Aug 19, 2024   0057 EKG:    Rhythm: Atrial flutter  Rate: 124  Right bundle branch block  Intervals: Normal QT interval  T-wave: Isolated T wave inversion in V2  ST Segment: Nonspecific ST depression V5, V6, nonspecific ST segment III, aVF    EKG Comparison: EKG is changed from EKG performed July 31, 2024 as the EKG appeared to be a sinus rhythm    Interpreted by me   [SD]   0332 EKG:    Rhythm: Sinus rhythm with PACs  Rate: 87  Intervals: Normal UT and QT interval  Right bundle branch block present  T-wave: T wave inversion V2, V3  ST Segment: No obvious pathological ST elevation and reciprocal ST depression to suggest STEMI    EKG Comparison: EKG is unchanged from EKG that was performed upon arrival other than the patient does have an improved heart rate    Interpreted by me   [SD]   0617 .lala [SD]      ED Course User Index  [SD] Michael Patel DO       Labs:    Lab Results (last 24 hours)       Procedure Component Value Units Date/Time    CBC & Differential [[id number]]  (Abnormal) Collected: 08/19/24 0127    Specimen: Blood Updated: 08/19/24 0141    Narrative:      The following orders were created for panel order CBC & Differential.  Procedure                               Abnormality         Status                     ---------                               -----------         ------                     CBC Auto Differential[[id number]]        Abnormal            Final  result                 Please view results for these tests on the individual orders.    CBC Auto Differential [[id number]]  (Abnormal) Collected: 08/19/24 0127    Specimen: Blood Updated: 08/19/24 0141     WBC 14.40 10*3/mm3      RBC 5.29 10*6/mm3      Hemoglobin 15.6 g/dL      Hematocrit 48.1 %      MCV 90.9 fL      MCH 29.5 pg      MCHC 32.4 g/dL      RDW 14.8 %      RDW-SD 49.1 fl      MPV 10.0 fL      Platelets 337 10*3/mm3      Neutrophil % 64.1 %      Lymphocyte % 25.2 %      Monocyte % 8.2 %      Eosinophil % 1.4 %      Basophil % 0.5 %      Immature Grans % 0.6 %      Neutrophils, Absolute 9.24 10*3/mm3      Lymphocytes, Absolute 3.63 10*3/mm3      Monocytes, Absolute 1.18 10*3/mm3      Eosinophils, Absolute 0.20 10*3/mm3      Basophils, Absolute 0.07 10*3/mm3      Immature Grans, Absolute 0.08 10*3/mm3      nRBC 0.0 /100 WBC     Comprehensive Metabolic Panel [[id number]]  (Abnormal) Collected: 08/19/24 0128    Specimen: Blood Updated: 08/19/24 0150     Glucose 108 mg/dL      BUN 24 mg/dL      Creatinine 1.24 mg/dL      Sodium 141 mmol/L      Potassium 3.8 mmol/L      Comment: Slight hemolysis detected by analyzer. Result may be falsely elevated.        Chloride 100 mmol/L      CO2 27.3 mmol/L      Calcium 9.7 mg/dL      Total Protein 7.8 g/dL      Albumin 3.9 g/dL      ALT (SGPT) 35 U/L      AST (SGOT) 45 U/L      Comment: Slight hemolysis detected by analyzer. Result may be falsely elevated.        Alkaline Phosphatase 66 U/L      Total Bilirubin 0.4 mg/dL      Globulin 3.9 gm/dL      A/G Ratio 1.0 g/dL      BUN/Creatinine Ratio 19.4     Anion Gap 13.7 mmol/L      eGFR 46.9 mL/min/1.73     Narrative:      GFR Normal >60  Chronic Kidney Disease <60  Kidney Failure <15      BNP [[id number]]  (Normal) Collected: 08/19/24 0128    Specimen: Blood Updated: 08/19/24 0147     proBNP 455.6 pg/mL     Narrative:      This assay is used as an aid in the diagnosis of individuals suspected of having heart failure. It can  be used as an aid in the diagnosis of acute decompensated heart failure (ADHF) in patients presenting with signs and symptoms of ADHF to the emergency department (ED). In addition, NT-proBNP of <300 pg/mL indicates ADHF is not likely.    Age Range Result Interpretation  NT-proBNP Concentration (pg/mL:      <50             Positive            >450                   Gray                 300-450                    Negative             <300    50-75           Positive            >900                  Gray                300-900                  Negative            <300      >75             Positive            >1800                  Gray                300-1800                  Negative            <300    Single High Sensitivity Troponin T [[id number]]  (Abnormal) Collected: 08/19/24 0128    Specimen: Blood Updated: 08/19/24 0149     HS Troponin T 21 ng/L     Narrative:      High Sensitive Troponin T Reference Range:  <14.0 ng/L- Negative Female for AMI  <22.0 ng/L- Negative Male for AMI  >=14 - Abnormal Female indicating possible myocardial injury.  >=22 - Abnormal Male indicating possible myocardial injury.   Clinicians would have to utilize clinical acumen, EKG, Troponin, and serial changes to determine if it is an Acute Myocardial Infarction or myocardial injury due to an underlying chronic condition.         Magnesium [[id number]]  (Normal) Collected: 08/19/24 0128    Specimen: Blood Updated: 08/19/24 0255     Magnesium 2.1 mg/dL     T4, Free [[id number]]  (Normal) Collected: 08/19/24 0128    Specimen: Blood Updated: 08/19/24 0308     Free T4 1.10 ng/dL     TSH [[id number]]  (Normal) Collected: 08/19/24 0128    Specimen: Blood Updated: 08/19/24 0308     TSH 3.710 uIU/mL     High Sensitivity Troponin T [[id number]]  (Abnormal) Collected: 08/19/24 0433    Specimen: Blood Updated: 08/19/24 0455     HS Troponin T 20 ng/L     Narrative:      High Sensitive Troponin T Reference Range:  <14.0 ng/L- Negative Female for  AMI  <22.0 ng/L- Negative Male for AMI  >=14 - Abnormal Female indicating possible myocardial injury.  >=22 - Abnormal Male indicating possible myocardial injury.   Clinicians would have to utilize clinical acumen, EKG, Troponin, and serial changes to determine if it is an Acute Myocardial Infarction or myocardial injury due to an underlying chronic condition.                  Imaging:    No Radiology Exams Resulted Within Past 24 Hours      Differential Diagnosis and Discussion:    Palpitations: Differential diagnosis includes but is not limited to anxiety, atrioventricular blocks, mitral valve disease, hypoxia, coronary artery disease, hypokalemia, anemia, fever, COPD, congestive heart failure, pericarditis, Nilam-Parkinson-White syndrome, pulmonary embolism, SVT, atrial fibrillation, atrial flutter, sinus tachycardia, thyrotoxicosis, and pheochromocytoma.    All labs were reviewed and interpreted by me.  All X-rays impressions were independently interpreted by me.  EKG was interpreted by me.    MDM  Number of Diagnoses or Management Options  Atrial flutter with rapid ventricular response  Chronic obstructive pulmonary disease, unspecified COPD type  Diagnosis management comments:   The patient's CMP was reviewed and shows no abnormalities of critical concern.  Of note, the patient's sodium and potassium are acceptable.  The patient's liver enzymes are unremarkable.  The patient's renal function including creatinine is preserved.  The patient has a normal anion gap.    The patient's CBC was reviewed and shows no abnormalities of critical concern.  Of note, there is no anemia requiring a blood transfusion and the platelet count is acceptable    The patient's TSH and free T4 were normal.  I do not feel that the patient had thyrotoxicosis or thyroid storm and thus not the cause of the patient's symptoms    Patient's first high sensor troponin was 21.  It was repeated over 2 hours later and returned to 20.  This is a  negative delta of 1.  This indicates the patient most likely did not have an acute myocardial infarction    The patient had a normal proBNP.  This makes acute decompensated heart failure unlikely        While in the emergency room it appeared that the patient went into atrial flutter with rapid ventricular response.  I initially wrote for IV Cardizem.  However before it could be administered the patient converted back into a sinus rhythm.  During that time the only symptom she had was palpitations.      At the time of discharge, the patient appeared very well, no distress and nontoxic.  The patient's vital signs were stable.  The patient has no symptoms at the time of discharge.  The patient will be started on Cardizem at the request of the cardiologist.  The patient will be discharged home to follow-up with cardiology outpatient.  I will start the patient on Cardizem as requested by the cardiologist.  The patient will call the office today for an appointment.  She will continue her current medication.    The patient was given very specific instructions on when and why to return to the emergency room.  The patient voiced understanding and felt comfortable with the discharge instructions.  They would return to the emergency room if necessary.  The patient appears appropriate for discharge and outpatient follow-up.         Amount and/or Complexity of Data Reviewed  Clinical lab tests: reviewed  Tests in the radiology section of CPT®: reviewed  Tests in the medicine section of CPT®: reviewed  Review and summarize past medical records: yes (I reviewed the patient's office visit with Dr. Perez from July 31, 2024.  It appears the patient had been diagnosed with a flutter on that visit.  The patient had a CHADS2 score of 4 and was placed on Eliquis..  Cardiac event monitor was ordered at that time.  The patient was also started on a beta-blocker.    I reviewed the patient's echocardiogram from July 31, 2004.  It appears  that the patient has an ejection fraction of 63%.    I reviewed the patient's chest x-ray from August 14,024.  The patient appears to have left perihilar atelectasis.  There is no evidence of infiltrate or edema    )  Discuss the patient with other providers: yes (06:15 EDT  I discussed case with Dr. Vivas.  We have discussed the patient's past medical history, symptoms, vital signs, EKG, laboratory findings.  He feels the patient is appropriate for discharge.  He is requesting the patient be started on Cardizem 120 mg once daily.  He is requesting that the patient follow-up with his partner, Dr. Patterson, cardiologist )           Social Determinants of Health:    Patient is independent, reliable, and has access to care.       Disposition and Care Coordination:    Discharged: The patient is suitable and stable for discharge with no need for consideration of admission.    I have explained discharge medications and the need for follow up with the patient/caretakers. This was also printed in the discharge instructions. Patient was discharged with the following medications and follow up:      Medication List        New Prescriptions      dilTIAZem HCl  MG tablet sustained-release 24 hour  Commonly known as: Cardizem LA  Take 120 mg by mouth Daily for 30 days.               Where to Get Your Medications        These medications were sent to [facility]/pharmacy #[id number] - [place], [place] - [address] - [phone]  - [phone] FX  [address] [place] [address]      Hours: 24-hours Phone: [phone]   dilTIAZem HCl  MG tablet sustained-release 24 hour      Mahad Perez MD  [address] [facility] [address]  [place] [address]  [phone]    Schedule an appointment as soon as possible for a visit on 8/20/2024  Atrial flutter with rapid ventricular response       Final diagnoses:   Atrial flutter with rapid ventricular response   Chronic obstructive pulmonary disease, unspecified COPD type         ED Disposition       ED Disposition   Discharge    Condition   Stable    Comment   --               This medical record created using voice recognition software.             Michael Patel DO  08/24/24 042

## 2024-08-21 ENCOUNTER — TELEPHONE (OUTPATIENT)
Dept: CARDIOLOGY | Facility: CLINIC | Age: 69
End: 2024-08-21
Payer: MEDICARE

## 2024-08-21 DIAGNOSIS — R06.09 DOE (DYSPNEA ON EXERTION): ICD-10-CM

## 2024-08-21 RX ORDER — TIOTROPIUM BROMIDE AND OLODATEROL 3.124; 2.736 UG/1; UG/1
SPRAY, METERED RESPIRATORY (INHALATION)
Qty: 4 G | Refills: 1 | Status: SHIPPED | OUTPATIENT
Start: 2024-08-21

## 2024-08-21 NOTE — TELEPHONE ENCOUNTER
----- Message from Mahad Perez sent at 8/20/2024  4:12 PM EDT -----  The right side of your heart appears to be enlarged, continue medical management for now.   You were seen in the ER for aflutter. I ll try to arrange a follow up with EP soon for ablation. We will discuss this in next visit.

## 2024-08-21 NOTE — TELEPHONE ENCOUNTER
SW patient. Went over results- patient asked for Dr Perez to look at the medication that the ER prescribed as her pharmacy does not have that prescription in stock.     RN called pharmacy. They have the diltiazem HCl  MG has been ordered but not dispensed- it should be in by tomorrow.     Patient verbalized understanding and appreciation.

## 2024-08-22 ENCOUNTER — OFFICE VISIT (OUTPATIENT)
Dept: CARDIOLOGY | Facility: CLINIC | Age: 69
End: 2024-08-22
Payer: MEDICARE

## 2024-08-22 VITALS
WEIGHT: 277 LBS | TEMPERATURE: 98.2 F | RESPIRATION RATE: 18 BRPM | OXYGEN SATURATION: 97 % | HEIGHT: 63 IN | DIASTOLIC BLOOD PRESSURE: 60 MMHG | HEART RATE: 86 BPM | SYSTOLIC BLOOD PRESSURE: 123 MMHG | BODY MASS INDEX: 49.08 KG/M2

## 2024-08-22 DIAGNOSIS — I50.32 CHRONIC DIASTOLIC (CONGESTIVE) HEART FAILURE: ICD-10-CM

## 2024-08-22 DIAGNOSIS — I48.92 ATRIAL FLUTTER WITH CONTROLLED RESPONSE: Primary | ICD-10-CM

## 2024-08-22 RX ORDER — METOPROLOL SUCCINATE 25 MG/1
25 TABLET, EXTENDED RELEASE ORAL DAILY
Qty: 30 TABLET | Refills: 11 | Status: SHIPPED | OUTPATIENT
Start: 2024-08-22

## 2024-08-22 RX ORDER — BUMETANIDE 2 MG/1
2 TABLET ORAL 2 TIMES DAILY PRN
Qty: 60 TABLET | Refills: 3 | Status: SHIPPED | OUTPATIENT
Start: 2024-08-22

## 2024-08-22 NOTE — PROGRESS NOTES
Interventional Cardiology Patient Visit Follow Up Note        History of Presenting Illness:  Randi Fajardo presents to clinic today for a follow-up after her recent visit to ER from palpitations.    In brief, patient has a history of atrial flutter on AC, COPD, PENNY on CPAP, morbid obesity, HTN and HLD.  She woke up in the middle of the night from racing of her heart rate and went to ER for evaluation, after noticing her heart rate to be in the 170s using a blood pressure cuff.  She felt some lightheadedness. Patient was noted to be in atrial flutter with RVR.  Patient was given Cardizem 120 mg p.o. which led to resolution of patient's symptoms.  Patient was discharged from the ER with the recommendation on continuing diltiazem.  She is presenting today for follow-up.    Patient stated that she was not able to get Cardizem and was instead recommended to consider alternative prescription from pharmacy.  In between ER visit till today she has had another episode of palpitations which was brief.  Patient took some deep breaths and try to do Valsalva maneuver in order to resolve her symptoms.  She otherwise feels well.  From her previous visit, patient reports significant improvement in shortness of breath and her leg swelling.  She has been taking Bumex every day and now has noted her weight to be down to 277 pounds.     She does not report exertional angina, presyncope, syncope, palpitations, weight gain today.      Past Medical History  Past Medical History:   Diagnosis Date    Atrial fibrillation     Colon polyp ?    Found during initial colonoscopy    COPD (chronic obstructive pulmonary disease) 2023    Depressed     Elevated cholesterol     GREGG (generalized anxiety disorder)     Gastroesophageal reflux     HTN (hypertension)     Hyperlipidemia     Hypothyroid     Irregular heartbeat     Lesion of neck     Osteoarthritis     Pneumonia 1960    RLS (restless legs syndrome)     Sleep apnea in adult     Sleep  apnea, obstructive 2005 ?    Type 2 diabetes mellitus          Current Outpatient Medications:     Accu-Chek Softclix Lancets lancets, 1 each by Other route 3 times a day. Use as instructed, Disp: 200 each, Rfl: 3    albuterol sulfate HFA (Ventolin HFA) 108 (90 Base) MCG/ACT inhaler, Inhale 2 puffs Every 4 (Four) Hours As Needed for Wheezing or Shortness of Air., Disp: 1 g, Rfl: 5    Alcohol Swabs (B-D SINGLE USE SWABS REGULAR) pads, Apply 1 Application topically to the appropriate area as directed 5 (Five) Times a Day., Disp: 200 each, Rfl: 3    apixaban (Eliquis) 5 MG tablet tablet, Take 1 tablet by mouth Every 12 (Twelve) Hours., Disp: 60 tablet, Rfl: 5    atorvastatin (LIPITOR) 10 MG tablet, Take 4 tablets by mouth Every Night., Disp: 90 tablet, Rfl: 3    Blood Glucose Monitoring Suppl (Accu-Chek Guide Me) w/Device kit, Inject 1 kit under the skin into the appropriate area as directed See Admin Instructions., Disp: 1 kit, Rfl: 0    bumetanide (BUMEX) 2 MG tablet, Take 1 tablet by mouth 2 (Two) Times a Day As Needed (When overnight 2 lb weight gain is noted or 4-5 pounds over 48 hours.)., Disp: 60 tablet, Rfl: 3    CALCIUM PO, Take 600 mg by mouth Daily., Disp: , Rfl:     DULoxetine (CYMBALTA) 60 MG capsule, Take 2 capsules by mouth Daily., Disp: 180 capsule, Rfl: 3    empagliflozin (JARDIANCE) 10 MG tablet tablet, Take 1 tablet by mouth Daily., Disp: 30 tablet, Rfl: 3    famotidine (PEPCID) 40 MG tablet, Take 1 tablet by mouth 2 (Two) Times a Day., Disp: 180 tablet, Rfl: 0    gabapentin (NEURONTIN) 600 MG tablet, Take 1 tablet by mouth 3 (Three) Times a Day As Needed (pain)., Disp: 90 tablet, Rfl: 0    glucose blood (Accu-Chek Guide) test strip, 1 each by Other route Daily. Use as instructed, Disp: 100 each, Rfl: 3    melatonin 5 MG tablet tablet, Take 1 tablet by mouth Every Night., Disp: , Rfl:     metFORMIN (GLUCOPHAGE) 500 MG tablet, TAKE 1 TABLET BY MOUTH EVERY DAY WITH DINNER, Disp: 90 tablet, Rfl: 0     minocycline (MINOCIN,DYNACIN) 100 MG capsule, Take 1 capsule by mouth Daily., Disp: 90 capsule, Rfl: 3    multivitamin with minerals tablet tablet, Take 1 tablet by mouth Daily., Disp: , Rfl:     Omega-3 Fatty Acids (fish oil) 1000 MG capsule capsule, Take  by mouth Daily With Breakfast., Disp: , Rfl:     rOPINIRole (REQUIP) 2 MG tablet, Take 1 tablet by mouth Every Night., Disp: 90 tablet, Rfl: 3    spironolactone (ALDACTONE) 25 MG tablet, Take 1 tablet by mouth Daily., Disp: 90 tablet, Rfl: 3    Stiolto Respimat 2.5-2.5 MCG/ACT aerosol solution inhaler, INHALE TWO PUFFS BY MOUTH EVERY DAY, Disp: 4 g, Rfl: 1    Synthroid 50 MCG tablet, TAKE ONE TABLET BY MOUTH EVERY DAY, Disp: 90 tablet, Rfl: 1    Zinc Sulfate (ZINC 15 PO), Take 15 mg by mouth Daily., Disp: , Rfl:     metoprolol succinate XL (TOPROL-XL) 25 MG 24 hr tablet, Take 1 tablet by mouth Daily., Disp: 30 tablet, Rfl: 11  Current outpatient and discharge medications have been reconciled for the patient.  Reviewed by: Mahad Perez MD     Medications Discontinued During This Encounter   Medication Reason    dilTIAZem HCl ER (Cardizem LA) 120 MG tablet sustained-release 24 hour Discontinued by another clinician    bumetanide (BUMEX) 2 MG tablet        Allergies   Allergen Reactions    Nexium [Esomeprazole] Hives    Prilosec [Omeprazole] Hives        Social History     Tobacco Use    Smoking status: Former     Current packs/day: 0.00     Average packs/day: 0.5 packs/day for 51.0 years (25.5 ttl pk-yrs)     Types: Cigarettes     Start date: 1969     Quit date: 2020     Years since quittin.6     Passive exposure: Past    Smokeless tobacco: Never    Tobacco comments:     Was off and on over the 40 years   Vaping Use    Vaping status: Never Used   Substance Use Topics    Alcohol use: Yes     Comment: Socially on occasion    Drug use: Never       Family History   Problem Relation Age of Onset    Lung cancer Father     Heart disease Father         Had  "pacemaker    Colon cancer Father     Cancer Father         Lung, colon    Emphysema Father     Cancer Brother         Lung, colon, and pancreas    Cancer Maternal Aunt         Breast cancer    Arthritis Mother     Arthritis Maternal Aunt     Arthritis Maternal Aunt     Hearing loss Maternal Aunt     Cancer Maternal Aunt           Objective   /60   Pulse 86   Temp 98.2 °F (36.8 °C) (Oral)   Resp 18   Ht 160 cm (63\")   Wt 126 kg (277 lb)   SpO2 97%   BMI 49.07 kg/m²     Wt Readings from Last 3 Encounters:   08/22/24 126 kg (277 lb)   08/19/24 128 kg (282 lb 3 oz)   08/15/24 132 kg (291 lb 3.2 oz)     BP Readings from Last 3 Encounters:   08/22/24 123/60   08/19/24 102/84   08/15/24 102/67       Physical Exam  Constitutional:  Awake. Not in acute distress. Normal appearance.   Neck: No carotid bruit, hepatojugular reflux or JVD.   Cardiovascular:      Rate and Rhythm: Normal rate and regular rhythm.      Chest Wall: PMI is not displaced.      Heart sounds: Normal heart sounds, S1 normal and S2 normal. No murmur heard.       No friction rub. No gallop. No S3 or S4 sounds.    Pulmonary: Pulmonary effort is normal. Normal breath sounds. No wheezing, rhonchi or rales.   Extremities: No Bilateral edema is noted.   Skin: Warm and dry. Non cyanotic, No petechiae or rash.   Neurological: Alert and oriented x 3  Psychiatric:  Behavior is cooperative.       Result Review :   The following data was reviewed by Mahad Perez MD on 08/22/2024   proBNP   Date Value Ref Range Status   08/19/2024 455.6 0.0 - 900.0 pg/mL Final     CMP          5/23/2024    16:48 8/14/2024    12:32 8/19/2024    01:28   CMP   Glucose 95  129  108    BUN 14  15  24    Creatinine 1.06  1.06  1.24    EGFR 56.6  56.6  46.9    Sodium 138  138  141    Potassium 3.9  4.3  3.8    Chloride 100  98  100    Calcium 9.8  9.8  9.7    Total Protein 7.1   7.8    Albumin 4.1   3.9    Globulin 3.0   3.9    Total Bilirubin 0.3   0.4    Alkaline Phosphatase 67 " "  66    AST (SGOT) 27   45    ALT (SGPT) 25   35    Albumin/Globulin Ratio 1.4   1.0    BUN/Creatinine Ratio 13.2  14.2  19.4    Anion Gap 10.0  14.0  13.7      CBC w/diff          5/13/2024    10:14 5/23/2024    16:48 8/19/2024    01:27   CBC w/Diff   WBC 9.45  11.37  14.40    RBC 5.28  5.42  5.29    Hemoglobin 15.4  15.7  15.6    Hematocrit 46.8  48.8  48.1    MCV 88.6  90.0  90.9    MCH 29.2  29.0  29.5    MCHC 32.9  32.2  32.4    RDW 13.8  14.4  14.8    Platelets 249  285  337    Neutrophil Rel % 43.7  58.2  64.1    Immature Granulocyte Rel % 0.4  0.5  0.6    Lymphocyte Rel % 42.0  30.3  25.2    Monocyte Rel % 7.9  7.0  8.2    Eosinophil Rel % 4.9  3.3  1.4    Basophil Rel % 1.1  0.7  0.5       Lab Results   Component Value Date    TSH 3.710 08/19/2024      Lab Results   Component Value Date    FREET4 1.10 08/19/2024      No results found for: \"DDIMERQUANT\"  Magnesium   Date Value Ref Range Status   08/19/2024 2.1 1.6 - 2.4 mg/dL Final      No results found for: \"DIGOXIN\"   Lab Results   Component Value Date    TROPONINT 20 (H) 08/19/2024      No results found for: \"POCTROP\"       A1C Last 3 Results          11/14/2023    10:18 2/12/2024    10:13 5/13/2024    10:14   HGBA1C Last 3 Results   Hemoglobin A1C 6.50  6.30  6.50      Lipid Panel          11/14/2023    10:18 2/12/2024    10:13 5/13/2024    10:14   Lipid Panel   Total Cholesterol 173  175  118    Triglycerides 181  191  156    HDL Cholesterol 38  38  38    VLDL Cholesterol 32  33  27    LDL Cholesterol  103  104  53    LDL/HDL Ratio 2.60  2.60  1.28      Results for orders placed during the hospital encounter of 08/16/24    Adult Transthoracic Echo Complete W/ Cont if Necessary Per Protocol    Interpretation Summary  Right ventricle is dilated whereas remaining chambers appear to be grossly normal in size  LV has normal wall thickness.  LV systolic function and wall motion is normal.  Calculated LVEF is 55 to 60%.  Diastolic function cannot be accurately " assessed.  Paradoxical septal motion is noted due to bundle branch block. There is interventricular septal flattening noted predominantly in diastole suggestive of volume overload in the right ventricle  RV systolic function cannot be accurately assessed.  Valves are not well-visualized.  RVSP cannot be calculated due to insufficient TR jet.  IVC is dilated corresponding to a right atrial pressure of 8 to 10 mmHg  No significant pericardial effusion is noted    Compared to study from March 11, 2022.  LV systolic function remains the same.  RV dilatation is noted.  Septal flattening is noted as mentioned above.     Results for orders placed during the hospital encounter of 08/16/24    Adult Transthoracic Echo Complete W/ Cont if Necessary Per Protocol    Interpretation Summary  Right ventricle is dilated whereas remaining chambers appear to be grossly normal in size  LV has normal wall thickness.  LV systolic function and wall motion is normal.  Calculated LVEF is 55 to 60%.  Diastolic function cannot be accurately assessed.  Paradoxical septal motion is noted due to bundle branch block. There is interventricular septal flattening noted predominantly in diastole suggestive of volume overload in the right ventricle  RV systolic function cannot be accurately assessed.  Valves are not well-visualized.  RVSP cannot be calculated due to insufficient TR jet.  IVC is dilated corresponding to a right atrial pressure of 8 to 10 mmHg  No significant pericardial effusion is noted    Compared to study from March 11, 2022.  LV systolic function remains the same.  RV dilatation is noted.  Septal flattening is noted as mentioned above.     No results found for this or any previous visit.     Results for orders placed during the hospital encounter of 07/20/22    Stress Test With Myocardial Perfusion Two Day    Interpretation Summary  Patient received Lexiscan 0.4 mg IV fusion over 10 seconds.  Baseline ECG showed normal sinus rhythm  with right bundle branch block.  At peak stress, no ischemic ST-T changes were noted.  No significant arrhythmias were noted.  Gated and SPECT images were obtained.  Image quality is adequate.  Attenuation artifact is present.  There is a medium area of mild to moderate perfusion defect in the distal anterior and apical segments with partial reversibility on resting images which could represent myocardial ischemia in the right clinical setting.  The left ventricle was normal in size with a calculated ejection fraction exceeding 70%.  Overall, this represents an intermediate myocardial perfusion scan.       The ASCVD Risk score (Jerad GOODMAN, et al., 2019) failed to calculate for the following reasons:    The valid total cholesterol range is 130 to 320 mg/dL        Assessment  1. Atrial flutter with controlled response    2. Chronic diastolic (congestive) heart failure        Plan  Patient could not start diltiazem.  Starting Toprol-XL 25 mg p.o. daily.  Will consult EP for consideration of flutter ablation.  Continue management for diastolic heart failure with Jardiance 10 mg p.o. daily spironolactone 25 mg p.o. daily.  Patient is doing weight-based diuresis with Bumex 2 mg p.o. daily if an overnight weight gain of 2 to 3 pounds is noted.  Patient's dry weight is 277 pounds.  Patient feels significantly better in terms of dyspnea and fatigue from her prior visit.  Patient is compliant with CPAP machine.  Blood pressure is at goal and optimally controlled with the medical regimen.  No adverse events with anticoagulation are reported.  Continue Eliquis 5 mg p.o. twice daily samples were provided in the office today.    Diagnoses and all orders for this visit:    1. Atrial flutter with controlled response (Primary)  -     metoprolol succinate XL (TOPROL-XL) 25 MG 24 hr tablet; Take 1 tablet by mouth Daily.  Dispense: 30 tablet; Refill: 11    2. Chronic diastolic (congestive) heart failure  -     bumetanide (BUMEX) 2 MG  tablet; Take 1 tablet by mouth 2 (Two) Times a Day As Needed (When overnight 2 lb weight gain is noted or 4-5 pounds over 48 hours.).  Dispense: 60 tablet; Refill: 3              Follow Up     No follow-ups on file.      Mahad Perez MD  Interventional Cardiology  08/22/2024  11:55 EDT      Patient was given instructions and counseling regarding her condition or for health maintenance advice. Please see specific information pulled into the AVS if appropriate.     Please note that portions of this document were completed using a voice recognition program.

## 2024-08-28 ENCOUNTER — TELEPHONE (OUTPATIENT)
Dept: CARDIOLOGY | Facility: CLINIC | Age: 69
End: 2024-08-28
Payer: MEDICARE

## 2024-08-28 NOTE — TELEPHONE ENCOUNTER
Patient notified MA she has been having episodes of afib and experiencing chest soreness.     MA attempted to call patient back and passed on to RN.

## 2024-09-06 LAB
QT INTERVAL: 333 MS
QT INTERVAL: 346 MS
QT INTERVAL: 393 MS
QTC INTERVAL: 470 MS
QTC INTERVAL: 502 MS
QTC INTERVAL: 552 MS

## 2024-09-07 DIAGNOSIS — E78.2 MIXED HYPERLIPIDEMIA: ICD-10-CM

## 2024-09-07 DIAGNOSIS — G62.9 NEUROPATHY: ICD-10-CM

## 2024-09-09 ENCOUNTER — TELEPHONE (OUTPATIENT)
Dept: CARDIOLOGY | Facility: CLINIC | Age: 69
End: 2024-09-09

## 2024-09-09 RX ORDER — GABAPENTIN 600 MG/1
600 TABLET ORAL 3 TIMES DAILY PRN
Qty: 90 TABLET | Refills: 0 | Status: SHIPPED | OUTPATIENT
Start: 2024-09-09

## 2024-09-09 RX ORDER — ATORVASTATIN CALCIUM 10 MG/1
40 TABLET, FILM COATED ORAL NIGHTLY
Qty: 90 TABLET | Refills: 3 | Status: SHIPPED | OUTPATIENT
Start: 2024-09-09 | End: 2024-09-10 | Stop reason: SDUPTHER

## 2024-09-09 NOTE — TELEPHONE ENCOUNTER
CAN ATORVASTATIN 10 MG TAKE 4 TABLETS NIGHTLY BE CHANGED TO ONE 40 MG TABLET NIGHTLY?  PT IS REQUESTING SCRIPT BE SENT TO [facility] MAIL ORDER.

## 2024-09-09 NOTE — TELEPHONE ENCOUNTER
Refill request for controlled substance.      Date of request: 9/9/2024    Medication requested: Gabapentin  Last OV: 5/21/24  Last UDS: 4/25/24  Contract signed: yes    Date:11/14/23  Next office visit: 11/15/24    Marly Peck

## 2024-09-09 NOTE — TELEPHONE ENCOUNTER
----- Message from Mahad Perez sent at 9/9/2024  3:01 PM EDT -----  Please let patient know that her symptoms of feeling fatigue and shortness of breath are related to extra set of beats from the bottom chamber.  If this is still bothersome for the patient we can increase the dose of the medication to suppress the

## 2024-09-09 NOTE — TELEPHONE ENCOUNTER
VIVI patient. Went over results. Patient states she has felt much better since starting the diltiazem. Patient reports very little episodes.     Patient states she is having difficulty paying for her Jardiance and Eliquis. Patient encouraged to call the PAP numbers provided to patient by RN. Patient will let the office know if she is approved, is asking for samples if possible at this time.

## 2024-09-10 DIAGNOSIS — I48.92 ATRIAL FLUTTER WITH CONTROLLED RESPONSE: ICD-10-CM

## 2024-09-10 DIAGNOSIS — I50.32 CHRONIC DIASTOLIC (CONGESTIVE) HEART FAILURE: ICD-10-CM

## 2024-09-10 DIAGNOSIS — E78.2 MIXED HYPERLIPIDEMIA: ICD-10-CM

## 2024-09-10 RX ORDER — ATORVASTATIN CALCIUM 40 MG/1
40 TABLET, FILM COATED ORAL NIGHTLY
Qty: 90 TABLET | Refills: 3 | Status: SHIPPED | OUTPATIENT
Start: 2024-09-10

## 2024-09-16 ENCOUNTER — TRANSCRIBE ORDERS (OUTPATIENT)
Dept: ADMINISTRATIVE | Facility: HOSPITAL | Age: 69
End: 2024-09-16
Payer: MEDICARE

## 2024-09-16 DIAGNOSIS — Z12.31 SCREENING MAMMOGRAM, ENCOUNTER FOR: Primary | ICD-10-CM

## 2024-09-25 ENCOUNTER — TELEPHONE (OUTPATIENT)
Dept: CARDIOLOGY | Facility: CLINIC | Age: 69
End: 2024-09-25
Payer: MEDICARE

## 2024-10-01 NOTE — PROGRESS NOTES
Electrophysiology Clinic Consult     Randi Fajardo  [id number]  1955    Referring Provider: Mahad Perez MD   PCP: Mainor Sanders Jr., MD  [address] [facility] [address] / [place] [address]    Date of Service: 10/08/24    Chief Complaint   Patient presents with    atrial flutter with controlled response      Problem List  Atrial flutter  [id number] (age, sex, HTN, DM), anticoagulated on Eliqui  Diagnosed in 8/2024  Monitor, 8/30/2024: Predominant sinus rhythm minimum 49 bpm, max 113 bpm, average 78 bpm, frequent PACs 15% burden  Diastolic heart failure  Echo, 8/16/24: EF 55-60% paradoxical septal motion noted due to bundle branch block, is not well-visualized  Coronary artery disease  MPS, 7/20/2022: medium area of mild to moderate perfusion defect in the distal anterior and apical segments with partial reversibility on resting images which could represent myocardial ischemia in the right clinical setting, EF 70%  Hypertension  Hyperlipidemia  Diabetes  Hypothyroid  COPD  PENNY on CPAP  Osteoarthritis  Restless leg syndrome  Anxiety/depression    Past Medical History:   Diagnosis Date    Abnormal ECG     Arrhythmia     Atrial fibrillation     Colon polyp ?    Found during initial colonoscopy    COPD (chronic obstructive pulmonary disease) 2023    Depressed     Elevated cholesterol     GREGG (generalized anxiety disorder)     Gastroesophageal reflux     HTN (hypertension)     Hyperlipidemia     Hypothyroid     Irregular heartbeat     Lesion of neck     Osteoarthritis     Pneumonia 1960    RLS (restless legs syndrome)     Sleep apnea in adult     Sleep apnea, obstructive 2005 ?    Type 2 diabetes mellitus        History of Present Illness  Randi Fajardo is a 69 y.o. female who presents to my electrophysiology clinic for evaluation of atrial flutter. Patient diagnosed with AF few years ago and most recently diagnosed with possible atrial flutter. Has baseline RBBB. She endorses palpitation, SOB,  fatigue as well as episodes of sever fluctuation of her HR which are the most bothersome.     Atrial Fibrillation/Atrial flutter risk factors:  Normal TSH and Abnormal TSH without treatment  PENNY, compliant with device  1-2 caffeinated drinks per day  No alcohol use  No nicotine use     Review of Systems   Constitutional:  Positive for fatigue. Negative for activity change and fever.   Respiratory:  Positive for shortness of breath. Negative for chest tightness.    Cardiovascular:  Positive for palpitations. Negative for chest pain and leg swelling.   Gastrointestinal:  Negative for constipation and diarrhea.   Genitourinary:  Negative for decreased urine volume and difficulty urinating.   Skin:  Negative for wound.   Neurological:  Positive for weakness. Negative for dizziness, syncope and light-headedness.   Psychiatric/Behavioral:  Negative for suicidal ideas.        Outpatient Medications Marked as Taking for the 10/8/24 encounter (Office Visit) with Luca James MD   Medication Sig Dispense Refill    Accu-Chek Softclix Lancets lancets 1 each by Other route 3 times a day. Use as instructed 200 each 3    albuterol sulfate HFA (Ventolin HFA) 108 (90 Base) MCG/ACT inhaler Inhale 2 puffs Every 4 (Four) Hours As Needed for Wheezing or Shortness of Air. 1 g 5    Alcohol Swabs (B-D SINGLE USE SWABS REGULAR) pads Apply 1 Application topically to the appropriate area as directed 5 (Five) Times a Day. 200 each 3    apixaban (Eliquis) 5 MG tablet tablet Take 1 tablet by mouth Every 12 (Twelve) Hours. Lot: [id number]  Exp: 9/25  4 boxes given 56 tablet 0    atorvastatin (LIPITOR) 40 MG tablet Take 1 tablet by mouth Every Night. 90 tablet 3    Blood Glucose Monitoring Suppl (Accu-Chek Guide Me) w/Device kit Inject 1 kit under the skin into the appropriate area as directed See Admin Instructions. 1 kit 0    bumetanide (BUMEX) 2 MG tablet Take 1 tablet by mouth 2 (Two) Times a Day As Needed (When overnight 2 lb weight gain is  "noted or 4-5 pounds over 48 hours.). 60 tablet 3    CALCIUM PO Take 600 mg by mouth Daily.      DULoxetine (CYMBALTA) 60 MG capsule Take 2 capsules by mouth Daily. 180 capsule 3    empagliflozin (JARDIANCE) 10 MG tablet tablet Take 1 tablet by mouth Daily. Lot: [id number]  Exp: 03/26  4 boxes given 28 tablet 0    famotidine (PEPCID) 40 MG tablet Take 1 tablet by mouth 2 (Two) Times a Day. 180 tablet 0    gabapentin (NEURONTIN) 600 MG tablet Take 1 tablet by mouth 3 (Three) Times a Day As Needed (pain). 90 tablet 0    glucose blood (Accu-Chek Guide) test strip 1 each by Other route Daily. Use as instructed 100 each 3    melatonin 5 MG tablet tablet Take 1 tablet by mouth Every Night.      metFORMIN (GLUCOPHAGE) 500 MG tablet TAKE 1 TABLET BY MOUTH EVERY DAY WITH DINNER 90 tablet 0    metoprolol succinate XL (TOPROL-XL) 25 MG 24 hr tablet Take 1 tablet by mouth Daily. 30 tablet 11    minocycline (MINOCIN,DYNACIN) 100 MG capsule Take 1 capsule by mouth Daily. 90 capsule 3    multivitamin with minerals tablet tablet Take 1 tablet by mouth Daily.      Omega-3 Fatty Acids (fish oil) 1000 MG capsule capsule Take  by mouth Daily With Breakfast.      rOPINIRole (REQUIP) 2 MG tablet Take 1 tablet by mouth Every Night. 90 tablet 3    spironolactone (ALDACTONE) 25 MG tablet Take 1 tablet by mouth Daily. 90 tablet 3    Stiolto Respimat 2.5-2.5 MCG/ACT aerosol solution inhaler INHALE TWO PUFFS BY MOUTH EVERY DAY 4 g 1    Synthroid 50 MCG tablet TAKE ONE TABLET BY MOUTH EVERY DAY 90 tablet 1    Zinc Sulfate (ZINC 15 PO) Take 15 mg by mouth Daily.         Physical Exam  Vitals:    10/08/24 1003   BP: 128/62   BP Location: Left arm   Patient Position: Sitting   Pulse: 72   SpO2: 98%   Weight: 124 kg (274 lb)   Height: 157.5 cm (62\")     Body mass index is 50.12 kg/m².    Vitals and nursing note reviewed.   Constitutional:       Appearance: Healthy appearance.   HENT:      Head: Normocephalic and atraumatic.      Nose: Nose normal. " "  Neck:      Vascular: No JVD.   Pulmonary:      Effort: Pulmonary effort is normal.      Breath sounds: Normal breath sounds.   Cardiovascular:      PMI at left midclavicular line. Normal rate. Regular rhythm. Normal S1. Normal S2.       Murmurs: There is no murmur.      No gallop.    Edema:     Peripheral edema absent.   Skin:     General: Skin is warm and dry.   Neurological:      Mental Status: Oriented to person, place and time.   Psychiatric:         Behavior: Behavior normal.          Diagnostic Data  NSR 72bpm  RBBB     Lab Results   Component Value Date    GLUCOSE 108 (H) 08/19/2024    CALCIUM 9.7 08/19/2024     08/19/2024    K 3.8 08/19/2024    CO2 27.3 08/19/2024     08/19/2024    BUN 24 (H) 08/19/2024    CREATININE 1.24 (H) 08/19/2024    EGFRIFNONA 59 (L) 09/02/2021    BCR 19.4 08/19/2024    ANIONGAP 13.7 08/19/2024     Lab Results   Component Value Date    WBC 14.40 (H) 08/19/2024    HGB 15.6 08/19/2024    HCT 48.1 (H) 08/19/2024    MCV 90.9 08/19/2024     08/19/2024     No results found for: \"INR\", \"PROTIME\"  Lab Results   Component Value Date    TSH 3.710 08/19/2024         I personally viewed and interpreted the patient's EKG/Telemetry/lab data    Randi Fajardo  reports that she quit smoking about 4 years ago. Her smoking use included cigarettes. She started smoking about 55 years ago. She has a 25.5 pack-year smoking history. She has been exposed to tobacco smoke. She has never used smokeless tobacco. I have educated her on the risk of diseases from using tobacco products such as cancer, COPD, and heart disease.       I spent 3  minutes counseling the patient.    ACP discussion was declined by the patient. Patient does not have an advance directive, declines further assistance.            Assessment and Plan   Diagnoses and all orders for this visit:    1. Atrial flutter with controlled response (Primary)    2. Chronic diastolic (congestive) heart failure    3. Essential " hypertension      Atrial flutter and atrial fibrillation  -[id number] 4 (age, sex, HTN, DM), anticoagulated on Eliquis  -After extensive conversation regarding risks, benefits, or alternatives to the therapy, patient elected to proceed with the AF/AFL ablation procedure   - AF/AFL ablation with PFA/NILA   - no CTA given CKD    Diastolic heart failure  -Echo, 8/16/24: EF 55-60% paradoxical septal motion noted due to bundle branch block, is not well-visualized  -Continue GDMT: jardiance, mavis, toprol and bumex  -Follows with Dr. Chris Tobin    Hypertension  -Well controlled, continue current medication regimen    Follow Up  Return for Follow up after Procedure.      Thank you for allowing me to participate in the care of your patient. Please to not hesitate to contact me with additional questions or concerns.        Luca James MD [facility]  Cardiac Electrophysiologist  [facility] / [facility]       no melena/no nausea/no vomiting/no diarrhea

## 2024-10-01 NOTE — H&P (VIEW-ONLY)
Electrophysiology Clinic Consult     Randi Fajardo  [id number]  1955    Referring Provider: Mahad Perez MD   PCP: Mainor Sanders Jr., MD  [address] [facility] [address] / [place] [address]    Date of Service: 10/08/24    Chief Complaint   Patient presents with    atrial flutter with controlled response      Problem List  Atrial flutter  [id number] (age, sex, HTN, DM), anticoagulated on Eliqui  Diagnosed in 8/2024  Monitor, 8/30/2024: Predominant sinus rhythm minimum 49 bpm, max 113 bpm, average 78 bpm, frequent PACs 15% burden  Diastolic heart failure  Echo, 8/16/24: EF 55-60% paradoxical septal motion noted due to bundle branch block, is not well-visualized  Coronary artery disease  MPS, 7/20/2022: medium area of mild to moderate perfusion defect in the distal anterior and apical segments with partial reversibility on resting images which could represent myocardial ischemia in the right clinical setting, EF 70%  Hypertension  Hyperlipidemia  Diabetes  Hypothyroid  COPD  PENNY on CPAP  Osteoarthritis  Restless leg syndrome  Anxiety/depression    Past Medical History:   Diagnosis Date    Abnormal ECG     Arrhythmia     Atrial fibrillation     Colon polyp ?    Found during initial colonoscopy    COPD (chronic obstructive pulmonary disease) 2023    Depressed     Elevated cholesterol     GREGG (generalized anxiety disorder)     Gastroesophageal reflux     HTN (hypertension)     Hyperlipidemia     Hypothyroid     Irregular heartbeat     Lesion of neck     Osteoarthritis     Pneumonia 1960    RLS (restless legs syndrome)     Sleep apnea in adult     Sleep apnea, obstructive 2005 ?    Type 2 diabetes mellitus        History of Present Illness  Randi Fajardo is a 69 y.o. female who presents to my electrophysiology clinic for evaluation of atrial flutter. Patient diagnosed with AF few years ago and most recently diagnosed with possible atrial flutter. Has baseline RBBB. She endorses palpitation, SOB,  fatigue as well as episodes of sever fluctuation of her HR which are the most bothersome.     Atrial Fibrillation/Atrial flutter risk factors:  Normal TSH and Abnormal TSH without treatment  PENNY, compliant with device  1-2 caffeinated drinks per day  No alcohol use  No nicotine use     Review of Systems   Constitutional:  Positive for fatigue. Negative for activity change and fever.   Respiratory:  Positive for shortness of breath. Negative for chest tightness.    Cardiovascular:  Positive for palpitations. Negative for chest pain and leg swelling.   Gastrointestinal:  Negative for constipation and diarrhea.   Genitourinary:  Negative for decreased urine volume and difficulty urinating.   Skin:  Negative for wound.   Neurological:  Positive for weakness. Negative for dizziness, syncope and light-headedness.   Psychiatric/Behavioral:  Negative for suicidal ideas.        Outpatient Medications Marked as Taking for the 10/8/24 encounter (Office Visit) with Luca James MD   Medication Sig Dispense Refill    Accu-Chek Softclix Lancets lancets 1 each by Other route 3 times a day. Use as instructed 200 each 3    albuterol sulfate HFA (Ventolin HFA) 108 (90 Base) MCG/ACT inhaler Inhale 2 puffs Every 4 (Four) Hours As Needed for Wheezing or Shortness of Air. 1 g 5    Alcohol Swabs (B-D SINGLE USE SWABS REGULAR) pads Apply 1 Application topically to the appropriate area as directed 5 (Five) Times a Day. 200 each 3    apixaban (Eliquis) 5 MG tablet tablet Take 1 tablet by mouth Every 12 (Twelve) Hours. Lot: [id number]  Exp: 9/25  4 boxes given 56 tablet 0    atorvastatin (LIPITOR) 40 MG tablet Take 1 tablet by mouth Every Night. 90 tablet 3    Blood Glucose Monitoring Suppl (Accu-Chek Guide Me) w/Device kit Inject 1 kit under the skin into the appropriate area as directed See Admin Instructions. 1 kit 0    bumetanide (BUMEX) 2 MG tablet Take 1 tablet by mouth 2 (Two) Times a Day As Needed (When overnight 2 lb weight gain is  "noted or 4-5 pounds over 48 hours.). 60 tablet 3    CALCIUM PO Take 600 mg by mouth Daily.      DULoxetine (CYMBALTA) 60 MG capsule Take 2 capsules by mouth Daily. 180 capsule 3    empagliflozin (JARDIANCE) 10 MG tablet tablet Take 1 tablet by mouth Daily. Lot: [id number]  Exp: 03/26  4 boxes given 28 tablet 0    famotidine (PEPCID) 40 MG tablet Take 1 tablet by mouth 2 (Two) Times a Day. 180 tablet 0    gabapentin (NEURONTIN) 600 MG tablet Take 1 tablet by mouth 3 (Three) Times a Day As Needed (pain). 90 tablet 0    glucose blood (Accu-Chek Guide) test strip 1 each by Other route Daily. Use as instructed 100 each 3    melatonin 5 MG tablet tablet Take 1 tablet by mouth Every Night.      metFORMIN (GLUCOPHAGE) 500 MG tablet TAKE 1 TABLET BY MOUTH EVERY DAY WITH DINNER 90 tablet 0    metoprolol succinate XL (TOPROL-XL) 25 MG 24 hr tablet Take 1 tablet by mouth Daily. 30 tablet 11    minocycline (MINOCIN,DYNACIN) 100 MG capsule Take 1 capsule by mouth Daily. 90 capsule 3    multivitamin with minerals tablet tablet Take 1 tablet by mouth Daily.      Omega-3 Fatty Acids (fish oil) 1000 MG capsule capsule Take  by mouth Daily With Breakfast.      rOPINIRole (REQUIP) 2 MG tablet Take 1 tablet by mouth Every Night. 90 tablet 3    spironolactone (ALDACTONE) 25 MG tablet Take 1 tablet by mouth Daily. 90 tablet 3    Stiolto Respimat 2.5-2.5 MCG/ACT aerosol solution inhaler INHALE TWO PUFFS BY MOUTH EVERY DAY 4 g 1    Synthroid 50 MCG tablet TAKE ONE TABLET BY MOUTH EVERY DAY 90 tablet 1    Zinc Sulfate (ZINC 15 PO) Take 15 mg by mouth Daily.         Physical Exam  Vitals:    10/08/24 1003   BP: 128/62   BP Location: Left arm   Patient Position: Sitting   Pulse: 72   SpO2: 98%   Weight: 124 kg (274 lb)   Height: 157.5 cm (62\")     Body mass index is 50.12 kg/m².    Vitals and nursing note reviewed.   Constitutional:       Appearance: Healthy appearance.   HENT:      Head: Normocephalic and atraumatic.      Nose: Nose normal. " "  Neck:      Vascular: No JVD.   Pulmonary:      Effort: Pulmonary effort is normal.      Breath sounds: Normal breath sounds.   Cardiovascular:      PMI at left midclavicular line. Normal rate. Regular rhythm. Normal S1. Normal S2.       Murmurs: There is no murmur.      No gallop.    Edema:     Peripheral edema absent.   Skin:     General: Skin is warm and dry.   Neurological:      Mental Status: Oriented to person, place and time.   Psychiatric:         Behavior: Behavior normal.          Diagnostic Data  NSR 72bpm  RBBB     Lab Results   Component Value Date    GLUCOSE 108 (H) 08/19/2024    CALCIUM 9.7 08/19/2024     08/19/2024    K 3.8 08/19/2024    CO2 27.3 08/19/2024     08/19/2024    BUN 24 (H) 08/19/2024    CREATININE 1.24 (H) 08/19/2024    EGFRIFNONA 59 (L) 09/02/2021    BCR 19.4 08/19/2024    ANIONGAP 13.7 08/19/2024     Lab Results   Component Value Date    WBC 14.40 (H) 08/19/2024    HGB 15.6 08/19/2024    HCT 48.1 (H) 08/19/2024    MCV 90.9 08/19/2024     08/19/2024     No results found for: \"INR\", \"PROTIME\"  Lab Results   Component Value Date    TSH 3.710 08/19/2024         I personally viewed and interpreted the patient's EKG/Telemetry/lab data    Randi Fajardo  reports that she quit smoking about 4 years ago. Her smoking use included cigarettes. She started smoking about 55 years ago. She has a 25.5 pack-year smoking history. She has been exposed to tobacco smoke. She has never used smokeless tobacco. I have educated her on the risk of diseases from using tobacco products such as cancer, COPD, and heart disease.       I spent 3  minutes counseling the patient.    ACP discussion was declined by the patient. Patient does not have an advance directive, declines further assistance.            Assessment and Plan   Diagnoses and all orders for this visit:    1. Atrial flutter with controlled response (Primary)    2. Chronic diastolic (congestive) heart failure    3. Essential " hypertension      Atrial flutter and atrial fibrillation  -[id number] (age, sex, HTN, DM), anticoagulated on Eliquis  -After extensive conversation regarding risks, benefits, or alternatives to the therapy, patient elected to proceed with the AF/AFL ablation procedure   - AF/AFL ablation with PFA/NILA   - no CTA given CKD    Diastolic heart failure  -Echo, 8/16/24: EF 55-60% paradoxical septal motion noted due to bundle branch block, is not well-visualized  -Continue GDMT: jardiance, mavis, toprol and bumex  -Follows with Dr. Chris Tobin    Hypertension  -Well controlled, continue current medication regimen    Follow Up  Return for Follow up after Procedure.      Thank you for allowing me to participate in the care of your patient. Please to not hesitate to contact me with additional questions or concerns.        Luca James MD [facility]  Cardiac Electrophysiologist  [facility] / [facility]

## 2024-10-03 DIAGNOSIS — R06.09 DOE (DYSPNEA ON EXERTION): ICD-10-CM

## 2024-10-03 RX ORDER — TIOTROPIUM BROMIDE AND OLODATEROL 3.124; 2.736 UG/1; UG/1
SPRAY, METERED RESPIRATORY (INHALATION)
Qty: 4 G | Refills: 1 | Status: SHIPPED | OUTPATIENT
Start: 2024-10-03

## 2024-10-08 ENCOUNTER — OFFICE VISIT (OUTPATIENT)
Dept: CARDIOLOGY | Facility: CLINIC | Age: 69
End: 2024-10-08
Payer: MEDICARE

## 2024-10-08 VITALS
SYSTOLIC BLOOD PRESSURE: 128 MMHG | OXYGEN SATURATION: 98 % | HEART RATE: 72 BPM | HEIGHT: 62 IN | WEIGHT: 274 LBS | DIASTOLIC BLOOD PRESSURE: 62 MMHG | BODY MASS INDEX: 50.42 KG/M2

## 2024-10-08 DIAGNOSIS — I48.91 ATRIAL FIBRILLATION, UNSPECIFIED TYPE: Primary | ICD-10-CM

## 2024-10-08 DIAGNOSIS — I48.92 ATRIAL FLUTTER WITH CONTROLLED RESPONSE: ICD-10-CM

## 2024-10-08 DIAGNOSIS — I48.92 ATRIAL FLUTTER WITH CONTROLLED RESPONSE: Primary | Chronic | ICD-10-CM

## 2024-10-08 DIAGNOSIS — I50.32 CHRONIC DIASTOLIC (CONGESTIVE) HEART FAILURE: Chronic | ICD-10-CM

## 2024-10-08 DIAGNOSIS — I10 ESSENTIAL HYPERTENSION: Chronic | ICD-10-CM

## 2024-10-08 PROCEDURE — G2211 COMPLEX E/M VISIT ADD ON: HCPCS | Performed by: INTERNAL MEDICINE

## 2024-10-08 PROCEDURE — 99204 OFFICE O/P NEW MOD 45 MIN: CPT | Performed by: INTERNAL MEDICINE

## 2024-10-08 PROCEDURE — 3074F SYST BP LT 130 MM HG: CPT | Performed by: INTERNAL MEDICINE

## 2024-10-08 PROCEDURE — 3078F DIAST BP <80 MM HG: CPT | Performed by: INTERNAL MEDICINE

## 2024-10-09 ENCOUNTER — OFFICE VISIT (OUTPATIENT)
Dept: CARDIOLOGY | Facility: CLINIC | Age: 69
End: 2024-10-09
Payer: MEDICARE

## 2024-10-09 VITALS
WEIGHT: 278 LBS | BODY MASS INDEX: 51.16 KG/M2 | HEART RATE: 83 BPM | HEIGHT: 62 IN | DIASTOLIC BLOOD PRESSURE: 79 MMHG | SYSTOLIC BLOOD PRESSURE: 124 MMHG

## 2024-10-09 DIAGNOSIS — I48.92 ATRIAL FLUTTER WITH CONTROLLED RESPONSE: ICD-10-CM

## 2024-10-09 DIAGNOSIS — I10 ESSENTIAL HYPERTENSION: Primary | ICD-10-CM

## 2024-10-09 DIAGNOSIS — I50.32 CHRONIC DIASTOLIC (CONGESTIVE) HEART FAILURE: ICD-10-CM

## 2024-10-09 DIAGNOSIS — G47.33 OSA (OBSTRUCTIVE SLEEP APNEA): ICD-10-CM

## 2024-10-09 RX ORDER — POTASSIUM CHLORIDE 600 MG/1
8 TABLET, FILM COATED, EXTENDED RELEASE ORAL DAILY
COMMUNITY

## 2024-10-09 NOTE — TELEPHONE ENCOUNTER
Jardiance 10mg  LOT# [id number]  EXP: 3/2026  4 boxes    Eliquis 5mg  LOT#[id number]  EXP: 1/2026  4 boxes

## 2024-10-09 NOTE — PROGRESS NOTES
Interventional Cardiology Patient Visit Follow Up Note      History of Presenting Illness:  History of Present Illness      Ms. Bryan is a 69-year-old female presenting today for follow-up.    In brief, she has a history of heart failure with preserved ejection fraction, atrial flutter on AC, COPD, PENNY on CPAP, HTN, HLD.  She was seen by Dr. James, and has opted for undergoing a flutter ablation procedure and present today for follow-up.    She has been experiencing frequent palpitations, which have led to emergency room visits in the past. Her pulse rate has been fluctuating, with the last two instances dropping to 41.  Now when she gets palpitations, she sits down do conservative measures to drop her heart rate.  She has been advised to take an additional dose of Toprol-XL to suppress episodes of palpitations.  Palpitations have primarily limited her from increasing her physical tolerance.      Despite her condition, she reports that her breathing has improved. She has been cautious not to overexert herself, taking breaks when necessary. Her blood pressure was well-controlled yesterday. She has noticed some leg swelling, which has been managed with diuretics. She has been taking spironolactone, Jardiance, Eliquis, and Bumex as needed. She also takes potassium and magnesium supplements daily.  She is doing weight-based diuresis.  When taking diuretics, she has been advised to take potassium pills and magnesium pills as a supplement which she has been doing.    She has experienced severe leg cramps, which she suspects may be due to sciatica. She has been using a CPAP machine for sleep apnea for several years.        Past Medical History  Past Medical History:   Diagnosis Date    Abnormal ECG     Arrhythmia     Atrial fibrillation     Colon polyp ?    Found during initial colonoscopy    COPD (chronic obstructive pulmonary disease) 2023    Depressed     Elevated cholesterol     GREGG (generalized anxiety disorder)      Gastroesophageal reflux     HTN (hypertension)     Hyperlipidemia     Hypothyroid     Irregular heartbeat     Lesion of neck     Osteoarthritis     Pneumonia 1960    RLS (restless legs syndrome)     Sleep apnea in adult     Sleep apnea, obstructive 2005 ?    Type 2 diabetes mellitus          Current Outpatient Medications:     Accu-Chek Softclix Lancets lancets, 1 each by Other route 3 times a day. Use as instructed, Disp: 200 each, Rfl: 3    albuterol sulfate HFA (Ventolin HFA) 108 (90 Base) MCG/ACT inhaler, Inhale 2 puffs Every 4 (Four) Hours As Needed for Wheezing or Shortness of Air., Disp: 1 g, Rfl: 5    Alcohol Swabs (B-D SINGLE USE SWABS REGULAR) pads, Apply 1 Application topically to the appropriate area as directed 5 (Five) Times a Day., Disp: 200 each, Rfl: 3    apixaban (Eliquis) 5 MG tablet tablet, Take 1 tablet by mouth Every 12 (Twelve) Hours. Lot: [id number] Exp: 9/25 4 boxes given, Disp: 56 tablet, Rfl: 0    atorvastatin (LIPITOR) 40 MG tablet, Take 1 tablet by mouth Every Night., Disp: 90 tablet, Rfl: 3    Blood Glucose Monitoring Suppl (Accu-Chek Guide Me) w/Device kit, Inject 1 kit under the skin into the appropriate area as directed See Admin Instructions., Disp: 1 kit, Rfl: 0    bumetanide (BUMEX) 2 MG tablet, Take 1 tablet by mouth 2 (Two) Times a Day As Needed (When overnight 2 lb weight gain is noted or 4-5 pounds over 48 hours.)., Disp: 60 tablet, Rfl: 3    CALCIUM PO, Take 600 mg by mouth Daily., Disp: , Rfl:     DULoxetine (CYMBALTA) 60 MG capsule, Take 2 capsules by mouth Daily., Disp: 180 capsule, Rfl: 3    empagliflozin (JARDIANCE) 10 MG tablet tablet, Take 1 tablet by mouth Daily. Lot: [id number] Exp: 03/26 4 boxes given, Disp: 28 tablet, Rfl: 0    famotidine (PEPCID) 40 MG tablet, Take 1 tablet by mouth 2 (Two) Times a Day., Disp: 180 tablet, Rfl: 0    gabapentin (NEURONTIN) 600 MG tablet, Take 1 tablet by mouth 3 (Three) Times a Day As Needed (pain)., Disp: 90 tablet, Rfl: 0    glucose  blood (Accu-Chek Guide) test strip, 1 each by Other route Daily. Use as instructed, Disp: 100 each, Rfl: 3    magnesium chloride ER 64 MG DR tablet, Take 1 tablet by mouth Daily., Disp: , Rfl:     metFORMIN (GLUCOPHAGE) 500 MG tablet, TAKE 1 TABLET BY MOUTH EVERY DAY WITH DINNER, Disp: 90 tablet, Rfl: 0    metoprolol succinate XL (TOPROL-XL) 25 MG 24 hr tablet, Take 1 tablet by mouth Daily., Disp: 30 tablet, Rfl: 11    minocycline (MINOCIN,DYNACIN) 100 MG capsule, Take 1 capsule by mouth Daily., Disp: 90 capsule, Rfl: 3    multivitamin with minerals tablet tablet, Take 1 tablet by mouth Daily., Disp: , Rfl:     Omega-3 Fatty Acids (fish oil) 1000 MG capsule capsule, Take  by mouth Daily With Breakfast., Disp: , Rfl:     potassium chloride (KLOR-CON) 8 MEQ CR tablet, Take 1 tablet by mouth Daily., Disp: , Rfl:     rOPINIRole (REQUIP) 2 MG tablet, Take 1 tablet by mouth Every Night., Disp: 90 tablet, Rfl: 3    spironolactone (ALDACTONE) 25 MG tablet, Take 1 tablet by mouth Daily., Disp: 90 tablet, Rfl: 3    Stiolto Respimat 2.5-2.5 MCG/ACT aerosol solution inhaler, INHALE TWO PUFFS BY MOUTH EVERY DAY, Disp: 4 g, Rfl: 1    Synthroid 50 MCG tablet, TAKE ONE TABLET BY MOUTH EVERY DAY, Disp: 90 tablet, Rfl: 1    Zinc Sulfate (ZINC 15 PO), Take 15 mg by mouth Daily., Disp: , Rfl:     melatonin 5 MG tablet tablet, Take 1 tablet by mouth Every Night. (Patient not taking: Reported on 10/9/2024), Disp: , Rfl:   Current outpatient and discharge medications have been reconciled for the patient.  Reviewed by: Mahad Perez MD     There are no discontinued medications.    Allergies   Allergen Reactions    Nexium [Esomeprazole] Hives    Prilosec [Omeprazole] Hives        Social History     Tobacco Use    Smoking status: Former     Current packs/day: 0.00     Average packs/day: 0.5 packs/day for 51.0 years (25.5 ttl pk-yrs)     Types: Cigarettes     Start date: 1969     Quit date:      Years since quittin.7     Passive  "exposure: Past    Smokeless tobacco: Never    Tobacco comments:     Was off and on over the 40 years   Vaping Use    Vaping status: Never Used   Substance Use Topics    Alcohol use: Yes     Comment: Socially on occasion    Drug use: Never       Family History   Problem Relation Age of Onset    Arthritis Mother     Lung cancer Father     Heart disease Father         Had pacemaker    Colon cancer Father     Cancer Father         Lung, colon    Emphysema Father     Cancer Brother         Lung, colon, and pancreas    Cancer Maternal Aunt         Breast cancer    Arthritis Maternal Aunt     Arthritis Maternal Aunt     Hearing loss Maternal Aunt     Cancer Maternal Aunt           Objective   /79   Pulse 83   Ht 157.5 cm (62.01\")   Wt 126 kg (278 lb)   BMI 50.83 kg/m²     Wt Readings from Last 3 Encounters:   10/09/24 126 kg (278 lb)   10/08/24 124 kg (274 lb)   08/22/24 126 kg (277 lb)     BP Readings from Last 3 Encounters:   10/09/24 124/79   10/08/24 128/62   08/22/24 123/60       Physical Exam  Constitutional:  Awake. Not in acute distress. Normal appearance.   Neck: No carotid bruit, hepatojugular reflux or JVD.   Cardiovascular:      Rate and Rhythm: Normal rate and regular rhythm.      Chest Wall: PMI is not displaced.      Heart sounds: Normal heart sounds, S1 normal and S2 normal. No murmur heard.       No friction rub. No gallop. No S3 or S4 sounds.    Pulmonary: Pulmonary effort is normal. Normal breath sounds. No wheezing, rhonchi or rales.   Extremities: No Bilateral edema is noted.   Skin: Warm and dry. Non cyanotic, No petechiae or rash.   Neurological: Alert and oriented x 3  Psychiatric:  Behavior is cooperative.       Result Review :   The following data was reviewed by Mahad Perez MD on 10/09/2024   proBNP   Date Value Ref Range Status   08/19/2024 455.6 0.0 - 900.0 pg/mL Final     CMP          5/23/2024    16:48 8/14/2024    12:32 8/19/2024    01:28   CMP   Glucose 95  129  108    BUN 14  " "15  24    Creatinine 1.06  1.06  1.24    EGFR 56.6  56.6  46.9    Sodium 138  138  141    Potassium 3.9  4.3  3.8    Chloride 100  98  100    Calcium 9.8  9.8  9.7    Total Protein 7.1   7.8    Albumin 4.1   3.9    Globulin 3.0   3.9    Total Bilirubin 0.3   0.4    Alkaline Phosphatase 67   66    AST (SGOT) 27   45    ALT (SGPT) 25   35    Albumin/Globulin Ratio 1.4   1.0    BUN/Creatinine Ratio 13.2  14.2  19.4    Anion Gap 10.0  14.0  13.7      CBC w/diff          5/13/2024    10:14 5/23/2024    16:48 8/19/2024    01:27   CBC w/Diff   WBC 9.45  11.37  14.40    RBC 5.28  5.42  5.29    Hemoglobin 15.4  15.7  15.6    Hematocrit 46.8  48.8  48.1    MCV 88.6  90.0  90.9    MCH 29.2  29.0  29.5    MCHC 32.9  32.2  32.4    RDW 13.8  14.4  14.8    Platelets 249  285  337    Neutrophil Rel % 43.7  58.2  64.1    Immature Granulocyte Rel % 0.4  0.5  0.6    Lymphocyte Rel % 42.0  30.3  25.2    Monocyte Rel % 7.9  7.0  8.2    Eosinophil Rel % 4.9  3.3  1.4    Basophil Rel % 1.1  0.7  0.5       Lab Results   Component Value Date    TSH 3.710 08/19/2024      Lab Results   Component Value Date    FREET4 1.10 08/19/2024      No results found for: \"DDIMERQUANT\"  Magnesium   Date Value Ref Range Status   08/19/2024 2.1 1.6 - 2.4 mg/dL Final      No results found for: \"DIGOXIN\"   Lab Results   Component Value Date    TROPONINT 20 (H) 08/19/2024      No results found for: \"POCTROP\"       A1C Last 3 Results          11/14/2023    10:18 2/12/2024    10:13 5/13/2024    10:14   HGBA1C Last 3 Results   Hemoglobin A1C 6.50  6.30  6.50      Lipid Panel          11/14/2023    10:18 2/12/2024    10:13 5/13/2024    10:14   Lipid Panel   Total Cholesterol 173  175  118    Triglycerides 181  191  156    HDL Cholesterol 38  38  38    VLDL Cholesterol 32  33  27    LDL Cholesterol  103  104  53    LDL/HDL Ratio 2.60  2.60  1.28      Results for orders placed during the hospital encounter of 08/16/24    Adult Transthoracic Echo Complete W/ Cont if " Necessary Per Protocol    Interpretation Summary  Right ventricle is dilated whereas remaining chambers appear to be grossly normal in size  LV has normal wall thickness.  LV systolic function and wall motion is normal.  Calculated LVEF is 55 to 60%.  Diastolic function cannot be accurately assessed.  Paradoxical septal motion is noted due to bundle branch block. There is interventricular septal flattening noted predominantly in diastole suggestive of volume overload in the right ventricle  RV systolic function cannot be accurately assessed.  Valves are not well-visualized.  RVSP cannot be calculated due to insufficient TR jet.  IVC is dilated corresponding to a right atrial pressure of 8 to 10 mmHg  No significant pericardial effusion is noted    Compared to study from March 11, 2022.  LV systolic function remains the same.  RV dilatation is noted.  Septal flattening is noted as mentioned above.     Results for orders placed during the hospital encounter of 08/16/24    Adult Transthoracic Echo Complete W/ Cont if Necessary Per Protocol    Interpretation Summary  Right ventricle is dilated whereas remaining chambers appear to be grossly normal in size  LV has normal wall thickness.  LV systolic function and wall motion is normal.  Calculated LVEF is 55 to 60%.  Diastolic function cannot be accurately assessed.  Paradoxical septal motion is noted due to bundle branch block. There is interventricular septal flattening noted predominantly in diastole suggestive of volume overload in the right ventricle  RV systolic function cannot be accurately assessed.  Valves are not well-visualized.  RVSP cannot be calculated due to insufficient TR jet.  IVC is dilated corresponding to a right atrial pressure of 8 to 10 mmHg  No significant pericardial effusion is noted    Compared to study from March 11, 2022.  LV systolic function remains the same.  RV dilatation is noted.  Septal flattening is noted as mentioned above.     No  results found for this or any previous visit.     Results for orders placed during the hospital encounter of 07/20/22    Stress Test With Myocardial Perfusion Two Day    Interpretation Summary  Patient received Lexiscan 0.4 mg IV fusion over 10 seconds.  Baseline ECG showed normal sinus rhythm with right bundle branch block.  At peak stress, no ischemic ST-T changes were noted.  No significant arrhythmias were noted.  Gated and SPECT images were obtained.  Image quality is adequate.  Attenuation artifact is present.  There is a medium area of mild to moderate perfusion defect in the distal anterior and apical segments with partial reversibility on resting images which could represent myocardial ischemia in the right clinical setting.  The left ventricle was normal in size with a calculated ejection fraction exceeding 70%.  Overall, this represents an intermediate myocardial perfusion scan.       The ASCVD Risk score (Jerad DK, et al., 2019) failed to calculate for the following reasons:    The valid total cholesterol range is 130 to 320 mg/dL          No diagnosis found.    There are no diagnoses linked to this encounter.          Assessment & Plan  1. Palpitations from atrial flutter.  She has been experiencing frequent palpitations, with episodes lasting up to 2 hours.   She has been advised to proceed with the flutter ablation procedure, which is expected to help alleviate her symptoms.   She has been using a device to monitor her pulse, which has occasionally dropped to 41 bpm. She was advised to take an extra dose of Metoprolol during episodes, ensuring her heart rate stays above 40 bpm.   If her heart rate drops too low, she should engage in physical activities or sit in a recliner to avoid injury.   She was also advised to continue taking potassium and magnesium only when actively using Bumex. The possibility of enrolling her in an assistance program for medication support will be explored.    2.  Hypertension.  Her blood pressure is well-controlled. She should continue her current medication regimen and monitor her blood pressure at home.    3.  Heart failure with preserved ejection fraction, ACC class III NYHA class II  She has been experiencing some leg swelling, which is managed with Bumex. She should continue taking Bumex as needed and monitor her leg swelling.  Continue Jardiance 10 mg p.o. daily  Continue spironolactone 25 mg p.o. daily.    Patient has been tolerating these medications without any significant side effects.    4. Sleep Apnea.  She is using a CPAP machine, which is helping her condition. She should continue using the CPAP machine as recommended.    5. Medication Management.  She requested more samples of Eliquis and Jardiance. Samples will be provided if available.      Follow-up in 8 weeks postatrial flutter ablation procedure.    Follow Up     No follow-ups on file.      Mahad Perez MD  Interventional Cardiology  10/09/2024  11:41 EDT      Patient was given instructions and counseling regarding her condition or for health maintenance advice. Please see specific information pulled into the AVS if appropriate.     Please note that portions of this document were completed using a voice recognition program.     Patient or patient representative verbalized consent for the use of Ambient Listening during the visit with  Mahad Perez MD for chart documentation. 10/9/2024  11:39 EDT

## 2024-10-15 ENCOUNTER — PREP FOR SURGERY (OUTPATIENT)
Dept: OTHER | Facility: HOSPITAL | Age: 69
End: 2024-10-15
Payer: MEDICARE

## 2024-10-15 DIAGNOSIS — I48.91 ATRIAL FIBRILLATION, UNSPECIFIED TYPE: Primary | ICD-10-CM

## 2024-10-15 DIAGNOSIS — I48.92 ATRIAL FLUTTER WITH CONTROLLED RESPONSE: ICD-10-CM

## 2024-10-15 RX ORDER — SODIUM CHLORIDE 0.9 % (FLUSH) 0.9 %
10 SYRINGE (ML) INJECTION EVERY 12 HOURS SCHEDULED
OUTPATIENT
Start: 2024-10-15

## 2024-10-15 RX ORDER — SODIUM CHLORIDE 0.9 % (FLUSH) 0.9 %
10 SYRINGE (ML) INJECTION AS NEEDED
OUTPATIENT
Start: 2024-10-15

## 2024-10-15 RX ORDER — ONDANSETRON 2 MG/ML
4 INJECTION INTRAMUSCULAR; INTRAVENOUS EVERY 6 HOURS PRN
OUTPATIENT
Start: 2024-10-15

## 2024-10-15 RX ORDER — NITROGLYCERIN 0.4 MG/1
0.4 TABLET SUBLINGUAL
OUTPATIENT
Start: 2024-10-15

## 2024-10-15 RX ORDER — ACETAMINOPHEN 325 MG/1
650 TABLET ORAL EVERY 4 HOURS PRN
OUTPATIENT
Start: 2024-10-15

## 2024-10-18 DIAGNOSIS — E66.01 MORBID OBESITY WITH BMI OF 50.0-59.9, ADULT: Primary | ICD-10-CM

## 2024-10-18 NOTE — NURSING NOTE
PRE-PVA ASSESSMENT  Randi Fajardo 1955   [address] [place] [address] [phone]        Referral Source: Mahad Perez MD   Information obtained from: [x] Medical record review  [x] Patient report  Scheduled for: PVA w/ PFA on 11/04/2024  with Dr. James  Allergies   Allergen Reactions    Nexium [Esomeprazole] Hives    Prilosec [Omeprazole] Hives       AFib Specific History:  AFIBTYPE: paroxysmal    CHADS-VASc Risk Assessment               5 Total Score    1 CHF    1 Hypertension    1 DM    1 Age 65-74    1 Sex: Female    Criteria that do not apply:    Age >/= 75    PRIOR STROKE/TIA/THROMBO    Vascular Disease            Anticoagulation: Eliquis 5 mg BID, missed doses?  Cardioversion x 0  Failed AAD(s): 0  Prior Ablation: 0    Is Ms. Fajardo aware of her AFib? Yes   Onset: 2024    Exacerbations: exertion, stress    Frequency: weekly    Alleviations: extra medications, water, rest, cooling down      Duration: hours     Symptoms:   [x] Palpitations:    [] Chest Discomfort:    [x] Dizziness:    [] Presyncope:    [] Lightheadedness:   [] Syncope:    [x] Fatigue:    [x] Other:  tingling, sweating, increased HR   [x] Short of Breath:       Last Echo(s):  [x] TTE Date: 08/2024  Right ventricle is dilated whereas remaining chambers appear to be grossly normal in size  LV has normal wall thickness.  LV systolic function and wall motion is normal.  Calculated LVEF is 55 to 60%.  Diastolic function cannot be accurately assessed.  Paradoxical septal motion is noted due to bundle branch block. There is interventricular septal flattening noted predominantly in diastole suggestive of volume overload in the right ventricle  RV systolic function cannot be accurately assessed.  Valves are not well-visualized.  RVSP cannot be calculated due to insufficient TR jet.  IVC is dilated corresponding to a right atrial pressure of 8 to 10 mmHg  No significant pericardial effusion is noted      Past medical History:   [x]  Diabetes             Tx:   metformin                 Hemoglobin A1C   Date Value Ref Range Status   05/13/2024 6.50 (H) 4.80 - 5.60 % Final   02/12/2024 6.30 (H) 4.80 - 5.60 % Final          [x] HYPOthyroidism  [] HYPERthyroidism NO       Tx: Synthroid          TSH   Date Value Ref Range Status   08/19/2024 3.710 0.270 - 4.200 uIU/mL Final   05/13/2024 1.840 0.270 - 4.200 uIU/mL Final       [x] HTN        [x] Tx: metoprolol, spironolactone,           [x] Heart Failure    [] CVA  NO                              [] TIA NO          [x] Diastolic    [x] CAD         [] MI  NO          [x] Dyslipidemia  [x] Statin indicated; Lipitor 40 mg     [x] Ischemic Evaluation       [x] Stress Test: MPS, 7/20/2022: medium area of mild to moderate perfusion defect in the distal anterior and apical segments with partial reversibility on resting images which could represent myocardial ischemia in the right clinical setting, EF 70%       [] Heart Cath: NO    [x] Sleep Apnea Diagnosed       Device: CPAP        Compliance: compliance all of the time    [x] Obesity       [x] BMI >35 (50)        [x] Weight reduction discussed with Ms. Fajardo         [x] Nutrition Consult            [] Declined by Ms. Fajardo       Summary of Patient Contact:    I spoke with Ms. Fajardo about her upcoming PVA.   She was well informed about the procedure from prior discussion with Dr. James and from reading the provided literature.  We discussed the procedure at length including risks, anesthesia, intra-op procedures, recovery, bedrest, normal post-procedure expectations, and success rates.  I answered a few remaining questions. Ms. Fajardo verbalized understanding and she is ready to proceed.

## 2024-10-27 DIAGNOSIS — E11.9 TYPE 2 DIABETES MELLITUS WITHOUT COMPLICATION, WITHOUT LONG-TERM CURRENT USE OF INSULIN: ICD-10-CM

## 2024-10-27 DIAGNOSIS — G62.9 NEUROPATHY: ICD-10-CM

## 2024-10-28 DIAGNOSIS — G62.9 NEUROPATHY: ICD-10-CM

## 2024-10-28 RX ORDER — GABAPENTIN 600 MG/1
600 TABLET ORAL 3 TIMES DAILY PRN
Qty: 90 TABLET | Refills: 0 | Status: SHIPPED | OUTPATIENT
Start: 2024-10-28

## 2024-10-28 RX ORDER — GABAPENTIN 600 MG/1
600 TABLET ORAL 3 TIMES DAILY PRN
Qty: 90 TABLET | Refills: 0 | Status: SHIPPED | OUTPATIENT
Start: 2024-10-28 | End: 2024-10-28 | Stop reason: SDUPTHER

## 2024-10-28 RX ORDER — FAMOTIDINE 40 MG/1
40 TABLET, FILM COATED ORAL 2 TIMES DAILY
Qty: 180 TABLET | Refills: 0 | Status: ON HOLD | OUTPATIENT
Start: 2024-10-28 | End: 2024-11-04

## 2024-10-28 NOTE — TELEPHONE ENCOUNTER
PHARMACY CHANGE    Caller: Randi Fajardo    Relationship: Self    Best call back number: [phone]     Requested Prescriptions:   Requested Prescriptions     Pending Prescriptions Disp Refills    gabapentin (NEURONTIN) 600 MG tablet 90 tablet 0     Sig: Take 1 tablet by mouth 3 (Three) Times a Day As Needed (pain).        Pharmacy where request should be sent: [facility]/PHARMACY #[id number] - [place], [place] - [address] [facility] [phone] [facility] [phone] FX     Last office visit with prescribing clinician: 5/21/2024   Last telemedicine visit with prescribing clinician: Visit date not found   Next office visit with prescribing clinician: 11/18/2024     Additional details provided by patient: PLEASE CANCEL MAIL ORDER PHARMACY AND SEND TO [facility] LOCALLY.    PATIENT TOOK LAST DOSE TODAY.    PLEASE CONTACT PATIENT WHEN COMPLETE.    Does the patient have less than a 3 day supply:  [x] Yes  [] No    Would you like a call back once the refill request has been completed: [x] Yes [] No    If the office needs to give you a call back, can they leave a voicemail: [x] Yes [] No    Sina Bueno Rep   10/28/24 10:31 EDT         Is it okay if the provider responds through MyChart: YES OR CALL MAY LEAVE VOICEMAIL.

## 2024-10-28 NOTE — TELEPHONE ENCOUNTER
Refill request for controlled substance.      Date of request: 10/28/2024    Medication requested: Gabapentin  Last OV: 5/21/24  Last UDS: 4/25/24  Contract signed: yes    Date:11/14/23  Next office visit: 11/18/24    Marly Peck

## 2024-11-04 ENCOUNTER — ANESTHESIA (OUTPATIENT)
Dept: CARDIOLOGY | Facility: HOSPITAL | Age: 69
End: 2024-11-04
Payer: MEDICARE

## 2024-11-04 ENCOUNTER — ANESTHESIA EVENT (OUTPATIENT)
Dept: CARDIOLOGY | Facility: HOSPITAL | Age: 69
End: 2024-11-04
Payer: MEDICARE

## 2024-11-04 ENCOUNTER — HOSPITAL ENCOUNTER (OUTPATIENT)
Facility: HOSPITAL | Age: 69
Setting detail: HOSPITAL OUTPATIENT SURGERY
Discharge: HOME | End: 2024-11-04
Attending: INTERNAL MEDICINE | Admitting: INTERNAL MEDICINE
Payer: MEDICARE

## 2024-11-04 VITALS
SYSTOLIC BLOOD PRESSURE: 113 MMHG | HEART RATE: 66 BPM | HEIGHT: 63 IN | RESPIRATION RATE: 14 BRPM | WEIGHT: 275.8 LBS | OXYGEN SATURATION: 93 % | DIASTOLIC BLOOD PRESSURE: 67 MMHG | TEMPERATURE: 97.2 F | BODY MASS INDEX: 48.87 KG/M2

## 2024-11-04 DIAGNOSIS — I48.91 ATRIAL FIBRILLATION, UNSPECIFIED TYPE: ICD-10-CM

## 2024-11-04 DIAGNOSIS — I48.92 ATRIAL FLUTTER WITH CONTROLLED RESPONSE: ICD-10-CM

## 2024-11-04 LAB
ANION GAP SERPL CALCULATED.3IONS-SCNC: 14 MMOL/L (ref 5–15)
BUN SERPL-MCNC: 20 MG/DL (ref 8–23)
BUN/CREAT SERPL: 20.2 (ref 7–25)
CALCIUM SPEC-SCNC: 9.5 MG/DL (ref 8.6–10.5)
CHLORIDE SERPL-SCNC: 100 MMOL/L (ref 98–107)
CO2 SERPL-SCNC: 25 MMOL/L (ref 22–29)
CREAT SERPL-MCNC: 0.99 MG/DL (ref 0.57–1)
DEPRECATED RDW RBC AUTO: 48 FL (ref 37–54)
EGFRCR SERPLBLD CKD-EPI 2021: 61.1 ML/MIN/1.73
ERYTHROCYTE [DISTWIDTH] IN BLOOD BY AUTOMATED COUNT: 14.5 % (ref 12.3–15.4)
GLUCOSE SERPL-MCNC: 98 MG/DL (ref 65–99)
HCT VFR BLD AUTO: 49.7 % (ref 34–46.6)
HGB BLD-MCNC: 16 G/DL (ref 12–15.9)
MCH RBC QN AUTO: 29.3 PG (ref 26.6–33)
MCHC RBC AUTO-ENTMCNC: 32.2 G/DL (ref 31.5–35.7)
MCV RBC AUTO: 90.9 FL (ref 79–97)
PLATELET # BLD AUTO: 275 10*3/MM3 (ref 140–450)
PMV BLD AUTO: 10 FL (ref 6–12)
POTASSIUM SERPL-SCNC: 4.7 MMOL/L (ref 3.5–5.2)
RBC # BLD AUTO: 5.47 10*6/MM3 (ref 3.77–5.28)
SODIUM SERPL-SCNC: 139 MMOL/L (ref 136–145)
WBC NRBC COR # BLD AUTO: 9.74 10*3/MM3 (ref 3.4–10.8)

## 2024-11-04 PROCEDURE — 25010000002 PHENYLEPHRINE 10 MG/ML SOLUTION 1 ML VIAL: Performed by: NURSE ANESTHETIST, CERTIFIED REGISTERED

## 2024-11-04 PROCEDURE — 93622 COMP EP EVAL L VENTR PAC&REC: CPT | Performed by: INTERNAL MEDICINE

## 2024-11-04 PROCEDURE — C1733 CATH, EP, OTHR THAN COOL-TIP: HCPCS | Performed by: INTERNAL MEDICINE

## 2024-11-04 PROCEDURE — 25810000003 SODIUM CHLORIDE 0.9 % SOLUTION 250 ML FLEX CONT: Performed by: NURSE ANESTHETIST, CERTIFIED REGISTERED

## 2024-11-04 PROCEDURE — 93656 COMPRE EP EVAL ABLTJ ATR FIB: CPT | Performed by: INTERNAL MEDICINE

## 2024-11-04 PROCEDURE — 93657 TX L/R ATRIAL FIB ADDL: CPT | Performed by: INTERNAL MEDICINE

## 2024-11-04 PROCEDURE — 25010000002 DEXAMETHASONE PER 1 MG: Performed by: NURSE ANESTHETIST, CERTIFIED REGISTERED

## 2024-11-04 PROCEDURE — C1760 CLOSURE DEV, VASC: HCPCS | Performed by: INTERNAL MEDICINE

## 2024-11-04 PROCEDURE — 25810000003 SODIUM CHLORIDE 0.9 % SOLUTION: Performed by: NURSE ANESTHETIST, CERTIFIED REGISTERED

## 2024-11-04 PROCEDURE — 25010000002 HEPARIN (PORCINE) PER 1000 UNITS: Performed by: INTERNAL MEDICINE

## 2024-11-04 PROCEDURE — 25010000002 PHENYLEPHRINE 10 MG/ML SOLUTION: Performed by: NURSE ANESTHETIST, CERTIFIED REGISTERED

## 2024-11-04 PROCEDURE — 93005 ELECTROCARDIOGRAM TRACING: CPT | Performed by: INTERNAL MEDICINE

## 2024-11-04 PROCEDURE — 25010000002 ONDANSETRON PER 1 MG: Performed by: NURSE ANESTHETIST, CERTIFIED REGISTERED

## 2024-11-04 PROCEDURE — C1894 INTRO/SHEATH, NON-LASER: HCPCS | Performed by: INTERNAL MEDICINE

## 2024-11-04 PROCEDURE — C1759 CATH, INTRA ECHOCARDIOGRAPHY: HCPCS | Performed by: INTERNAL MEDICINE

## 2024-11-04 PROCEDURE — 36415 COLL VENOUS BLD VENIPUNCTURE: CPT

## 2024-11-04 PROCEDURE — 25010000002 PROPOFOL 10 MG/ML EMULSION: Performed by: NURSE ANESTHETIST, CERTIFIED REGISTERED

## 2024-11-04 PROCEDURE — 80048 BASIC METABOLIC PNL TOTAL CA: CPT | Performed by: INTERNAL MEDICINE

## 2024-11-04 PROCEDURE — C1769 GUIDE WIRE: HCPCS | Performed by: INTERNAL MEDICINE

## 2024-11-04 PROCEDURE — C1766 INTRO/SHEATH,STRBLE,NON-PEEL: HCPCS | Performed by: INTERNAL MEDICINE

## 2024-11-04 PROCEDURE — C1732 CATH, EP, DIAG/ABL, 3D/VECT: HCPCS | Performed by: INTERNAL MEDICINE

## 2024-11-04 PROCEDURE — 93655 ICAR CATH ABLTJ DSCRT ARRHYT: CPT | Performed by: INTERNAL MEDICINE

## 2024-11-04 PROCEDURE — 85347 COAGULATION TIME ACTIVATED: CPT

## 2024-11-04 PROCEDURE — 25010000002 LIDOCAINE 1 % SOLUTION: Performed by: NURSE ANESTHETIST, CERTIFIED REGISTERED

## 2024-11-04 PROCEDURE — 25010000002 GLYCOPYRROLATE 1 MG/5ML SOLUTION: Performed by: NURSE ANESTHETIST, CERTIFIED REGISTERED

## 2024-11-04 PROCEDURE — 25010000002 SUGAMMADEX 500 MG/5ML SOLUTION: Performed by: NURSE ANESTHETIST, CERTIFIED REGISTERED

## 2024-11-04 PROCEDURE — 25010000002 PROTAMINE SULFATE PER 10 MG: Performed by: INTERNAL MEDICINE

## 2024-11-04 PROCEDURE — 85027 COMPLETE CBC AUTOMATED: CPT | Performed by: INTERNAL MEDICINE

## 2024-11-04 PROCEDURE — 93010 ELECTROCARDIOGRAM REPORT: CPT | Performed by: INTERNAL MEDICINE

## 2024-11-04 RX ORDER — DROPERIDOL 2.5 MG/ML
0.62 INJECTION, SOLUTION INTRAMUSCULAR; INTRAVENOUS ONCE AS NEEDED
Status: DISCONTINUED | OUTPATIENT
Start: 2024-11-04 | End: 2024-11-04 | Stop reason: RX

## 2024-11-04 RX ORDER — UBIDECARENONE 30 MG
CAPSULE ORAL EVERY EVENING
COMMUNITY

## 2024-11-04 RX ORDER — GLYCOPYRROLATE 0.2 MG/ML
INJECTION INTRAMUSCULAR; INTRAVENOUS AS NEEDED
Status: DISCONTINUED | OUTPATIENT
Start: 2024-11-04 | End: 2024-11-04 | Stop reason: SURG

## 2024-11-04 RX ORDER — FAMOTIDINE 40 MG/1
40 TABLET, FILM COATED ORAL 2 TIMES DAILY
COMMUNITY

## 2024-11-04 RX ORDER — DEXAMETHASONE SODIUM PHOSPHATE 4 MG/ML
INJECTION, SOLUTION INTRA-ARTICULAR; INTRALESIONAL; INTRAMUSCULAR; INTRAVENOUS; SOFT TISSUE AS NEEDED
Status: DISCONTINUED | OUTPATIENT
Start: 2024-11-04 | End: 2024-11-04 | Stop reason: SURG

## 2024-11-04 RX ORDER — GINSENG 100 MG
1 CAPSULE ORAL NIGHTLY
COMMUNITY

## 2024-11-04 RX ORDER — PHENYLEPHRINE HYDROCHLORIDE 10 MG/ML
INJECTION INTRAVENOUS AS NEEDED
Status: DISCONTINUED | OUTPATIENT
Start: 2024-11-04 | End: 2024-11-04 | Stop reason: SURG

## 2024-11-04 RX ORDER — ROCURONIUM BROMIDE 10 MG/ML
INJECTION, SOLUTION INTRAVENOUS AS NEEDED
Status: DISCONTINUED | OUTPATIENT
Start: 2024-11-04 | End: 2024-11-04 | Stop reason: SURG

## 2024-11-04 RX ORDER — ONDANSETRON 2 MG/ML
4 INJECTION INTRAMUSCULAR; INTRAVENOUS EVERY 6 HOURS PRN
Status: DISCONTINUED | OUTPATIENT
Start: 2024-11-04 | End: 2024-11-04 | Stop reason: HOSPADM

## 2024-11-04 RX ORDER — FENTANYL CITRATE 50 UG/ML
50 INJECTION, SOLUTION INTRAMUSCULAR; INTRAVENOUS
Status: DISCONTINUED | OUTPATIENT
Start: 2024-11-04 | End: 2024-11-04 | Stop reason: HOSPADM

## 2024-11-04 RX ORDER — ACETAMINOPHEN 325 MG/1
650 TABLET ORAL EVERY 4 HOURS PRN
Status: DISCONTINUED | OUTPATIENT
Start: 2024-11-04 | End: 2024-11-04 | Stop reason: HOSPADM

## 2024-11-04 RX ORDER — IPRATROPIUM BROMIDE AND ALBUTEROL SULFATE 2.5; .5 MG/3ML; MG/3ML
3 SOLUTION RESPIRATORY (INHALATION) ONCE AS NEEDED
Status: DISCONTINUED | OUTPATIENT
Start: 2024-11-04 | End: 2024-11-04 | Stop reason: HOSPADM

## 2024-11-04 RX ORDER — NITROGLYCERIN 0.4 MG/1
0.4 TABLET SUBLINGUAL
Status: DISCONTINUED | OUTPATIENT
Start: 2024-11-04 | End: 2024-11-04 | Stop reason: HOSPADM

## 2024-11-04 RX ORDER — LIDOCAINE HYDROCHLORIDE 10 MG/ML
INJECTION, SOLUTION INFILTRATION; PERINEURAL AS NEEDED
Status: DISCONTINUED | OUTPATIENT
Start: 2024-11-04 | End: 2024-11-04 | Stop reason: SURG

## 2024-11-04 RX ORDER — SODIUM CHLORIDE 0.9 % (FLUSH) 0.9 %
10 SYRINGE (ML) INJECTION EVERY 12 HOURS SCHEDULED
Status: DISCONTINUED | OUTPATIENT
Start: 2024-11-04 | End: 2024-11-04 | Stop reason: HOSPADM

## 2024-11-04 RX ORDER — PROTAMINE SULFATE 10 MG/ML
INJECTION, SOLUTION INTRAVENOUS
Status: DISCONTINUED | OUTPATIENT
Start: 2024-11-04 | End: 2024-11-04 | Stop reason: HOSPADM

## 2024-11-04 RX ORDER — PROPOFOL 10 MG/ML
VIAL (ML) INTRAVENOUS AS NEEDED
Status: DISCONTINUED | OUTPATIENT
Start: 2024-11-04 | End: 2024-11-04 | Stop reason: SURG

## 2024-11-04 RX ORDER — ONDANSETRON 2 MG/ML
INJECTION INTRAMUSCULAR; INTRAVENOUS AS NEEDED
Status: DISCONTINUED | OUTPATIENT
Start: 2024-11-04 | End: 2024-11-04 | Stop reason: SURG

## 2024-11-04 RX ORDER — HEPARIN SODIUM 1000 [USP'U]/ML
INJECTION, SOLUTION INTRAVENOUS; SUBCUTANEOUS
Status: DISCONTINUED | OUTPATIENT
Start: 2024-11-04 | End: 2024-11-04 | Stop reason: HOSPADM

## 2024-11-04 RX ORDER — SODIUM CHLORIDE 9 MG/ML
INJECTION, SOLUTION INTRAVENOUS CONTINUOUS PRN
Status: DISCONTINUED | OUTPATIENT
Start: 2024-11-04 | End: 2024-11-04 | Stop reason: SURG

## 2024-11-04 RX ORDER — SODIUM CHLORIDE 0.9 % (FLUSH) 0.9 %
10 SYRINGE (ML) INJECTION AS NEEDED
Status: DISCONTINUED | OUTPATIENT
Start: 2024-11-04 | End: 2024-11-04 | Stop reason: HOSPADM

## 2024-11-04 RX ORDER — HYDROMORPHONE HYDROCHLORIDE 1 MG/ML
0.5 INJECTION, SOLUTION INTRAMUSCULAR; INTRAVENOUS; SUBCUTANEOUS
Status: DISCONTINUED | OUTPATIENT
Start: 2024-11-04 | End: 2024-11-04 | Stop reason: HOSPADM

## 2024-11-04 RX ADMIN — LIDOCAINE HYDROCHLORIDE 50 MG: 10 INJECTION, SOLUTION INFILTRATION; PERINEURAL at 12:53

## 2024-11-04 RX ADMIN — SUGAMMADEX 500 MG: 100 INJECTION, SOLUTION INTRAVENOUS at 14:24

## 2024-11-04 RX ADMIN — ROCURONIUM BROMIDE 100 MG: 10 SOLUTION INTRAVENOUS at 12:53

## 2024-11-04 RX ADMIN — PROPOFOL 150 MG: 10 INJECTION, EMULSION INTRAVENOUS at 12:53

## 2024-11-04 RX ADMIN — ONDANSETRON 4 MG: 2 INJECTION INTRAMUSCULAR; INTRAVENOUS at 14:24

## 2024-11-04 RX ADMIN — DEXAMETHASONE SODIUM PHOSPHATE 8 MG: 4 INJECTION, SOLUTION INTRAMUSCULAR; INTRAVENOUS at 13:02

## 2024-11-04 RX ADMIN — SODIUM CHLORIDE: 9 INJECTION, SOLUTION INTRAVENOUS at 12:49

## 2024-11-04 RX ADMIN — GLYCOPYRROLATE 0.2 MG: 0.2 INJECTION INTRAMUSCULAR; INTRAVENOUS at 13:32

## 2024-11-04 RX ADMIN — PHENYLEPHRINE HYDROCHLORIDE 0.2 MCG/KG/MIN: 10 INJECTION INTRAVENOUS at 13:04

## 2024-11-04 RX ADMIN — PHENYLEPHRINE HYDROCHLORIDE 160 MCG: 10 INJECTION INTRAVENOUS at 12:53

## 2024-11-04 NOTE — ANESTHESIA PROCEDURE NOTES
Airway  Urgency: elective    Date/Time: 11/4/2024 12:56 PM  Airway not difficult    General Information and Staff    Patient location during procedure: OR  CRNA/CAA: Vincenzo Diaz CRNA    Indications and Patient Condition  Indications for airway management: airway protection    Preoxygenated: yes  MILS not maintained throughout  Mask difficulty assessment: 2 - vent by mask + OA or adjuvant +/- NMBA    Final Airway Details  Final airway type: endotracheal airway      Successful airway: ETT  Cuffed: yes   Successful intubation technique: video laryngoscopy  Facilitating devices/methods: intubating stylet  Endotracheal tube insertion site: oral  Blade: Germain  Blade size: 3  ETT size (mm): 7.5  Cormack-Lehane Classification: grade IIa - partial view of glottis  Placement verified by: chest auscultation and capnometry   Cuff volume (mL): 8  Measured from: lips  ETT/EBT  to lips (cm): 21  Number of attempts at approach: 1  Assessment: lips, teeth, and gum same as pre-op and atraumatic intubation    Additional Comments  Negative epigastric sounds, Breath sound equal bilaterally with symmetric chest rise and fall

## 2024-11-04 NOTE — ANESTHESIA PREPROCEDURE EVALUATION
Anesthesia Evaluation     Patient summary reviewed and Nursing notes reviewed   NPO Solid Status: > 8 hours  NPO Liquid Status: > 2 hours           Airway   Mallampati: I  TM distance: >3 FB  Neck ROM: full  No difficulty expected  Dental      Pulmonary    (+) COPD (daily MDI and rare rescue MDI) moderate,shortness of breath (stable), sleep apnea on CPAP  (-) pneumonia, recent URI, no home oxygen  Cardiovascular     ECG reviewed    (+) hypertension, dysrhythmias Atrial Fib, Atrial Flutter, CHF Diastolic >=55%, hyperlipidemia  (-) past MI, cardiac stents    ROS comment: ECG NSR RBBB  ECHO 2024 Ef >55% RV enlarged- RA pressure inc RBBB effect   MPS 2022  no ischemic ST-T changes were noted. Attenuation artifact is present vs anterior/apical defect -intermediate study       Neuro/Psych  (-) seizures, CVA  GI/Hepatic/Renal/Endo    (+) obesity, morbid obesity, GERD, renal disease (creat 1.2 increased form <1 over last 6 months)- CRI, diabetes mellitus type 2, thyroid problem hypothyroidism    Musculoskeletal     Abdominal    Substance History      OB/GYN          Other   arthritis,                   Anesthesia Plan    ASA 3     general     intravenous induction     Anesthetic plan, risks, benefits, and alternatives have been provided, discussed and informed consent has been obtained with: patient.    Plan discussed with CRNA.      CODE STATUS:

## 2024-11-04 NOTE — ANESTHESIA POSTPROCEDURE EVALUATION
Patient: Randi Fajardo    Procedure Summary       Date: 11/04/24 Room / Location: [facility] / [facility]    Anesthesia Start: 1244 Anesthesia Stop: 1445    Procedure: AF/AFL with PFA & NILA. No CTA. Hold Eliquis morning of. Diagnosis:       Atrial fibrillation, unspecified type      Atrial flutter with controlled response      (afib)    Providers: Luca James MD Provider: Sundeep Puga MD    Anesthesia Type: general ASA Status: 3            Anesthesia Type: general    Vitals  No vitals data found for the desired time range.          Post Anesthesia Care and Evaluation    Patient location during evaluation: PACU  Patient participation: complete - patient participated  Level of consciousness: awake  Pain score: 0  Pain management: adequate    Airway patency: patent  Anesthetic complications: No anesthetic complications  PONV Status: none  Cardiovascular status: acceptable and stable  Respiratory status: nasal cannula, unassisted, acceptable and spontaneous ventilation  Hydration status: acceptable

## 2024-11-04 NOTE — INTERVAL H&P NOTE
"  H&P reviewed. The patient was examined and there are no changes to the H&P.      Vitals:    11/04/24 1017 11/04/24 1019   BP: 138/88 110/68   BP Location: Right arm Left arm   Patient Position: Lying Lying   Pulse: 72    Resp: 16    SpO2: 93%    Weight: 125 kg (275 lb 12.7 oz)    Height: 160 cm (63\")      Lab Results   Component Value Date    WBC 9.74 11/04/2024    HGB 16.0 (H) 11/04/2024    HCT 49.7 (H) 11/04/2024    MCV 90.9 11/04/2024     11/04/2024     BMP pending. Will proceed if acceptable.    Patient is here today for pulmonary vein ablation and atrial flutter ablation with general anesthesia. Patient is anticoagulated with eliquis and denies any missed doses in the last 30 days. Procedure, risks, and alternatives have been discussed with the patient and she is agreeable to proceed.     Electronically signed by EDDIE Stuart, 11/04/24, 10:58 AM EST.          "

## 2024-11-05 ENCOUNTER — CALL CENTER PROGRAMS (OUTPATIENT)
Dept: CALL CENTER | Facility: HOSPITAL | Age: 69
End: 2024-11-05
Payer: MEDICARE

## 2024-11-05 LAB
ACT BLD: 397 SECONDS (ref 82–152)
ACT BLD: 452 SECONDS (ref 82–152)
QT INTERVAL: 454 MS
QTC INTERVAL: 479 MS

## 2024-11-05 NOTE — OUTREACH NOTE
PCI/Device Survey      Flowsheet Row Responses   Facility patient discharged from? [facility]   Procedure date 11/04/24   Procedure (if device, specify in description) Ablation   Performing MD Other (annotate)  [Dr. Luca James]   Attempt successful? No   Unsuccessful attempts Attempt 1            Tiffany Mccarty Registered Nurse

## 2024-11-05 NOTE — OUTREACH NOTE
PCI/Device Survey      Flowsheet Row Responses   Facility patient discharged from? [facility]   Procedure date 11/04/24   Procedure (if device, specify in description) Ablation   Performing MD Other (annotate)  [Dr. Luca James]   Attempt successful? No   Unsuccessful attempts Attempt 2            Tiffany DEMPSEY - Registered Nurse

## 2024-11-13 ENCOUNTER — OFFICE VISIT (OUTPATIENT)
Dept: PULMONOLOGY | Facility: CLINIC | Age: 69
End: 2024-11-13
Payer: MEDICARE

## 2024-11-13 VITALS
WEIGHT: 272 LBS | HEART RATE: 79 BPM | BODY MASS INDEX: 48.2 KG/M2 | RESPIRATION RATE: 18 BRPM | SYSTOLIC BLOOD PRESSURE: 110 MMHG | HEIGHT: 63 IN | DIASTOLIC BLOOD PRESSURE: 45 MMHG | TEMPERATURE: 97.6 F | OXYGEN SATURATION: 93 %

## 2024-11-13 DIAGNOSIS — J41.8 MIXED SIMPLE AND MUCOPURULENT CHRONIC BRONCHITIS: Primary | Chronic | ICD-10-CM

## 2024-11-13 DIAGNOSIS — R06.02 SOB (SHORTNESS OF BREATH): ICD-10-CM

## 2024-11-13 DIAGNOSIS — E66.813 CLASS 3 SEVERE OBESITY WITH SERIOUS COMORBIDITY AND BODY MASS INDEX (BMI) OF 45.0 TO 49.9 IN ADULT, UNSPECIFIED OBESITY TYPE: ICD-10-CM

## 2024-11-13 DIAGNOSIS — G47.33 OSA (OBSTRUCTIVE SLEEP APNEA): ICD-10-CM

## 2024-11-13 DIAGNOSIS — E66.01 CLASS 3 SEVERE OBESITY WITH SERIOUS COMORBIDITY AND BODY MASS INDEX (BMI) OF 45.0 TO 49.9 IN ADULT, UNSPECIFIED OBESITY TYPE: ICD-10-CM

## 2024-11-13 NOTE — PROGRESS NOTES
Primary Care Provider  Mainor Sanders Jr., MD     Referring Provider  No ref. provider found     Chief Complaint  Shortness of Breath (With exertion ), Follow-up (6 month f/up ), and Sleep Apnea    Subjective          Randi Fajardo presents to [facility]MON[facility] MEDICINE  History of Present Illness  Randi Fajardo is a 69 y.o. female patient of Dr. Cuevas here for management of PENNY, chronic bronchitis and tobacco abuse of cigarettes in remission.     Patient states she is doing okay since her last office visit.  She denies using antibiotics or steroids for her lungs.  She denies any current fevers or chills.  She continues to use Stiolto every morning.  She seldomly uses her albuterol.  She states she did have an ablation for atrial fibrillation last Monday.  Her shortness of breath is mild in severity, worse with exertion and improved with rest.  Continues wear her CPAP at night and is benefiting from its use.  She will get her flu shot at her primary care provider's office next week.  She is able to perform her ADLs without difficulty.     Her history of smoking is   Tobacco Use: Medium Risk (11/13/2024)    Patient History     Smoking Tobacco Use: Former     Smokeless Tobacco Use: Never     Passive Exposure: Past   .    Review of Systems   Constitutional:  Negative for chills, fatigue, fever, unexpected weight gain and unexpected weight loss.   HENT:  Congestion: Nasal.    Respiratory:  Positive for shortness of breath. Negative for apnea, cough and wheezing.         Negative for Hemoptysis     Cardiovascular:  Negative for chest pain, palpitations and leg swelling.   Skin:         Negative for cyanosis      Sleep: Negative for Excessive daytime sleepiness  Negative for morning headaches  Negative for Snoring    Family History   Problem Relation Age of Onset    Arthritis Mother     Lung cancer Father     Heart disease Father         Had pacemaker    Colon cancer  Father     Cancer Father         Lung, colon    Emphysema Father     Cancer Brother         Lung, colon, and pancreas    Cancer Maternal Aunt         Breast cancer    Arthritis Maternal Aunt     Arthritis Maternal Aunt     Hearing loss Maternal Aunt     Cancer Maternal Aunt         Social History     Socioeconomic History    Marital status:    Tobacco Use    Smoking status: Former     Current packs/day: 0.00     Average packs/day: 0.5 packs/day for 51.0 years (25.5 ttl pk-yrs)     Types: Cigarettes     Start date: 1969     Quit date:      Years since quittin.8     Passive exposure: Past    Smokeless tobacco: Never    Tobacco comments:     Was off and on over the 40 years   Vaping Use    Vaping status: Never Used   Substance and Sexual Activity    Alcohol use: Yes     Comment: Socially on occasion    Drug use: Never    Sexual activity: Not Currently     Birth control/protection: Post-menopausal, None        Past Medical History:   Diagnosis Date    Abnormal ECG     Arrhythmia     Atrial fibrillation     Colon polyp ?    Found during initial colonoscopy    COPD (chronic obstructive pulmonary disease)     Depressed     Elevated cholesterol     GREGG (generalized anxiety disorder)     Gastroesophageal reflux     HTN (hypertension)     Hyperlipidemia     Hypothyroid     Irregular heartbeat     Lesion of neck     Osteoarthritis     Pneumonia 1960    RLS (restless legs syndrome)     Sleep apnea in adult     Sleep apnea, obstructive  ?    Type 2 diabetes mellitus         Immunization History   Administered Date(s) Administered    COVID-19 (PFIZER) 12YRS+ (COMIRNATY) 2024    COVID-19 (PFIZER) BIVALENT 12+YRS 10/05/2022, 2023    COVID-19 (PFIZER) Purple Cap Monovalent 2021, 2021, 10/12/2021    Fluad Quad 65+ 10/12/2021    Fluzone High-Dose 65+YRS 2019, 10/12/2021, 10/05/2022    Fluzone High-Dose 65+yrs 2020, 10/05/2022, 2023    Influenza, Unspecified  11/14/2019    Pneumococcal Conjugate 13-Valent (PCV13) 02/13/2019    Pneumococcal Conjugate 20-Valent (PCV20) 06/07/2022    Pneumococcal Polysaccharide (PPSV23) 03/01/2021    Shingrix 05/21/2015, 11/14/2019    Tdap 05/21/2015         Allergies   Allergen Reactions    Nexium [Esomeprazole] Hives    Prilosec [Omeprazole] Hives          Current Outpatient Medications:     Accu-Chek Softclix Lancets lancets, 1 each by Other route 3 times a day. Use as instructed, Disp: 200 each, Rfl: 3    albuterol sulfate HFA (Ventolin HFA) 108 (90 Base) MCG/ACT inhaler, Inhale 2 puffs Every 4 (Four) Hours As Needed for Wheezing or Shortness of Air., Disp: 1 g, Rfl: 5    Alcohol Swabs (B-D SINGLE USE SWABS REGULAR) pads, Apply 1 Application topically to the appropriate area as directed 5 (Five) Times a Day., Disp: 200 each, Rfl: 3    apixaban (Eliquis) 5 MG tablet tablet, Take 1 tablet by mouth Every 12 (Twelve) Hours. Lot: [id number] Exp: 9/25 4 boxes given, Disp: 56 tablet, Rfl: 0    apixaban (ELIQUIS) 5 MG tablet tablet, Take 1 tablet by mouth 2 (Two) Times a Day., Disp: 56 tablet, Rfl: 0    atorvastatin (LIPITOR) 40 MG tablet, Take 1 tablet by mouth Every Night., Disp: 90 tablet, Rfl: 3    BIOTIN PO, Take  by mouth Every Night., Disp: , Rfl:     Blood Glucose Monitoring Suppl (Accu-Chek Guide Me) w/Device kit, Inject 1 kit under the skin into the appropriate area as directed See Admin Instructions., Disp: 1 kit, Rfl: 0    bumetanide (BUMEX) 2 MG tablet, Take 1 tablet by mouth 2 (Two) Times a Day As Needed (When overnight 2 lb weight gain is noted or 4-5 pounds over 48 hours.)., Disp: 60 tablet, Rfl: 3    Calcium Carb-Cholecalciferol (CALCIUM 600 + D PO), Take 1 tablet by mouth After Dinner., Disp: , Rfl:     DULoxetine (CYMBALTA) 60 MG capsule, Take 2 capsules by mouth Daily. (Patient taking differently: Take 2 capsules by mouth Every Evening.), Disp: 180 capsule, Rfl: 3    empagliflozin (JARDIANCE) 10 MG tablet tablet, Take 1 tablet  by mouth Daily. Lot: [id number] Exp: 03/26 4 boxes given, Disp: 28 tablet, Rfl: 0    empagliflozin (Jardiance) 10 MG tablet tablet, Take 1 tablet by mouth Daily. (Patient taking differently: Take 1 tablet by mouth Every Night.), Disp: 28 tablet, Rfl: 0    famotidine (Pepcid) 40 MG tablet, Take 1 tablet by mouth 2 (Two) Times a Day., Disp: , Rfl:     gabapentin (NEURONTIN) 600 MG tablet, Take 1 tablet by mouth 3 (Three) Times a Day As Needed (pain)., Disp: 90 tablet, Rfl: 0    glucose blood (Accu-Chek Guide) test strip, 1 each by Other route Daily. Use as instructed, Disp: 100 each, Rfl: 3    magnesium chloride ER 64 MG DR tablet, Take 1 tablet by mouth Daily., Disp: , Rfl:     metFORMIN (GLUCOPHAGE) 500 MG tablet, Take 1 tablet by mouth Daily With Dinner., Disp: 90 tablet, Rfl: 0    metoprolol succinate XL (TOPROL-XL) 25 MG 24 hr tablet, Take 1 tablet by mouth Daily., Disp: 30 tablet, Rfl: 11    minocycline (MINOCIN,DYNACIN) 100 MG capsule, Take 1 capsule by mouth Daily. (Patient taking differently: Take 1 capsule by mouth Every Night.), Disp: 90 capsule, Rfl: 3    multivitamin with minerals tablet tablet, Take 1 tablet by mouth Every Night. 1 GUMMIE AT NIGHT, Disp: , Rfl:     Omega-3 Fatty Acids (fish oil) 1000 MG capsule capsule, Take  by mouth Daily With Dinner., Disp: , Rfl:     Potassium Gluconate 550 MG tablet, Take  by mouth Every Evening., Disp: , Rfl:     rOPINIRole (REQUIP) 2 MG tablet, Take 1 tablet by mouth Every Night., Disp: 90 tablet, Rfl: 3    spironolactone (ALDACTONE) 25 MG tablet, Take 1 tablet by mouth Daily., Disp: 90 tablet, Rfl: 3    Stiolto Respimat 2.5-2.5 MCG/ACT aerosol solution inhaler, INHALE TWO PUFFS BY MOUTH EVERY DAY (Patient taking differently: Inhale 2 puffs Every Morning.), Disp: 4 g, Rfl: 1    Synthroid 50 MCG tablet, TAKE ONE TABLET BY MOUTH EVERY DAY, Disp: 90 tablet, Rfl: 1    Zinc 50 MG tablet, Take 1 tablet by mouth Every Night. CAPSULE, NOT TAB (WITH D3), Disp: , Rfl:   "    Objective   Physical Exam  Constitutional:       General: She is not in acute distress.     Appearance: Normal appearance. She is obese.   HENT:      Right Ear: Hearing normal.      Left Ear: Hearing normal.      Nose: No nasal tenderness or congestion.      Mouth/Throat:      Mouth: Mucous membranes are moist. No oral lesions.   Eyes:      Extraocular Movements: Extraocular movements intact.      Pupils: Pupils are equal, round, and reactive to light.   Cardiovascular:      Rate and Rhythm: Normal rate and regular rhythm.      Pulses: Normal pulses.      Heart sounds: Normal heart sounds. No murmur heard.  Pulmonary:      Effort: Pulmonary effort is normal.      Breath sounds: Normal breath sounds. No wheezing, rhonchi or rales.   Musculoskeletal:      Right lower leg: No edema.      Left lower leg: No edema.   Skin:     General: Skin is warm and dry.      Findings: No lesion or rash.   Neurological:      General: No focal deficit present.      Mental Status: She is alert and oriented to person, place, and time.   Psychiatric:         Mood and Affect: Affect normal. Mood is not anxious or depressed.         Vital Signs:   /45 (BP Location: Left arm, Patient Position: Sitting, Cuff Size: Large Adult)   Pulse 79   Temp 97.6 °F (36.4 °C) (Oral)   Resp 18   Ht 160 cm (63\")   Wt 123 kg (272 lb)   SpO2 93% Comment: RA  BMI 48.18 kg/m²        Result Review :   The following data was reviewed by: EDDIE Liao on 11/13/2024:  CMP          8/14/2024    12:32 8/19/2024    01:28 11/4/2024    10:20   CMP   Glucose 129  108  98    BUN 15  24  20    Creatinine 1.06  1.24  0.99    EGFR 56.6  46.9  61.1    Sodium 138  141  139    Potassium 4.3  3.8  4.7    Chloride 98  100  100    Calcium 9.8  9.7  9.5    Total Protein  7.8     Albumin  3.9     Globulin  3.9     Total Bilirubin  0.4     Alkaline Phosphatase  66     AST (SGOT)  45     ALT (SGPT)  35     Albumin/Globulin Ratio  1.0     BUN/Creatinine Ratio " 14.2  19.4  20.2    Anion Gap 14.0  13.7  14.0      CBC w/diff          5/23/2024    16:48 8/19/2024    01:27 11/4/2024    10:20   CBC w/Diff   WBC 11.37  14.40  9.74    RBC 5.42  5.29  5.47    Hemoglobin 15.7  15.6  16.0    Hematocrit 48.8  48.1  49.7    MCV 90.0  90.9  90.9    MCH 29.0  29.5  29.3    MCHC 32.2  32.4  32.2    RDW 14.4  14.8  14.5    Platelets 285  337  275    Neutrophil Rel % 58.2  64.1     Immature Granulocyte Rel % 0.5  0.6     Lymphocyte Rel % 30.3  25.2     Monocyte Rel % 7.0  8.2     Eosinophil Rel % 3.3  1.4     Basophil Rel % 0.7  0.5       Data reviewed : Radiologic studies chest CT 1/26/2024, chest x-ray 8/14/2024, pulmonary function test 3/11/2022 and Dr. Cuevas last office note    Procedures        Assessment and Plan    Diagnoses and all orders for this visit:    1. Mixed simple and mucopurulent chronic bronchitis (Primary)  Comments:  Continue Stiolto    2. PENNY (obstructive sleep apnea)  Comments:  Continue CPAP    3. SOB (shortness of breath)  Comments:  Continue albuterol as needed    4. Class 3 severe obesity with serious comorbidity and body mass index (BMI) of 45.0 to 49.9 in adult, unspecified obesity type  Comments:  Encourage increased activity          Follow Up   Return in about 6 months (around 5/13/2025) for Recheck with Rebecca.  Patient was given instructions and counseling regarding her condition or for health maintenance advice. Please see specific information pulled into the AVS if appropriate.

## 2024-11-21 ENCOUNTER — HOSPITAL ENCOUNTER (OUTPATIENT)
Dept: MAMMOGRAPHY | Facility: HOSPITAL | Age: 69
Discharge: HOME | End: 2024-11-21
Admitting: INTERNAL MEDICINE
Payer: MEDICARE

## 2024-11-21 DIAGNOSIS — Z12.31 SCREENING MAMMOGRAM, ENCOUNTER FOR: ICD-10-CM

## 2024-11-21 PROCEDURE — 77063 BREAST TOMOSYNTHESIS BI: CPT

## 2024-11-21 PROCEDURE — 77067 SCR MAMMO BI INCL CAD: CPT

## 2024-11-27 DIAGNOSIS — R06.09 DOE (DYSPNEA ON EXERTION): ICD-10-CM

## 2024-11-27 RX ORDER — TIOTROPIUM BROMIDE AND OLODATEROL 3.124; 2.736 UG/1; UG/1
SPRAY, METERED RESPIRATORY (INHALATION)
Qty: 4 G | Refills: 1 | Status: SHIPPED | OUTPATIENT
Start: 2024-11-27

## 2024-12-03 ENCOUNTER — OFFICE VISIT (OUTPATIENT)
Dept: CARDIOLOGY | Facility: CLINIC | Age: 69
End: 2024-12-03
Payer: MEDICARE

## 2024-12-03 VITALS
HEIGHT: 63 IN | DIASTOLIC BLOOD PRESSURE: 80 MMHG | BODY MASS INDEX: 48.87 KG/M2 | HEART RATE: 112 BPM | WEIGHT: 275.8 LBS | SYSTOLIC BLOOD PRESSURE: 157 MMHG

## 2024-12-03 DIAGNOSIS — I10 ESSENTIAL HYPERTENSION: Primary | ICD-10-CM

## 2024-12-03 DIAGNOSIS — I48.92 ATRIAL FLUTTER WITH CONTROLLED RESPONSE: ICD-10-CM

## 2024-12-03 DIAGNOSIS — G47.33 OSA (OBSTRUCTIVE SLEEP APNEA): ICD-10-CM

## 2024-12-03 DIAGNOSIS — I50.32 CHRONIC DIASTOLIC (CONGESTIVE) HEART FAILURE: ICD-10-CM

## 2024-12-03 NOTE — TELEPHONE ENCOUNTER
Patient takes 10mg Jardiance daily. 10 Mg samples were not available. Per Dr. Perez give patient 25mg and for patient to take 1/2 tablet daily. Made patient aware of these directions.    Patient takes 5mg twice daily of Eliquis. 5mg samples were not available. Per Dr. Perez give patient 2.5mg and for patient to take 2 tablets twice daily. Made patient aware of these directions.

## 2024-12-03 NOTE — PROGRESS NOTES
Interventional Cardiology Patient Visit Follow Up Note      History of Presenting Illness:  History of Present Illness  In brief, she has a history of heart failure with preserved ejection fraction, atrial flutter on AC, COPD, PENNY on CPAP, HTN, HLD.      Patient underwent successful typical atrial flutter ablation on 11/5/2024 and presents today for follow-up.  She has a video visit appointment with EP team tomorrow.    She has been experiencing frequent palpitations, since her ablation procedure which she was told that is expected a within 3 months of procedure.  On occasions, she notices her heart rate to be ranging from 150s to 180s causing her to feel worn out.  She reports that this generally happens when she is trying to get ready to go out.  None of these episodes have led to fainting or near fainting experience.  Due to palpitations she has limited her physical activities and order to prevent any kind of exacerbations.  During these episodes, she take deep breaths and sit down.  She is reluctant to try an additional Toprol-XL dose to prevent her heart rate from dropping too low.    Despite her condition, she reports that her breathing has improved. She has been cautious not to overexert herself, taking breaks when necessary. Her blood pressure was well-controlled.  She is able to manage her body weight by taking diuretics and increasing dose when necessary.  She has been taking spironolactone, Jardiance, Eliquis, and Bumex as needed. She also takes potassium and magnesium supplements daily.  . She has been using a CPAP machine for sleep apnea for several years.    Past Medical History  Past Medical History:   Diagnosis Date    Abnormal ECG     Arrhythmia     Atrial fibrillation     Colon polyp ?    Found during initial colonoscopy    COPD (chronic obstructive pulmonary disease) 2023    Depressed     Elevated cholesterol     GREGG (generalized anxiety disorder)     Gastroesophageal reflux     HTN  (hypertension)     Hyperlipidemia     Hypothyroid     Irregular heartbeat     Lesion of neck     Osteoarthritis     Pneumonia 1960    RLS (restless legs syndrome)     Sleep apnea in adult     Sleep apnea, obstructive 2005 ?    Type 2 diabetes mellitus          Current Outpatient Medications:     Accu-Chek Softclix Lancets lancets, 1 each by Other route 3 times a day. Use as instructed, Disp: 200 each, Rfl: 3    albuterol sulfate HFA (Ventolin HFA) 108 (90 Base) MCG/ACT inhaler, Inhale 2 puffs Every 4 (Four) Hours As Needed for Wheezing or Shortness of Air., Disp: 1 g, Rfl: 5    Alcohol Swabs (B-D SINGLE USE SWABS REGULAR) pads, Apply 1 Application topically to the appropriate area as directed 5 (Five) Times a Day., Disp: 200 each, Rfl: 3    apixaban (Eliquis) 5 MG tablet tablet, Take 1 tablet by mouth Every 12 (Twelve) Hours. Lot: [id number] Exp: 9/25 4 boxes given, Disp: 56 tablet, Rfl: 0    atorvastatin (LIPITOR) 40 MG tablet, Take 1 tablet by mouth Every Night., Disp: 90 tablet, Rfl: 3    BIOTIN PO, Take  by mouth Every Night., Disp: , Rfl:     Blood Glucose Monitoring Suppl (Accu-Chek Guide Me) w/Device kit, Inject 1 kit under the skin into the appropriate area as directed See Admin Instructions., Disp: 1 kit, Rfl: 0    bumetanide (BUMEX) 2 MG tablet, Take 1 tablet by mouth 2 (Two) Times a Day As Needed (When overnight 2 lb weight gain is noted or 4-5 pounds over 48 hours.)., Disp: 60 tablet, Rfl: 3    Calcium Carb-Cholecalciferol (CALCIUM 600 + D PO), Take 1 tablet by mouth After Dinner., Disp: , Rfl:     DULoxetine (CYMBALTA) 60 MG capsule, Take 2 capsules by mouth Daily. (Patient taking differently: Take 2 capsules by mouth Every Evening.), Disp: 180 capsule, Rfl: 3    empagliflozin (Jardiance) 10 MG tablet tablet, Take 1 tablet by mouth Daily. (Patient taking differently: Take 1 tablet by mouth Every Night.), Disp: 28 tablet, Rfl: 0    famotidine (Pepcid) 40 MG tablet, Take 1 tablet by mouth 2 (Two) Times a  Day., Disp: , Rfl:     gabapentin (NEURONTIN) 600 MG tablet, Take 1 tablet by mouth 3 (Three) Times a Day As Needed (pain)., Disp: 90 tablet, Rfl: 0    glucose blood (Accu-Chek Guide) test strip, 1 each by Other route Daily. Use as instructed, Disp: 100 each, Rfl: 3    magnesium chloride ER 64 MG DR tablet, Take 1 tablet by mouth Daily., Disp: , Rfl:     metFORMIN (GLUCOPHAGE) 500 MG tablet, Take 1 tablet by mouth Daily With Dinner., Disp: 90 tablet, Rfl: 0    metoprolol succinate XL (TOPROL-XL) 25 MG 24 hr tablet, Take 1 tablet by mouth Daily., Disp: 30 tablet, Rfl: 11    minocycline (MINOCIN,DYNACIN) 100 MG capsule, Take 1 capsule by mouth Daily. (Patient taking differently: Take 1 capsule by mouth Every Night.), Disp: 90 capsule, Rfl: 3    multivitamin with minerals tablet tablet, Take 1 tablet by mouth Every Night. 1 GUMMIE AT NIGHT, Disp: , Rfl:     Omega-3 Fatty Acids (fish oil) 1000 MG capsule capsule, Take  by mouth Daily With Dinner., Disp: , Rfl:     Potassium Gluconate 550 MG tablet, Take  by mouth Every Evening., Disp: , Rfl:     rOPINIRole (REQUIP) 2 MG tablet, Take 1 tablet by mouth Every Night., Disp: 90 tablet, Rfl: 3    spironolactone (ALDACTONE) 25 MG tablet, Take 1 tablet by mouth Daily., Disp: 90 tablet, Rfl: 3    Stiolto Respimat 2.5-2.5 MCG/ACT aerosol solution inhaler, INHALE TWO PUFFS BY MOUTH EVERY DAY, Disp: 4 g, Rfl: 1    Synthroid 50 MCG tablet, TAKE ONE TABLET BY MOUTH EVERY DAY, Disp: 90 tablet, Rfl: 1    Zinc 50 MG tablet, Take 1 tablet by mouth Every Night. CAPSULE, NOT TAB (WITH D3), Disp: , Rfl:     apixaban (ELIQUIS) 5 MG tablet tablet, Take 0.5 tablets by mouth 4 (Four) Times a Day., Disp: 56 tablet, Rfl: 0    empagliflozin (JARDIANCE) 10 MG tablet tablet, Take 1 tablet by mouth Daily. 1 box of Jardiance 25mg Patient is to take 1/2 tablet daily- per Dr. Perez Lot: [id number] Exp: 08/26, Disp: 14 tablet, Rfl: 0  Current outpatient and discharge medications have been reconciled for the  "patient.  Reviewed by: Mahad Perez MD     There are no discontinued medications.    Allergies   Allergen Reactions    Nexium [Esomeprazole] Hives    Prilosec [Omeprazole] Hives        Social History     Tobacco Use    Smoking status: Former     Current packs/day: 0.00     Average packs/day: 0.5 packs/day for 51.0 years (25.5 ttl pk-yrs)     Types: Cigarettes     Start date: 1969     Quit date:      Years since quittin.9     Passive exposure: Past    Smokeless tobacco: Never    Tobacco comments:     Was off and on over the 40 years   Vaping Use    Vaping status: Never Used   Substance Use Topics    Alcohol use: Yes     Comment: Socially on occasion    Drug use: Never       Family History   Problem Relation Age of Onset    Arthritis Mother     Lung cancer Father     Heart disease Father         Had pacemaker    Colon cancer Father     Cancer Father         Lung, colon    Emphysema Father     Cancer Brother         Lung, colon, and pancreas    Cancer Maternal Aunt         Breast cancer    Arthritis Maternal Aunt     Arthritis Maternal Aunt     Hearing loss Maternal Aunt     Cancer Maternal Aunt           Objective   /80 (BP Location: Right arm, Patient Position: Sitting, Cuff Size: Large Adult)   Pulse 112   Ht 160 cm (63\")   Wt 125 kg (275 lb 12.8 oz)   BMI 48.86 kg/m²     Wt Readings from Last 3 Encounters:   24 125 kg (275 lb 12.8 oz)   24 123 kg (272 lb)   24 125 kg (275 lb 12.7 oz)     BP Readings from Last 3 Encounters:   24 157/80   24 110/45   24 113/67       Physical Exam  Constitutional:  Awake. Not in acute distress. Normal appearance.   Neck: No carotid bruit, hepatojugular reflux or JVD.   Cardiovascular:      Rate and Rhythm: Normal rate and regular rhythm.      Chest Wall: PMI is not displaced.      Heart sounds: Normal heart sounds, S1 normal and S2 normal. No murmur heard.       No friction rub. No gallop. No S3 or S4 sounds.    Pulmonary: " "Pulmonary effort is normal. Normal breath sounds. No wheezing, rhonchi or rales.   Extremities: No Bilateral edema is noted.   Skin: Warm and dry. Non cyanotic, No petechiae or rash.   Neurological: Alert and oriented x 3  Psychiatric:  Behavior is cooperative.       Result Review :   The following data was reviewed by Mahad Perez MD on 12/03/2024   proBNP   Date Value Ref Range Status   08/19/2024 455.6 0.0 - 900.0 pg/mL Final     CMP          8/14/2024    12:32 8/19/2024    01:28 11/4/2024    10:20   CMP   Glucose 129  108  98    BUN 15  24  20    Creatinine 1.06  1.24  0.99    EGFR 56.6  46.9  61.1    Sodium 138  141  139    Potassium 4.3  3.8  4.7    Chloride 98  100  100    Calcium 9.8  9.7  9.5    Total Protein  7.8     Albumin  3.9     Globulin  3.9     Total Bilirubin  0.4     Alkaline Phosphatase  66     AST (SGOT)  45     ALT (SGPT)  35     Albumin/Globulin Ratio  1.0     BUN/Creatinine Ratio 14.2  19.4  20.2    Anion Gap 14.0  13.7  14.0      CBC w/diff          5/13/2024    10:14 5/23/2024    16:48 8/19/2024    01:27   CBC w/Diff   WBC 9.45  11.37  14.40    RBC 5.28  5.42  5.29    Hemoglobin 15.4  15.7  15.6    Hematocrit 46.8  48.8  48.1    MCV 88.6  90.0  90.9    MCH 29.2  29.0  29.5    MCHC 32.9  32.2  32.4    RDW 13.8  14.4  14.8    Platelets 249  285  337    Neutrophil Rel % 43.7  58.2  64.1    Immature Granulocyte Rel % 0.4  0.5  0.6    Lymphocyte Rel % 42.0  30.3  25.2    Monocyte Rel % 7.9  7.0  8.2    Eosinophil Rel % 4.9  3.3  1.4    Basophil Rel % 1.1  0.7  0.5       Lab Results   Component Value Date    TSH 3.710 08/19/2024      Lab Results   Component Value Date    FREET4 1.10 08/19/2024      No results found for: \"DDIMERQUANT\"  Magnesium   Date Value Ref Range Status   08/19/2024 2.1 1.6 - 2.4 mg/dL Final      No results found for: \"DIGOXIN\"   Lab Results   Component Value Date    TROPONINT 20 (H) 08/19/2024      No results found for: \"POCTROP\"       A1C Last 3 Results          2/12/2024 "    10:13 5/13/2024    10:14   HGBA1C Last 3 Results   Hemoglobin A1C 6.30  6.50      Lipid Panel          2/12/2024    10:13 5/13/2024    10:14   Lipid Panel   Total Cholesterol 175  118    Triglycerides 191  156    HDL Cholesterol 38  38    VLDL Cholesterol 33  27    LDL Cholesterol  104  53    LDL/HDL Ratio 2.60  1.28      Results for orders placed during the hospital encounter of 08/16/24    Adult Transthoracic Echo Complete W/ Cont if Necessary Per Protocol    Interpretation Summary  Right ventricle is dilated whereas remaining chambers appear to be grossly normal in size  LV has normal wall thickness.  LV systolic function and wall motion is normal.  Calculated LVEF is 55 to 60%.  Diastolic function cannot be accurately assessed.  Paradoxical septal motion is noted due to bundle branch block. There is interventricular septal flattening noted predominantly in diastole suggestive of volume overload in the right ventricle  RV systolic function cannot be accurately assessed.  Valves are not well-visualized.  RVSP cannot be calculated due to insufficient TR jet.  IVC is dilated corresponding to a right atrial pressure of 8 to 10 mmHg  No significant pericardial effusion is noted    Compared to study from March 11, 2022.  LV systolic function remains the same.  RV dilatation is noted.  Septal flattening is noted as mentioned above.     Results for orders placed during the hospital encounter of 08/16/24    Adult Transthoracic Echo Complete W/ Cont if Necessary Per Protocol    Interpretation Summary  Right ventricle is dilated whereas remaining chambers appear to be grossly normal in size  LV has normal wall thickness.  LV systolic function and wall motion is normal.  Calculated LVEF is 55 to 60%.  Diastolic function cannot be accurately assessed.  Paradoxical septal motion is noted due to bundle branch block. There is interventricular septal flattening noted predominantly in diastole suggestive of volume overload in  the right ventricle  RV systolic function cannot be accurately assessed.  Valves are not well-visualized.  RVSP cannot be calculated due to insufficient TR jet.  IVC is dilated corresponding to a right atrial pressure of 8 to 10 mmHg  No significant pericardial effusion is noted    Compared to study from March 11, 2022.  LV systolic function remains the same.  RV dilatation is noted.  Septal flattening is noted as mentioned above.     No results found for this or any previous visit.     Results for orders placed during the hospital encounter of 07/20/22    Stress Test With Myocardial Perfusion Two Day    Interpretation Summary  Patient received Lexiscan 0.4 mg IV fusion over 10 seconds.  Baseline ECG showed normal sinus rhythm with right bundle branch block.  At peak stress, no ischemic ST-T changes were noted.  No significant arrhythmias were noted.  Gated and SPECT images were obtained.  Image quality is adequate.  Attenuation artifact is present.  There is a medium area of mild to moderate perfusion defect in the distal anterior and apical segments with partial reversibility on resting images which could represent myocardial ischemia in the right clinical setting.  The left ventricle was normal in size with a calculated ejection fraction exceeding 70%.  Overall, this represents an intermediate myocardial perfusion scan.       The ASCVD Risk score ([place] DK, et al., 2019) failed to calculate for the following reasons:    The valid total cholesterol range is 130 to 320 mg/dL          No diagnosis found.    There are no diagnoses linked to this encounter.          Assessment & Plan  1. Status post successful atrial fibrillation ablation via pulmonary vein isolation and posterior wall isolation  Status post successful atrial flutter ablation by doing CTI ablation    She has been experiencing palpitations, since her procedure.  This is an expected outcome.  The frequency and duration of episodes is not as it was  prior to the procedure.  Patient is in a postprocedure inflammatory phase which is anticipated to subside in 3 months of procedure.  Patient was advised to continue metoprolol and anticoagulation with Eliquis 5 mg p.o. twice daily.  She is currently taking Toprol-XL 25 mg p.o. daily.  She was advised to take an additional dose during palpitations.  And can even continue with Toprol-XL 50 mg p.o. daily.  If in February patient continues to have similar symptoms we will consider rhythm monitor for further evaluation of palpitations.    Eliquis samples were provided in the clinic today.    2. Hypertension.  Her blood pressure is well-controlled. She should continue her current medication regimen and monitor her blood pressure at home.    3.  Heart failure with preserved ejection fraction, ACC class III NYHA class II above  Patient is doing great guided diuresis.  She should continue taking Bumex as needed and monitor her leg swelling.  Continue Jardiance 10 mg p.o. daily.  Samples of Jardiance 25 mg p.o. were provided today.  Patient was instructed to take half pill.  Continue spironolactone 25 mg p.o. daily.    Patient has been tolerating these medications without any significant side effects.    4. Sleep Apnea.  She is using a CPAP machine, which is helping her condition. She should continue using the CPAP machine as recommended.        Follow-up in 8 weeks postatrial flutter ablation procedure.    Follow Up     Return in about 4 months (around 4/3/2025) for With Dr. Perez.      Mahad Perez MD  Interventional Cardiology  12/03/2024  13:46 EST      Patient was given instructions and counseling regarding her condition or for health maintenance advice. Please see specific information pulled into the AVS if appropriate.     Please note that portions of this document were completed using a voice recognition program.     Patient or patient representative verbalized consent for the use of Ambient Listening during the  visit with  Mahad Perez MD for chart documentation. 12/3/2024  11:39 EDT

## 2024-12-04 ENCOUNTER — TELEMEDICINE (OUTPATIENT)
Dept: CARDIOLOGY | Facility: HOSPITAL | Age: 69
End: 2024-12-04
Payer: MEDICARE

## 2024-12-04 VITALS — SYSTOLIC BLOOD PRESSURE: 118 MMHG | HEART RATE: 69 BPM | DIASTOLIC BLOOD PRESSURE: 75 MMHG

## 2024-12-04 DIAGNOSIS — I10 ESSENTIAL HYPERTENSION: ICD-10-CM

## 2024-12-04 DIAGNOSIS — I48.91 ATRIAL FIBRILLATION, UNSPECIFIED TYPE: Primary | ICD-10-CM

## 2024-12-04 DIAGNOSIS — I48.92 ATRIAL FLUTTER WITH CONTROLLED RESPONSE: ICD-10-CM

## 2024-12-04 RX ORDER — METOPROLOL SUCCINATE 25 MG/1
50 TABLET, EXTENDED RELEASE ORAL DAILY
Qty: 60 TABLET | Refills: 11 | Status: SHIPPED | OUTPATIENT
Start: 2024-12-04

## 2024-12-04 NOTE — PROGRESS NOTES
Mode of visit: Video  Location of patient:[facility]   Location of provider: [place]  You have chosen to receive care through a telehealth visit.   Do you consent to the use video/audio connection for your medical care today?: Yes  This visit included audio and video interaction. Converted to telephone call due to audio issues after initial introduction.      Chief Complaint  Atrial Fibrillation (Ablation follow-up)    [facility]  Telemedicine note    This was a video enabled telemedicine encounter.    Subjective    History of Present Illness {CC  Problem List  Visit  Diagnosis   Encounters  Notes  Medications  Labs  Result Review Imaging  Media :23}     Randi Fajardo, 69 y.o. female presents to [facility] [place] [facility] telemedicine visit for Atrial Fibrillation (Ablation follow-up)    Problem List  Atrial flutter  [id number] (age, sex, HTN, DM), anticoagulated on Eliqui  Diagnosed in 8/2024  Monitor, 8/30/2024: Predominant sinus rhythm minimum 49 bpm, max 113 bpm, average 78 bpm, frequent PACs 15% burden  Successful AF ablation with PV isolation, posterior wall isolation and CTI ablation by Dr. James on 11/4/24.  Diastolic heart failure  Echo, 8/16/24: EF 55-60% paradoxical septal motion noted due to bundle branch block, is not well-visualized  Coronary artery disease  MPS, 7/20/2022: medium area of mild to moderate perfusion defect in the distal anterior and apical segments with partial reversibility on resting images which could represent myocardial ischemia in the right clinical setting, EF 70%  Hypertension  Hyperlipidemia  Diabetes  Hypothyroid  COPD  PENNY on CPAP  Osteoarthritis  Restless leg syndrome  Anxiety/depression      History of Present Illness:  Patient recently underwent successful AF ablation with PV isolation, posterior wall isolation and CTI ablation by Dr. James on 11/4/24. Patient was instructed to continue uninterrupted  anticoagulation.      Patient presents today doing well since their procedure.  States improvement in arrhythmia symptoms, but intermittent recurrent episodes. Reports today she has had minimal AF symptoms. A few days AF symptoms multiple times per day. Denies access site complications, or signs/symptoms of bleeding. HR generally above 70, occasional increased heart rates above 100. SBP generally controlled on ambulatory monitoring. Denies chest pain, lightheadedness, near syncope/syncope.  They have continued uninterrupted anticoagulation as instructed. Compliant with CPAP use.       Objective     Vital Signs:   Vitals:    12/04/24 1200   BP: 118/75   Pulse: 69     There is no height or weight on file to calculate BMI.  Physical Exam  Vitals reviewed.   Constitutional:       Appearance: Normal appearance.   HENT:      Head: Normocephalic.   Pulmonary:      Effort: Pulmonary effort is normal. No respiratory distress.   Neurological:      Mental Status: She is alert and oriented to person, place, and time.   Psychiatric:         Mood and Affect: Mood normal.         Behavior: Behavior normal.         Thought Content: Thought content normal.        Data Reviewed:{ Labs  Result Review  Imaging  Med Tab  Media :23}     ECG 12 Lead Rhythm Change (11/04/2024 15:44)  EP/CRM Study (11/04/2024 14:19)  Holter Monitor - 72 Hour Up To 15 Days (08/30/2024 15:31)  Adult Transthoracic Echo Complete W/ Cont if Necessary Per Protocol (08/16/2024 16:01)  Basic Metabolic Panel (11/04/2024 10:20)  CBC (No Diff) (11/04/2024 10:20)  TSH (08/19/2024 01:28)  T4, Free (08/19/2024 01:28)      Assessment and Plan {CC Problem List  Visit Diagnosis  ROS  Review (Popup)  Health Maintenance  Quality  BestPractice  Medications  SmartSets  SnapShot Encounters  Media :23}     This visit has been scheduled as a video visit.     1. Atrial fibrillation  -s/p successful AF ablation with PV isolation, posterior wall isolation and CTI  ablation by Dr. James on 11/4/24.   -Overall improvement in arrhythmia symptoms, but with intermittent recurrent episodes.   -[id number]: 5 (age, female, DM, HTN, HF)  -Continue anticoagulation with apixaban 5mg every 12h  -Increase Toprol-XL to 50mg daily, monitor heart rates with increased dose  -Discussed AF ablation expectations in regard to 3 month blanking period, but will increase beta-blocker to help improve symptoms further in interim.   -Contact Dr. James or AF clinic if symptoms worsen or needs arise in interim  -Continue EP follow-up as scheduled    2. Essential hypertension  -Controlled on home monitoring  -Continue current regimen of spironolactone 25mg daily, Toprol-XL increase as above  -Continue routine BP/heart rate monitoring    3. PENNY  -Compliant with CPAP use, discussed importance      Follow Up {Instructions Charge Capture  Follow-up Communications :23}   Return if symptoms worsen or fail to improve.      Patient was given instructions and counseling regarding her condition or for health maintenance advice. Please see specific information pulled into the AVS if appropriate. Patient was instructed to call the [facility] with any questions, concerns, or worsening symptoms.    *Please note that portions of this note were completed with a voice recognition program. Efforts were made to edit the dictations, but occasionally words are mistranscribed.

## 2024-12-05 ENCOUNTER — OFFICE VISIT (OUTPATIENT)
Dept: INTERNAL MEDICINE | Facility: CLINIC | Age: 69
End: 2024-12-05
Payer: MEDICARE

## 2024-12-05 VITALS
BODY MASS INDEX: 48.6 KG/M2 | OXYGEN SATURATION: 93 % | DIASTOLIC BLOOD PRESSURE: 68 MMHG | TEMPERATURE: 96.3 F | HEIGHT: 63 IN | WEIGHT: 274.3 LBS | SYSTOLIC BLOOD PRESSURE: 124 MMHG | HEART RATE: 85 BPM

## 2024-12-05 DIAGNOSIS — I48.0 PAROXYSMAL ATRIAL FIBRILLATION: ICD-10-CM

## 2024-12-05 DIAGNOSIS — E78.2 MIXED HYPERLIPIDEMIA: ICD-10-CM

## 2024-12-05 DIAGNOSIS — Z79.899 ENCOUNTER FOR LONG-TERM (CURRENT) USE OF MEDICATIONS: ICD-10-CM

## 2024-12-05 DIAGNOSIS — Z23 NEED FOR INFLUENZA VACCINATION: ICD-10-CM

## 2024-12-05 DIAGNOSIS — I50.32 CHRONIC DIASTOLIC (CONGESTIVE) HEART FAILURE: ICD-10-CM

## 2024-12-05 DIAGNOSIS — Z29.11 NEED FOR RSV VACCINATION: ICD-10-CM

## 2024-12-05 DIAGNOSIS — E11.9 TYPE 2 DIABETES MELLITUS WITHOUT COMPLICATION, WITHOUT LONG-TERM CURRENT USE OF INSULIN: Primary | ICD-10-CM

## 2024-12-05 DIAGNOSIS — Z23 NEED FOR COVID-19 VACCINE: ICD-10-CM

## 2024-12-05 DIAGNOSIS — E03.9 ACQUIRED HYPOTHYROIDISM: ICD-10-CM

## 2024-12-05 DIAGNOSIS — I10 ESSENTIAL HYPERTENSION: ICD-10-CM

## 2024-12-05 PROCEDURE — 90480 ADMN SARSCOV2 VAC 1/ONLY CMP: CPT | Performed by: INTERNAL MEDICINE

## 2024-12-05 PROCEDURE — 91320 SARSCV2 VAC 30MCG TRS-SUC IM: CPT | Performed by: INTERNAL MEDICINE

## 2024-12-05 PROCEDURE — G0008 ADMIN INFLUENZA VIRUS VAC: HCPCS | Performed by: INTERNAL MEDICINE

## 2024-12-05 PROCEDURE — 3044F HG A1C LEVEL LT 7.0%: CPT | Performed by: INTERNAL MEDICINE

## 2024-12-05 PROCEDURE — 1126F AMNT PAIN NOTED NONE PRSNT: CPT | Performed by: INTERNAL MEDICINE

## 2024-12-05 PROCEDURE — 90662 IIV NO PRSV INCREASED AG IM: CPT | Performed by: INTERNAL MEDICINE

## 2024-12-05 PROCEDURE — 3074F SYST BP LT 130 MM HG: CPT | Performed by: INTERNAL MEDICINE

## 2024-12-05 PROCEDURE — 3078F DIAST BP <80 MM HG: CPT | Performed by: INTERNAL MEDICINE

## 2024-12-05 PROCEDURE — 84443 ASSAY THYROID STIM HORMONE: CPT | Performed by: INTERNAL MEDICINE

## 2024-12-05 PROCEDURE — 99214 OFFICE O/P EST MOD 30 MIN: CPT | Performed by: INTERNAL MEDICINE

## 2024-12-05 PROCEDURE — 83036 HEMOGLOBIN GLYCOSYLATED A1C: CPT | Performed by: INTERNAL MEDICINE

## 2024-12-05 PROCEDURE — 80061 LIPID PANEL: CPT | Performed by: INTERNAL MEDICINE

## 2024-12-05 NOTE — PROGRESS NOTES
Chief Complaint  Hypertension (Follow up )    Subjective          Randi Fajardo presents to [facility]  History of Present Illness  DM2- patient reports home blood glucose range have been good with 74- 120. Patient denies hypoglycemic events.  Patient will be interested in GLP-1 to help with weight loss.  Patient reports previously difficult to get supplies and insurance coverage.  Hyperlipidemia- her LDL has improved.  Patient reports she is trying to exercise more often.   Hypothyroid- due for recheck  Patient reports gabapentin is helpful for chronic pain. Typically takes twice per day and rarely takes TID.       Current Outpatient Medications   Medication Instructions    Accu-Chek Softclix Lancets lancets 1 each, Other, 3 times daily, Use as instructed    albuterol sulfate HFA (Ventolin HFA) 108 (90 Base) MCG/ACT inhaler 2 puffs, Inhalation, Every 4 Hours PRN    Alcohol Swabs (B-D SINGLE USE SWABS REGULAR) pads 1 Application, Topical, 5 Times Daily    apixaban (ELIQUIS) 5 mg, Oral, Every 12 Hours Scheduled, Lot: [id number]  Exp: 9/25  4 boxes given    atorvastatin (LIPITOR) 40 mg, Oral, Nightly    BIOTIN PO Nightly    Blood Glucose Monitoring Suppl (Accu-Chek Guide Me) w/Device kit 1 kit, Subcutaneous, See Admin Instructions    bumetanide (BUMEX) 2 mg, Oral, 2 Times Daily PRN    Calcium Carb-Cholecalciferol (CALCIUM 600 + D PO) 1 tablet, After Dinner    DULoxetine (CYMBALTA) 120 mg, Oral, Daily    empagliflozin (JARDIANCE) 25 mg, Oral, Daily    famotidine (PEPCID) 40 mg, 2 Times Daily    gabapentin (NEURONTIN) 600 mg, Oral, 3 Times Daily PRN    glucose blood (Accu-Chek Guide) test strip 1 each, Other, Daily, Use as instructed    magnesium chloride ER 64 mg, Daily    metFORMIN (GLUCOPHAGE) 500 mg, Oral, Daily With Dinner    metoprolol succinate XL (TOPROL-XL) 50 mg, Oral, Daily    minocycline (MINOCIN,DYNACIN) 100 mg, Oral, Daily    multivitamin with minerals  "tablet tablet 1 tablet, Nightly    Omega-3 Fatty Acids (fish oil) 1000 MG capsule capsule Daily With Dinner    Potassium Gluconate 550 MG tablet Every Evening    rOPINIRole (REQUIP) 2 mg, Oral, Nightly    Semaglutide(0.25 or 0.5MG/DOS) (OZEMPIC) 0.25 mg, Subcutaneous, Weekly    spironolactone (ALDACTONE) 25 mg, Oral, Daily    Stiolto Respimat 2.5-2.5 MCG/ACT aerosol solution inhaler INHALE TWO PUFFS BY MOUTH EVERY DAY    Synthroid 50 mcg, Oral, Daily    Zinc 50 MG tablet 1 tablet, Nightly       The following portions of the patient's history were reviewed and updated as appropriate: allergies, current medications, past family history, past medical history, past social history, past surgical history, and problem list.    Objective   Vital Signs:   /68 (BP Location: Left arm, Patient Position: Sitting, Cuff Size: Adult)   Pulse 85   Temp 96.3 °F (35.7 °C) (Temporal)   Ht 160 cm (63\")   Wt 124 kg (274 lb 4.8 oz)   SpO2 93%   BMI 48.59 kg/m²     BP Readings from Last 3 Encounters:   12/05/24 124/68   12/04/24 118/75   12/03/24 157/80     Wt Readings from Last 3 Encounters:   12/05/24 124 kg (274 lb 4.8 oz)   12/03/24 125 kg (275 lb 12.8 oz)   11/13/24 123 kg (272 lb)         Physical Exam   Appearance: No acute distress, well-nourished  Head: normocephalic, atraumatic  Eyes: extraocular movements intact, no scleral icterus, no conjunctival injection  Ears, Nose, and Throat: external ears normal, nares patent, moist mucous membranes  Cardiovascular: regular rate and rhythm. no murmurs, rubs, or gallops. no edema  Respiratory: breathing comfortably, symmetric chest rise, clear to auscultation bilaterally. No wheezes, rales, or rhonchi.  Neuro: alert and oriented to time, place, and person. Normal gait  Psych: normal mood and affect     Result Review :   The following data was reviewed by: Mainor Sanders Jr, MD on 05/14/2024:  Common labs          8/14/2024    12:32 8/19/2024    01:27 8/19/2024    " 01:28 11/4/2024    10:20   Common Labs   Glucose 129   108  98    BUN 15   24  20    Creatinine 1.06   1.24  0.99    Sodium 138   141  139    Potassium 4.3   3.8  4.7    Chloride 98   100  100    Calcium 9.8   9.7  9.5    Albumin   3.9     Total Bilirubin   0.4     Alkaline Phosphatase   66     AST (SGOT)   45     ALT (SGPT)   35     WBC  14.40   9.74    Hemoglobin  15.6   16.0    Hematocrit  48.1   49.7    Platelets  337   275        Lab Results   Component Value Date    SARSANTIGEN Not Detected 01/10/2024    COVID19 Not Detected 01/10/2024    FLUAAG Not Detected 01/10/2024    FLUBAG Not Detected 01/10/2024    RAPSCRN Negative 12/12/2023       Last Urine Toxicity  More data exists         Latest Ref Rng & Units 5/14/2024 4/25/2024   LAST URINE TOXICITY RESULTS   Creatinine, Urine mg/dL 28.2  -   Amphetamine, Urine Qual Negative - Negative    Barbiturates Screen, Urine Negative - Negative    Benzodiazepine Screen, Urine Negative - Negative    Buprenorphine, Screen, Urine Negative - Negative    Cocaine Screen, Urine Negative - Negative    Methadone Screen , Urine Negative - Negative    Methamphetamine, Ur Negative - Negative       Details                   Assessment and Plan    Diagnoses and all orders for this visit:    1. Type 2 diabetes mellitus without complication, without long-term current use of insulin (Primary)  Comments:  labs today. inc jardiance to 25mg daily. also start ozempic to help with treatment and weight loss efforts.  Orders:  -     Hemoglobin A1c  -     empagliflozin (Jardiance) 25 MG tablet tablet; Take 1 tablet by mouth Daily.  Dispense: 90 tablet; Refill: 3  -     Ambulatory Referral to Social Care Services (Amb Case Mgmt)  -     Semaglutide,0.25 or 0.5MG/DOS, (OZEMPIC) 2 MG/1.5ML solution pen-injector; Inject 0.25 mg under the skin into the appropriate area as directed 1 (One) Time Per Week.  Dispense: 2 mL; Refill: 0    2. Need for influenza vaccination  -     Fluzone High-Dose  65+yrs    3. Mixed hyperlipidemia  -     Lipid Panel  -     Ambulatory Referral to Social Care Services (Amb Case Mgmt)    4. Essential hypertension  Comments:  well controlled. goal BP <130/80  Orders:  -     Ambulatory Referral to Social Care Services (Amb Case Mgmt)    5. Need for COVID-19 vaccine  -     COVID-19 (Pfizer) 12yrs+ (COMIRNATY)    6. Paroxysmal atrial fibrillation  Comments:  s/p ablation. cont eliquis. rate controlled.  Orders:  -     Ambulatory Referral to Social Care Services (Amb Case Mgmt)  -     Semaglutide,0.25 or 0.5MG/DOS, (OZEMPIC) 2 MG/1.5ML solution pen-injector; Inject 0.25 mg under the skin into the appropriate area as directed 1 (One) Time Per Week.  Dispense: 2 mL; Refill: 0    7. Acquired hypothyroidism  -     Ambulatory Referral to Social Care Services (Amb Case Mgmt)  -     TSH Rfx On Abnormal To Free T4    8. Chronic diastolic (congestive) heart failure  -     Semaglutide,0.25 or 0.5MG/DOS, (OZEMPIC) 2 MG/1.5ML solution pen-injector; Inject 0.25 mg under the skin into the appropriate area as directed 1 (One) Time Per Week.  Dispense: 2 mL; Refill: 0    9. Need for RSV vaccination      Medications Discontinued During This Encounter   Medication Reason    apixaban (ELIQUIS) 5 MG tablet tablet *Therapy completed    empagliflozin (JARDIANCE) 10 MG tablet tablet Alternate therapy    empagliflozin (Jardiance) 10 MG tablet tablet         Follow Up   Return in about 6 months (around 6/5/2025) for DM, HTN.  Patient was given instructions and counseling regarding her condition or for health maintenance advice. Please see specific information pulled into the AVS if appropriate.       Mainor Sanders Jr, MD  12/05/24  10:22 EST

## 2024-12-06 ENCOUNTER — REFERRAL TRIAGE (OUTPATIENT)
Age: 69
End: 2024-12-06
Payer: MEDICARE

## 2024-12-06 LAB
CHOLEST SERPL-MCNC: 110 MG/DL (ref 0–200)
HBA1C MFR BLD: 6.5 % (ref 4.8–5.6)
HDLC SERPL-MCNC: 41 MG/DL (ref 40–60)
LDLC SERPL CALC-MCNC: 48 MG/DL (ref 0–100)
LDLC/HDLC SERPL: 1.14 {RATIO}
TRIGL SERPL-MCNC: 112 MG/DL (ref 0–150)
TSH SERPL DL<=0.05 MIU/L-ACNC: 2.57 UIU/ML (ref 0.27–4.2)
VLDLC SERPL-MCNC: 21 MG/DL (ref 5–40)

## 2024-12-09 ENCOUNTER — PATIENT OUTREACH (OUTPATIENT)
Age: 69
End: 2024-12-09
Payer: MEDICARE

## 2024-12-09 DIAGNOSIS — G62.9 NEUROPATHY: ICD-10-CM

## 2024-12-09 NOTE — TELEPHONE ENCOUNTER
Again denied chest pain.  He is taking ASA now. To continue. Plan for lexiscan when possible or at next visit in 3 months.   Request for Controlled Substance      Date of Request: 12/9/2024  Medication: Gabapentin  Last OV: 12/5/24  Next OV: 66/25  Last UDS: 12/5/24  Last Narcotic Consent: 11/14/23

## 2024-12-09 NOTE — OUTREACH NOTE
Social Work Assessment  Questions/Answers      Flowsheet Row Most Recent Value   Referral Source physician   Reason for Consult community resources, financial concerns, medication concerns   Preferred Language English   Advance Care Planning Reviewed questions answered   Decision Making Considerations patient/family ability to make health care decisions   People in Home alone   Current Living Arrangements home   In the past 12 months has the electric, gas, oil, or water company threatened to shut off services in your home? No   Primary Care Provided by self   Family Caregiver if Needed child(freeman), adult   Quality of Family Relationships helpful   Source of Income social security   Financial/Environmental Concerns unable to afford medication(s)   Application for Public Assistance obtained public assistance pending number   Do you want help finding or keeping work or a job? I do not need or want help          SDOH updated and reviewed with the patient during this program:  --     Disabilities: Not At Risk (11/4/2024)    Disabilities     Concentrating, Remembering, or Making Decisions Difficulty: no     Doing Errands Independently Difficulty: no      --     Employment: Not At Risk (12/9/2024)    Employment     Do you want help finding or keeping work or a job?: I do not need or want help      Financial Resource Strain: Medium Risk (12/9/2024)    Overall Financial Resource Strain (CARDIA)     Difficulty of Paying Living Expenses: Somewhat hard      --     Food Insecurity: No Food Insecurity (12/9/2024)    Hunger Vital Sign     Worried About Running Out of Food in the Last Year: Never true     Ran Out of Food in the Last Year: Never true      --     Housing Stability: Unknown (12/9/2024)    Housing Stability     Current Living Arrangements: home      --     Transportation Needs: No Transportation Needs (12/9/2024)    PRAPARE - Transportation     Lack of Transportation (Medical): No     Lack of Transportation  (Non-Medical): No      --     Utilities: Not At Risk (12/9/2024)    [facility] Utilities     Threatened with loss of utilities: No      Continuing Care   Community & DME   [facility] [place] [facility]    [address], [place] [address]    Phone: [phone]    Request Status: Pending - No Request Sent    Services: Financial Assistance    Resource for: Financial Resource Strain, Utilities     Patient Outreach    MSW spoke with patient who states that her main need is med cost assistance. MSW sent patient resources for Eliquis, Jardiance, and Ozempic. Patient instructed on how to fill out online and PCP signatures needed. MSW to send via [employer]. MSW available should patient need any further resources.    Michelle NAVA -   Ambulatory Case Management    12/9/2024, 15:13 EST

## 2024-12-10 RX ORDER — GABAPENTIN 600 MG/1
600 TABLET ORAL 3 TIMES DAILY PRN
Qty: 90 TABLET | Refills: 0 | Status: SHIPPED | OUTPATIENT
Start: 2024-12-10

## 2025-01-10 RX ORDER — MINOCYCLINE HYDROCHLORIDE 100 MG/1
100 CAPSULE ORAL DAILY
Qty: 90 CAPSULE | Refills: 3 | Status: SHIPPED | OUTPATIENT
Start: 2025-01-10

## 2025-01-13 RX ORDER — ROPINIROLE 2 MG/1
2 TABLET, FILM COATED ORAL NIGHTLY
Qty: 90 TABLET | Refills: 3 | Status: SHIPPED | OUTPATIENT
Start: 2025-01-13

## 2025-01-19 DIAGNOSIS — G62.9 NEUROPATHY: ICD-10-CM

## 2025-01-20 RX ORDER — GABAPENTIN 600 MG/1
600 TABLET ORAL 3 TIMES DAILY PRN
Qty: 90 TABLET | Refills: 0 | Status: SHIPPED | OUTPATIENT
Start: 2025-01-20

## 2025-01-20 NOTE — TELEPHONE ENCOUNTER
Request for Controlled Substance      Date of Request: 1/20/2025  Medication: Gabapentin  Last OV: 12/5/24  Next OV: 6/6/25  Last UDS: 12/5/24  Last Narcotic Consent: 11/14/23

## 2025-01-24 DIAGNOSIS — F32.4 MAJOR DEPRESSIVE DISORDER WITH SINGLE EPISODE, IN PARTIAL REMISSION: ICD-10-CM

## 2025-01-24 RX ORDER — DULOXETIN HYDROCHLORIDE 60 MG/1
120 CAPSULE, DELAYED RELEASE ORAL DAILY
Qty: 180 CAPSULE | Refills: 3 | Status: SHIPPED | OUTPATIENT
Start: 2025-01-24

## 2025-02-02 DIAGNOSIS — E11.9 TYPE 2 DIABETES MELLITUS WITHOUT COMPLICATION, WITHOUT LONG-TERM CURRENT USE OF INSULIN: ICD-10-CM

## 2025-02-03 RX ORDER — LEVOTHYROXINE SODIUM 50 UG/1
50 TABLET ORAL DAILY
Qty: 90 TABLET | Refills: 1 | Status: SHIPPED | OUTPATIENT
Start: 2025-02-03

## 2025-02-03 RX ORDER — FAMOTIDINE 40 MG/1
40 TABLET, FILM COATED ORAL 2 TIMES DAILY
Qty: 90 TABLET | Refills: 2 | Status: SHIPPED | OUTPATIENT
Start: 2025-02-03

## 2025-02-04 DIAGNOSIS — I50.32 CHRONIC DIASTOLIC (CONGESTIVE) HEART FAILURE: ICD-10-CM

## 2025-02-04 RX ORDER — BUMETANIDE 2 MG/1
TABLET ORAL
Qty: 180 TABLET | Refills: 1 | Status: SHIPPED | OUTPATIENT
Start: 2025-02-04

## 2025-02-05 DIAGNOSIS — G62.9 NEUROPATHY: ICD-10-CM

## 2025-02-05 RX ORDER — GABAPENTIN 600 MG/1
600 TABLET ORAL 3 TIMES DAILY PRN
Qty: 90 TABLET | Refills: 0 | Status: SHIPPED | OUTPATIENT
Start: 2025-02-05

## 2025-02-05 NOTE — TELEPHONE ENCOUNTER
Refill Request for Controlled Substance    Date of Request: 2/5/2025  Medication Requested: Gabapentin   Last UDS: 12/05/2024  Last Consent: 11/23/2023  Last OV: 12/05/2024  Next OV: 06/06/2025

## 2025-02-05 NOTE — TELEPHONE ENCOUNTER
Caller: Randi Fajardo    Relationship: Self    Best call back number: [phone]     Requested Prescriptions:   Requested Prescriptions     Pending Prescriptions Disp Refills    gabapentin (NEURONTIN) 600 MG tablet 90 tablet 0     Sig: Take 1 tablet by mouth 3 (Three) Times a Day As Needed (pain).        Pharmacy where request should be sent: [facility]ACY #[id number] - [place], [place] - [address] [facility] - [phone] [facility] [phone] [place]     Last office visit with prescribing clinician: 12/5/2024   Last telemedicine visit with prescribing clinician: Visit date not found   Next office visit with prescribing clinician: 6/6/2025     Additional details provided by patient: PATIENT STATES THAT THIS PRESCRIPTION WAS SENT TO [facility] BUT THEY CANCELLED THE ORDER AND SAYS THERE ARE NO REFILLS    PATIENT STATES THAT SHE WOULD LIKE THE PRESCRIPTION TO BE RESENT TO THE PHARMACY LISTED BECAUSE SHE IS COMPLETELY OUT    Does the patient have less than a 3 day supply:  [x] Yes  [] No    Would you like a call back once the refill request has been completed: [] Yes [] No    If the office needs to give you a call back, can they leave a voicemail: [] Yes [] No    Sina Lemon Rep   02/05/25 12:24 EST

## 2025-02-11 ENCOUNTER — TELEPHONE (OUTPATIENT)
Dept: CARDIOLOGY | Facility: CLINIC | Age: 70
End: 2025-02-11
Payer: MEDICARE

## 2025-02-11 DIAGNOSIS — I48.92 ATRIAL FLUTTER WITH CONTROLLED RESPONSE: ICD-10-CM

## 2025-02-11 DIAGNOSIS — E11.9 TYPE 2 DIABETES MELLITUS WITHOUT COMPLICATION, WITHOUT LONG-TERM CURRENT USE OF INSULIN: ICD-10-CM

## 2025-02-11 NOTE — TELEPHONE ENCOUNTER
SW patient. Patient states that she has been having afib episodes. Patient states last week she had 2 episodes. Patient states sometimes will last couple hours. Hr will get up to 160-170s when has episode.   Patient is compliant with Toprol 50 mg daily

## 2025-02-11 NOTE — TELEPHONE ENCOUNTER
Received VM from patient c/o heart racing. Patient also requesting samples of Jardiance and Eliquis.     Attempted to call patient. No answer. Unable to leave VM due to mailbox full

## 2025-02-21 DIAGNOSIS — R06.09 DOE (DYSPNEA ON EXERTION): ICD-10-CM

## 2025-02-21 RX ORDER — TIOTROPIUM BROMIDE AND OLODATEROL 3.124; 2.736 UG/1; UG/1
SPRAY, METERED RESPIRATORY (INHALATION)
Qty: 4 G | Refills: 1 | Status: SHIPPED | OUTPATIENT
Start: 2025-02-21

## 2025-02-25 ENCOUNTER — OFFICE VISIT (OUTPATIENT)
Dept: INTERNAL MEDICINE | Facility: CLINIC | Age: 70
End: 2025-02-25
Payer: MEDICARE

## 2025-02-25 ENCOUNTER — TELEPHONE (OUTPATIENT)
Dept: INTERNAL MEDICINE | Facility: CLINIC | Age: 70
End: 2025-02-25

## 2025-02-25 VITALS
OXYGEN SATURATION: 93 % | BODY MASS INDEX: 48.73 KG/M2 | TEMPERATURE: 96.6 F | SYSTOLIC BLOOD PRESSURE: 117 MMHG | HEIGHT: 63 IN | DIASTOLIC BLOOD PRESSURE: 75 MMHG | WEIGHT: 275 LBS | HEART RATE: 68 BPM

## 2025-02-25 DIAGNOSIS — R07.9 CHEST PAIN, UNSPECIFIED TYPE: Primary | ICD-10-CM

## 2025-02-25 DIAGNOSIS — R06.02 SHORTNESS OF BREATH: ICD-10-CM

## 2025-02-25 DIAGNOSIS — R09.02 HYPOXIA: ICD-10-CM

## 2025-02-25 LAB
BILIRUB BLD-MCNC: NEGATIVE MG/DL
CLARITY, POC: CLEAR
COLOR UR: YELLOW
EXPIRATION DATE: 0
EXPIRATION DATE: NORMAL
FLUAV AG UPPER RESP QL IA.RAPID: NOT DETECTED
FLUBV AG UPPER RESP QL IA.RAPID: NOT DETECTED
GLUCOSE UR STRIP-MCNC: NEGATIVE MG/DL
HGB BLDA-MCNC: 14.9 G/DL (ref 12–17)
INTERNAL CONTROL: NORMAL
INTERNAL CONTROL: NORMAL
KETONES UR QL: NEGATIVE
LEUKOCYTE EST, POC: NEGATIVE
Lab: 0
Lab: NORMAL
NITRITE UR-MCNC: NEGATIVE MG/ML
PH UR: 5.5 [PH] (ref 5–8)
PROT UR STRIP-MCNC: NEGATIVE MG/DL
RBC # UR STRIP: NEGATIVE /UL
S PYO AG THROAT QL: NEGATIVE
SARS-COV-2 AG UPPER RESP QL IA.RAPID: NOT DETECTED
SARS-COV-2 RNA RESP QL NAA+PROBE: NOT DETECTED
SP GR UR: 1.01 (ref 1–1.03)
UROBILINOGEN UR QL: NORMAL

## 2025-02-25 PROCEDURE — 1126F AMNT PAIN NOTED NONE PRSNT: CPT | Performed by: INTERNAL MEDICINE

## 2025-02-25 PROCEDURE — 93000 ELECTROCARDIOGRAM COMPLETE: CPT | Performed by: INTERNAL MEDICINE

## 2025-02-25 PROCEDURE — 3074F SYST BP LT 130 MM HG: CPT | Performed by: INTERNAL MEDICINE

## 2025-02-25 PROCEDURE — 99214 OFFICE O/P EST MOD 30 MIN: CPT | Performed by: INTERNAL MEDICINE

## 2025-02-25 PROCEDURE — 87880 STREP A ASSAY W/OPTIC: CPT | Performed by: INTERNAL MEDICINE

## 2025-02-25 PROCEDURE — 81003 URINALYSIS AUTO W/O SCOPE: CPT | Performed by: INTERNAL MEDICINE

## 2025-02-25 PROCEDURE — 3078F DIAST BP <80 MM HG: CPT | Performed by: INTERNAL MEDICINE

## 2025-02-25 PROCEDURE — 85018 HEMOGLOBIN: CPT | Performed by: INTERNAL MEDICINE

## 2025-02-25 PROCEDURE — 87081 CULTURE SCREEN ONLY: CPT | Performed by: INTERNAL MEDICINE

## 2025-02-25 PROCEDURE — 87635 SARS-COV-2 COVID-19 AMP PRB: CPT | Performed by: INTERNAL MEDICINE

## 2025-02-25 PROCEDURE — 87428 SARSCOV & INF VIR A&B AG IA: CPT | Performed by: INTERNAL MEDICINE

## 2025-02-25 NOTE — TELEPHONE ENCOUNTER
Left voicemail for patient to return call.    Hub okay to read/advise:   Both, the covid/flu and the strep swabs, were negative and we are sending them to the lab for confirmation.

## 2025-02-25 NOTE — PROGRESS NOTES
Chief Complaint  Rapid Heart Rate (Last night for 4 plus hours /), Fatigue, Shortness of Breath (She stated that its hurts to breathe ), and Back Pain    Subjective          Randi Fajardo presents to [facility]  History of Present Illness  Patient reports having palpitations with  at home. Patient also reports fatigue and shortness of breath. Patient does report some lower O2 readings recently. Patient reports it hurts to take a deep breath as well, but no chest pain at rest. Patient reports having intermittent complaince with eliquis previously, but has been taking regularly in recent months..     Current Outpatient Medications   Medication Instructions    Accu-Chek Softclix Lancets lancets 1 each, Other, 3 times daily, Use as instructed    albuterol sulfate HFA (Ventolin HFA) 108 (90 Base) MCG/ACT inhaler 2 puffs, Inhalation, Every 4 Hours PRN    Alcohol Swabs (B-D SINGLE USE SWABS REGULAR) pads 1 Application, Topical, 5 Times Daily    apixaban (ELIQUIS) 5 mg, Oral, Every 12 Hours Scheduled, Lot: [id number]  Exp: 9/25  4 boxes given    atorvastatin (LIPITOR) 40 mg, Oral, Nightly    BIOTIN PO Nightly    Blood Glucose Monitoring Suppl (Accu-Chek Guide Me) w/Device kit 1 kit, Subcutaneous, See Admin Instructions    bumetanide (BUMEX) 2 MG tablet PLEASE SEE ATTACHED FOR DETAILED DIRECTIONS    Calcium Carb-Cholecalciferol (CALCIUM 600 + D PO) 1 tablet, After Dinner    DULoxetine (CYMBALTA) 120 mg, Oral, Daily    empagliflozin (JARDIANCE) 25 mg, Oral, Daily    famotidine (PEPCID) 40 mg, Oral, 2 Times Daily    gabapentin (NEURONTIN) 600 mg, Oral, 3 Times Daily PRN    glucose blood (Accu-Chek Guide) test strip 1 each, Other, Daily, Use as instructed    levothyroxine (SYNTHROID) 50 mcg, Oral, Daily    magnesium chloride ER 64 mg, Daily    metFORMIN (GLUCOPHAGE) 500 mg, Oral, Daily With Dinner    metoprolol succinate XL (TOPROL-XL) 50 mg, Oral, Daily    minocycline  "(MINOCIN,DYNACIN) 100 mg, Oral, Daily    multivitamin with minerals tablet tablet 1 tablet, Nightly    Omega-3 Fatty Acids (fish oil) 1000 MG capsule capsule Daily With Dinner    Potassium Gluconate 550 MG tablet Every Evening    rOPINIRole (REQUIP) 2 mg, Oral, Nightly    Semaglutide(0.25 or 0.5MG/DOS) (OZEMPIC) 0.25 mg, Subcutaneous, Weekly    spironolactone (ALDACTONE) 25 mg, Oral, Daily    Stiolto Respimat 2.5-2.5 MCG/ACT aerosol solution inhaler INHALE TWO PUFFS BY MOUTH EVERY DAY    Zinc 50 MG tablet 1 tablet, Nightly       The following portions of the patient's history were reviewed and updated as appropriate: allergies, current medications, past family history, past medical history, past social history, past surgical history, and problem list.    Objective   Vital Signs:   /75 (BP Location: Left arm, Patient Position: Sitting, Cuff Size: Large Adult)   Pulse 68   Temp 96.6 °F (35.9 °C) (Temporal)   Ht 160 cm (63\")   Wt 125 kg (275 lb)   SpO2 93%   BMI 48.71 kg/m²     Wt Readings from Last 3 Encounters:   02/25/25 125 kg (275 lb)   12/05/24 124 kg (274 lb 4.8 oz)   12/03/24 125 kg (275 lb 12.8 oz)     BP Readings from Last 3 Encounters:   02/25/25 117/75   12/05/24 124/68   12/04/24 118/75     SpO2 Readings from Last 3 Encounters:   02/25/25 93%   12/05/24 93%   11/13/24 93%        Physical Exam   Appearance: No acute distress, well-nourished  Head: normocephalic, atraumatic  Eyes: extraocular movements intact, no scleral icterus, no conjunctival injection  Ears, Nose, and Throat: external ears normal, nares patent, moist mucous membranes, tympanic membranes clear bilaterally. Tonsils within normal limits. Oropharynx clear.   Cardiovascular: regular rate and rhythm. no murmurs, rubs, or gallops. no edema  Respiratory: breathing comfortably, symmetric chest rise, clear to auscultation bilaterally. No wheezes, rales, or rhonchi.  Neuro: alert and moves all extremities equally  Lymph: no occipital, " cervical, submandibular,or supraclavicular lymphadenopathy.      Result Review :   The following data was reviewed by: Mainor Sanders Jr, MD on 02/25/2025:  Common labs          11/4/2024    10:20 12/5/2024    10:41 2/25/2025    13:32   Common Labs   Glucose 98      BUN 20      Creatinine 0.99      Sodium 139      Potassium 4.7      Chloride 100      Calcium 9.5      WBC 9.74      Hemoglobin 16.0   14.9    Hematocrit 49.7      Platelets 275      Total Cholesterol  110     Triglycerides  112     HDL Cholesterol  41     LDL Cholesterol   48     Hemoglobin A1C  6.50         Lab Results   Component Value Date    SARSANTIGEN Not Detected 02/25/2025    COVID19 Not Detected 01/10/2024    FLUAAG Not Detected 02/25/2025    FLUBAG Not Detected 02/25/2025    RAPSCRN Negative 02/25/2025    BILIRUBINUR Negative 02/25/2025       ECG 12 Lead    Date/Time: 2/25/2025 2:51 PM  Performed by: Mainor Sanders Jr., MD    Authorized by: Mainor Sanders Jr., MD  Comparison: compared with previous ECG from 11/4/2024  Similar to previous ECG  Rhythm: sinus rhythm  Rate: normal  BPM: 90  Conduction: right bundle branch block  ST Segments: ST segments normal  T Waves: T waves normal  QRS axis: normal  Other findings: T wave abnormality    Clinical impression: normal ECG          Assessment and Plan    Diagnoses and all orders for this visit:    1. Chest pain, unspecified type (Primary)  Comments:  EKG in clinic unchanged from prior EKG.  Will obtain stat chest CT to further evaluate.  Lung exam and cardiac exam reassuring.  Orders:  -     CT Chest With Contrast; Future  -     POC Hemoglobin  -     ECG 12 Lead    2. Shortness of breath  Comments:  Point-of-care hemoglobin normal.  Lung exam and cardiac exam reassuring.  Obtain chest CT to further evaluate.  Orders:  -     POCT urinalysis dipstick, automated  -     CT Chest With Contrast; Future  -     COVID-19,CEPHEID/GISELLE,COR/RACHELLE/PAD/ALIREZA/LAG/CARI IN-HOUSE,NP SWAB IN  TRANSPORT MEDIA 1 HR TAT, RT-PCR - Swab, Nasopharynx  -     Beta Strep Culture, Throat - Swab, Throat  -     POCT SARS-CoV-2 + Flu Antigen JOSE  -     POC Rapid Strep A    3. Hypoxia  -     CT Chest With Contrast; Future  -     POC Hemoglobin          There are no discontinued medications.       Follow Up   Return if symptoms worsen or fail to improve.  Patient was given instructions and counseling regarding her condition or for health maintenance advice. Please see specific information pulled into the AVS if appropriate.       Mainor Sanders Jr, MD  02/25/25  14:54 EST

## 2025-02-27 LAB — BACTERIA SPEC AEROBE CULT: NORMAL

## 2025-02-28 ENCOUNTER — TELEPHONE (OUTPATIENT)
Dept: INTERNAL MEDICINE | Facility: CLINIC | Age: 70
End: 2025-02-28
Payer: MEDICARE

## 2025-02-28 NOTE — TELEPHONE ENCOUNTER
----- Message from Mainor Sanders sent at 2/28/2025  8:07 AM EST -----  Chest CT is normal. Please ask how patient is doing.

## 2025-02-28 NOTE — TELEPHONE ENCOUNTER
CALLED PATIENT SHE IS AWARE     SHE HAD A WEEK- COULDN'T BREATHE, NO ENERGY.     SHE STATED THAT SHE IS DOING BETTER NOW.     SHE IS A LITTLE CONCERNED ABOUT NERVES.   IF IT COULD BE THAT.

## 2025-03-04 ENCOUNTER — OFFICE VISIT (OUTPATIENT)
Dept: INTERNAL MEDICINE | Facility: CLINIC | Age: 70
End: 2025-03-04
Payer: MEDICARE

## 2025-03-04 VITALS
TEMPERATURE: 96.6 F | HEIGHT: 63 IN | SYSTOLIC BLOOD PRESSURE: 97 MMHG | BODY MASS INDEX: 48.62 KG/M2 | OXYGEN SATURATION: 94 % | HEART RATE: 68 BPM | WEIGHT: 274.4 LBS | DIASTOLIC BLOOD PRESSURE: 65 MMHG

## 2025-03-04 DIAGNOSIS — I10 ESSENTIAL HYPERTENSION: ICD-10-CM

## 2025-03-04 DIAGNOSIS — R42 VERTIGO: Primary | ICD-10-CM

## 2025-03-04 DIAGNOSIS — E11.9 TYPE 2 DIABETES MELLITUS WITHOUT COMPLICATION, WITHOUT LONG-TERM CURRENT USE OF INSULIN: ICD-10-CM

## 2025-03-04 DIAGNOSIS — I48.0 PAROXYSMAL ATRIAL FIBRILLATION: ICD-10-CM

## 2025-03-04 DIAGNOSIS — E78.2 MIXED HYPERLIPIDEMIA: ICD-10-CM

## 2025-03-04 DIAGNOSIS — I50.32 CHRONIC DIASTOLIC (CONGESTIVE) HEART FAILURE: ICD-10-CM

## 2025-03-04 DIAGNOSIS — E03.9 ACQUIRED HYPOTHYROIDISM: ICD-10-CM

## 2025-03-04 PROCEDURE — 3078F DIAST BP <80 MM HG: CPT | Performed by: INTERNAL MEDICINE

## 2025-03-04 PROCEDURE — 99214 OFFICE O/P EST MOD 30 MIN: CPT | Performed by: INTERNAL MEDICINE

## 2025-03-04 PROCEDURE — 1126F AMNT PAIN NOTED NONE PRSNT: CPT | Performed by: INTERNAL MEDICINE

## 2025-03-04 PROCEDURE — 3074F SYST BP LT 130 MM HG: CPT | Performed by: INTERNAL MEDICINE

## 2025-03-04 PROCEDURE — G2211 COMPLEX E/M VISIT ADD ON: HCPCS | Performed by: INTERNAL MEDICINE

## 2025-03-04 NOTE — PROGRESS NOTES
Chief Complaint  Fatigue, Weakness - Generalized, Dizziness, and Medication Problem (Patient had order a medication for her Diabetes /Its called Cellucare she has  taken a total of 5 /She taken the week before she came in )    Subjective          Randi Fajardo presents to [facility]  History of Present Illness  Patient reports feeling significantly better since her previous appointment.  Patient thinks her symptoms previously related to taking a new supplement.  Her workup with no acute appointment largely returned reassuring.  Patient reports feeling weak and imbalance, but Patient reports it is improving.  Patient has close follow-up with cardiology in 1 week.  Of note, patient was increased on Toprol approximately 3 months ago and related to increased heart rate.    Current Outpatient Medications   Medication Instructions    Accu-Chek Softclix Lancets lancets 1 each, Other, 3 times daily, Use as instructed    albuterol sulfate HFA (Ventolin HFA) 108 (90 Base) MCG/ACT inhaler 2 puffs, Inhalation, Every 4 Hours PRN    Alcohol Swabs (B-D SINGLE USE SWABS REGULAR) pads 1 Application, Topical, 5 Times Daily    apixaban (ELIQUIS) 5 mg, Oral, Every 12 Hours Scheduled, Lot: [id number]  Exp: 9/25  4 boxes given    atorvastatin (LIPITOR) 40 mg, Oral, Nightly    BIOTIN PO Nightly    Blood Glucose Monitoring Suppl (Accu-Chek Guide Me) w/Device kit 1 kit, Subcutaneous, See Admin Instructions    bumetanide (BUMEX) 2 MG tablet PLEASE SEE ATTACHED FOR DETAILED DIRECTIONS    Calcium Carb-Cholecalciferol (CALCIUM 600 + D PO) 1 tablet, After Dinner    DULoxetine (CYMBALTA) 120 mg, Oral, Daily    empagliflozin (JARDIANCE) 25 mg, Oral, Daily    famotidine (PEPCID) 40 mg, Oral, 2 Times Daily    gabapentin (NEURONTIN) 600 mg, Oral, 3 Times Daily PRN    glucose blood (Accu-Chek Guide) test strip 1 each, Other, Daily, Use as instructed    levothyroxine (SYNTHROID) 50 mcg, Oral, Daily     "magnesium chloride ER 64 mg, Daily    metFORMIN (GLUCOPHAGE) 500 mg, Oral, Daily With Dinner    metoprolol succinate XL (TOPROL-XL) 50 mg, Oral, Daily    minocycline (MINOCIN,DYNACIN) 100 mg, Oral, Daily    multivitamin with minerals tablet tablet 1 tablet, Nightly    Omega-3 Fatty Acids (fish oil) 1000 MG capsule capsule Daily With Dinner    Potassium Gluconate 550 MG tablet Every Evening    rOPINIRole (REQUIP) 2 mg, Oral, Nightly    Semaglutide(0.25 or 0.5MG/DOS) (OZEMPIC) 0.25 mg, Subcutaneous, Weekly    spironolactone (ALDACTONE) 25 mg, Oral, Daily    Stiolto Respimat 2.5-2.5 MCG/ACT aerosol solution inhaler INHALE TWO PUFFS BY MOUTH EVERY DAY    Zinc 50 MG tablet 1 tablet, Nightly       The following portions of the patient's history were reviewed and updated as appropriate: allergies, current medications, past family history, past medical history, past social history, past surgical history, and problem list.    Objective   Vital Signs:   BP 97/65 (BP Location: Left arm, Patient Position: Sitting, Cuff Size: Large Adult)   Pulse 68   Temp 96.6 °F (35.9 °C) (Temporal)   Ht 160 cm (63\")   Wt 124 kg (274 lb 6.4 oz)   SpO2 94%   BMI 48.61 kg/m²     BP Readings from Last 3 Encounters:   03/04/25 97/65   02/25/25 117/75   12/05/24 124/68     Wt Readings from Last 3 Encounters:   03/04/25 124 kg (274 lb 6.4 oz)   02/25/25 125 kg (275 lb)   12/05/24 124 kg (274 lb 4.8 oz)     Pulse Readings from Last 3 Encounters:   03/04/25 68   02/25/25 68   12/05/24 85       Physical Exam   Appearance: No acute distress, well-nourished  Head: normocephalic, atraumatic  Eyes: extraocular movements intact, no scleral icterus, no conjunctival injection  Ears, Nose, and Throat: external ears normal, nares patent, moist mucous membranes  Cardiovascular: regular rate and rhythm. no murmurs, rubs, or gallops. no edema  Respiratory: breathing comfortably, symmetric chest rise, clear to auscultation bilaterally. No wheezes, rales, or " tresa.  Neuro: alert and oriented to time, place, and person. Normal gait  Psych: normal mood and affect     Result Review :   The following data was reviewed by: Mainor Sanders Jr, MD on 03/04/2025:  Common labs          11/4/2024    10:20 12/5/2024    10:41 2/25/2025    13:32   Common Labs   Glucose 98      BUN 20      Creatinine 0.99      Sodium 139      Potassium 4.7      Chloride 100      Calcium 9.5      WBC 9.74      Hemoglobin 16.0   14.9    Hematocrit 49.7      Platelets 275      Total Cholesterol  110     Triglycerides  112     HDL Cholesterol  41     LDL Cholesterol   48     Hemoglobin A1C  6.50           Lab Results   Component Value Date    SARSANTIGEN Not Detected 02/25/2025    COVID19 Not Detected 02/25/2025    FLUAAG Not Detected 02/25/2025    FLUBAG Not Detected 02/25/2025    RAPSCRN Negative 02/25/2025    BILIRUBINUR Negative 02/25/2025          Assessment and Plan    Diagnoses and all orders for this visit:    1. Vertigo (Primary)  Comments:  Possibly related to low BP?  From taking supplement?  Hold on changing meds for now.  Discussed possibly stopping Aldactone if needed.  F/u with cardiology.  Orders:  -     Vitamin B12 & Folate; Future    2. Paroxysmal atrial fibrillation  Comments:  Rate controlled.  [id number] equals 4.  Continue Eliquis.    3. Chronic diastolic (congestive) heart failure  Comments:  Continue follow-up with cardiology.  Orders:  -     proBNP; Future    4. Mixed hyperlipidemia  Comments:  Continue statin.  Check labs.  Goal LDL less than 55.  Orders:  -     Lipid Panel; Future    5. Acquired hypothyroidism  Comments:  Check TSH and adjust Synthroid accordingly.  Orders:  -     TSH; Future    6. Type 2 diabetes mellitus without complication, without long-term current use of insulin  Comments:  Check labs.  Goal A1c less than 7%.  Continue current med regimen.  Orders:  -     Hemoglobin A1c; Future  -     Microalbumin / Creatinine Urine Ratio - Urine, Clean Catch;  Future    7. Essential hypertension  -     CBC & Differential; Future  -     Comprehensive Metabolic Panel; Future          There are no discontinued medications.       Follow Up   Return for scheduled follow-up.  Patient was given instructions and counseling regarding her condition or for health maintenance advice. Please see specific information pulled into the AVS if appropriate.       Mainor Sanders Jr, MD  03/04/25  13:22 EST

## 2025-03-11 ENCOUNTER — OFFICE VISIT (OUTPATIENT)
Dept: CARDIOLOGY | Facility: CLINIC | Age: 70
End: 2025-03-11
Payer: MEDICARE

## 2025-03-11 VITALS
HEART RATE: 73 BPM | WEIGHT: 272 LBS | DIASTOLIC BLOOD PRESSURE: 64 MMHG | BODY MASS INDEX: 48.2 KG/M2 | HEIGHT: 63 IN | OXYGEN SATURATION: 92 % | SYSTOLIC BLOOD PRESSURE: 126 MMHG

## 2025-03-11 DIAGNOSIS — I48.0 PAROXYSMAL ATRIAL FIBRILLATION: Primary | Chronic | ICD-10-CM

## 2025-03-11 DIAGNOSIS — I10 ESSENTIAL HYPERTENSION: ICD-10-CM

## 2025-03-11 DIAGNOSIS — I48.92 ATRIAL FLUTTER WITH CONTROLLED RESPONSE: Chronic | ICD-10-CM

## 2025-03-11 DIAGNOSIS — I50.32 CHRONIC DIASTOLIC (CONGESTIVE) HEART FAILURE: Chronic | ICD-10-CM

## 2025-03-11 RX ORDER — METOPROLOL TARTRATE 25 MG/1
12.5 TABLET, FILM COATED ORAL 2 TIMES DAILY PRN
Qty: 30 TABLET | Refills: 11 | Status: SHIPPED | OUTPATIENT
Start: 2025-03-11

## 2025-03-11 NOTE — PROGRESS NOTES
Randi Fajardo  [id number]  1955  [phone]      03/11/2025      [facility] CARDIOLOGY     Referring Provider: No ref. provider found     Mainor Sanders Jr., MD  [address]  [place] [id number]    Chief Complaint   Patient presents with    Atrial flutter with controlled response       Problem List  Atrial flutter  [id number] (age, sex, CHF, CAD, HTN, DM), anticoagulated on Eliqui  Diagnosed in 8/2024  Monitor, 8/30/2024: Predominant sinus rhythm minimum 49 bpm, max 113 bpm, average 78 bpm, frequent PACs 15% burden  PVA, posterior wall isolation, AFL RFA with CTI, 11/4/24 by Dr. James  Diastolic heart failure  Echo, 8/16/24: EF 55-60% paradoxical septal motion noted due to bundle branch block, is not well-visualized  Coronary artery disease  MPS, 7/20/2022: medium area of mild to moderate perfusion defect in the distal anterior and apical segments with partial reversibility on resting images which could represent myocardial ischemia in the right clinical setting, EF 70%  Hypertension  Hyperlipidemia  Diabetes  Hypothyroid  COPD  PENNY on CPAP  Osteoarthritis  Restless leg syndrome  Anxiety/depression    History of Present Illness   Randi Fajardo is a 69 y.o. female who presents to my electrophysiology clinic for follow up of AFL. Patient underwent PVA in November. Since the procedure, patient is still having episodes of AF. Episodes are shorter in frequency and less often. She denies SOB. She will have occasional LH and dizziness with quick position changes. Anticoagulated with eliquis. Denies any bleeding complications or stroke like symptoms.       Outpatient Medications Marked as Taking for the 3/11/25 encounter (Office Visit) with Luca James MD   Medication Sig Dispense Refill    Accu-Chek Softclix Lancets lancets 1 each by Other route 3 times a day. Use as instructed 200 each 3    albuterol sulfate HFA (Ventolin HFA) 108 (90 Base) MCG/ACT inhaler Inhale 2  puffs Every 4 (Four) Hours As Needed for Wheezing or Shortness of Air. 1 g 5    Alcohol Swabs (B-D SINGLE USE SWABS REGULAR) pads Apply 1 Application topically to the appropriate area as directed 5 (Five) Times a Day. 200 each 3    apixaban (Eliquis) 5 MG tablet tablet Take 1 tablet by mouth Every 12 (Twelve) Hours. Lot: [id number]  Exp: 9/25  4 boxes given 56 tablet 0    atorvastatin (LIPITOR) 40 MG tablet Take 1 tablet by mouth Every Night. 90 tablet 3    BIOTIN PO Take  by mouth Every Night.      Blood Glucose Monitoring Suppl (Accu-Chek Guide Me) w/Device kit Inject 1 kit under the skin into the appropriate area as directed See Admin Instructions. 1 kit 0    bumetanide (BUMEX) 2 MG tablet PLEASE SEE ATTACHED FOR DETAILED DIRECTIONS 180 tablet 1    Calcium Carb-Cholecalciferol (CALCIUM 600 + D PO) Take 1 tablet by mouth After Dinner.      DULoxetine (CYMBALTA) 60 MG capsule TAKE TWO CAPSULES BY MOUTH EVERY  capsule 3    empagliflozin (Jardiance) 25 MG tablet tablet Take 1 tablet by mouth Daily. 90 tablet 3    famotidine (Pepcid) 40 MG tablet Take 1 tablet by mouth 2 (Two) Times a Day. 90 tablet 2    gabapentin (NEURONTIN) 600 MG tablet Take 1 tablet by mouth 3 (Three) Times a Day As Needed (pain). 90 tablet 0    glucose blood (Accu-Chek Guide) test strip 1 each by Other route Daily. Use as instructed 100 each 3    levothyroxine (Synthroid) 50 MCG tablet Take 1 tablet by mouth Daily. 90 tablet 1    magnesium chloride ER 64 MG DR tablet Take 1 tablet by mouth Daily.      metFORMIN (GLUCOPHAGE) 500 MG tablet Take 1 tablet by mouth Daily With Dinner. 90 tablet 0    metoprolol succinate XL (TOPROL-XL) 25 MG 24 hr tablet Take 2 tablets by mouth Daily. 60 tablet 11    minocycline (MINOCIN,DYNACIN) 100 MG capsule TAKE ONE CAPSULE BY MOUTH EVERY DAY 90 capsule 3    multivitamin with minerals tablet tablet Take 1 tablet by mouth Every Night. 1 GUMMIE AT NIGHT      Omega-3 Fatty Acids (fish oil) 1000 MG capsule capsule  "Take  by mouth Daily With Dinner.      Potassium Gluconate 550 MG tablet Take  by mouth Every Evening.      rOPINIRole (REQUIP) 2 MG tablet TAKE 1 TABLET BY MOUTH EVERY NIGHT. 90 tablet 3    spironolactone (ALDACTONE) 25 MG tablet Take 1 tablet by mouth Daily. 90 tablet 3    Stiolto Respimat 2.5-2.5 MCG/ACT aerosol solution inhaler INHALE TWO PUFFS BY MOUTH EVERY DAY 4 g 1    Zinc 50 MG tablet Take 1 tablet by mouth Every Night. CAPSULE, NOT TAB (WITH D3)              Physical Exam  Vitals:    03/11/25 1101   BP: 126/64   BP Location: Left arm   Patient Position: Sitting   Pulse: 73   SpO2: 92%   Weight: 123 kg (272 lb)   Height: 158.8 cm (62.5\")     Body mass index is 48.96 kg/m².  Constitutional:       Appearance: Healthy appearance.   Neck:      Vascular: JVD normal.   Pulmonary:      Effort: Pulmonary effort is normal.      Breath sounds: Normal breath sounds.   Cardiovascular:      Normal rate. Regular rhythm. Normal S1. Normal S2.       Murmurs: There is no murmur.      No gallop.  No rub.   Pulses:     Intact distal pulses.   Edema:     Peripheral edema absent.   Neurological:      General: No focal deficit present.          Diagnostic Data  Procedures  EKG: NSR, left axis deviation, RBBB, 68 BPM, , , QT//482    Lab Results   Component Value Date    GLUCOSE 98 11/04/2024    CALCIUM 9.5 11/04/2024     11/04/2024    K 4.7 11/04/2024    CO2 25.0 11/04/2024     11/04/2024    BUN 20 11/04/2024    CREATININE 0.99 11/04/2024    EGFRIFNONA 59 (L) 09/02/2021    BCR 20.2 11/04/2024    ANIONGAP 14.0 11/04/2024     Lab Results   Component Value Date    WBC 9.74 11/04/2024    HGB 14.9 02/25/2025    HCT 49.7 (H) 11/04/2024    MCV 90.9 11/04/2024     11/04/2024     No results found for: \"INR\", \"PROTIME\"  Lab Results   Component Value Date    TSH 2.570 12/05/2024       I personally viewed and interpreted the patient's EKG/Telemetry/lab data    Randi Fajardo  reports that she quit " smoking about 5 years ago. Her smoking use included cigarettes. She started smoking about 56 years ago. She has a 25.5 pack-year smoking history. She has been exposed to tobacco smoke. She has never used smokeless tobacco. I have educated her on the risk of diseases from using tobacco products such as cancer, COPD, and heart disease.       ACP discussion was declined by the patient. Patient does not have an advance directive, declines further assistance.    Assessment and Plan  Diagnoses and all orders for this visit:    1. Paroxysmal atrial fibrillation (Primary)    2. Atrial flutter with controlled response    3. Chronic diastolic (congestive) heart failure    4. Essential hypertension    Other orders  -     metoprolol tartrate (LOPRESSOR) 25 MG tablet; Take 0.5 tablets by mouth 2 (Two) Times a Day As Needed (for sustained HR >120 bpm).  Dispense: 30 tablet; Refill: 11        Atrial flutter and atrial fibrillation  -[id number] (age, sex, CHF, CAD, HTN, DM), anticoagulated on Eliquis  -s/p PVA, 11/2024  -Still having episodes of AF. Episodes seem to be less frequent. Will use metoprolol tartrate 12.5 mg PRN for episodes.   -Continue toprol 25 mg daily.     Diastolic heart failure  -Echo, 8/16/24: EF 55-60% paradoxical septal motion noted due to bundle branch block, is not well-visualized  -Continue GDMT: jardiance, mavis, toprol and bumex  -Follows with Dr. Chris Tobin     Hypertension  -Well controlled, continue current medication regimen      Follow-Up  Return for Next scheduled follow up.      Thank you for allowing me to participate in the care of your patient. Please to not hesitate to contact me with additional questions or concerns.     EDDIE Stuart

## 2025-03-13 DIAGNOSIS — G62.9 NEUROPATHY: ICD-10-CM

## 2025-03-13 RX ORDER — GABAPENTIN 600 MG/1
600 TABLET ORAL 3 TIMES DAILY PRN
Qty: 90 TABLET | Refills: 0 | Status: SHIPPED | OUTPATIENT
Start: 2025-03-13

## 2025-03-13 NOTE — TELEPHONE ENCOUNTER
Refill Request for Controlled Substance    Date of Request: 3/13/2025  Medication Requested: Gabapentin   Last UDS: 12/05/2024  Last Consent: 11/14/2023  Last OV: 03/04/2025  Next OV: 06/06/2025

## 2025-03-27 ENCOUNTER — LAB (OUTPATIENT)
Facility: HOSPITAL | Age: 70
End: 2025-03-27
Payer: MEDICARE

## 2025-03-27 DIAGNOSIS — E03.9 ACQUIRED HYPOTHYROIDISM: ICD-10-CM

## 2025-03-27 DIAGNOSIS — E78.2 MIXED HYPERLIPIDEMIA: ICD-10-CM

## 2025-03-27 DIAGNOSIS — I10 ESSENTIAL HYPERTENSION: ICD-10-CM

## 2025-03-27 DIAGNOSIS — E11.9 TYPE 2 DIABETES MELLITUS WITHOUT COMPLICATION, WITHOUT LONG-TERM CURRENT USE OF INSULIN: ICD-10-CM

## 2025-03-27 DIAGNOSIS — R42 VERTIGO: ICD-10-CM

## 2025-03-27 DIAGNOSIS — I50.32 CHRONIC DIASTOLIC (CONGESTIVE) HEART FAILURE: ICD-10-CM

## 2025-03-27 LAB
ALBUMIN SERPL-MCNC: 3.9 G/DL (ref 3.5–5.2)
ALBUMIN UR-MCNC: <1.2 MG/DL
ALBUMIN/GLOB SERPL: 1.1 G/DL
ALP SERPL-CCNC: 79 U/L (ref 39–117)
ALT SERPL W P-5'-P-CCNC: 20 U/L (ref 1–33)
ANION GAP SERPL CALCULATED.3IONS-SCNC: 11.8 MMOL/L (ref 5–15)
AST SERPL-CCNC: 28 U/L (ref 1–32)
BASOPHILS # BLD AUTO: 0.08 10*3/MM3 (ref 0–0.2)
BASOPHILS NFR BLD AUTO: 0.8 % (ref 0–1.5)
BILIRUB SERPL-MCNC: 0.5 MG/DL (ref 0–1.2)
BUN SERPL-MCNC: 20 MG/DL (ref 8–23)
BUN/CREAT SERPL: 17.1 (ref 7–25)
CALCIUM SPEC-SCNC: 9.6 MG/DL (ref 8.6–10.5)
CHLORIDE SERPL-SCNC: 99 MMOL/L (ref 98–107)
CHOLEST SERPL-MCNC: 115 MG/DL (ref 0–200)
CO2 SERPL-SCNC: 28.2 MMOL/L (ref 22–29)
CREAT SERPL-MCNC: 1.17 MG/DL (ref 0.57–1)
CREAT UR-MCNC: 18.5 MG/DL
DEPRECATED RDW RBC AUTO: 44.5 FL (ref 37–54)
EGFRCR SERPLBLD CKD-EPI 2021: 50 ML/MIN/1.73
EOSINOPHIL # BLD AUTO: 0.3 10*3/MM3 (ref 0–0.4)
EOSINOPHIL NFR BLD AUTO: 2.9 % (ref 0.3–6.2)
ERYTHROCYTE [DISTWIDTH] IN BLOOD BY AUTOMATED COUNT: 14 % (ref 12.3–15.4)
FOLATE SERPL-MCNC: >20 NG/ML (ref 4.78–24.2)
GLOBULIN UR ELPH-MCNC: 3.5 GM/DL
GLUCOSE SERPL-MCNC: 102 MG/DL (ref 65–99)
HBA1C MFR BLD: 6.7 % (ref 4.8–5.6)
HCT VFR BLD AUTO: 48.7 % (ref 34–46.6)
HDLC SERPL-MCNC: 38 MG/DL (ref 40–60)
HGB BLD-MCNC: 15.7 G/DL (ref 12–15.9)
IMM GRANULOCYTES # BLD AUTO: 0.06 10*3/MM3 (ref 0–0.05)
IMM GRANULOCYTES NFR BLD AUTO: 0.6 % (ref 0–0.5)
LDLC SERPL CALC-MCNC: 54 MG/DL (ref 0–100)
LDLC/HDLC SERPL: 1.36 {RATIO}
LYMPHOCYTES # BLD AUTO: 3.35 10*3/MM3 (ref 0.7–3.1)
LYMPHOCYTES NFR BLD AUTO: 32.5 % (ref 19.6–45.3)
MCH RBC QN AUTO: 28.6 PG (ref 26.6–33)
MCHC RBC AUTO-ENTMCNC: 32.2 G/DL (ref 31.5–35.7)
MCV RBC AUTO: 88.9 FL (ref 79–97)
MICROALBUMIN/CREAT UR: NORMAL MG/G{CREAT}
MONOCYTES # BLD AUTO: 0.83 10*3/MM3 (ref 0.1–0.9)
MONOCYTES NFR BLD AUTO: 8.1 % (ref 5–12)
NEUTROPHILS NFR BLD AUTO: 5.69 10*3/MM3 (ref 1.7–7)
NEUTROPHILS NFR BLD AUTO: 55.1 % (ref 42.7–76)
NRBC BLD AUTO-RTO: 0 /100 WBC (ref 0–0.2)
NT-PROBNP SERPL-MCNC: 213 PG/ML (ref 0–900)
PLATELET # BLD AUTO: 282 10*3/MM3 (ref 140–450)
PMV BLD AUTO: 10.6 FL (ref 6–12)
POTASSIUM SERPL-SCNC: 4.7 MMOL/L (ref 3.5–5.2)
PROT SERPL-MCNC: 7.4 G/DL (ref 6–8.5)
RBC # BLD AUTO: 5.48 10*6/MM3 (ref 3.77–5.28)
SODIUM SERPL-SCNC: 139 MMOL/L (ref 136–145)
TRIGL SERPL-MCNC: 126 MG/DL (ref 0–150)
TSH SERPL DL<=0.05 MIU/L-ACNC: 1.78 UIU/ML (ref 0.27–4.2)
VIT B12 BLD-MCNC: 682 PG/ML (ref 211–946)
VLDLC SERPL-MCNC: 23 MG/DL (ref 5–40)
WBC NRBC COR # BLD AUTO: 10.31 10*3/MM3 (ref 3.4–10.8)

## 2025-03-27 PROCEDURE — 84443 ASSAY THYROID STIM HORMONE: CPT

## 2025-03-27 PROCEDURE — 36415 COLL VENOUS BLD VENIPUNCTURE: CPT

## 2025-03-27 PROCEDURE — 83036 HEMOGLOBIN GLYCOSYLATED A1C: CPT

## 2025-03-27 PROCEDURE — 82043 UR ALBUMIN QUANTITATIVE: CPT

## 2025-03-27 PROCEDURE — 80053 COMPREHEN METABOLIC PANEL: CPT

## 2025-03-27 PROCEDURE — 82607 VITAMIN B-12: CPT

## 2025-03-27 PROCEDURE — 83880 ASSAY OF NATRIURETIC PEPTIDE: CPT

## 2025-03-27 PROCEDURE — 80061 LIPID PANEL: CPT

## 2025-03-27 PROCEDURE — 85025 COMPLETE CBC W/AUTO DIFF WBC: CPT

## 2025-03-27 PROCEDURE — 82746 ASSAY OF FOLIC ACID SERUM: CPT

## 2025-03-27 PROCEDURE — 82570 ASSAY OF URINE CREATININE: CPT

## 2025-04-04 ENCOUNTER — APPOINTMENT (OUTPATIENT)
Facility: HOSPITAL | Age: 70
End: 2025-04-04
Payer: MEDICARE

## 2025-04-04 ENCOUNTER — HOSPITAL ENCOUNTER (EMERGENCY)
Facility: HOSPITAL | Age: 70
Discharge: HOME | End: 2025-04-04
Attending: EMERGENCY MEDICINE
Payer: MEDICARE

## 2025-04-04 VITALS
DIASTOLIC BLOOD PRESSURE: 54 MMHG | RESPIRATION RATE: 20 BRPM | HEART RATE: 71 BPM | HEIGHT: 63 IN | TEMPERATURE: 98.7 F | BODY MASS INDEX: 49.45 KG/M2 | OXYGEN SATURATION: 100 % | WEIGHT: 279.1 LBS | SYSTOLIC BLOOD PRESSURE: 107 MMHG

## 2025-04-04 DIAGNOSIS — M79.89 PAIN AND SWELLING OF LEFT LOWER LEG: Primary | ICD-10-CM

## 2025-04-04 DIAGNOSIS — M79.662 PAIN AND SWELLING OF LEFT LOWER LEG: Primary | ICD-10-CM

## 2025-04-04 LAB
ALBUMIN SERPL-MCNC: 4 G/DL (ref 3.5–5.2)
ALBUMIN/GLOB SERPL: 1.1 G/DL
ALP SERPL-CCNC: 80 U/L (ref 39–117)
ALT SERPL W P-5'-P-CCNC: 16 U/L (ref 1–33)
ANION GAP SERPL CALCULATED.3IONS-SCNC: 13 MMOL/L (ref 5–15)
AST SERPL-CCNC: 22 U/L (ref 1–32)
BASOPHILS # BLD AUTO: 0.09 10*3/MM3 (ref 0–0.2)
BASOPHILS NFR BLD AUTO: 0.7 % (ref 0–1.5)
BH CV LOWER VASCULAR LEFT COMMON FEMORAL AUGMENT: NORMAL
BH CV LOWER VASCULAR LEFT COMMON FEMORAL COMPETENT: NORMAL
BH CV LOWER VASCULAR LEFT COMMON FEMORAL COMPRESS: NORMAL
BH CV LOWER VASCULAR LEFT COMMON FEMORAL PHASIC: NORMAL
BH CV LOWER VASCULAR LEFT COMMON FEMORAL SPONT: NORMAL
BH CV LOWER VASCULAR LEFT DISTAL FEMORAL COMPRESS: NORMAL
BH CV LOWER VASCULAR LEFT GASTRONEMIUS COMPRESS: NORMAL
BH CV LOWER VASCULAR LEFT GREATER SAPH AK COMPRESS: NORMAL
BH CV LOWER VASCULAR LEFT GREATER SAPH BK COMPRESS: NORMAL
BH CV LOWER VASCULAR LEFT LESSER SAPH COMPRESS: NORMAL
BH CV LOWER VASCULAR LEFT MID FEMORAL AUGMENT: NORMAL
BH CV LOWER VASCULAR LEFT MID FEMORAL COMPETENT: NORMAL
BH CV LOWER VASCULAR LEFT MID FEMORAL COMPRESS: NORMAL
BH CV LOWER VASCULAR LEFT MID FEMORAL PHASIC: NORMAL
BH CV LOWER VASCULAR LEFT MID FEMORAL SPONT: NORMAL
BH CV LOWER VASCULAR LEFT PERONEAL COMPRESS: NORMAL
BH CV LOWER VASCULAR LEFT POPLITEAL AUGMENT: NORMAL
BH CV LOWER VASCULAR LEFT POPLITEAL COMPETENT: NORMAL
BH CV LOWER VASCULAR LEFT POPLITEAL COMPRESS: NORMAL
BH CV LOWER VASCULAR LEFT POPLITEAL PHASIC: NORMAL
BH CV LOWER VASCULAR LEFT POPLITEAL SPONT: NORMAL
BH CV LOWER VASCULAR LEFT POSTERIOR TIBIAL COMPRESS: NORMAL
BH CV LOWER VASCULAR LEFT PROXIMAL FEMORAL COMPRESS: NORMAL
BH CV LOWER VASCULAR LEFT SAPHENOFEMORAL JUNCTION COMPRESS: NORMAL
BH CV LOWER VASCULAR RIGHT COMMON FEMORAL AUGMENT: NORMAL
BH CV LOWER VASCULAR RIGHT COMMON FEMORAL COMPETENT: NORMAL
BH CV LOWER VASCULAR RIGHT COMMON FEMORAL COMPRESS: NORMAL
BH CV LOWER VASCULAR RIGHT COMMON FEMORAL PHASIC: NORMAL
BH CV LOWER VASCULAR RIGHT COMMON FEMORAL SPONT: NORMAL
BH CV POP FLUID COLLECT LEFT: 1
BH CV VAS PRELIMINARY FINDINGS SCRIPTING: 1
BILIRUB SERPL-MCNC: 0.5 MG/DL (ref 0–1.2)
BUN SERPL-MCNC: 18 MG/DL (ref 8–23)
BUN/CREAT SERPL: 17.1 (ref 7–25)
CALCIUM SPEC-SCNC: 9.9 MG/DL (ref 8.6–10.5)
CHLORIDE SERPL-SCNC: 95 MMOL/L (ref 98–107)
CO2 SERPL-SCNC: 30 MMOL/L (ref 22–29)
CREAT SERPL-MCNC: 1.05 MG/DL (ref 0.57–1)
DEPRECATED RDW RBC AUTO: 48.7 FL (ref 37–54)
EGFRCR SERPLBLD CKD-EPI 2021: 56.9 ML/MIN/1.73
EOSINOPHIL # BLD AUTO: 0.18 10*3/MM3 (ref 0–0.4)
EOSINOPHIL NFR BLD AUTO: 1.4 % (ref 0.3–6.2)
ERYTHROCYTE [DISTWIDTH] IN BLOOD BY AUTOMATED COUNT: 14.8 % (ref 12.3–15.4)
GLOBULIN UR ELPH-MCNC: 3.7 GM/DL
GLUCOSE SERPL-MCNC: 80 MG/DL (ref 65–99)
HCT VFR BLD AUTO: 48.7 % (ref 34–46.6)
HGB BLD-MCNC: 15.7 G/DL (ref 12–15.9)
HOLD SPECIMEN: NORMAL
HOLD SPECIMEN: NORMAL
IMM GRANULOCYTES # BLD AUTO: 0.06 10*3/MM3 (ref 0–0.05)
IMM GRANULOCYTES NFR BLD AUTO: 0.5 % (ref 0–0.5)
LYMPHOCYTES # BLD AUTO: 3.57 10*3/MM3 (ref 0.7–3.1)
LYMPHOCYTES NFR BLD AUTO: 27.5 % (ref 19.6–45.3)
MCH RBC QN AUTO: 29 PG (ref 26.6–33)
MCHC RBC AUTO-ENTMCNC: 32.2 G/DL (ref 31.5–35.7)
MCV RBC AUTO: 90 FL (ref 79–97)
MONOCYTES # BLD AUTO: 0.89 10*3/MM3 (ref 0.1–0.9)
MONOCYTES NFR BLD AUTO: 6.9 % (ref 5–12)
NEUTROPHILS NFR BLD AUTO: 63 % (ref 42.7–76)
NEUTROPHILS NFR BLD AUTO: 8.2 10*3/MM3 (ref 1.7–7)
NRBC BLD AUTO-RTO: 0 /100 WBC (ref 0–0.2)
NT-PROBNP SERPL-MCNC: 163.6 PG/ML (ref 0–900)
PLATELET # BLD AUTO: 291 10*3/MM3 (ref 140–450)
PMV BLD AUTO: 10 FL (ref 6–12)
POTASSIUM SERPL-SCNC: 4.3 MMOL/L (ref 3.5–5.2)
PROT SERPL-MCNC: 7.7 G/DL (ref 6–8.5)
RBC # BLD AUTO: 5.41 10*6/MM3 (ref 3.77–5.28)
SODIUM SERPL-SCNC: 138 MMOL/L (ref 136–145)
WBC NRBC COR # BLD AUTO: 12.99 10*3/MM3 (ref 3.4–10.8)
WHOLE BLOOD HOLD COAG: NORMAL
WHOLE BLOOD HOLD SPECIMEN: NORMAL

## 2025-04-04 PROCEDURE — 93971 EXTREMITY STUDY: CPT

## 2025-04-04 PROCEDURE — 80053 COMPREHEN METABOLIC PANEL: CPT | Performed by: EMERGENCY MEDICINE

## 2025-04-04 PROCEDURE — 93971 EXTREMITY STUDY: CPT | Performed by: SURGERY

## 2025-04-04 PROCEDURE — 99284 EMERGENCY DEPT VISIT MOD MDM: CPT

## 2025-04-04 PROCEDURE — 85025 COMPLETE CBC W/AUTO DIFF WBC: CPT | Performed by: EMERGENCY MEDICINE

## 2025-04-04 PROCEDURE — 83880 ASSAY OF NATRIURETIC PEPTIDE: CPT | Performed by: EMERGENCY MEDICINE

## 2025-04-04 NOTE — DISCHARGE INSTRUCTIONS
The ultrasound did not show a blood clot today.  The swelling is likely from your knee pain.  Elevate your leg frequently.  Wear compression stockings.    We placed a referral for a home health evaluation, they should give you a call in the next day.    Return for any new concerns.

## 2025-04-04 NOTE — ED PROVIDER NOTES
Time: 5:12 PM EDT  Date of encounter:  4/4/2025  Independent Historian/Clinical History and Information was obtained by:   Patient    History is limited by: N/A    Chief Complaint: Left lower extremity swelling      History of Present Illness:  Patient is a 69 y.o. year old female who presents to the emergency department for evaluation of left lower extremity swelling.  Was seen at urgent care this past Monday.  Now she states more lower extremity.  Denies history of DVT.  Karmen FOFANA    Patient reports that she was seen at urgent care a few days ago where she is diagnosed with osteoarthritis of the left knee.  Since then that her leg has been swollen more.  She denies chest pain or shortness of breath.  States that she lives by herself and has a hard time getting around.      Patient Care Team  Primary Care Provider: Mainor Sanders Jr., MD    Past Medical History:     Allergies   Allergen Reactions    Nexium [Esomeprazole] Hives    Prilosec [Omeprazole] Hives     Past Medical History:   Diagnosis Date    Abnormal ECG     Arrhythmia     Arthritis of back 2018?    Atrial fibrillation     Cataract 07/22    Colon polyp ?    Found during initial colonoscopy    COPD (chronic obstructive pulmonary disease) 2023    Coronary artery disease 09/24    Afib and irregular heartbeat    CTS (carpal tunnel syndrome) ?    Have had corrective surgery on both    Depressed     Elevated cholesterol     Fracture of ankle 1986    Plate and screws were used to correct break    GREGG (generalized anxiety disorder)     Gastroesophageal reflux     HTN (hypertension)     Hyperlipidemia     Hypothyroid     Irregular heartbeat     Knee swelling ?    Lesion of neck     Low back strain ?    Has happenened several times    Obesity     Since birth    Osteoarthritis     Pneumonia 1960    RLS (restless legs syndrome)     Shortness of breath Approx. 3 yrs. ago    Sleep apnea in adult     Sleep apnea, obstructive 2005 ?    Type 2 diabetes  mellitus      Past Surgical History:   Procedure Laterality Date    ABLATION OF DYSRHYTHMIC FOCUS      ANKLE OPEN REDUCTION INTERNAL FIXATION  1086    Plate and screws corrected compound fracture    ANKLE TENDON REPAIR Right     ankle with hardware    CARDIAC ELECTROPHYSIOLOGY PROCEDURE N/A 2024    Procedure: AF/AFL with PFA & NILA. No CTA. Hold Eliquis morning .;  Surgeon: Luca James MD;  Location: [facility] INVASIVE LOCATION;  Service: Cardiovascular;  Laterality: N/A;    CARDIAC SURGERY  2024    Ablation    CARPAL TUNNEL RELEASE Bilateral      SECTION          COLONOSCOPY      COLONOSCOPY N/A 2024    Procedure: COLONOSCOPY;  Surgeon: Kalin Arriaga MD;  Location: [facility] ENDOSCOPY;  Service: General;  Laterality: N/A;  NORMAL    FRACTURE SURGERY  1984    Plate and screws in right ankle    HAND SURGERY  ?    Carpal tunnel correction on both hands at different dates    NECK SURGERY      ,,; Neck lesion removed, total of 3     Family History   Problem Relation Age of Onset    Arthritis Mother     Thyroid disease Mother     Vision loss Mother         Wore glasses; cataract surgery; detached retina    Lung cancer Father     Heart disease Father         Had pacemaker    Colon cancer Father     Cancer Father         Lung, colon    Emphysema Father     COPD Father     Vision loss Father         Wore glasses; cataract surgery    Cancer Brother         Lung, pancreas    Cancer Maternal Aunt         Breast cancer    Arthritis Maternal Aunt     Hearing loss Maternal Aunt     Rheumatologic disease Maternal Aunt         Rheumatoid Arthritis    Arthritis Maternal Aunt     Hearing loss Maternal Aunt     Cancer Maternal Aunt     Vision loss Maternal Grandfather     Stroke Maternal Grandfather     Diabetes Paternal Uncle     Hearing loss Maternal Aunt     Arthritis Maternal Aunt     Hearing loss Maternal Aunt     Cancer Maternal Aunt         Cancer       Home  Medications:  Prior to Admission medications    Medication Sig Start Date End Date Taking? Authorizing Provider   Accu-Chek Softclix Lancets lancets 1 each by Other route 3 times a day. Use as instructed 2/9/24   Mainor Sanders Jr., MD   albuterol sulfate HFA (Ventolin HFA) 108 (90 Base) MCG/ACT inhaler Inhale 2 puffs Every 4 (Four) Hours As Needed for Wheezing or Shortness of Air. 5/13/24   Joon Cuevas MD   Alcohol Swabs (B-D SINGLE USE SWABS REGULAR) pads Apply 1 Application topically to the appropriate area as directed 5 (Five) Times a Day. 2/9/24   Mainor Sanders Jr., MD   apixaban (Eliquis) 5 MG tablet tablet Take 1 tablet by mouth Every 12 (Twelve) Hours. Lot: [id number]  Exp: 9/25  4 boxes given 2/11/25   Mahad Perez MD   atorvastatin (LIPITOR) 40 MG tablet Take 1 tablet by mouth Every Night. 9/10/24   Charito Hernandes APRN   BIOTIN PO Take  by mouth Every Night.    ProviderGeorgette MD   Blood Glucose Monitoring Suppl (Accu-Chek Guide Me) w/Device kit Inject 1 kit under the skin into the appropriate area as directed See Admin Instructions. 2/16/23   Mainor Sanders Jr., MD   bumetanide (BUMEX) 2 MG tablet PLEASE SEE ATTACHED FOR DETAILED DIRECTIONS 2/4/25   Mahad Perez MD   Calcium Carb-Cholecalciferol (CALCIUM 600 + D PO) Take 1 tablet by mouth After Dinner.    ProviderGeorgtete MD   Diclofenac Sodium (VOLTAREN) 1 % gel gel May apply to each knee every 6 hours while awake 3/31/25   Brennon León MD   DULoxetine (CYMBALTA) 60 MG capsule TAKE TWO CAPSULES BY MOUTH EVERY DAY 1/24/25   Mainor Sanders Jr., MD   empagliflozin (Jardiance) 25 MG tablet tablet Take 1 tablet by mouth Daily. 2/11/25   Mahad Perez MD   famotidine (Pepcid) 40 MG tablet Take 1 tablet by mouth 2 (Two) Times a Day. 2/3/25   Mainor Sanders Jr., MD   gabapentin (NEURONTIN) 600 MG tablet Take 1 tablet by mouth 3 (Three) Times a Day As Needed (pain). 3/13/25    Mainor Sanders Jr., MD   glucose blood (Accu-Chek Guide) test strip 1 each by Other route Daily. Use as instructed 2/5/24   Mainor Sanders Jr., MD   levothyroxine (Synthroid) 50 MCG tablet Take 1 tablet by mouth Daily. 2/3/25   Mainor Sanders Jr., MD   magnesium chloride ER 64 MG DR tablet Take 1 tablet by mouth Daily.    Georgette Navarro MD   metFORMIN (GLUCOPHAGE) 500 MG tablet Take 1 tablet by mouth Daily With Dinner. 2/3/25   Mainor Sanders Jr., MD   metoprolol succinate XL (TOPROL-XL) 25 MG 24 hr tablet Take 2 tablets by mouth Daily. 12/4/24   Karl Eid APRN   metoprolol tartrate (LOPRESSOR) 25 MG tablet Take 0.5 tablets by mouth 2 (Two) Times a Day As Needed (for sustained HR >120 bpm). 3/11/25   Catrina Lawler APRN   minocycline (MINOCIN,DYNACIN) 100 MG capsule TAKE ONE CAPSULE BY MOUTH EVERY DAY 1/10/25   Mainor Sanders Jr., MD   multivitamin with minerals tablet tablet Take 1 tablet by mouth Every Night. 1 GUMMIE AT NIGHT    Georgette Navarro MD   Omega-3 Fatty Acids (fish oil) 1000 MG capsule capsule Take  by mouth Daily With Dinner.    Georgette Navarro MD   Potassium Gluconate 550 MG tablet Take  by mouth Every Evening.    Georgette Navarro MD   rOPINIRole (REQUIP) 2 MG tablet TAKE 1 TABLET BY MOUTH EVERY NIGHT. 1/13/25   Mainor Sanders Jr., MD   spironolactone (ALDACTONE) 25 MG tablet Take 1 tablet by mouth Daily. 8/15/24   Mahad Perez MD   Stiolto Respimat 2.5-2.5 MCG/ACT aerosol solution inhaler INHALE TWO PUFFS BY MOUTH EVERY DAY 2/21/25   Mainor Sanders Jr., MD   Zinc 50 MG tablet Take 1 tablet by mouth Every Night. CAPSULE, NOT TAB (WITH D3)    Georgette Navarro MD        Social History:   Social History     Tobacco Use    Smoking status: Former     Current packs/day: 0.00     Average packs/day: 0.5 packs/day for 51.0 years (25.5 ttl pk-yrs)     Types: Cigarettes     Start date: 1/1/1969     Quit date:  "2020     Years since quittin.2     Passive exposure: Past    Smokeless tobacco: Never    Tobacco comments:     Was off and on during the 40 years   Vaping Use    Vaping status: Never Used   Substance Use Topics    Alcohol use: Yes     Comment: una    Drug use: Never         Review of Systems:  Review of Systems   Cardiovascular:  Positive for leg swelling.   Musculoskeletal:  Positive for myalgias.   All other systems reviewed and are negative.       Physical Exam:  /54 (BP Location: Left arm, Patient Position: Sitting)   Pulse 71   Temp 98.7 °F (37.1 °C) (Oral)   Resp 20   Ht 160 cm (63\")   Wt 127 kg (279 lb 1.6 oz)   SpO2 100%   BMI 49.44 kg/m²     Physical Exam  Vitals and nursing note reviewed.   Constitutional:       General: She is not in acute distress.     Appearance: Normal appearance. She is morbidly obese. She is not toxic-appearing.   HENT:      Head: Normocephalic and atraumatic.      Mouth/Throat:      Mouth: Mucous membranes are moist.   Eyes:      General: No scleral icterus.     Pupils: Pupils are equal, round, and reactive to light.   Cardiovascular:      Rate and Rhythm: Normal rate and regular rhythm.      Pulses:           Radial pulses are 2+ on the right side and 2+ on the left side.        Dorsalis pedis pulses are 2+ on the right side and 2+ on the left side.      Heart sounds: Normal heart sounds.   Pulmonary:      Effort: Pulmonary effort is normal. No respiratory distress.      Breath sounds: Normal breath sounds. No wheezing or rhonchi.   Abdominal:      General: Abdomen is protuberant. Bowel sounds are normal. There is no distension.      Palpations: Abdomen is soft.      Tenderness: There is no abdominal tenderness.   Musculoskeletal:         General: Normal range of motion.      Cervical back: Normal range of motion and neck supple.      Left lower le+   Feet:      Left foot:      Skin integrity: No blister, skin breakdown, erythema, warmth or fissure. "   Skin:     General: Skin is warm and dry.   Neurological:      General: No focal deficit present.      Mental Status: She is alert and oriented to person, place, and time. Mental status is at baseline.   Psychiatric:         Mood and Affect: Mood normal.         Behavior: Behavior normal.                    Medical Decision Making:      Comorbidities that affect care:    Atrial Fibrillation, COPD, Coronary Artery Disease, Diabetes, Hypertension, Thyroid Disease, Obesity    External Notes reviewed:    Previous Clinic Note: 3/31/2025 seen at urgent care for left knee pain and diagnosed with osteoarthritis of the left knee      The following orders were placed and all results were independently analyzed by me:  Orders Placed This Encounter   Procedures    [place] Draw    Comprehensive Metabolic Panel    BNP    CBC Auto Differential    NPO Diet NPO Type: Strict NPO    Undress & Gown    CBC & Differential    Green Top (Gel)    Lavender Top    Gold Top - SST    Light Blue Top       Medications Given in the Emergency Department:  Medications - No data to display     ED Course:    ED Course as of 04/04/25 1923 Fri Apr 04, 2025   1712 --- PROVIDER IN TRIAGE NOTE ---    The patient was evaluated by meKarmen in triage. Orders were placed and the patient is currently awaiting disposition.    [AJ]      ED Course User Index  [AJ] Karmen Graves, PARHONDA       Labs:    Lab Results (last 24 hours)       Procedure Component Value Units Date/Time    CBC & Differential [[id number]]  (Abnormal) Collected: 04/04/25 1748    Specimen: Blood Updated: 04/04/25 1755    Narrative:      The following orders were created for panel order CBC & Differential.  Procedure                               Abnormality         Status                     ---------                               -----------         ------                     CBC Auto Differential[[id number]]        Abnormal            Final result                 Please view  results for these tests on the individual orders.    Comprehensive Metabolic Panel [[id number]]  (Abnormal) Collected: 04/04/25 1748    Specimen: Blood Updated: 04/04/25 1815     Glucose 80 mg/dL      BUN 18 mg/dL      Creatinine 1.05 mg/dL      Sodium 138 mmol/L      Potassium 4.3 mmol/L      Chloride 95 mmol/L      CO2 30.0 mmol/L      Calcium 9.9 mg/dL      Total Protein 7.7 g/dL      Albumin 4.0 g/dL      ALT (SGPT) 16 U/L      AST (SGOT) 22 U/L      Alkaline Phosphatase 80 U/L      Total Bilirubin 0.5 mg/dL      Globulin 3.7 gm/dL      A/G Ratio 1.1 g/dL      BUN/Creatinine Ratio 17.1     Anion Gap 13.0 mmol/L      eGFR 56.9 mL/min/1.73     Narrative:      GFR Categories in Chronic Kidney Disease (CKD)      GFR Category          GFR (mL/min/1.73)    Interpretation  G1                     90 or greater         Normal or high (1)  G2                      60-89                Mild decrease (1)  G3a                   45-59                Mild to moderate decrease  G3b                   30-44                Moderate to severe decrease  G4                    15-29                Severe decrease  G5                    14 or less           Kidney failure          (1)In the absence of evidence of kidney disease, neither GFR category G1 or G2 fulfill the criteria for CKD.    eGFR calculation 2021 CKD-EPI creatinine equation, which does not include race as a factor    BNP [[id number]]  (Normal) Collected: 04/04/25 1748    Specimen: Blood Updated: 04/04/25 1813     proBNP 163.6 pg/mL     Narrative:      This assay is used as an aid in the diagnosis of individuals suspected of having heart failure. It can be used as an aid in the diagnosis of acute decompensated heart failure (ADHF) in patients presenting with signs and symptoms of ADHF to the emergency department (ED). In addition, NT-proBNP of <300 pg/mL indicates ADHF is not likely.    Age Range Result Interpretation  NT-proBNP Concentration (pg/mL:      <50              Positive            >450                   Gray                 300-450                    Negative             <300    50-75           Positive            >900                  Gray                300-900                  Negative            <300      >75             Positive            >1800                  Gray                300-1800                  Negative            <300    CBC Auto Differential [[id number]]  (Abnormal) Collected: 04/04/25 1748    Specimen: Blood Updated: 04/04/25 1755     WBC 12.99 10*3/mm3      RBC 5.41 10*6/mm3      Hemoglobin 15.7 g/dL      Hematocrit 48.7 %      MCV 90.0 fL      MCH 29.0 pg      MCHC 32.2 g/dL      RDW 14.8 %      RDW-SD 48.7 fl      MPV 10.0 fL      Platelets 291 10*3/mm3      Neutrophil % 63.0 %      Lymphocyte % 27.5 %      Monocyte % 6.9 %      Eosinophil % 1.4 %      Basophil % 0.7 %      Immature Grans % 0.5 %      Neutrophils, Absolute 8.20 10*3/mm3      Lymphocytes, Absolute 3.57 10*3/mm3      Monocytes, Absolute 0.89 10*3/mm3      Eosinophils, Absolute 0.18 10*3/mm3      Basophils, Absolute 0.09 10*3/mm3      Immature Grans, Absolute 0.06 10*3/mm3      nRBC 0.0 /100 WBC              Imaging:    No Radiology Exams Resulted Within Past 24 Hours      Differential Diagnosis and Discussion:    Edema: Based on the patient's history, signs, and symptoms, the differential diagnosis includes but is not limited to heart failure, kidney disease, liver disease, venous insufficiency, lymphedema, pregnancy, medications, allergic reactions.    PROCEDURES:    Ultrasound was performed in the emergency department and the ultrasound impression was interpreted by me.     No orders to display       Procedures    MDM  Number of Diagnoses or Management Options  Pain and swelling of left lower leg  Diagnosis management comments: Patient presented with left lower leg swelling that worsened over the past few days since she has been diagnosed with arthritis of her left knee at the urgent  care earlier in the week.  She denied chest pain or shortness of breath.  Venous duplex study was negative for DVT or SVT.  Patient symptoms likely stem from her arthritis and sedentary lifestyle.  Discussed supportive care measures with the patient she voiced understanding. I do not believe that the patient has an acute emergency medical condition requiring additional emergency management at this time. The patient is currently stable for outpatient treatment and continuation of care. Important signs and symptoms that would warrant return to the emergency department were reviewed. The patient was provided the opportunity to ask questions. All questions were addressed and the patient was discharged from the ED. The patient demonstrated understanding and agreed to plan         Amount and/or Complexity of Data Reviewed  Tests in the radiology section of CPT®: ordered and reviewed  Decide to obtain previous medical records or to obtain history from someone other than the patient: yes    Risk of Complications, Morbidity, and/or Mortality  Presenting problems: minimal  Diagnostic procedures: minimal  Management options: minimal    Patient Progress  Patient progress: stable                       Patient Care Considerations:          Consultants/Shared Management Plan:    I have discussed the case with  who placed the referral for home health evaluation.    Social Determinants of Health:    Patient is independent, reliable, and has access to care.       Disposition and Care Coordination:    Discharged: The patient is suitable and stable for discharge with no need for consideration of admission.        Final diagnoses:   Pain and swelling of left lower leg        ED Disposition       ED Disposition   Discharge    Condition   Stable    Comment   --               This medical record created using voice recognition software.             Kelly Blair APRN  04/04/25 1928

## 2025-04-15 ENCOUNTER — OFFICE VISIT (OUTPATIENT)
Dept: ORTHOPEDIC SURGERY | Facility: CLINIC | Age: 70
End: 2025-04-15
Payer: MEDICARE

## 2025-04-15 VITALS
WEIGHT: 279 LBS | HEIGHT: 63 IN | SYSTOLIC BLOOD PRESSURE: 137 MMHG | BODY MASS INDEX: 49.43 KG/M2 | HEART RATE: 81 BPM | DIASTOLIC BLOOD PRESSURE: 74 MMHG | OXYGEN SATURATION: 92 %

## 2025-04-15 DIAGNOSIS — M17.12 PRIMARY OSTEOARTHRITIS OF LEFT KNEE: Primary | ICD-10-CM

## 2025-04-15 DIAGNOSIS — E66.01 OBESITY, MORBID, BMI 40.0-49.9: ICD-10-CM

## 2025-04-15 NOTE — PROGRESS NOTES
"Chief Complaint  Pain and Initial Evaluation of the Left Knee       Subjective      Randi Fajardo presents to [facility] for an evaluation  of her left knee. Her left knee has been bothering her for several months but denies any specific injury, trauma, falls or prior surgery on her left knee. She went to urgent care on 25 where she had a left knee x-rays. She presents today ambulating today with a cane. She has pain with prolonged walking and going up and down stairs.     Allergies   Allergen Reactions    Nexium [Esomeprazole] Hives    Prilosec [Omeprazole] Hives        Social History     Socioeconomic History    Marital status:    Tobacco Use    Smoking status: Former     Current packs/day: 0.00     Average packs/day: 0.5 packs/day for 51.0 years (25.5 ttl pk-yrs)     Types: Cigarettes     Start date: 1969     Quit date:      Years since quittin.2     Passive exposure: Past    Smokeless tobacco: Never    Tobacco comments:     Was off and on during the 40 years   Vaping Use    Vaping status: Never Used   Substance and Sexual Activity    Alcohol use: Yes     Comment: una    Drug use: Never    Sexual activity: Not Currently     Birth control/protection: None        I reviewed the patient's chief complaint, history of present illness, review of systems, past medical history, surgical history, family history, social history, medications, and allergy list.     Review of Systems     Constitutional: Denies fevers, chills, weight loss  Cardiovascular: Denies chest pain, shortness of breath  Skin: Denies rashes, acute skin changes  Neurologic: Denies headache, loss of consciousness  MSK: left knee pain       Vital Signs:   /74   Pulse 81   Ht 160 cm (63\")   Wt 127 kg (279 lb)   SpO2 92%   BMI 49.42 kg/m²            Ortho Exam    Physical Exam  General:Alert. No acute distress     Left lower extremity: full extension, flexion  to 125 degrees, small " effusion, mild swelling, stable to varus/valgus stress, negative  Lachman's and April's, non tender to the medial joint line  and lateral joint line, crepitus with range of motion, distal neurovascularly intact, positive  pulses, positive EHL, FHL, GS, and TA. Sensation intact to all 5 nerves of the foot.      Procedures    Imaging Results (Most Recent)       None             Result Review :       Duplex Venous Lower Extremity LEFT  Result Date: 4/4/2025  Narrative:   Normal left lower extremity venous duplex scan.   Left popliteal fossa fluid collection.     XR Knee 4+ View Left  Result Date: 3/31/2025  Narrative: XR KNEE 4+ VW LEFT Date of Exam: 3/31/2025 11:27 AM EDT Indication: Pain in the anterior knee in the superior portion no known injury Comparison: None available. Findings: No acute fracture is identified. No focal osseous abnormality is identified. There is moderate tricompartmental osteoarthritis most prominent along the patellofemoral compartment. There is a prominent ossicle in the suprapatellar joint, suggestive of an osseous loose body. The patellar alignment is anatomic. There is a suprapatellar joint effusion.     Impression: Impression: 1.No acute osseous process identified. 2.Degenerative changes as described above. 3.Suprapatellar joint effusion. Electronically Signed: Ezequiel Mosher MD  3/31/2025 11:48 AM EDT  Workstation ID: [id number]             Assessment and Plan     Diagnoses and all orders for this visit:    1. Primary osteoarthritis of left knee (Primary)    2. Obesity, morbid, BMI 40.0-49.9        The patient presents here today for an evaluation  of her left knee.     Will seek an approval for a left knee gel injection in the future.     She will continue the topical creams and home exercises given today.     Educated on risk of elevated BMI.  Discussed options for weight loss/decreasing BMI prior to procedure including dietician consult, weight loss options and exercise program.  and Call or return if worsening symptoms.    Follow Up     After gel injection approval     Patient was given instructions and counseling regarding her condition or for health maintenance advice. Please see specific information pulled into the AVS if appropriate.     Scribed for Luca Mckeon MD by Isaura Arambula.  04/15/25   11:13 EDT    I have personally performed the services described in this document as scribed by the above individual and it is both accurate and complete. Luca Mckeon MD 04/16/25

## 2025-04-18 DIAGNOSIS — R06.09 DOE (DYSPNEA ON EXERTION): ICD-10-CM

## 2025-04-18 RX ORDER — TIOTROPIUM BROMIDE AND OLODATEROL 3.124; 2.736 UG/1; UG/1
SPRAY, METERED RESPIRATORY (INHALATION)
Qty: 4 G | Refills: 1 | Status: SHIPPED | OUTPATIENT
Start: 2025-04-18

## 2025-04-21 ENCOUNTER — TELEPHONE (OUTPATIENT)
Dept: ORTHOPEDIC SURGERY | Facility: CLINIC | Age: 70
End: 2025-04-21
Payer: MEDICARE

## 2025-04-21 NOTE — TELEPHONE ENCOUNTER
----- Message from Jane SHAW sent at 4/18/2025 10:48 AM EDT -----  Patient has been approved for left knee Euflexxa for dates:  04/28/25 to 10/24/25.  Okay to schedule when appropriate.  Auth #:  [id number]

## 2025-04-21 NOTE — TELEPHONE ENCOUNTER
APPT: LT KNEE EUFLEXXA INJ WITH JESSI AT [facility]  5-5 AT 11:00 AM  5-12 AT 11:00 AM  5-19 AT 11:00 AM    PER PT, NO RECENT INJECTION.    [facility] MEDICARE=[facility] EXP 10-24-25

## 2025-04-27 DIAGNOSIS — E11.9 TYPE 2 DIABETES MELLITUS WITHOUT COMPLICATION, WITHOUT LONG-TERM CURRENT USE OF INSULIN: ICD-10-CM

## 2025-04-27 DIAGNOSIS — G62.9 NEUROPATHY: ICD-10-CM

## 2025-04-28 ENCOUNTER — OFFICE VISIT (OUTPATIENT)
Dept: CARDIOLOGY | Facility: CLINIC | Age: 70
End: 2025-04-28
Payer: MEDICARE

## 2025-04-28 VITALS
OXYGEN SATURATION: 95 % | BODY MASS INDEX: 48.78 KG/M2 | SYSTOLIC BLOOD PRESSURE: 105 MMHG | HEART RATE: 67 BPM | DIASTOLIC BLOOD PRESSURE: 59 MMHG | RESPIRATION RATE: 18 BRPM | HEIGHT: 63 IN | WEIGHT: 275.3 LBS

## 2025-04-28 DIAGNOSIS — E78.2 MIXED HYPERLIPIDEMIA: ICD-10-CM

## 2025-04-28 DIAGNOSIS — E66.01 MORBID OBESITY WITH BMI OF 50.0-59.9, ADULT: ICD-10-CM

## 2025-04-28 DIAGNOSIS — I50.32 CHRONIC DIASTOLIC (CONGESTIVE) HEART FAILURE: ICD-10-CM

## 2025-04-28 DIAGNOSIS — I48.92 ATRIAL FLUTTER WITH CONTROLLED RESPONSE: Primary | ICD-10-CM

## 2025-04-28 PROCEDURE — 3074F SYST BP LT 130 MM HG: CPT | Performed by: INTERNAL MEDICINE

## 2025-04-28 PROCEDURE — 93000 ELECTROCARDIOGRAM COMPLETE: CPT | Performed by: INTERNAL MEDICINE

## 2025-04-28 PROCEDURE — 1159F MED LIST DOCD IN RCRD: CPT | Performed by: INTERNAL MEDICINE

## 2025-04-28 PROCEDURE — 1160F RVW MEDS BY RX/DR IN RCRD: CPT | Performed by: INTERNAL MEDICINE

## 2025-04-28 PROCEDURE — 99214 OFFICE O/P EST MOD 30 MIN: CPT | Performed by: INTERNAL MEDICINE

## 2025-04-28 PROCEDURE — 3078F DIAST BP <80 MM HG: CPT | Performed by: INTERNAL MEDICINE

## 2025-04-28 RX ORDER — METOPROLOL SUCCINATE 50 MG/1
50 TABLET, EXTENDED RELEASE ORAL DAILY
Qty: 90 TABLET | Refills: 2 | Status: SHIPPED | OUTPATIENT
Start: 2025-04-28

## 2025-04-28 RX ORDER — GABAPENTIN 600 MG/1
600 TABLET ORAL 3 TIMES DAILY PRN
Qty: 90 TABLET | Refills: 0 | Status: SHIPPED | OUTPATIENT
Start: 2025-04-28

## 2025-04-28 RX ORDER — FAMOTIDINE 40 MG/1
40 TABLET, FILM COATED ORAL 2 TIMES DAILY
Qty: 90 TABLET | Refills: 2 | Status: SHIPPED | OUTPATIENT
Start: 2025-04-28

## 2025-04-28 NOTE — PROGRESS NOTES
[facility]  INTERVENTIONAL CARDIOLOGY FOLLOW-UP PROGRESS NOTE        Chief Complaint  Follow-up (4 month ) and Palpitations    Subjective            Palpitations       Randi Fajardo is a 69 y.o. female who presents to [facility] CARDIOLOGY    History of Present Illness  Patient is here for follow-up visit.  Patient has history of atrial flutter status post radiofrequency ablation of cavotricuspid isthmus by Dr. James on 11/4/2024, currently on Eliquis and metoprolol.  Patient also has diastolic heart failure with ejection fraction of 55 to 60%.  Patient has been seen by electrophysiology NP on 3/11/2025 for episodes of palpitation post CTI ablation.  Patient takes Eliquis 5 mg twice daily, metoprolol succinate 50 mg daily and as needed metoprolol tartrate 12.5 mg for breakthrough palpitations.  Her blood pressure today is 105/59 with heart rate of 67.  Patient is on spironolactone 25 mg daily.  Her blood pressure on 3/31/2025 was 96/60.  Patient states she continues to have intermittent palpitations but the frequency have reduced from moderate was prior to ablation.  Patient denies chest pain or worsening of shortness of breath compared to her baseline.    Occasional dizziness upon standing from a seated position is reported, which she believes may be related to her knee pain.    A history of diabetes is noted, which is well-managed. Her blood glucose level was recorded as 92 this morning. Insulin therapy has never been required. The condition is managed with metformin and dietary control.    SOCIAL HISTORY  Diet: Tries to control diabetes with diet.  Tobacco: Does not smoke.      FAMILY HISTORY  - Mother: Arthritis  - Brother: Arthritis  - Aunt: Arthritis         Past History:  Past Medical History:   Diagnosis Date    Abnormal ECG     Arrhythmia     Arthritis of back 2018?    Atrial fibrillation     Cataract 07/22    Colon polyp ?    Found during initial colonoscopy    COPD (chronic  obstructive pulmonary disease)     Coronary artery disease     Afib and irregular heartbeat    CTS (carpal tunnel syndrome) ?    Have had corrective surgery on both    Depressed     Elevated cholesterol     Fracture of ankle     Plate and screws were used to correct break    GREGG (generalized anxiety disorder)     Gastroesophageal reflux     HTN (hypertension)     Hyperlipidemia     Hypothyroid     Irregular heartbeat     Knee swelling ?    Lesion of neck     Low back strain ?    Has happenened several times    Obesity     Since birth    Osteoarthritis     Pneumonia 1960    RLS (restless legs syndrome)     Shortness of breath Approx. 3 yrs. ago    Sleep apnea in adult     Sleep apnea, obstructive  ?    Type 2 diabetes mellitus        Medical History:  Past Medical History:   Diagnosis Date    Abnormal ECG     Arrhythmia     Arthritis of back ?    Atrial fibrillation     Cataract     Colon polyp ?    Found during initial colonoscopy    COPD (chronic obstructive pulmonary disease)     Coronary artery disease     Afib and irregular heartbeat    CTS (carpal tunnel syndrome) ?    Have had corrective surgery on both    Depressed     Elevated cholesterol     Fracture of ankle     Plate and screws were used to correct break    GREGG (generalized anxiety disorder)     Gastroesophageal reflux     HTN (hypertension)     Hyperlipidemia     Hypothyroid     Irregular heartbeat     Knee swelling ?    Lesion of neck     Low back strain ?    Has happenened several times    Obesity     Since birth    Osteoarthritis     Pneumonia 1960    RLS (restless legs syndrome)     Shortness of breath Approx. 3 yrs. ago    Sleep apnea in adult     Sleep apnea, obstructive  ?    Type 2 diabetes mellitus        Surgical History:   has a past surgical history that includes Ankle Tendon Repair (Right, );  section; Colonoscopy; Neck surgery; Fracture surgery (1984); Carpal tunnel release  (Bilateral); Colonoscopy (N/A, 07/29/2024); Cardiac electrophysiology procedure (N/A, 11/04/2024); Cardiac surgery (11/2024); Ankle fracture surgery (1086); Ablation of dysrhythmic focus (11/24); and Hand surgery (?).     Family History:   family history includes Arthritis in her maternal aunt, maternal aunt, maternal aunt, and mother; COPD in her father; Cancer in her brother, father, maternal aunt, maternal aunt, and maternal aunt; Colon cancer in her father; Diabetes in her paternal uncle; Emphysema in her father; Hearing loss in her maternal aunt, maternal aunt, maternal aunt, and maternal aunt; Heart disease in her father; Lung cancer in her father; Rheumatologic disease in her maternal aunt; Stroke in her maternal grandfather; Thyroid disease in her mother; Vision loss in her father, maternal grandfather, and mother.     Social History:   reports that she quit smoking about 5 years ago. Her smoking use included cigarettes. She started smoking about 56 years ago. She has a 25.5 pack-year smoking history. She has been exposed to tobacco smoke. She has never used smokeless tobacco. She reports current alcohol use. She reports that she does not use drugs.    Allergies:   Nexium [esomeprazole] and Prilosec [omeprazole]    Current Outpatient Medications on File Prior to Visit   Medication Sig    Accu-Chek Softclix Lancets lancets 1 each by Other route 3 times a day. Use as instructed    albuterol sulfate HFA (Ventolin HFA) 108 (90 Base) MCG/ACT inhaler Inhale 2 puffs Every 4 (Four) Hours As Needed for Wheezing or Shortness of Air.    Alcohol Swabs (B-D SINGLE USE SWABS REGULAR) pads Apply 1 Application topically to the appropriate area as directed 5 (Five) Times a Day.    apixaban (Eliquis) 5 MG tablet tablet Take 1 tablet by mouth Every 12 (Twelve) Hours. Lot: [id number]  Exp: 9/25  4 boxes given    atorvastatin (LIPITOR) 40 MG tablet Take 1 tablet by mouth Every Night.    BIOTIN PO Take  by mouth Every Night.     Blood Glucose Monitoring Suppl (Accu-Chek Guide Me) w/Device kit Inject 1 kit under the skin into the appropriate area as directed See Admin Instructions.    bumetanide (BUMEX) 2 MG tablet PLEASE SEE ATTACHED FOR DETAILED DIRECTIONS    Calcium Carb-Cholecalciferol (CALCIUM 600 + D PO) Take 1 tablet by mouth After Dinner.    Diclofenac Sodium (VOLTAREN) 1 % gel gel May apply to each knee every 6 hours while awake    DULoxetine (CYMBALTA) 60 MG capsule TAKE TWO CAPSULES BY MOUTH EVERY DAY    empagliflozin (Jardiance) 25 MG tablet tablet Take 1 tablet by mouth Daily.    famotidine (Pepcid) 40 MG tablet Take 1 tablet by mouth 2 (Two) Times a Day.    gabapentin (NEURONTIN) 600 MG tablet Take 1 tablet by mouth 3 (Three) Times a Day As Needed (pain).    glucose blood (Accu-Chek Guide) test strip 1 each by Other route Daily. Use as instructed    levothyroxine (Synthroid) 50 MCG tablet Take 1 tablet by mouth Daily.    magnesium chloride ER 64 MG DR tablet Take 1 tablet by mouth Daily.    metFORMIN (GLUCOPHAGE) 500 MG tablet Take 1 tablet by mouth Daily With Dinner.    metoprolol tartrate (LOPRESSOR) 25 MG tablet Take 0.5 tablets by mouth 2 (Two) Times a Day As Needed (for sustained HR >120 bpm).    minocycline (MINOCIN,DYNACIN) 100 MG capsule TAKE ONE CAPSULE BY MOUTH EVERY DAY    multivitamin with minerals tablet tablet Take 1 tablet by mouth Every Night. 1 GUMMIE AT NIGHT    Potassium Gluconate 550 MG tablet Take  by mouth Every Evening.    rOPINIRole (REQUIP) 2 MG tablet TAKE 1 TABLET BY MOUTH EVERY NIGHT.    Stiolto Respimat 2.5-2.5 MCG/ACT aerosol solution inhaler INHALE TWO PUFFS BY MOUTH EVERY DAY    Zinc 50 MG tablet Take 1 tablet by mouth Every Night. CAPSULE, NOT TAB (WITH D3)    [DISCONTINUED] metoprolol succinate XL (TOPROL-XL) 25 MG 24 hr tablet Take 2 tablets by mouth Daily.    [DISCONTINUED] Omega-3 Fatty Acids (fish oil) 1000 MG capsule capsule Take  by mouth Daily With Dinner.    [DISCONTINUED]  "spironolactone (ALDACTONE) 25 MG tablet Take 1 tablet by mouth Daily.     No current facility-administered medications on file prior to visit.          Review of Systems   Cardiovascular:  Positive for palpitations.      Negative except as mentioned in HPI above.    Objective     /59   Pulse 67   Resp 18   Ht 160 cm (63\")   Wt 125 kg (275 lb 4.8 oz)   SpO2 95%   BMI 48.77 kg/m²       Physical Exam    General : Alert, awake, no acute distress  Neck : Supple, no carotid bruit, no jugular venous distention  CVS : Regular rate and rhythm, no murmur, rubs or gallops  Lungs: Clear to auscultation bilaterally, no crackles or rhonchi  Abdomen: Soft, nontender, bowel sounds heard in all 4 quadrants  Extremities: Warm, well-perfused, no pedal edema    Result Review :     The following data was reviewed by: Susi Soriano MD on 04/28/2025:    CMP          11/4/2024    10:20 3/27/2025    12:05 4/4/2025    17:48   CMP   Glucose 98  102  80    BUN 20  20  18    Creatinine 0.99  1.17  1.05    EGFR 61.1  50.0  56.9    Sodium 139  139  138    Potassium 4.7  4.7  4.3    Chloride 100  99  95    Calcium 9.5  9.6  9.9    Total Protein  7.4  7.7    Albumin  3.9  4.0    Globulin  3.5  3.7    Total Bilirubin  0.5  0.5    Alkaline Phosphatase  79  80    AST (SGOT)  28  22    ALT (SGPT)  20  16    Albumin/Globulin Ratio  1.1  1.1    BUN/Creatinine Ratio 20.2  17.1  17.1    Anion Gap 14.0  11.8  13.0      CBC          2/25/2025    13:32 3/27/2025    12:05 4/4/2025    17:48   CBC   WBC  10.31  12.99    RBC  5.48  5.41    Hemoglobin 14.9  15.7  15.7    Hematocrit  48.7  48.7    MCV  88.9  90.0    MCH  28.6  29.0    MCHC  32.2  32.2    RDW  14.0  14.8    Platelets  282  291      TSH          8/19/2024    01:28 12/5/2024    10:41 3/27/2025    12:05   TSH   TSH 3.710  2.570  1.780      Lipid Panel          5/13/2024    10:14 12/5/2024    10:41 3/27/2025    12:05   Lipid Panel   Total Cholesterol 118  110  115    Triglycerides 156  " 112  126    HDL Cholesterol 38  41  38    VLDL Cholesterol 27  21  23    LDL Cholesterol  53  48  54    LDL/HDL Ratio 1.28  1.14  1.36       A1C Last 3 Results          5/13/2024    10:14 12/5/2024    10:41 3/27/2025    12:05   HGBA1C Last 3 Results   Hemoglobin A1C 6.50  6.50  6.70          Data reviewed: Cardiology studies    Results for orders placed during the hospital encounter of 08/16/24    Adult Transthoracic Echo Complete W/ Cont if Necessary Per Protocol    Interpretation Summary  Right ventricle is dilated whereas remaining chambers appear to be grossly normal in size  LV has normal wall thickness.  LV systolic function and wall motion is normal.  Calculated LVEF is 55 to 60%.  Diastolic function cannot be accurately assessed.  Paradoxical septal motion is noted due to bundle branch block. There is interventricular septal flattening noted predominantly in diastole suggestive of volume overload in the right ventricle  RV systolic function cannot be accurately assessed.  Valves are not well-visualized.  RVSP cannot be calculated due to insufficient TR jet.  IVC is dilated corresponding to a right atrial pressure of 8 to 10 mmHg  No significant pericardial effusion is noted    Compared to study from March 11, 2022.  LV systolic function remains the same.  RV dilatation is noted.  Septal flattening is noted as mentioned above.    Results for orders placed during the hospital encounter of 07/20/22    Stress Test With Myocardial Perfusion Two Day    Interpretation Summary  Patient received Lexiscan 0.4 mg IV fusion over 10 seconds.  Baseline ECG showed normal sinus rhythm with right bundle branch block.  At peak stress, no ischemic ST-T changes were noted.  No significant arrhythmias were noted.  Gated and SPECT images were obtained.  Image quality is adequate.  Attenuation artifact is present.  There is a medium area of mild to moderate perfusion defect in the distal anterior and apical segments with partial  reversibility on resting images which could represent myocardial ischemia in the right clinical setting.  The left ventricle was normal in size with a calculated ejection fraction exceeding 70%.  Overall, this represents an intermediate myocardial perfusion scan.      ECG 12 Lead    Date/Time: 4/28/2025 2:14 PM  Performed by: Susi Soriano MD    Authorized by: Susi Soriano MD  Comparison: compared with previous ECG   Similar to previous ECG  Comparison to previous ECG: Compared to EKG from 3/11/2025, sinus rhythm with intermittent junctional rhythm is found.  Rhythm: sinus rhythm  Rate: normal  Conduction: right bundle branch block    Clinical impression: abnormal EKG  Comments: Sinus rhythm with intermittent sinus rhythm.  Right bundle branch block.                ASCVD Score  The ASCVD Risk score (Jerad DK, et al., 2019) failed to calculate for the following reasons:    The valid total cholesterol range is 130 to 320 mg/dL         Assessment and Plan          Diagnoses and all orders for this visit:    1. Atrial flutter with controlled response (Primary)  -     metoprolol succinate XL (TOPROL-XL) 50 MG 24 hr tablet; Take 1 tablet by mouth Daily.  Dispense: 90 tablet; Refill: 2    2. Chronic diastolic (congestive) heart failure    3. Mixed hyperlipidemia    4. Morbid obesity with BMI of 50.0-59.9, adult    Other orders  -     ECG 12 Lead          Assessment & Plan  1. Atrial Flutter status post CTI ablation on 11/4/2024  EKG results today indicate sinus rhythm, patient having intermittent chest palpitation but not as frequent as prior to the ablation.  A prescription for metoprolol 50 mg daily will be provided to simplify medication intake.  She was previously taking 25 mg 2 tablets a day.  She is advised to continue her current medications, including Eliquis and Jardiance, and consult with Dr. James on 06/10/2025 regarding any additional treatment options.  Patient is asking if we can reduce her medication  burden.  Will discontinue fish oil as her lipid profile is well-controlled and spironolactone as her blood pressure in the lower side and patient having postural dizziness.    2. Diabetes Mellitus.  Diabetes is well-controlled with a recent blood glucose reading of 92. She will continue her current regimen of metformin and dietary management.    3. Medication Management.  Discontinue fish oil and spironolactone as lipid levels are well-controlled (LDL < 70) and blood pressure is on the lower side.     Follow-up  Follow-up appointment scheduled in 6 months.        Patient was educated on heart healthy diet, daily exercise and achieving optimal weight.     Follow Up     Return in about 6 months (around 10/28/2025) for Next scheduled follow up.      Randi Fajardo  reports that she quit smoking about 5 years ago. Her smoking use included cigarettes. She started smoking about 56 years ago. She has a 25.5 pack-year smoking history. She has been exposed to tobacco smoke. She has never used smokeless tobacco.        I spent 30 minutes caring for this patient on this date of service. This time includes time spent by me in the following activities:preparing for the visit, reviewing tests, obtaining and/or reviewing a separately obtained history, performing a medically appropriate examination and/or evaluation , counseling and educating the patient/family/caregiver, ordering medications, tests, or procedures, referring and communicating with other health care professionals , documenting information in the medical record, independently interpreting results and communicating that information with the patient/family/caregiver, and care coordination.    I have reviewed the family history, social history, and past medical history for this patient. Previous information and data has been reviewed and updated as needed. I have reviewed and verified the chief complaint, history, and other documentation. The patient was interviewed  and examined in the clinic and the chart reviewed. The previous observations, recommendations, and conclusions were reviewed including those of other providers.     The plan was discussed with the patient and/or family. The patient was given time to ask questions and these questions were answered. At the conclusion of their visit they had no additional questions or concerns and all questions were answered to their satisfaction.     Patient was given instructions and counseling regarding her condition or for health maintenance advice. Please see specific information pulled into the AVS if appropriate.      Patient or patient representative verbalized consent for the use of Ambient Listening during the visit with  Susi Soriano MD for chart documentation. 4/28/2025  14:16 EDT      Susi Soriano MD, [facility]  04/28/25  14:10 EDT    Dictated Utilizing Dragon Dictation

## 2025-04-28 NOTE — LETTER
April 28, 2025     Mainor Sanders Jr., MD  [address]  [place] [address]    Patient: Randi Fajardo   YOB: 1955   Date of Visit: 4/28/2025       Dear Mainor Sanders Jr., MD,    Randi Fajardo was in my office today. Below are the relevant portions of my assessment and plan of care.           If you have questions, please do not hesitate to call me. I look forward to following Randi along with you.         Sincerely,        Susi Soriano MD        CC: MD Paula Herrera APRN

## 2025-04-28 NOTE — TELEPHONE ENCOUNTER
Refill Request for Controlled Substance    Date of Request: 4/28/2025  Medication Requested: Gabapentin   Last UDS: 12/05/2024  Last Consent: 11/14/2023  Last OV: 03/04/2025  Next OV: 06/06/2025

## 2025-05-01 NOTE — Clinical Note
ACT = 397 (sec). ACT result was completed at 13:54 EST. Render Post-Care Instructions In Note?: no Detail Level: Detailed Consent: The patient's consent was obtained including but not limited to risks of crusting, scabbing, blistering, scarring, darker or lighter pigmentary change, recurrence, incomplete removal and infection. Show Applicator Variable?: Yes Post-Care Instructions: I reviewed with the patient in detail post-care instructions. Patient is to wear sunprotection, and avoid picking at any of the treated lesions. Pt may apply Vaseline to crusted or scabbing areas. Duration Of Freeze Thaw-Cycle (Seconds): 0 Number Of Freeze-Thaw Cycles: 2 freeze-thaw cycles

## 2025-05-05 ENCOUNTER — OFFICE VISIT (OUTPATIENT)
Dept: ORTHOPEDIC SURGERY | Facility: CLINIC | Age: 70
End: 2025-05-05
Payer: MEDICARE

## 2025-05-05 VITALS
WEIGHT: 275 LBS | BODY MASS INDEX: 48.73 KG/M2 | DIASTOLIC BLOOD PRESSURE: 71 MMHG | HEART RATE: 70 BPM | HEIGHT: 63 IN | SYSTOLIC BLOOD PRESSURE: 106 MMHG | OXYGEN SATURATION: 96 %

## 2025-05-05 DIAGNOSIS — M17.12 PRIMARY OSTEOARTHRITIS OF LEFT KNEE: Primary | ICD-10-CM

## 2025-05-05 NOTE — PROGRESS NOTES
"Chief Complaint  Follow-up of the Left Knee    Subjective          History of Present Illness      Randi Fajardo is a 69 y.o. female  presents to [facility] for     Patient presents for follow-up evaluation of left knee pain left knee osteoarthritis she saw Dr. Mckeon on 4/15/2025 and he recommended seeking approval for viscosupplementation she is here for left knee Euflexxa injection #1.      Allergies   Allergen Reactions    Nexium [Esomeprazole] Hives    Prilosec [Omeprazole] Hives        Social History     Socioeconomic History    Marital status:    Tobacco Use    Smoking status: Former     Current packs/day: 0.00     Average packs/day: 0.5 packs/day for 51.0 years (25.5 ttl pk-yrs)     Types: Cigarettes     Start date: 1969     Quit date:      Years since quittin.3     Passive exposure: Past    Smokeless tobacco: Never    Tobacco comments:     Was off and on during the 40 years   Vaping Use    Vaping status: Never Used   Substance and Sexual Activity    Alcohol use: Yes     Comment: Socially on occasion    Drug use: Never    Sexual activity: Not Currently     Birth control/protection: None        REVIEW OF SYSTEMS    Constitutional: Awake alert and oriented x3, no acute distress, denies fevers, chills, weight loss  Respiratory: No respiratory distress  Vascular: Brisk cap refill, Intact distal pulses, No cyanosis, compartments soft with no signs or symptoms of compartment syndrome or DVT.   Cardiovascular: Denies chest pain, shortness of breath  Skin: Denies rashes, acute skin changes  Neurologic: Denies headache, loss of consciousness  MSK: Knee pain      Objective   Vital Signs:   /71   Pulse 70   Ht 160 cm (62.99\")   Wt 125 kg (275 lb)   SpO2 96%   BMI 48.73 kg/m²     Body mass index is 48.73 kg/m².    Physical Exam       Left knee:: full extension, flexion to 125 degrees, small effusion, mild swelling, stable to varus/valgus stress, " negative Lachman's and April's, non tender to the medial joint line and lateral joint line, crepitus with range of motion, distal neurovascularly intact, positive pulses, positive EHL, FHL, GS, and TA. Sensation intact to all 5 nerves of the foot.       Large Joint: L knee  Date/Time: 5/5/2025 11:26 AM  Consent given by: patient  Site marked: site marked  Timeout: Immediately prior to procedure a time out was called to verify the correct patient, procedure, equipment, support staff and site/side marked as required   Supporting Documentation  Indications: pain   Procedure Details  Location: knee - L knee  Preparation: Patient was prepped and draped in the usual sterile fashion  Needle gauge: 21g.  Medications administered: 20 mg Sodium Hyaluronate (Viscosup) 20 MG/2ML  Patient tolerance: patient tolerated the procedure well with no immediate complications      This injection documentation was Scribed for BRIGIDO Villa by Tess Brown MA.  05/05/25   11:27 EDT    Imaging Results (Most Recent)       None                   Assessment and Plan    Diagnoses and all orders for this visit:    1. Primary osteoarthritis of left knee (Primary)  -     Large Joint: L knee        Discussed diagnosis and treatment options with patient she elected to have left knee Euflexxa injection #1.  A sterile prep of the injection site was performed with chloraprep. Cryospray was used for local anesthesia. The site was injected. The patient tolerated the procedure well without complications. Post injection pain was discussed.    The risks, benefits, complications, treatment options/alternatives, and expected outcomes/goals were discussed with the patient.       Call or return if worsening symptoms.    Follow Up   Return in about 1 week (around 5/12/2025) for Recheck.  Patient was given instructions and counseling regarding her condition or for health maintenance advice. Please see specific information pulled into the AVS  if appropriate.       EMR Dragon/Transcription disclaimer:  Part of this note may be an electronic transcription/translation of spoken language to printed text using the Dragon Dictation System

## 2025-05-12 ENCOUNTER — OFFICE VISIT (OUTPATIENT)
Dept: ORTHOPEDIC SURGERY | Facility: CLINIC | Age: 70
End: 2025-05-12
Payer: MEDICARE

## 2025-05-12 VITALS
WEIGHT: 275 LBS | HEIGHT: 63 IN | BODY MASS INDEX: 48.73 KG/M2 | DIASTOLIC BLOOD PRESSURE: 77 MMHG | OXYGEN SATURATION: 90 % | HEART RATE: 62 BPM | SYSTOLIC BLOOD PRESSURE: 135 MMHG

## 2025-05-12 DIAGNOSIS — M17.12 PRIMARY OSTEOARTHRITIS OF LEFT KNEE: Primary | ICD-10-CM

## 2025-05-12 PROCEDURE — 3078F DIAST BP <80 MM HG: CPT | Performed by: PHYSICIAN ASSISTANT

## 2025-05-12 PROCEDURE — 3075F SYST BP GE 130 - 139MM HG: CPT | Performed by: PHYSICIAN ASSISTANT

## 2025-05-12 PROCEDURE — 1159F MED LIST DOCD IN RCRD: CPT | Performed by: PHYSICIAN ASSISTANT

## 2025-05-12 PROCEDURE — 1160F RVW MEDS BY RX/DR IN RCRD: CPT | Performed by: PHYSICIAN ASSISTANT

## 2025-05-12 PROCEDURE — 20610 DRAIN/INJ JOINT/BURSA W/O US: CPT | Performed by: PHYSICIAN ASSISTANT

## 2025-05-12 NOTE — PROGRESS NOTES
"Chief Complaint  Follow-up of the Left Knee    Subjective          History of Present Illness      Randi Fajardo is a 69 y.o. female     History of Present Illness  Patient presents for follow-up evaluation of left knee pain left knee osteoarthritis she just received Euflexxa injection #1 last visit she states she cannot really feel a difference yet.  She is here for left knee Euflexxa injection #2.        Allergies   Allergen Reactions    Nexium [Esomeprazole] Hives    Prilosec [Omeprazole] Hives        Social History     Socioeconomic History    Marital status:    Tobacco Use    Smoking status: Former     Current packs/day: 0.00     Average packs/day: 0.5 packs/day for 51.0 years (25.5 ttl pk-yrs)     Types: Cigarettes     Start date: 1969     Quit date:      Years since quittin.3     Passive exposure: Past    Smokeless tobacco: Never    Tobacco comments:     Was off and on during the 40 years   Vaping Use    Vaping status: Never Used   Substance and Sexual Activity    Alcohol use: Yes     Comment: Socially on occasion    Drug use: Never    Sexual activity: Not Currently     Birth control/protection: None        REVIEW OF SYSTEMS    Constitutional: Awake alert and oriented x3, no acute distress, denies fevers, chills, weight loss  Respiratory: No respiratory distress  Vascular: Brisk cap refill, Intact distal pulses, No cyanosis, compartments soft with no signs or symptoms of compartment syndrome or DVT.   Cardiovascular: Denies chest pain, shortness of breath  Skin: Denies rashes, acute skin changes  Neurologic: Denies headache, loss of consciousness  MSK: Left knee pain      Objective   Vital Signs:   /77   Pulse 62   Ht 160 cm (62.99\")   Wt 125 kg (275 lb)   SpO2 90%   BMI 48.73 kg/m²     Body mass index is 48.73 kg/m².         Physical Exam  Left knee:: full extension, flexion to 125 degrees, small effusion, mild swelling, stable to varus/valgus stress, negative Lachman's and " April's, non tender to the medial joint line and lateral joint line, crepitus with range of motion, distal neurovascularly intact, positive pulses, positive EHL, FHL, GS, and TA. Sensation intact to all 5 nerves of the foot.            Large Joint: L knee  Date/Time: 5/12/2025 11:05 AM  Consent given by: patient  Site marked: site marked  Timeout: Immediately prior to procedure a time out was called to verify the correct patient, procedure, equipment, support staff and site/side marked as required   Supporting Documentation  Indications: pain   Procedure Details  Location: knee - L knee  Preparation: Patient was prepped and draped in the usual sterile fashion  Needle gauge: 21g.  Medications administered: 20 mg Sodium Hyaluronate (Viscosup) 20 MG/2ML  Patient tolerance: patient tolerated the procedure well with no immediate complications      This injection documentation was Scribed for BRIGIDO Villa by Tess Brown MA.  05/12/25   11:06 EDT    Imaging Results (Most Recent)       None                     Assessment and Plan    Diagnoses and all orders for this visit:    1. Primary osteoarthritis of left knee (Primary)  -     Large Joint: L knee        Assessment & Plan  Discussed diagnosis and treatment options with the patient, she elected to have left knee Euflexxa injection #2.  A sterile prep of the injection site was performed with chloraprep. Cryospray was used for local anesthesia. The site was injected. The patient tolerated the procedure well without complications. Post injection pain was discussed.    The risks, benefits, complications, treatment options/alternatives, and expected outcomes/goals were discussed with the patient.   Follow-up in 1 week for Euflexxa injection #3      Call or return if worsening symptoms.    Follow Up   Return in about 1 week (around 5/19/2025) for Recheck.  Patient was given instructions and counseling regarding her condition or for health maintenance  advice. Please see specific information pulled into the AVS if appropriate.       Contains text text generated by COLBY Copilot  EMR Dragon/Transcription disclaimer:  Part of this note may be an electronic transcription/translation of spoken language to printed text using the Dragon Dictation System

## 2025-05-12 NOTE — PROGRESS NOTES
Primary Care Provider  Mainor Sanders Jr., MD     Referring Provider  No ref. provider found       Patient or patient representative verbalized consent for the use of Ambient Listening during the visit with  EDDIE Liao for chart documentation. 5/13/2025  11:43 EDT    Chief Complaint  Bronchitis, Cough (Tan thick mucus ), Shortness of Breath (With exertion ), and Follow-up (6 month f/up )    Subjective          Randi Fajardo presents to [facility]  History of Present Illness  Randi Fajardo is a 69 y.o. female patient here for management of PENNY, chronic bronchitis and tobacco abuse of cigarettes in remission.    History of Present Illness  The patient presents for evaluation of sleep apnea and respiratory issues.    He reports a positive response to his CPAP therapy, which he finds indispensable for sleep. He occasionally utilizes it during daytime naps while seated in a chair. He experiences severe dry mouth, for which he uses a spray at night before bedtime. He often wakes up with a dry mouth and sometimes finds the tank in his CPAP machine completely dry, while at other times, it appears to use very little water. He has received a letter indicating the need for a CT scan this year and plans to schedule it.    He continues to use his Stiolto inhaler, as prescribed by Dr. Tracey, who also initiated the treatment. He has 3 refills remaining at home. The use of albuterol is infrequent, with only one instance in the past year, leaving him with a supply of 10 doses. He recalls a recent episode of panic attack characterized by difficulty breathing, which necessitated the use of albuterol. He has not required any antibiotics or steroids. He reports no fevers or chills but does experience a mild cough, particularly in the mornings after using his CPAP machine. He reports no hemoptysis or unintentional weight loss.    Supplemental Information  He  was prescribed weight loss injections but can not afford them due to the cost of Eliquis and Jardiance. He is considering ordering Eliquis from [facility] for $ 30 per month.    SOCIAL HISTORY  The patient is a former smoker and quit 5 years ago.    MEDICATIONS  Stiolto inhaler, albuterol, Eliquis, Jardiance       Her history of smoking is   Tobacco Use: Medium Risk (5/13/2025)    Patient History     Smoking Tobacco Use: Former     Smokeless Tobacco Use: Never     Passive Exposure: Past   .    Review of Systems   Constitutional:  Negative for chills, fatigue, fever, unexpected weight gain and unexpected weight loss.   HENT:  Congestion: Nasal.    Respiratory:  Negative for apnea, cough, shortness of breath and wheezing.         Negative for Hemoptysis     Cardiovascular:  Negative for chest pain, palpitations and leg swelling.   Skin:         Negative for cyanosis      Sleep: Negative for Excessive daytime sleepiness  Negative for morning headaches  Negative for Snoring    Family History   Problem Relation Age of Onset    Arthritis Mother     Thyroid disease Mother     Vision loss Mother         Wore glasses; cataract surgery; detached retina    Lung cancer Father     Heart disease Father         Had pacemaker    Colon cancer Father     Cancer Father         Lung, colon    Emphysema Father     COPD Father     Vision loss Father         Wore glasses; cataract surgery    Cancer Brother         Lung, pancreas    Cancer Maternal Aunt         Breast cancer    Arthritis Maternal Aunt     Hearing loss Maternal Aunt     Rheumatologic disease Maternal Aunt         Rheumatoid Arthritis    Arthritis Maternal Aunt     Hearing loss Maternal Aunt     Cancer Maternal Aunt     Vision loss Maternal Grandfather     Stroke Maternal Grandfather     Diabetes Paternal Uncle     Hearing loss Maternal Aunt     Arthritis Maternal Aunt     Hearing loss Maternal Aunt     Cancer Maternal Aunt         Cancer        Social History     Socioeconomic  History    Marital status:    Tobacco Use    Smoking status: Former     Current packs/day: 0.00     Average packs/day: 0.5 packs/day for 51.0 years (25.5 ttl pk-yrs)     Types: Cigarettes     Start date: 1969     Quit date:      Years since quittin.3     Passive exposure: Past    Smokeless tobacco: Never    Tobacco comments:     Was off and on during the 40 years   Vaping Use    Vaping status: Never Used   Substance and Sexual Activity    Alcohol use: Yes     Comment: Socially on occasion    Drug use: Never    Sexual activity: Not Currently     Birth control/protection: None        Past Medical History:   Diagnosis Date    Abnormal ECG     Arrhythmia     Arthritis of back ?    Atrial fibrillation     Cataract     Colon polyp ?    Found during initial colonoscopy    COPD (chronic obstructive pulmonary disease)     Coronary artery disease     Afib and irregular heartbeat    CTS (carpal tunnel syndrome) ?    Have had corrective surgery on both    Depressed     Elevated cholesterol     Fracture of ankle     Plate and screws were used to correct break    GREGG (generalized anxiety disorder)     Gastroesophageal reflux     HTN (hypertension)     Hyperlipidemia     Hypothyroid     Irregular heartbeat     Knee swelling ?    Lesion of neck     Low back strain ?    Has happenened several times    Obesity     Since birth    Osteoarthritis     Pneumonia 1960    RLS (restless legs syndrome)     Shortness of breath Approx. 3 yrs. ago    Sleep apnea in adult     Sleep apnea, obstructive  ?    Type 2 diabetes mellitus         Immunization History   Administered Date(s) Administered    Arexvy (RSV, Adults 60+ yrs) 2025    COVID-19 (PFIZER) 12YRS+ (COMIRNATY) 2024, 2024    COVID-19 (PFIZER) BIVALENT 12+YRS 10/05/2022, 2023    COVID-19 (PFIZER) Purple Cap Monovalent 2021, 2021, 10/12/2021    Fluad Quad 65+ 10/12/2021    Fluzone High-Dose 65+YRS 2019,  10/12/2021, 10/05/2022, 12/05/2024    Fluzone High-Dose 65+yrs 12/17/2020, 10/05/2022, 11/14/2023    Influenza, Unspecified 11/14/2019    Pneumococcal Conjugate 13-Valent (PCV13) 02/13/2019    Pneumococcal Conjugate 20-Valent (PCV20) 06/07/2022    Pneumococcal Polysaccharide (PPSV23) 03/01/2021    Shingrix 05/21/2015, 11/14/2019    Tdap 05/21/2015         Allergies   Allergen Reactions    Nexium [Esomeprazole] Hives    Prilosec [Omeprazole] Hives          Current Outpatient Medications:     Accu-Chek Softclix Lancets lancets, 1 each by Other route 3 times a day. Use as instructed, Disp: 200 each, Rfl: 3    albuterol sulfate HFA (Ventolin HFA) 108 (90 Base) MCG/ACT inhaler, Inhale 2 puffs Every 4 (Four) Hours As Needed for Wheezing or Shortness of Air., Disp: 18 g, Rfl: 5    Alcohol Swabs (B-D SINGLE USE SWABS REGULAR) pads, Apply 1 Application topically to the appropriate area as directed 5 (Five) Times a Day., Disp: 200 each, Rfl: 3    apixaban (Eliquis) 5 MG tablet tablet, Take 1 tablet by mouth Every 12 (Twelve) Hours. Lot: [id number] Exp: 9/25 4 boxes given, Disp: 56 tablet, Rfl: 0    atorvastatin (LIPITOR) 40 MG tablet, Take 1 tablet by mouth Every Night., Disp: 90 tablet, Rfl: 3    BIOTIN PO, Take  by mouth Every Night., Disp: , Rfl:     Blood Glucose Monitoring Suppl (Accu-Chek Guide Me) w/Device kit, Inject 1 kit under the skin into the appropriate area as directed See Admin Instructions., Disp: 1 kit, Rfl: 0    bumetanide (BUMEX) 2 MG tablet, PLEASE SEE ATTACHED FOR DETAILED DIRECTIONS, Disp: 180 tablet, Rfl: 1    Calcium Carb-Cholecalciferol (CALCIUM 600 + D PO), Take 1 tablet by mouth After Dinner., Disp: , Rfl:     Diclofenac Sodium (VOLTAREN) 1 % gel gel, May apply to each knee every 6 hours while awake, Disp: 350 g, Rfl: 1    DULoxetine (CYMBALTA) 60 MG capsule, TAKE TWO CAPSULES BY MOUTH EVERY DAY, Disp: 180 capsule, Rfl: 3    empagliflozin (Jardiance) 25 MG tablet tablet, Take 1 tablet by mouth Daily.,  Disp: 90 tablet, Rfl: 3    famotidine (Pepcid) 40 MG tablet, Take 1 tablet by mouth 2 (Two) Times a Day., Disp: 90 tablet, Rfl: 2    gabapentin (NEURONTIN) 600 MG tablet, Take 1 tablet by mouth 3 (Three) Times a Day As Needed (pain)., Disp: 90 tablet, Rfl: 0    glucose blood (Accu-Chek Guide) test strip, 1 each by Other route Daily. Use as instructed, Disp: 100 each, Rfl: 3    levothyroxine (Synthroid) 50 MCG tablet, Take 1 tablet by mouth Daily., Disp: 90 tablet, Rfl: 1    magnesium chloride ER 64 MG DR tablet, Take 1 tablet by mouth Daily., Disp: , Rfl:     metFORMIN (GLUCOPHAGE) 500 MG tablet, Take 1 tablet by mouth Daily With Dinner., Disp: 90 tablet, Rfl: 0    metoprolol succinate XL (TOPROL-XL) 50 MG 24 hr tablet, Take 1 tablet by mouth Daily., Disp: 90 tablet, Rfl: 2    metoprolol tartrate (LOPRESSOR) 25 MG tablet, Take 0.5 tablets by mouth 2 (Two) Times a Day As Needed (for sustained HR >120 bpm)., Disp: 30 tablet, Rfl: 11    minocycline (MINOCIN,DYNACIN) 100 MG capsule, TAKE ONE CAPSULE BY MOUTH EVERY DAY, Disp: 90 capsule, Rfl: 3    multivitamin with minerals tablet tablet, Take 1 tablet by mouth Every Night. 1 GUMMIE AT NIGHT, Disp: , Rfl:     Potassium Gluconate 550 MG tablet, Take  by mouth Every Evening., Disp: , Rfl:     rOPINIRole (REQUIP) 2 MG tablet, TAKE 1 TABLET BY MOUTH EVERY NIGHT., Disp: 90 tablet, Rfl: 3    Stiolto Respimat 2.5-2.5 MCG/ACT aerosol solution inhaler, INHALE TWO PUFFS BY MOUTH EVERY DAY, Disp: 4 g, Rfl: 1    Zinc 50 MG tablet, Take 1 tablet by mouth Every Night. CAPSULE, NOT TAB (WITH D3), Disp: , Rfl:      Objective   Physical Exam  Constitutional:       General: She is not in acute distress.     Appearance: Normal appearance. She is overweight.   HENT:      Right Ear: Hearing normal.      Left Ear: Hearing normal.      Nose: No nasal tenderness or congestion.      Mouth/Throat:      Mouth: Mucous membranes are moist. No oral lesions.   Eyes:      Extraocular Movements:  "Extraocular movements intact.      Pupils: Pupils are equal, round, and reactive to light.   Cardiovascular:      Rate and Rhythm: Normal rate and regular rhythm.      Pulses: Normal pulses.      Heart sounds: Normal heart sounds. No murmur heard.  Pulmonary:      Effort: Pulmonary effort is normal.      Breath sounds: Normal breath sounds. No wheezing, rhonchi or rales.   Musculoskeletal:      Right lower leg: No edema.      Left lower leg: No edema.   Skin:     General: Skin is warm and dry.      Findings: No lesion or rash.   Neurological:      General: No focal deficit present.      Mental Status: She is alert and oriented to person, place, and time.   Psychiatric:         Mood and Affect: Affect normal. Mood is not anxious or depressed.         Vital Signs:   /68 (BP Location: Left arm, Patient Position: Sitting, Cuff Size: Large Adult)   Pulse 69   Temp 97.6 °F (36.4 °C) (Oral)   Resp 16   Ht 160 cm (63\")   Wt 125 kg (275 lb)   SpO2 93% Comment: RA  BMI 48.71 kg/m²        Result Review :   The following data was reviewed by: EDDIE Liao on 05/13/2025:  CMP          11/4/2024    10:20 3/27/2025    12:05 4/4/2025    17:48   CMP   Glucose 98  102  80    BUN 20  20  18    Creatinine 0.99  1.17  1.05    EGFR 61.1  50.0  56.9    Sodium 139  139  138    Potassium 4.7  4.7  4.3    Chloride 100  99  95    Calcium 9.5  9.6  9.9    Total Protein  7.4  7.7    Albumin  3.9  4.0    Globulin  3.5  3.7    Total Bilirubin  0.5  0.5    Alkaline Phosphatase  79  80    AST (SGOT)  28  22    ALT (SGPT)  20  16    Albumin/Globulin Ratio  1.1  1.1    BUN/Creatinine Ratio 20.2  17.1  17.1    Anion Gap 14.0  11.8  13.0      CBC w/diff          2/25/2025    13:32 3/27/2025    12:05 4/4/2025    17:48   CBC w/Diff   WBC  10.31  12.99    RBC  5.48  5.41    Hemoglobin 14.9  15.7  15.7    Hematocrit  48.7  48.7    MCV  88.9  90.0    MCH  28.6  29.0    MCHC  32.2  32.2    RDW  14.0  14.8    Platelets  282  291  "   Neutrophil Rel %  55.1  63.0    Immature Granulocyte Rel %  0.6  0.5    Lymphocyte Rel %  32.5  27.5    Monocyte Rel %  8.1  6.9    Eosinophil Rel %  2.9  1.4    Basophil Rel %  0.8  0.7      Data reviewed : Radiologic studies chest 1/26/2024, chest xray 8/14/2024 and my last office note    Procedures        Assessment and Plan    Diagnoses and all orders for this visit:    1. PENNY (obstructive sleep apnea) (Primary)  Comments:  continue CPAP.  Patient using and benefiting    2. Mixed simple and mucopurulent chronic bronchitis  Comments:  Continue Stiolto  Orders:  -     albuterol sulfate HFA (Ventolin HFA) 108 (90 Base) MCG/ACT inhaler; Inhale 2 puffs Every 4 (Four) Hours As Needed for Wheezing or Shortness of Air.  Dispense: 18 g; Refill: 5    3. Dyspnea on exertion  Comments:  continue albuterol as needed    4. Nicotine dependence, cigarettes, in remission  Comments:  PCP has been ordering patients LDCT  Orders:  -      CT Chest Low Dose Cancer Screening WO; Future        Assessment & Plan  1. Sleep apnea.  He reports doing well with his CPAP machine and uses it consistently, even for naps. He experiences dry mouth, especially in the morning, which he manages with a spray at night. There is no current solution for the varying humidity levels affecting his CPAP machine. A CT scan has not been ordered for this year, and he plans to call and schedule it.    2.  Chronic bronchitis  He continues to use his Stiolto inhaler and rarely needs albuterol. He had a panic attack recently, which required the use of albuterol. He has not been on antibiotics or steroids and reports no fever, chills, or coughing up blood. He will be provided with a refill for albuterol to be sent to [facility].    Follow-up  The patient will follow up in 6 months.          Follow Up   Return in about 6 months (around 11/13/2025) for Recheck.  Patient was given instructions and counseling regarding her condition or for health maintenance advice. Please  see specific information pulled into the AVS if appropriate.

## 2025-05-13 ENCOUNTER — TELEPHONE (OUTPATIENT)
Dept: PULMONOLOGY | Facility: CLINIC | Age: 70
End: 2025-05-13

## 2025-05-13 ENCOUNTER — OFFICE VISIT (OUTPATIENT)
Dept: PULMONOLOGY | Facility: CLINIC | Age: 70
End: 2025-05-13
Payer: MEDICARE

## 2025-05-13 VITALS
SYSTOLIC BLOOD PRESSURE: 112 MMHG | BODY MASS INDEX: 48.73 KG/M2 | DIASTOLIC BLOOD PRESSURE: 68 MMHG | RESPIRATION RATE: 16 BRPM | HEART RATE: 69 BPM | HEIGHT: 63 IN | OXYGEN SATURATION: 93 % | TEMPERATURE: 97.6 F | WEIGHT: 275 LBS

## 2025-05-13 DIAGNOSIS — J41.8 MIXED SIMPLE AND MUCOPURULENT CHRONIC BRONCHITIS: Chronic | ICD-10-CM

## 2025-05-13 DIAGNOSIS — R06.09 DYSPNEA ON EXERTION: ICD-10-CM

## 2025-05-13 DIAGNOSIS — F17.211 NICOTINE DEPENDENCE, CIGARETTES, IN REMISSION: ICD-10-CM

## 2025-05-13 DIAGNOSIS — G47.33 OSA (OBSTRUCTIVE SLEEP APNEA): Primary | Chronic | ICD-10-CM

## 2025-05-13 RX ORDER — ALBUTEROL SULFATE 90 UG/1
2 INHALANT RESPIRATORY (INHALATION) EVERY 4 HOURS PRN
Qty: 18 G | Refills: 5 | Status: SHIPPED | OUTPATIENT
Start: 2025-05-13

## 2025-05-13 NOTE — TELEPHONE ENCOUNTER
"@Relay     \"Patient has an appointment this morning at 11:15, wanting to see if patient would come in at 10:15AM.\"                 "

## 2025-05-19 ENCOUNTER — OFFICE VISIT (OUTPATIENT)
Dept: ORTHOPEDIC SURGERY | Facility: CLINIC | Age: 70
End: 2025-05-19
Payer: MEDICARE

## 2025-05-19 VITALS — WEIGHT: 275 LBS | HEIGHT: 63 IN | BODY MASS INDEX: 48.73 KG/M2

## 2025-05-19 DIAGNOSIS — M17.12 PRIMARY OSTEOARTHRITIS OF LEFT KNEE: Primary | ICD-10-CM

## 2025-05-19 NOTE — PROGRESS NOTES
"Chief Complaint  Follow-up of the Left Knee    Subjective          History of Present Illness      Randi Fajardo is a 69 y.o. female     History of Present Illness  Patient presents for follow-up evaluation of left knee pain left knee osteoarthritis and to receive Euflexxa injection #3.  She states the first 2 injections have given her no relief.        Allergies   Allergen Reactions    Nexium [Esomeprazole] Hives    Prilosec [Omeprazole] Hives        Social History     Socioeconomic History    Marital status:    Tobacco Use    Smoking status: Former     Current packs/day: 0.00     Average packs/day: 0.5 packs/day for 51.0 years (25.5 ttl pk-yrs)     Types: Cigarettes     Start date: 1969     Quit date:      Years since quittin.3     Passive exposure: Past    Smokeless tobacco: Never    Tobacco comments:     Was off and on during the 40 years   Vaping Use    Vaping status: Never Used   Substance and Sexual Activity    Alcohol use: Yes     Comment: Socially on occasion    Drug use: Never    Sexual activity: Not Currently     Birth control/protection: None        REVIEW OF SYSTEMS    Constitutional: Awake alert and oriented x3, no acute distress, denies fevers, chills, weight loss  Respiratory: No respiratory distress  Vascular: Brisk cap refill, Intact distal pulses, No cyanosis, compartments soft with no signs or symptoms of compartment syndrome or DVT.   Cardiovascular: Denies chest pain, shortness of breath  Skin: Denies rashes, acute skin changes  Neurologic: Denies headache, loss of consciousness  MSK: Left knee pain      Objective   Vital Signs:   Ht 160 cm (63\")   Wt 125 kg (275 lb)   BMI 48.71 kg/m²     Body mass index is 48.71 kg/m².         Physical Exam  Left knee:: full extension, flexion to 125 degrees, small effusion, mild swelling, stable to varus/valgus stress, negative Lachman's and pAril's, non tender to the medial joint line and lateral joint line, crepitus with range of " motion, distal neurovascularly intact, positive pulses, positive EHL, FHL, GS, and TA. Sensation intact to all 5 nerves of the foot.         Large Joint: L knee  Date/Time: 5/19/2025 11:06 AM  Consent given by: patient  Site marked: site marked  Timeout: Immediately prior to procedure a time out was called to verify the correct patient, procedure, equipment, support staff and site/side marked as required   Supporting Documentation  Indications: pain   Procedure Details  Location: knee - L knee  Preparation: Patient was prepped and draped in the usual sterile fashion  Needle gauge: 21 G.  Medications administered: 20 mg Sodium Hyaluronate (Viscosup) 20 MG/2ML  Patient tolerance: patient tolerated the procedure well with no immediate complications      This injection documentation was Scribed for BRIGIDO Villa by Ruthy Thomas MA.  05/19/25   11:07 EDT    Imaging Results (Most Recent)       None                     Assessment and Plan    Diagnoses and all orders for this visit:    1. Primary osteoarthritis of left knee (Primary)    Other orders  -     Large Joint: L knee        Assessment & Plan  Discussed diagnosis and treatment options with the patient she elected to have a left knee Euflexxa injection #3.  A sterile prep of the injection site was performed with chloraprep. Cryospray was used for local anesthesia. The site was injected. The patient tolerated the procedure well without complications. Post injection pain was discussed.    The risks, benefits, complications, treatment options/alternatives, and expected outcomes/goals were discussed with the patient.   Follow-up 8 weeks      Call or return if worsening symptoms.    Follow Up   Return in about 8 weeks (around 7/14/2025) for Recheck.  Patient was given instructions and counseling regarding her condition or for health maintenance advice. Please see specific information pulled into the AVS if appropriate.       Contains text text generated by  COLBY Copilot  EMR Dragon/Transcription disclaimer:  Part of this note may be an electronic transcription/translation of spoken language to printed text using the Dragon Dictation System

## 2025-05-28 DIAGNOSIS — R06.09 DOE (DYSPNEA ON EXERTION): ICD-10-CM

## 2025-05-28 RX ORDER — TIOTROPIUM BROMIDE AND OLODATEROL 3.124; 2.736 UG/1; UG/1
SPRAY, METERED RESPIRATORY (INHALATION)
Qty: 4 G | Refills: 1 | Status: SHIPPED | OUTPATIENT
Start: 2025-05-28

## 2025-06-04 NOTE — PROGRESS NOTES
Subjective   The ABCs of the Annual Wellness Visit  Medicare Wellness Visit      Randi Fajardo is a 69 y.o. patient who presents for a Medicare Wellness Visit.    The following portions of the patient's history were reviewed and   updated as appropriate: allergies, current medications, past family history, past medical history, past social history, past surgical history, and problem list.    Compared to one year ago, the patient's physical   health is worse.  Compared to one year ago, the patient's mental   health is the same.    Recent Hospitalizations:  She was not admitted to the hospital during the last year.     Current Medical Providers:  Patient Care Team:  Mainor Sanders Jr., MD as PCP - General (Internal Medicine)  Joon Cuevas MD as Consulting Physician (Pulmonary Disease)  Paula Wilkins APRN as Nurse Practitioner (Nurse Practitioner)    Outpatient Medications Prior to Visit   Medication Sig Dispense Refill    Accu-Chek Softclix Lancets lancets 1 each by Other route 3 times a day. Use as instructed 200 each 3    albuterol sulfate HFA (Ventolin HFA) 108 (90 Base) MCG/ACT inhaler Inhale 2 puffs Every 4 (Four) Hours As Needed for Wheezing or Shortness of Air. 18 g 5    Alcohol Swabs (B-D SINGLE USE SWABS REGULAR) pads Apply 1 Application topically to the appropriate area as directed 5 (Five) Times a Day. 200 each 3    apixaban (Eliquis) 5 MG tablet tablet Take 1 tablet by mouth Every 12 (Twelve) Hours. Lot: [id number]  Exp: 9/25  4 boxes given 56 tablet 0    atorvastatin (LIPITOR) 40 MG tablet Take 1 tablet by mouth Every Night. 90 tablet 3    BIOTIN PO Take  by mouth Every Night.      Blood Glucose Monitoring Suppl (Accu-Chek Guide Me) w/Device kit Inject 1 kit under the skin into the appropriate area as directed See Admin Instructions. 1 kit 0    bumetanide (BUMEX) 2 MG tablet PLEASE SEE ATTACHED FOR DETAILED DIRECTIONS 180 tablet 1    Calcium Carb-Cholecalciferol (CALCIUM 600 + D PO) Take 1  tablet by mouth After Dinner.      Diclofenac Sodium (VOLTAREN) 1 % gel gel May apply to each knee every 6 hours while awake 350 g 1    empagliflozin (Jardiance) 25 MG tablet tablet Take 1 tablet by mouth Daily. 90 tablet 3    famotidine (Pepcid) 40 MG tablet Take 1 tablet by mouth 2 (Two) Times a Day. 90 tablet 2    glucose blood (Accu-Chek Guide) test strip 1 each by Other route Daily. Use as instructed 100 each 3    levothyroxine (Synthroid) 50 MCG tablet Take 1 tablet by mouth Daily. 90 tablet 1    magnesium chloride ER 64 MG DR tablet Take 1 tablet by mouth Daily.      metFORMIN (GLUCOPHAGE) 500 MG tablet Take 1 tablet by mouth Daily With Dinner. 90 tablet 0    metoprolol succinate XL (TOPROL-XL) 50 MG 24 hr tablet Take 1 tablet by mouth Daily. 90 tablet 2    metoprolol tartrate (LOPRESSOR) 25 MG tablet Take 0.5 tablets by mouth 2 (Two) Times a Day As Needed (for sustained HR >120 bpm). 30 tablet 11    minocycline (MINOCIN,DYNACIN) 100 MG capsule TAKE ONE CAPSULE BY MOUTH EVERY DAY 90 capsule 3    multivitamin with minerals tablet tablet Take 1 tablet by mouth Every Night. 1 GUMMIE AT NIGHT      Potassium Gluconate 550 MG tablet Take  by mouth Every Evening.      rOPINIRole (REQUIP) 2 MG tablet TAKE 1 TABLET BY MOUTH EVERY NIGHT. 90 tablet 3    Stiolto Respimat 2.5-2.5 MCG/ACT aerosol solution inhaler INHALE TWO PUFFS BY MOUTH EVERY DAY 4 g 1    Zinc 50 MG tablet Take 1 tablet by mouth Every Night. CAPSULE, NOT TAB (WITH D3)      DULoxetine (CYMBALTA) 60 MG capsule TAKE TWO CAPSULES BY MOUTH EVERY  capsule 3    gabapentin (NEURONTIN) 600 MG tablet Take 1 tablet by mouth 3 (Three) Times a Day As Needed (pain). 90 tablet 0     No facility-administered medications prior to visit.     No opioid medication identified on active medication list. I have reviewed chart for other potential  high risk medication/s and harmful drug interactions in the elderly.      Aspirin is not on active medication list.  Aspirin  "use is contraindicated for this patient due to: current use of Eliquis.  .    Patient Active Problem List   Diagnosis    Acquired hypothyroidism    Essential hypertension    Mixed hyperlipidemia    Type 2 diabetes mellitus without complication, without long-term current use of insulin    Right knee pain    Primary osteoarthritis of right knee    Screening for malignant neoplasm of colon    Family history of colon cancer in father    Atrial flutter with controlled response    PENNY (obstructive sleep apnea)    Morbid obesity with BMI of 50.0-59.9, adult    SOB (shortness of breath)    Chronic diastolic (congestive) heart failure    Atrial fibrillation    Primary osteoarthritis of left knee     Advance Care Planning Advance Directive is not on file.  ACP discussion was held with the patient during this visit. Patient has an advance directive (not in EMR), copy requested.            Objective   Vitals:    25 1046   BP: 93/53   BP Location: Left arm   Patient Position: Sitting   Cuff Size: Large Adult   Pulse: 75   Temp: 96.9 °F (36.1 °C)   TempSrc: Temporal   SpO2: 94%   Weight: 128 kg (281 lb 6.4 oz)   Height: 160 cm (63\")   PainSc: 0-No pain       Estimated body mass index is 49.85 kg/m² as calculated from the following:    Height as of this encounter: 160 cm (63\").    Weight as of this encounter: 128 kg (281 lb 6.4 oz).      Does the patient have evidence of cognitive impairment? No  Lab Results   Component Value Date    TRIG 126 2025    HDL 38 (L) 2025    LDL 54 2025    VLDL 23 2025    HGBA1C 6.70 (H) 2025                                                                                                Health  Risk Assessment    Smoking Status:  Social History     Tobacco Use   Smoking Status Former    Current packs/day: 0.00    Average packs/day: 0.5 packs/day for 51.0 years (25.5 ttl pk-yrs)    Types: Cigarettes    Start date: 1969    Quit date: 2020    Years since quittin.4 "    Passive exposure: Past   Smokeless Tobacco Never   Tobacco Comments    Was off and on during the 40 years     Alcohol Consumption:  Social History     Substance and Sexual Activity   Alcohol Use Yes    Comment: Socially on occasion       Fall Risk Screen  STEADI Fall Risk Assessment was completed, and patient is at MODERATE risk for falls. Assessment completed on:2025    Depression Screening   Little interest or pleasure in doing things? Not at all   Feeling down, depressed, or hopeless? Several days   PHQ-2 Total Score 1      Health Habits and Functional and Cognitive Screenin/30/2025     4:56 PM   Functional & Cognitive Status   Do you have difficulty preparing food and eating? No   Do you have difficulty bathing yourself, getting dressed or grooming yourself? No   Do you have difficulty using the toilet? No   Do you have difficulty moving around from place to place? No   Do you have trouble with steps or getting out of a bed or a chair? Yes   Current Diet Low Carb Diet   Dental Exam Up to date   Eye Exam Up to date   Exercise (times per week) 2 times per week   Current Exercises Include Gardening;House Cleaning;Other   Do you need help using the phone?  No   Are you deaf or do you have serious difficulty hearing?  No   Do you need help to go to places out of walking distance? Yes   Do you need help shopping? No   Do you need help preparing meals?  No   Do you need help with housework?  Yes   Do you need help with laundry? No   Do you need help taking your medications? No   Do you need help managing money? No   Do you ever drive or ride in a car without wearing a seat belt? No   Have you felt unusual stress, anger or loneliness in the last month? Yes   Who do you live with? Alone   If you need help, do you have trouble finding someone available to you? No   Have you been bothered in the last four weeks by sexual problems? No   Do you have difficulty concentrating, remembering or making decisions?  No           Age-appropriate Screening Schedule:  Refer to the list below for future screening recommendations based on patient's age, sex and/or medical conditions. Orders for these recommended tests are listed in the plan section. The patient has been provided with a written plan.    Health Maintenance List  Health Maintenance   Topic Date Due    DIABETIC FOOT EXAM  03/07/2023    LUNG CANCER SCREENING  01/26/2025    TDAP/TD VACCINES (2 - Td or Tdap) 05/21/2025    DIABETIC EYE EXAM  07/30/2025    INFLUENZA VACCINE  07/01/2025    HEMOGLOBIN A1C  09/27/2025    DXA SCAN  12/04/2025    LIPID PANEL  03/27/2026    URINE MICROALBUMIN-CREATININE RATIO (uACR)  03/27/2026    ANNUAL WELLNESS VISIT  06/06/2026    MAMMOGRAM  11/21/2026    COLORECTAL CANCER SCREENING  07/29/2034    HEPATITIS C SCREENING  Completed    COVID-19 Vaccine  Completed    Pneumococcal Vaccine 50+  Completed    ZOSTER VACCINE  Completed                                                                                                                                                CMS Preventative Services Quick Reference  Risk Factors Identified During Encounter  Fall Risk-High or Moderate: Discussed Fall Prevention in the home    The above risks/problems have been discussed with the patient.  Pertinent information has been shared with the patient in the After Visit Summary.  An After Visit Summary and PPPS were made available to the patient.    Follow Up:   Next Medicare Wellness visit to be scheduled in 1 year.          Additional E&M Note during same encounter follows:  Patient has multiple medical problems which are significant and separately identifiable that require additional work above and beyond the Medicare Wellness Visit.      Chief Complaint  Medicare Wellness-subsequent    Randi Fajardo is a 69 y.o. female who presents to [facility]   History of Present Illness  The patient presents for  "evaluation of hypotension, xerostomia, and wheezing.    The patient is scheduled to consult with Dr. James, a cardiologist, next Tuesday. The patient is awaiting the results of a computed tomography (CT) scan performed on 06/05/2025. This morning, the patient's blood pressure was noted to be low, despite previously being well-controlled. The cardiologist has advised discontinuation of spironolactone. The patient is not currently taking apixaban (Eliquis) due to financial constraints but plans to resume within the next month. The patient is considering another ablation procedure due to persistent tachycardia.      The patient mentions occasional wheezing, which is attributed to environmental factors such as storms. The patient has been advised to switch from Stiolto to a different inhaler, the name of which is not recalled. She does see pulmonary.    The patient experiences difficulty with balance upon standing and believes increased exercise could be beneficial but is unable to afford physical therapy.    The patient is currently on gabapentin and requires a refill. She reports it is helpful in controlling pain and allows her to be more mobile.       Objective   Vital Signs:   Vitals:    06/06/25 1046   BP: 93/53   BP Location: Left arm   Patient Position: Sitting   Cuff Size: Large Adult   Pulse: 75   Temp: 96.9 °F (36.1 °C)   TempSrc: Temporal   SpO2: 94%   Weight: 128 kg (281 lb 6.4 oz)   Height: 160 cm (63\")   PainSc: 0-No pain       Wt Readings from Last 3 Encounters:   06/06/25 128 kg (281 lb 6.4 oz)   05/19/25 125 kg (275 lb)   05/13/25 125 kg (275 lb)     BP Readings from Last 3 Encounters:   06/06/25 93/53   05/13/25 112/68   05/12/25 135/77       Physical Exam  Appearance: No acute distress, well-nourished  Head: normocephalic, atraumatic  Eyes: extraocular movements intact, no scleral icterus, no conjunctival injection  Ears, Nose, and Throat: external ears normal, nares patent, moist mucous " membranes  Cardiovascular: regular rate and rhythm. no murmurs, rubs, or gallops. no edema  Respiratory: breathing comfortably, symmetric chest rise, clear to auscultation bilaterally. No wheezes, rales, or rhonchi.  Neuro: alert and oriented to time, place, and person. Normal gait  Psych: normal mood and affect     The following data was reviewed by Mainor Sanders Jr, MD on 06/06/2025  Common Labs   Common labs          2/25/2025    13:32 3/27/2025    12:05 4/4/2025    17:48   Common Labs   Glucose  102  80    BUN  20  18    Creatinine  1.17  1.05    Sodium  139  138    Potassium  4.7  4.3    Chloride  99  95    Calcium  9.6  9.9    Albumin  3.9  4.0    Total Bilirubin  0.5  0.5    Alkaline Phosphatase  79  80    AST (SGOT)  28  22    ALT (SGPT)  20  16    WBC  10.31  12.99    Hemoglobin 14.9  15.7  15.7    Hematocrit  48.7  48.7    Platelets  282  291    Total Cholesterol  115     Triglycerides  126     HDL Cholesterol  38     LDL Cholesterol   54     Hemoglobin A1C  6.70     Microalbumin, Urine  <1.2           Last Urine Toxicity  More data exists         Latest Ref Rng & Units 3/27/2025 12/5/2024   LAST URINE TOXICITY RESULTS   Creatinine, Urine mg/dL 18.5  -   Amphetamine, Urine Qual Negative - Negative    Barbiturates Screen, Urine Negative - Negative    Benzodiazepine Screen, Urine Negative - Negative    Buprenorphine, Screen, Urine Negative - Negative    Cocaine Screen, Urine Negative - Negative    Methadone Screen , Urine Negative - Negative    Methamphetamine, Ur Negative - Negative          Assessment & Plan   Diagnoses and all orders for this visit:    1. Annual physical exam (Primary)    2. Encounter for long-term (current) use of medications  -     POC Medline 12 Panel Urine Drug Screen    3. Need for COVID-19 vaccine  -     COVID-19 (Pfizer) 12yrs+ (COMIRNATY)    4. Major depressive disorder with single episode, in partial remission  Comments:  cont cymbalta at current dosage  Orders:  -      DULoxetine (CYMBALTA) 60 MG capsule; Take 2 capsules by mouth Daily.  Dispense: 180 capsule; Refill: 3    5. Type 2 diabetes mellitus without complication, without long-term current use of insulin  -     Hemoglobin A1c; Future    6. Mixed hyperlipidemia  Comments:  goal LDL <55.  Orders:  -     Lipid Panel; Future    7. Essential hypertension  Comments:  goal BP <130/80  Orders:  -     CBC & Differential; Future  -     Comprehensive Metabolic Panel; Future    8. Paroxysmal atrial fibrillation  Comments:  rate controlled. difficulty getting eliquis due to insurance coverage and cost. rec full dose ASA if unable to get eliquis.    9. Acquired hypothyroidism  -     TSH Rfx On Abnormal To Free T4; Future    10. Neuropathy  Comments:  doing well on gabapentin. KHADIJAH and nimisha reviewed.   Orders:  -     gabapentin (NEURONTIN) 600 MG tablet; Take 1 tablet by mouth 3 (Three) Times a Day As Needed (pain).  Dispense: 90 tablet; Refill: 0          FOLLOW UP  Return in about 6 months (around 12/6/2025) for DM, HTN.  Patient was given instructions and counseling regarding her condition or for health maintenance advice. Please see specific information pulled into the AVS if appropriate.     Mainor Sanders Jr, MD  06/06/25  13:38 EDT

## 2025-06-05 ENCOUNTER — HOSPITAL ENCOUNTER (OUTPATIENT)
Dept: CT IMAGING | Facility: HOSPITAL | Age: 70
Discharge: HOME | End: 2025-06-05
Admitting: NURSE PRACTITIONER
Payer: MEDICARE

## 2025-06-05 DIAGNOSIS — F17.211 NICOTINE DEPENDENCE, CIGARETTES, IN REMISSION: ICD-10-CM

## 2025-06-05 PROCEDURE — 71271 CT THORAX LUNG CANCER SCR C-: CPT

## 2025-06-06 ENCOUNTER — OFFICE VISIT (OUTPATIENT)
Dept: INTERNAL MEDICINE | Facility: CLINIC | Age: 70
End: 2025-06-06
Payer: MEDICARE

## 2025-06-06 VITALS
BODY MASS INDEX: 49.86 KG/M2 | HEART RATE: 75 BPM | SYSTOLIC BLOOD PRESSURE: 93 MMHG | HEIGHT: 63 IN | OXYGEN SATURATION: 94 % | WEIGHT: 281.4 LBS | DIASTOLIC BLOOD PRESSURE: 53 MMHG | TEMPERATURE: 96.9 F

## 2025-06-06 DIAGNOSIS — F32.4 MAJOR DEPRESSIVE DISORDER WITH SINGLE EPISODE, IN PARTIAL REMISSION: ICD-10-CM

## 2025-06-06 DIAGNOSIS — E03.9 ACQUIRED HYPOTHYROIDISM: ICD-10-CM

## 2025-06-06 DIAGNOSIS — Z79.899 ENCOUNTER FOR LONG-TERM (CURRENT) USE OF MEDICATIONS: ICD-10-CM

## 2025-06-06 DIAGNOSIS — Z00.00 ANNUAL PHYSICAL EXAM: Primary | ICD-10-CM

## 2025-06-06 DIAGNOSIS — I48.0 PAROXYSMAL ATRIAL FIBRILLATION: ICD-10-CM

## 2025-06-06 DIAGNOSIS — E11.9 TYPE 2 DIABETES MELLITUS WITHOUT COMPLICATION, WITHOUT LONG-TERM CURRENT USE OF INSULIN: ICD-10-CM

## 2025-06-06 DIAGNOSIS — Z23 NEED FOR COVID-19 VACCINE: ICD-10-CM

## 2025-06-06 DIAGNOSIS — G62.9 NEUROPATHY: ICD-10-CM

## 2025-06-06 DIAGNOSIS — E78.2 MIXED HYPERLIPIDEMIA: ICD-10-CM

## 2025-06-06 DIAGNOSIS — I10 ESSENTIAL HYPERTENSION: ICD-10-CM

## 2025-06-06 RX ORDER — DULOXETIN HYDROCHLORIDE 60 MG/1
120 CAPSULE, DELAYED RELEASE ORAL DAILY
Qty: 180 CAPSULE | Refills: 3 | Status: SHIPPED | OUTPATIENT
Start: 2025-06-06

## 2025-06-06 RX ORDER — GABAPENTIN 600 MG/1
600 TABLET ORAL 3 TIMES DAILY PRN
Qty: 90 TABLET | Refills: 0 | Status: SHIPPED | OUTPATIENT
Start: 2025-06-06

## 2025-06-10 ENCOUNTER — HOSPITAL ENCOUNTER (OUTPATIENT)
Facility: HOSPITAL | Age: 70
Setting detail: OBSERVATION
Discharge: HOME | End: 2025-06-12
Attending: EMERGENCY MEDICINE | Admitting: STUDENT IN AN ORGANIZED HEALTH CARE EDUCATION/TRAINING PROGRAM
Payer: MEDICARE

## 2025-06-10 ENCOUNTER — APPOINTMENT (OUTPATIENT)
Dept: GENERAL RADIOLOGY | Facility: HOSPITAL | Age: 70
End: 2025-06-10
Payer: MEDICARE

## 2025-06-10 ENCOUNTER — OFFICE VISIT (OUTPATIENT)
Dept: CARDIOLOGY | Facility: CLINIC | Age: 70
End: 2025-06-10
Payer: MEDICARE

## 2025-06-10 VITALS
HEART RATE: 160 BPM | HEIGHT: 63 IN | SYSTOLIC BLOOD PRESSURE: 110 MMHG | DIASTOLIC BLOOD PRESSURE: 60 MMHG | WEIGHT: 276 LBS | OXYGEN SATURATION: 98 % | BODY MASS INDEX: 48.9 KG/M2

## 2025-06-10 DIAGNOSIS — I10 ESSENTIAL HYPERTENSION: Chronic | ICD-10-CM

## 2025-06-10 DIAGNOSIS — I48.92 ATRIAL FLUTTER, UNSPECIFIED TYPE: Primary | ICD-10-CM

## 2025-06-10 DIAGNOSIS — I48.92 ATRIAL FLUTTER WITH CONTROLLED RESPONSE: Chronic | ICD-10-CM

## 2025-06-10 DIAGNOSIS — I48.0 PAROXYSMAL ATRIAL FIBRILLATION: Primary | Chronic | ICD-10-CM

## 2025-06-10 LAB
ALBUMIN SERPL-MCNC: 3.9 G/DL (ref 3.5–5.2)
ALBUMIN/GLOB SERPL: 1.1 G/DL
ALP SERPL-CCNC: 77 U/L (ref 39–117)
ALT SERPL W P-5'-P-CCNC: 20 U/L (ref 1–33)
ANION GAP SERPL CALCULATED.3IONS-SCNC: 17.9 MMOL/L (ref 5–15)
AST SERPL-CCNC: 25 U/L (ref 1–32)
BASOPHILS # BLD AUTO: 0.1 10*3/MM3 (ref 0–0.2)
BASOPHILS NFR BLD AUTO: 0.8 % (ref 0–1.5)
BILIRUB SERPL-MCNC: 0.5 MG/DL (ref 0–1.2)
BILIRUB UR QL STRIP: NEGATIVE
BUN SERPL-MCNC: 21.5 MG/DL (ref 8–23)
BUN/CREAT SERPL: 14.9 (ref 7–25)
CALCIUM SPEC-SCNC: 9.4 MG/DL (ref 8.6–10.5)
CHLORIDE SERPL-SCNC: 98 MMOL/L (ref 98–107)
CLARITY UR: CLEAR
CO2 SERPL-SCNC: 22.1 MMOL/L (ref 22–29)
COLOR UR: YELLOW
CREAT SERPL-MCNC: 1.44 MG/DL (ref 0.57–1)
DEPRECATED RDW RBC AUTO: 52.7 FL (ref 37–54)
EGFRCR SERPLBLD CKD-EPI 2021: 39 ML/MIN/1.73
EOSINOPHIL # BLD AUTO: 0.18 10*3/MM3 (ref 0–0.4)
EOSINOPHIL NFR BLD AUTO: 1.5 % (ref 0.3–6.2)
ERYTHROCYTE [DISTWIDTH] IN BLOOD BY AUTOMATED COUNT: 15.6 % (ref 12.3–15.4)
GEN 5 1HR TROPONIN T REFLEX: 23 NG/L
GLOBULIN UR ELPH-MCNC: 3.5 GM/DL
GLUCOSE BLDC GLUCOMTR-MCNC: 101 MG/DL (ref 70–99)
GLUCOSE SERPL-MCNC: 154 MG/DL (ref 65–99)
GLUCOSE UR STRIP-MCNC: ABNORMAL MG/DL
HCT VFR BLD AUTO: 50.2 % (ref 34–46.6)
HGB BLD-MCNC: 16.1 G/DL (ref 12–15.9)
HGB UR QL STRIP.AUTO: NEGATIVE
HOLD SPECIMEN: NORMAL
HOLD SPECIMEN: NORMAL
IMM GRANULOCYTES # BLD AUTO: 0.06 10*3/MM3 (ref 0–0.05)
IMM GRANULOCYTES NFR BLD AUTO: 0.5 % (ref 0–0.5)
KETONES UR QL STRIP: NEGATIVE
LEUKOCYTE ESTERASE UR QL STRIP.AUTO: NEGATIVE
LYMPHOCYTES # BLD AUTO: 4.1 10*3/MM3 (ref 0.7–3.1)
LYMPHOCYTES NFR BLD AUTO: 33.2 % (ref 19.6–45.3)
MAGNESIUM SERPL-MCNC: 2 MG/DL (ref 1.6–2.4)
MCH RBC QN AUTO: 29.8 PG (ref 26.6–33)
MCHC RBC AUTO-ENTMCNC: 32.1 G/DL (ref 31.5–35.7)
MCV RBC AUTO: 92.8 FL (ref 79–97)
MONOCYTES # BLD AUTO: 0.83 10*3/MM3 (ref 0.1–0.9)
MONOCYTES NFR BLD AUTO: 6.7 % (ref 5–12)
NEUTROPHILS NFR BLD AUTO: 57.3 % (ref 42.7–76)
NEUTROPHILS NFR BLD AUTO: 7.08 10*3/MM3 (ref 1.7–7)
NITRITE UR QL STRIP: NEGATIVE
NRBC BLD AUTO-RTO: 0 /100 WBC (ref 0–0.2)
NT-PROBNP SERPL-MCNC: 933.8 PG/ML (ref 0–900)
PH UR STRIP.AUTO: <=5 [PH] (ref 5–8)
PLATELET # BLD AUTO: 302 10*3/MM3 (ref 140–450)
PMV BLD AUTO: 10.2 FL (ref 6–12)
POTASSIUM SERPL-SCNC: 4.1 MMOL/L (ref 3.5–5.2)
PROT SERPL-MCNC: 7.4 G/DL (ref 6–8.5)
PROT UR QL STRIP: NEGATIVE
QT INTERVAL: 337 MS
QTC INTERVAL: 547 MS
RBC # BLD AUTO: 5.41 10*6/MM3 (ref 3.77–5.28)
SODIUM SERPL-SCNC: 138 MMOL/L (ref 136–145)
SP GR UR STRIP: 1.02 (ref 1–1.03)
T4 FREE SERPL-MCNC: 1.11 NG/DL (ref 0.92–1.68)
TROPONIN T % DELTA: -15
TROPONIN T NUMERIC DELTA: -4 NG/L
TROPONIN T SERPL HS-MCNC: 27 NG/L
TSH SERPL DL<=0.05 MIU/L-ACNC: 2.73 UIU/ML (ref 0.27–4.2)
UROBILINOGEN UR QL STRIP: ABNORMAL
WBC NRBC COR # BLD AUTO: 12.35 10*3/MM3 (ref 3.4–10.8)
WHOLE BLOOD HOLD COAG: NORMAL
WHOLE BLOOD HOLD SPECIMEN: NORMAL

## 2025-06-10 PROCEDURE — 83735 ASSAY OF MAGNESIUM: CPT | Performed by: REGISTERED NURSE

## 2025-06-10 PROCEDURE — 80053 COMPREHEN METABOLIC PANEL: CPT | Performed by: REGISTERED NURSE

## 2025-06-10 PROCEDURE — 82948 REAGENT STRIP/BLOOD GLUCOSE: CPT

## 2025-06-10 PROCEDURE — 99223 1ST HOSP IP/OBS HIGH 75: CPT | Performed by: STUDENT IN AN ORGANIZED HEALTH CARE EDUCATION/TRAINING PROGRAM

## 2025-06-10 PROCEDURE — 84439 ASSAY OF FREE THYROXINE: CPT | Performed by: STUDENT IN AN ORGANIZED HEALTH CARE EDUCATION/TRAINING PROGRAM

## 2025-06-10 PROCEDURE — 84443 ASSAY THYROID STIM HORMONE: CPT | Performed by: STUDENT IN AN ORGANIZED HEALTH CARE EDUCATION/TRAINING PROGRAM

## 2025-06-10 PROCEDURE — 93005 ELECTROCARDIOGRAM TRACING: CPT | Performed by: REGISTERED NURSE

## 2025-06-10 PROCEDURE — 94799 UNLISTED PULMONARY SVC/PX: CPT

## 2025-06-10 PROCEDURE — 94640 AIRWAY INHALATION TREATMENT: CPT

## 2025-06-10 PROCEDURE — 93010 ELECTROCARDIOGRAM REPORT: CPT | Performed by: SPECIALIST

## 2025-06-10 PROCEDURE — 84484 ASSAY OF TROPONIN QUANT: CPT | Performed by: REGISTERED NURSE

## 2025-06-10 PROCEDURE — 96374 THER/PROPH/DIAG INJ IV PUSH: CPT

## 2025-06-10 PROCEDURE — 81003 URINALYSIS AUTO W/O SCOPE: CPT | Performed by: STUDENT IN AN ORGANIZED HEALTH CARE EDUCATION/TRAINING PROGRAM

## 2025-06-10 PROCEDURE — 83880 ASSAY OF NATRIURETIC PEPTIDE: CPT | Performed by: EMERGENCY MEDICINE

## 2025-06-10 PROCEDURE — G0378 HOSPITAL OBSERVATION PER HR: HCPCS

## 2025-06-10 PROCEDURE — 85025 COMPLETE CBC W/AUTO DIFF WBC: CPT | Performed by: REGISTERED NURSE

## 2025-06-10 PROCEDURE — 36415 COLL VENOUS BLD VENIPUNCTURE: CPT

## 2025-06-10 PROCEDURE — 71045 X-RAY EXAM CHEST 1 VIEW: CPT

## 2025-06-10 PROCEDURE — 99285 EMERGENCY DEPT VISIT HI MDM: CPT

## 2025-06-10 RX ORDER — DILTIAZEM HYDROCHLORIDE 5 MG/ML
20 INJECTION INTRAVENOUS ONCE
Status: COMPLETED | OUTPATIENT
Start: 2025-06-10 | End: 2025-06-10

## 2025-06-10 RX ORDER — POLYETHYLENE GLYCOL 3350 17 G/17G
17 POWDER, FOR SOLUTION ORAL DAILY PRN
Status: DISCONTINUED | OUTPATIENT
Start: 2025-06-10 | End: 2025-06-12 | Stop reason: HOSPADM

## 2025-06-10 RX ORDER — NICOTINE POLACRILEX 4 MG
15 LOZENGE BUCCAL
Status: DISCONTINUED | OUTPATIENT
Start: 2025-06-10 | End: 2025-06-12 | Stop reason: HOSPADM

## 2025-06-10 RX ORDER — BISACODYL 5 MG/1
5 TABLET, DELAYED RELEASE ORAL DAILY PRN
Status: DISCONTINUED | OUTPATIENT
Start: 2025-06-10 | End: 2025-06-12 | Stop reason: HOSPADM

## 2025-06-10 RX ORDER — ROPINIROLE 1 MG/1
2 TABLET, FILM COATED ORAL 3 TIMES DAILY
Status: DISCONTINUED | OUTPATIENT
Start: 2025-06-10 | End: 2025-06-10

## 2025-06-10 RX ORDER — NITROGLYCERIN 0.4 MG/1
0.4 TABLET SUBLINGUAL
Status: DISCONTINUED | OUTPATIENT
Start: 2025-06-10 | End: 2025-06-12 | Stop reason: HOSPADM

## 2025-06-10 RX ORDER — BISACODYL 10 MG
10 SUPPOSITORY, RECTAL RECTAL DAILY PRN
Status: DISCONTINUED | OUTPATIENT
Start: 2025-06-10 | End: 2025-06-12 | Stop reason: HOSPADM

## 2025-06-10 RX ORDER — ALBUTEROL SULFATE 0.83 MG/ML
2.5 SOLUTION RESPIRATORY (INHALATION) EVERY 6 HOURS PRN
Status: DISCONTINUED | OUTPATIENT
Start: 2025-06-10 | End: 2025-06-12 | Stop reason: HOSPADM

## 2025-06-10 RX ORDER — DULOXETIN HYDROCHLORIDE 30 MG/1
60 CAPSULE, DELAYED RELEASE ORAL EVERY 12 HOURS SCHEDULED
Status: DISCONTINUED | OUTPATIENT
Start: 2025-06-10 | End: 2025-06-12 | Stop reason: HOSPADM

## 2025-06-10 RX ORDER — AMOXICILLIN 250 MG
2 CAPSULE ORAL 2 TIMES DAILY PRN
Status: DISCONTINUED | OUTPATIENT
Start: 2025-06-10 | End: 2025-06-12 | Stop reason: HOSPADM

## 2025-06-10 RX ORDER — IBUPROFEN 600 MG/1
1 TABLET ORAL
Status: DISCONTINUED | OUTPATIENT
Start: 2025-06-10 | End: 2025-06-12 | Stop reason: HOSPADM

## 2025-06-10 RX ORDER — SODIUM CHLORIDE 0.9 % (FLUSH) 0.9 %
10 SYRINGE (ML) INJECTION AS NEEDED
Status: DISCONTINUED | OUTPATIENT
Start: 2025-06-10 | End: 2025-06-12 | Stop reason: HOSPADM

## 2025-06-10 RX ORDER — ATORVASTATIN CALCIUM 40 MG/1
40 TABLET, FILM COATED ORAL NIGHTLY
Status: DISCONTINUED | OUTPATIENT
Start: 2025-06-10 | End: 2025-06-12 | Stop reason: HOSPADM

## 2025-06-10 RX ORDER — SODIUM CHLORIDE 0.9 % (FLUSH) 0.9 %
10 SYRINGE (ML) INJECTION EVERY 12 HOURS SCHEDULED
Status: DISCONTINUED | OUTPATIENT
Start: 2025-06-10 | End: 2025-06-12 | Stop reason: HOSPADM

## 2025-06-10 RX ORDER — ROPINIROLE 1 MG/1
2 TABLET, FILM COATED ORAL NIGHTLY
Status: DISCONTINUED | OUTPATIENT
Start: 2025-06-10 | End: 2025-06-12 | Stop reason: HOSPADM

## 2025-06-10 RX ORDER — METOPROLOL SUCCINATE 50 MG/1
50 TABLET, EXTENDED RELEASE ORAL DAILY
Status: DISCONTINUED | OUTPATIENT
Start: 2025-06-11 | End: 2025-06-12 | Stop reason: HOSPADM

## 2025-06-10 RX ORDER — ARFORMOTEROL TARTRATE 15 UG/2ML
15 SOLUTION RESPIRATORY (INHALATION)
Status: DISCONTINUED | OUTPATIENT
Start: 2025-06-10 | End: 2025-06-12 | Stop reason: HOSPADM

## 2025-06-10 RX ORDER — DEXTROSE MONOHYDRATE 25 G/50ML
25 INJECTION, SOLUTION INTRAVENOUS
Status: DISCONTINUED | OUTPATIENT
Start: 2025-06-10 | End: 2025-06-12 | Stop reason: HOSPADM

## 2025-06-10 RX ORDER — SODIUM CHLORIDE 9 MG/ML
40 INJECTION, SOLUTION INTRAVENOUS AS NEEDED
Status: DISCONTINUED | OUTPATIENT
Start: 2025-06-10 | End: 2025-06-12 | Stop reason: HOSPADM

## 2025-06-10 RX ADMIN — APIXABAN 5 MG: 5 TABLET, FILM COATED ORAL at 23:12

## 2025-06-10 RX ADMIN — ROPINIROLE HYDROCHLORIDE 2 MG: 1 TABLET, FILM COATED ORAL at 23:12

## 2025-06-10 RX ADMIN — Medication 5 MG: at 23:12

## 2025-06-10 RX ADMIN — DULOXETINE HYDROCHLORIDE 60 MG: 30 CAPSULE, DELAYED RELEASE ORAL at 23:12

## 2025-06-10 RX ADMIN — Medication 10 ML: at 23:12

## 2025-06-10 RX ADMIN — ATORVASTATIN CALCIUM 40 MG: 40 TABLET, FILM COATED ORAL at 23:12

## 2025-06-10 RX ADMIN — DILTIAZEM HYDROCHLORIDE 20 MG: 5 INJECTION, SOLUTION INTRAVENOUS at 17:07

## 2025-06-10 RX ADMIN — ARFORMOTEROL TARTRATE 15 MCG: 15 SOLUTION RESPIRATORY (INHALATION) at 23:44

## 2025-06-10 NOTE — H&P
[facility]   HOSPITALIST HISTORY AND PHYSICAL  Date: 6/10/2025   Patient Name: Randi Fajardo  : 1955  MRN: [id number]  Primary Care Physician:  Mainor Sanders Jr., MD  Date of admission: 6/10/2025    Subjective   Subjective     Chief Complaint: Tachycardia    HPI:    Randi Fajardo is a 69 y.o. female  with past medical history of hypertension, hyperlipidemia, type 2 diabetes mellitus, hypothyroidism, atrial flutter s/p radiofrequency ablation of cavotricuspid isthmus in 2024, currently on Eliquis, CHF, obesity presented to the ED for evaluation of atrial flutter with rapid ventricular response.  Since the ablation, patient had intermittent episodes of rapid ventricular response with associated dizziness.  Today patient went to see her electrophysiologist Dr. James at [facility] and noted to have a heart rate in 170s and was advised to go to the ER for further evaluation.  Patient is not taking Eliquis since last 4 weeks as it was too expensive.  Denies any chest pain, nausea, vomiting, fevers, chills, abdominal pain, dysuria.    Upon arrival, vital signs temperature 99.1, pulse 159, respiratory 17, blood pressure 100/75 on room air saturating around 93%.  EKG showed wide-complex tachycardia, probably atrial flutter with RVR and aberrancy.  Initial troponin 27, repeat 23, creatinine 1.44 baseline around 1.1, rest of the CMP with no significant findings, WBC 12.35, hemoglobin 16.1, platelets 302.  Chest x-ray showed no acute abnormality.  Received IV diltiazem in the ED with improvement in the heart rate.  Repeat EKG showed junctional rhythm.  Case discussed by ER physician with cardiologist on-call Dr. Garibay, agreed to consult.  Patient admitted for further evaluation and management      Personal History     Past Medical History:   Diagnosis Date    Abnormal ECG     Arrhythmia     Arthritis of back ?    Atrial fibrillation     Cataract     Colon polyp ?    Found  during initial colonoscopy    COPD (chronic obstructive pulmonary disease)     Coronary artery disease     Afib and irregular heartbeat    CTS (carpal tunnel syndrome) ?    Have had corrective surgery on both    Depressed     Elevated cholesterol     Fracture of ankle     Plate and screws were used to correct break    GREGG (generalized anxiety disorder)     Gastroesophageal reflux     HTN (hypertension)     Hyperlipidemia     Hypothyroid     Irregular heartbeat     Knee swelling ?    Lesion of neck     Low back strain ?    Has happenened several times    Obesity     Since birth    Osteoarthritis     Pneumonia     RLS (restless legs syndrome)     Shortness of breath Approx. 3 yrs. ago    Sleep apnea in adult     Sleep apnea, obstructive  ?    Sleep with full face mask    Type 2 diabetes mellitus     Visual impairment     Wear glasses for reading         Past Surgical History:   Procedure Laterality Date    ABLATION OF DYSRHYTHMIC FOCUS      ANKLE OPEN REDUCTION INTERNAL FIXATION  108    Plate and screws corrected compound fracture    ANKLE TENDON REPAIR Right     ankle with hardware    CARDIAC ELECTROPHYSIOLOGY PROCEDURE N/A 2024    Procedure: AF/AFL with PFA & NILA. No CTA. Hold [facility] morning of.;  Surgeon: Luca James MD;  Location: [facility] INVASIVE LOCATION;  Service: Cardiovascular;  Laterality: N/A;    CARDIAC SURGERY  2024    Ablation    CARPAL TUNNEL RELEASE Bilateral      SECTION          COLONOSCOPY      COLONOSCOPY N/A 2024    Procedure: COLONOSCOPY;  Surgeon: Kalin Arriaga MD;  Location: [facility] ENDOSCOPY;  Service: General;  Laterality: N/A;  NORMAL    FRACTURE SURGERY  1984    Plate and screws in right ankle    HAND SURGERY  ?    Carpal tunnel correction on both hands at different dates    NECK SURGERY      ,,2008; Neck lesion removed, total of 3         Family History   Problem Relation Age of Onset    Arthritis Mother      Thyroid disease Mother     Vision loss Mother         Wore glasses; cataract surgery; detached retina    Lung cancer Father     Heart disease Father         Had pacemaker    Colon cancer Father     Cancer Father         Lung, colon    Emphysema Father     COPD Father     Vision loss Father         Wore glasses; cataract surgery    Cancer Brother         Lung, pancreas    Depression Brother         Undiagnosed    Cancer Maternal Aunt         Breast cancer    Arthritis Maternal Aunt     Hearing loss Maternal Aunt     Rheumatologic disease Maternal Aunt         Rheumatoid Arthritis    Arthritis Maternal Aunt     Hearing loss Maternal Aunt     Cancer Maternal Aunt     Vision loss Maternal Grandfather     Stroke Maternal Grandfather     Diabetes Paternal Uncle     Hearing loss Maternal Aunt     Arthritis Maternal Aunt     Arthritis Maternal Aunt     Hearing loss Maternal Aunt     Cancer Maternal Aunt         Cancer    Arthritis Maternal Aunt          Social History     Socioeconomic History    Marital status:    Tobacco Use    Smoking status: Former     Current packs/day: 0.00     Average packs/day: 0.5 packs/day for 51.0 years (25.5 ttl pk-yrs)     Types: Cigarettes     Start date: 1969     Quit date:      Years since quittin.4     Passive exposure: Past    Smokeless tobacco: Never    Tobacco comments:     Was off and on during the 40 years   Vaping Use    Vaping status: Never Used   Substance and Sexual Activity    Alcohol use: Yes     Comment: Socially on occasion    Drug use: Never    Sexual activity: Not Currently     Birth control/protection: None         Home Medications:  Accu-Chek Guide Me, Accu-Chek Softclix Lancets, B-D SINGLE USE SWABS REGULAR, Biotin, Calcium Carb-Cholecalciferol, DULoxetine, Diclofenac Sodium, Potassium Gluconate, Zinc, albuterol sulfate HFA, apixaban, atorvastatin, bumetanide, empagliflozin, famotidine, gabapentin, glucose blood, levothyroxine, magnesium chloride ER,  metFORMIN, metoprolol succinate XL, metoprolol tartrate, minocycline, multivitamin with minerals, rOPINIRole, and tiotropium bromide-olodaterol    Allergies:  Allergies   Allergen Reactions    Nexium [Esomeprazole] Hives    Prilosec [Omeprazole] Hives         Objective   Objective     Vitals:   Temp:  [99.1 °F (37.3 °C)] 99.1 °F (37.3 °C)  Heart Rate:  [] 58  Resp:  [17] 17  BP: (100-123)/(60-94) 123/94    Physical Exam    Constitutional: Awake, alert, no acute distress   Eyes: Pupils equal, sclerae anicteric, no conjunctival injection   HENT: NCAT, mucous membranes moist   Respiratory: Clear to auscultation bilaterally, nonlabored respirations    Cardiovascular: RRR, no murmurs   Gastrointestinal: Positive bowel sounds, soft, nontender, nondistended   Musculoskeletal: No bilateral ankle edema, no clubbing or cyanosis to extremities   Neurologic: Oriented x 3, speech clear   Skin: No rashes     Result Review    Result Review:  I have personally reviewed the results from the time of this admission to 6/10/2025 20:29 EDT and agree with these findings:  [x]  Laboratory  []  Microbiology  [x]  Radiology  [x]  EKG/Telemetry   []  Cardiology/Vascular   []  Pathology  []  Old records  []  Other:        Imaging Results (Last 24 Hours)       Procedure Component Value Units Date/Time    XR Chest 1 View [[id number]] Collected: 06/10/25 1732     Updated: 06/10/25 1736    Narrative:      XR CHEST 1 VW    Date of Exam: 6/10/2025 4:44 PM EDT    Indication: Chest pain protocol    Comparison: CXR 8/14/2024    Findings:  The heart remains normal in size. The lungs are well-expanded. Subsegmental atelectasis and/or linear fibrosis in the left perihilar region is stable.    Bony structures appear intact.      Impression:      Impression:  No active disease is seen.        Electronically Signed: Sy Joshi MD    6/10/2025 5:34 PM EDT    Workstation ID: [id number]                   Assessment & Plan   Assessment / Plan      Assessment/Plan:   Atrial flutter with RVR - improved  History of atrial flutter status post ablation in 2024  Hypertension  Hyperlipidemia  Type 2 diabetes mellitus  Hypothyroidism  CHF  COPD  PENNY on CPAP  Restless leg syndrome  Anxiety/depression    Plan  Admit to observation, telemetry  Cardiology consulted Dr. Garibay, patient follows Dr. Soriano outpatient  Cardiac echo  Follow-up on TSH, free T4  Restart on Eliquis for now.  Need to discuss to different anticoagulation choices based on the patients insurance coverage prior to discharge   Continue metoprolol succinate from tomorrow morning  Heart healthy, consistent carb diet  Low-dose sliding scale insulin  Brovana, revefenacin  Bronchodilator protocol  Albuterol every 6 hours as needed  Follow-up on urinalysis  Resume other appropriate home medications once reconciled    VTE Prophylaxis:  Mechanical & pharmacologic VTE prophylaxis orders are signed & held.       CODE STATUS:    Code Status (Patient has no pulse and is not breathing): CPR (Attempt to Resuscitate)  Medical Interventions (Patient has pulse or is breathing): Full Support  Level Of Support Discussed With: Patient      Admission Status:  I believe this patient meets observation status.    Part of this note may be an electronic transcription/translation of spoken language to printed text using the Dragon Dictation System    Tian Pollard MD

## 2025-06-10 NOTE — PROGRESS NOTES
Randi Fajardo  [id number]  1955      [facility] CARDIOLOGY     Referring Provider: No ref. provider found     Mainor Sanders Jr., MD  [address]  [place] [address]    Chief Complaint   Patient presents with    Atrial flutter with controlled response       Problem List  Atrial flutter  [id number] (age, sex, CHF, CAD, HTN, DM), anticoagulated on Eliqui  Diagnosed in 8/2024  Monitor, 8/30/2024: Predominant sinus rhythm minimum 49 bpm, max 113 bpm, average 78 bpm, frequent PACs 15% burden  PVA, posterior wall isolation, AFL RFA with CTI, 11/4/24 by Dr. James  Diastolic heart failure  Echo, 8/16/24: EF 55-60% paradoxical septal motion noted due to bundle branch block, is not well-visualized  Coronary artery disease  MPS, 7/20/2022: medium area of mild to moderate perfusion defect in the distal anterior and apical segments with partial reversibility on resting images which could represent myocardial ischemia in the right clinical setting, EF 70%  Hypertension  Hyperlipidemia  Diabetes  Hypothyroid  COPD  PENNY on CPAP  Osteoarthritis  Restless leg syndrome  Anxiety/depression    History of Present Illness   Randi Fajardo is a 69 y.o. female who presents to my electrophysiology clinic for follow up of AFL. Patient underwent PVA in November. Since the procedure, patient is still having episodes of AF. Episodes are shorter in frequency and less often. She denies SOB. She will have occasional LH and dizziness with quick position changes. Anticoagulated with eliquis. Denies any bleeding complications or stroke like symptoms.    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       Outpatient Medications Marked as Taking for the 6/10/25 encounter (Office Visit) with Luca James MD   Medication Sig Dispense Refill    albuterol sulfate HFA (Ventolin HFA) 108 (90 Base) MCG/ACT inhaler Inhale 2 puffs Every 4 (Four) Hours As  Needed for Wheezing or Shortness of Air. 18 g 5    atorvastatin (LIPITOR) 40 MG tablet Take 1 tablet by mouth Every Night. 90 tablet 3    BIOTIN PO Take 1 tablet by mouth Every Night.      bumetanide (BUMEX) 2 MG tablet PLEASE SEE ATTACHED FOR DETAILED DIRECTIONS (Patient taking differently: Take 1 tablet by mouth Daily. Please see attached for detailed directions) 180 tablet 1    Calcium Carb-Cholecalciferol (CALCIUM 600 + D PO) Take 1 tablet by mouth After Dinner.      Diclofenac Sodium (VOLTAREN) 1 % gel gel May apply to each knee every 6 hours while awake (Patient taking differently: Apply 4 g topically to the appropriate area as directed 4 (Four) Times a Day. May apply to each knee every 6 hours while awake) 350 g 1    DULoxetine (CYMBALTA) 60 MG capsule Take 2 capsules by mouth Daily. 180 capsule 3    famotidine (Pepcid) 40 MG tablet Take 1 tablet by mouth 2 (Two) Times a Day. 90 tablet 2    gabapentin (NEURONTIN) 600 MG tablet Take 1 tablet by mouth 3 (Three) Times a Day As Needed (pain). 90 tablet 0    levothyroxine (Synthroid) 50 MCG tablet Take 1 tablet by mouth Daily. 90 tablet 1    magnesium chloride ER 64 MG DR tablet Take 1 tablet by mouth Daily.      metFORMIN (GLUCOPHAGE) 500 MG tablet Take 1 tablet by mouth Daily With Dinner. 90 tablet 0    metoprolol succinate XL (TOPROL-XL) 50 MG 24 hr tablet Take 1 tablet by mouth Daily. 90 tablet 2    metoprolol tartrate (LOPRESSOR) 25 MG tablet Take 0.5 tablets by mouth 2 (Two) Times a Day As Needed (for sustained HR >120 bpm). 30 tablet 11    minocycline (MINOCIN,DYNACIN) 100 MG capsule TAKE ONE CAPSULE BY MOUTH EVERY DAY 90 capsule 3    multivitamin with minerals tablet tablet Take 1 tablet by mouth Every Night. 1 GUMMIE AT NIGHT      rOPINIRole (REQUIP) 2 MG tablet TAKE 1 TABLET BY MOUTH EVERY NIGHT. 90 tablet 3    Stiolto Respimat 2.5-2.5 MCG/ACT aerosol solution inhaler INHALE TWO PUFFS BY MOUTH EVERY DAY (Patient taking differently: Inhale 2 puffs Daily.)  "4 g 1    Zinc 50 MG tablet Take 1 tablet by mouth Every Night. CAPSULE, NOT TAB (WITH D3)      [DISCONTINUED] Accu-Chek Softclix Lancets lancets 1 each by Other route 3 times a day. Use as instructed 200 each 3    [DISCONTINUED] Alcohol Swabs (B-D SINGLE USE SWABS REGULAR) pads Apply 1 Application topically to the appropriate area as directed 5 (Five) Times a Day. 200 each 3    [DISCONTINUED] apixaban (Eliquis) 5 MG tablet tablet Take 1 tablet by mouth Every 12 (Twelve) Hours. Lot: [id number]  Exp: 9/25  4 boxes given 56 tablet 0    [DISCONTINUED] Blood Glucose Monitoring Suppl (Accu-Chek Guide Me) w/Device kit Inject 1 kit under the skin into the appropriate area as directed See Admin Instructions. 1 kit 0    [DISCONTINUED] empagliflozin (Jardiance) 25 MG tablet tablet Take 1 tablet by mouth Daily. 90 tablet 3    [DISCONTINUED] glucose blood (Accu-Chek Guide) test strip 1 each by Other route Daily. Use as instructed 100 each 3    [DISCONTINUED] Potassium Gluconate 550 MG tablet Take  by mouth Every Evening.              Physical Exam  Vitals:    06/10/25 1129   BP: 110/60   BP Location: Left arm   Patient Position: Sitting   Pulse: (!) 160   SpO2: 98%   Weight: 125 kg (276 lb)   Height: 160 cm (63\")     Body mass index is 48.89 kg/m².  Constitutional:       Appearance: Healthy appearance.   Neck:      Vascular: JVD normal.   Pulmonary:      Effort: Pulmonary effort is normal.      Breath sounds: Normal breath sounds.   Cardiovascular:      Normal rate. Regular rhythm. Normal S1. Normal S2.       Murmurs: There is no murmur.      No gallop.  No rub.   Pulses:     Intact distal pulses.   Edema:     Peripheral edema absent.   Neurological:      General: No focal deficit present.          Diagnostic Data  Procedures    WCT 161bpm   QTc 556    Lab Results   Component Value Date    GLUCOSE 90 06/12/2025    CALCIUM 8.7 06/12/2025     06/12/2025    K 3.7 06/12/2025    CO2 26.3 06/12/2025     06/12/2025    " "BUN 18.3 06/12/2025    CREATININE 0.95 06/12/2025    EGFRIFNONA 59 (L) 09/02/2021    BCR 19.3 06/12/2025    ANIONGAP 9.7 06/12/2025     Lab Results   Component Value Date    WBC 9.83 06/11/2025    HGB 14.0 06/11/2025    HCT 43.8 06/11/2025    MCV 92.8 06/11/2025     06/11/2025     No results found for: \"INR\", \"PROTIME\"  Lab Results   Component Value Date    TSH 2.730 06/10/2025       I personally viewed and interpreted the patient's EKG/Telemetry/lab data    Randi Fajardo  reports that she quit smoking about 5 years ago. Her smoking use included cigarettes. She started smoking about 56 years ago. She has a 25.5 pack-year smoking history. She has been exposed to tobacco smoke. She has never used smokeless tobacco. I have educated her on the risk of diseases from using tobacco products such as cancer, COPD, and heart disease.       ACP discussion was declined by the patient. Patient does not have an advance directive, declines further assistance.    Assessment and Plan  Diagnoses and all orders for this visit:    1. Paroxysmal atrial fibrillation (Primary)    2. Atrial flutter with controlled response    3. 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 Soriano     Hypertension  -Well controlled, continue current medication regimen      Follow-Up  Return in about 4 weeks (around 7/8/2025) for Next scheduled follow up.      Thank you for allowing me to participate in the care of your patient. Please to not hesitate to contact me " with additional questions or concerns.       UPDATE- reviewed patient's ER records. Did not receive adenosine. Started on diltiazem gtt which terminated her arrhythmia. Following up with me in about 1-2 months to discuss her future options: repeat ablation vs AAD.

## 2025-06-10 NOTE — ED PROVIDER NOTES
Time: 4:23 PM EDT  Date of encounter:  6/10/2025  Independent Historian/Clinical History and Information was obtained by:   Patient    History is limited by: N/A    Chief Complaint   Patient presents with    Rapid Heart Rate         History of Present Illness:  Patient is a 69 y.o. year old female who presents to the emergency department via POV for evaluation of the potation's and weakness.  Patient was seen by her cardiologist and referred to the ED for further evaluation.  Patient denies chest pain and shortness of breath.  Patient has no cough or hemoptysis.  Patient denies nausea, vomiting, and diarrhea.  Patient has no leg pain or swelling.    Patient Care Team  Primary Care Provider: Mainor Sanders Jr., MD    Past Medical History:     Allergies   Allergen Reactions    Nexium [Esomeprazole] Hives    Prilosec [Omeprazole] Hives     Past Medical History:   Diagnosis Date    Abnormal ECG     Arrhythmia     Arthritis of back 2018?    Atrial fibrillation     Cataract 07/22    Colon polyp ?    Found during initial colonoscopy    COPD (chronic obstructive pulmonary disease) 2023    Coronary artery disease 09/24    Afib and irregular heartbeat    CTS (carpal tunnel syndrome) ?    Have had corrective surgery on both    Depressed     Elevated cholesterol     Fracture of ankle 1986    Plate and screws were used to correct break    GREGG (generalized anxiety disorder)     Gastroesophageal reflux     HTN (hypertension)     Hyperlipidemia     Hypothyroid     Irregular heartbeat     Knee swelling ?    Lesion of neck     Low back strain ?    Has happenened several times    Obesity     Since birth    Osteoarthritis     Pneumonia 1960    RLS (restless legs syndrome)     Shortness of breath Approx. 3 yrs. ago    Sleep apnea in adult     Sleep apnea, obstructive 2005 ?    Sleep with full face mask    Type 2 diabetes mellitus     Visual impairment     Wear glasses for reading     Past Surgical History:   Procedure  Laterality Date    ABLATION OF DYSRHYTHMIC FOCUS      ANKLE OPEN REDUCTION INTERNAL FIXATION  1086    Plate and screws corrected compound fracture    ANKLE TENDON REPAIR Right     ankle with hardware    CARDIAC ELECTROPHYSIOLOGY PROCEDURE N/A 2024    Procedure: AF/AFL with PFA & NILA. No CTA. Hold Eliquis morning .;  Surgeon: Luca James MD;  Location: [facility] INVASIVE LOCATION;  Service: Cardiovascular;  Laterality: N/A;    CARDIAC SURGERY  2024    Ablation    CARPAL TUNNEL RELEASE Bilateral      SECTION          COLONOSCOPY      COLONOSCOPY N/A 2024    Procedure: COLONOSCOPY;  Surgeon: Kalin Arriaga MD;  Location: [facility] ENDOSCOPY;  Service: General;  Laterality: N/A;  NORMAL    FRACTURE SURGERY  1984    Plate and screws in right ankle    HAND SURGERY  ?    Carpal tunnel correction on both hands at different dates    NECK SURGERY      ,,; Neck lesion removed, total of 3     Family History   Problem Relation Age of Onset    Arthritis Mother     Thyroid disease Mother     Vision loss Mother         Wore glasses; cataract surgery; detached retina    Lung cancer Father     Heart disease Father         Had pacemaker    Colon cancer Father     Cancer Father         Lung, colon    Emphysema Father     COPD Father     Vision loss Father         Wore glasses; cataract surgery    Cancer Brother         Lung, pancreas    Depression Brother         Undiagnosed    Cancer Maternal Aunt         Breast cancer    Arthritis Maternal Aunt     Hearing loss Maternal Aunt     Rheumatologic disease Maternal Aunt         Rheumatoid Arthritis    Arthritis Maternal Aunt     Hearing loss Maternal Aunt     Cancer Maternal Aunt     Vision loss Maternal Grandfather     Stroke Maternal Grandfather     Diabetes Paternal Uncle     Hearing loss Maternal Aunt     Arthritis Maternal Aunt     Arthritis Maternal Aunt     Hearing loss Maternal Aunt     Cancer Maternal Aunt         Cancer     Arthritis Maternal Aunt        Home Medications:  Prior to Admission medications    Medication Sig Start Date End Date Taking? Authorizing Provider   Accu-Chek Softclix Lancets lancets 1 each by Other route 3 times a day. Use as instructed 2/9/24   Mainor Sanders Jr., MD   albuterol sulfate HFA (Ventolin HFA) 108 (90 Base) MCG/ACT inhaler Inhale 2 puffs Every 4 (Four) Hours As Needed for Wheezing or Shortness of Air. 5/13/25   Jessy Anton APRN   Alcohol Swabs (B-D SINGLE USE SWABS REGULAR) pads Apply 1 Application topically to the appropriate area as directed 5 (Five) Times a Day. 2/9/24   Mainor Sanders Jr., MD   apixaban (Eliquis) 5 MG tablet tablet Take 1 tablet by mouth Every 12 (Twelve) Hours. Lot: [id number]  Exp: 9/25  4 boxes given 2/11/25   Mahad Perez MD   atorvastatin (LIPITOR) 40 MG tablet Take 1 tablet by mouth Every Night. 9/10/24   Charito Hernandes APRN   BIOTIN PO Take  by mouth Every Night.    ProviderGeorgette MD   Blood Glucose Monitoring Suppl (Accu-Chek Guide Me) w/Device kit Inject 1 kit under the skin into the appropriate area as directed See Admin Instructions. 2/16/23   Mainor Sanders Jr., MD   bumetanide (BUMEX) 2 MG tablet PLEASE SEE ATTACHED FOR DETAILED DIRECTIONS 2/4/25   Mahad Perez MD   Calcium Carb-Cholecalciferol (CALCIUM 600 + D PO) Take 1 tablet by mouth After Dinner.    ProviderGeorgette MD   Diclofenac Sodium (VOLTAREN) 1 % gel gel May apply to each knee every 6 hours while awake 3/31/25   Brennon León MD   DULoxetine (CYMBALTA) 60 MG capsule Take 2 capsules by mouth Daily. 6/6/25   Mainor Sanders Jr., MD   empagliflozin (Jardiance) 25 MG tablet tablet Take 1 tablet by mouth Daily. 2/11/25   Mahad Perez MD   famotidine (Pepcid) 40 MG tablet Take 1 tablet by mouth 2 (Two) Times a Day. 4/28/25   Mainor Sanders Jr., MD   gabapentin (NEURONTIN) 600 MG tablet Take 1 tablet by mouth 3 (Three) Times  a Day As Needed (pain). 25   Mainor Sanders Jr., MD   glucose blood (Accu-Chek Guide) test strip 1 each by Other route Daily. Use as instructed 24   Mainor Sanders Jr., MD   levothyroxine (Synthroid) 50 MCG tablet Take 1 tablet by mouth Daily. 2/3/25   Mainor Sanders Jr., MD   magnesium chloride ER 64 MG DR tablet Take 1 tablet by mouth Daily.    Georgette Navarro MD   metFORMIN (GLUCOPHAGE) 500 MG tablet Take 1 tablet by mouth Daily With Dinner. 25   Mainor Sanders Jr., MD   metoprolol succinate XL (TOPROL-XL) 50 MG 24 hr tablet Take 1 tablet by mouth Daily. 25   Susi Soriano MD   metoprolol tartrate (LOPRESSOR) 25 MG tablet Take 0.5 tablets by mouth 2 (Two) Times a Day As Needed (for sustained HR >120 bpm). 3/11/25   Catrina Lawler APRN   minocycline (MINOCIN,DYNACIN) 100 MG capsule TAKE ONE CAPSULE BY MOUTH EVERY DAY 1/10/25   Mainor Sanders Jr., MD   multivitamin with minerals tablet tablet Take 1 tablet by mouth Every Night. 1 GUMMIE AT NIGHT    Georgette Navarro MD   Potassium Gluconate 550 MG tablet Take  by mouth Every Evening.    Georgette Navarro MD   rOPINIRole (REQUIP) 2 MG tablet TAKE 1 TABLET BY MOUTH EVERY NIGHT. 25   Mainor Sanders Jr., MD   Stiolto Respimat 2.5-2.5 MCG/ACT aerosol solution inhaler INHALE TWO PUFFS BY MOUTH EVERY DAY 25   Mainor Sanders Jr., MD   Zinc 50 MG tablet Take 1 tablet by mouth Every Night. CAPSULE, NOT TAB (WITH D3)    Georgette Navarro MD        Social History:   Social History     Tobacco Use    Smoking status: Former     Current packs/day: 0.00     Average packs/day: 0.5 packs/day for 51.0 years (25.5 ttl pk-yrs)     Types: Cigarettes     Start date: 1969     Quit date: 2020     Years since quittin.4     Passive exposure: Past    Smokeless tobacco: Never    Tobacco comments:     Was off and on during the 40 years   Vaping Use    Vaping status: Never  "Used   Substance Use Topics    Alcohol use: Yes     Comment: Socially on occasion    Drug use: Never         Review of Systems:  Review of Systems   Constitutional:  Negative for chills and fever.   HENT:  Negative for congestion, rhinorrhea and sore throat.    Eyes:  Negative for pain and visual disturbance.   Respiratory:  Negative for apnea, cough, chest tightness and shortness of breath.    Cardiovascular:  Positive for palpitations. Negative for chest pain.   Gastrointestinal:  Negative for abdominal pain, diarrhea, nausea and vomiting.   Genitourinary:  Negative for difficulty urinating and dysuria.   Musculoskeletal:  Negative for joint swelling and myalgias.   Skin:  Negative for color change.   Neurological:  Positive for weakness. Negative for seizures and headaches.   Psychiatric/Behavioral: Negative.     All other systems reviewed and are negative.       Physical Exam:  /94   Pulse 58   Temp 99.1 °F (37.3 °C) (Oral)   Resp 17   Ht 160 cm (63\")   Wt 130 kg (287 lb 4.2 oz)   SpO2 93%   BMI 50.89 kg/m²         Physical Exam  Vitals and nursing note reviewed.   Constitutional:       General: She is not in acute distress.     Appearance: Normal appearance. She is morbidly obese. She is not toxic-appearing.   HENT:      Head: Normocephalic and atraumatic.      Jaw: There is normal jaw occlusion.      Mouth/Throat:      Mouth: Mucous membranes are moist.   Eyes:      General: Lids are normal.      Extraocular Movements: Extraocular movements intact.      Conjunctiva/sclera: Conjunctivae normal.      Pupils: Pupils are equal, round, and reactive to light.   Cardiovascular:      Rate and Rhythm: Normal rate and regular rhythm.      Pulses: Normal pulses.      Heart sounds: Normal heart sounds.   Pulmonary:      Effort: Pulmonary effort is normal. No respiratory distress.      Breath sounds: Normal breath sounds. No wheezing or rhonchi.   Abdominal:      General: Abdomen is flat. There is no " distension.      Palpations: Abdomen is soft.      Tenderness: There is no abdominal tenderness. There is no guarding or rebound.   Musculoskeletal:         General: Normal range of motion.      Cervical back: Normal range of motion and neck supple.      Right lower leg: No edema.      Left lower leg: No edema.   Skin:     General: Skin is warm and dry.   Neurological:      General: No focal deficit present.      Mental Status: She is alert and oriented to person, place, and time. Mental status is at baseline.   Psychiatric:         Mood and Affect: Mood normal.         Behavior: Behavior normal.                            Medical Decision Making:      Comorbidities that affect care:    Atrial Fibrillation    External Notes reviewed:    None      The following orders were placed and all results were independently analyzed by me:  Orders Placed This Encounter   Procedures    XR Chest 1 View    [place] Draw    Comprehensive Metabolic Panel    High Sensitivity Troponin T    Magnesium    CBC Auto Differential    High Sensitivity Troponin T 1Hr    BNP    Urinalysis With Microscopic If Indicated (No Culture) - Urine, Clean Catch    Phosphorus    Magnesium    Comprehensive Metabolic Panel    TSH    T4, Free    CBC Auto Differential    NPO Diet NPO Type: Sips with Meds, Ice Chips    Diet: Cardiac, Diabetic; Healthy Heart (2-3 Na+); Consistent Carbohydrate; Fluid Consistency: Thin (IDDSI 0)    Vital Signs    Vital Signs    Intake & Output    Weigh Patient    Oral Care    Place Sequential Compression Device    Maintain Sequential Compression Device    Maintain IV Access    Telemetry - Place Orders & Notify Provider of Results When Patient Experiences Acute Chest Pain, Dysrhythmia or Respiratory Distress    Continuous Pulse Oximetry    Code Status and Medical Interventions: CPR (Attempt to Resuscitate); Full Support    Inpatient Cardiology Consult    Inpatient Hospitalist Consult    Oxygen Therapy- Nasal Cannula; Titrate 1-6  LPM Per SpO2; 90 - 95%    RT to Initiate Bronchodilator Protocol    Patient May Use Home CPAP / BIPAP For Sleep or As Needed    POC Glucose Q6H    POC Glucose Once    ECG 12 Lead Chest Pain    ECG 12 Lead Chest Pain    Adult Transthoracic Echo Complete W/ Cont if Necessary Per Protocol    Insert Peripheral IV    Insert Peripheral IV    Initiate Observation Status    CBC & Differential    Green Top (Gel)    Lavender Top    Gold Top - SST    Light Blue Top    CBC & Differential       Medications Given in the Emergency Department:  Medications   sodium chloride 0.9 % flush 10 mL (has no administration in time range)   apixaban (ELIQUIS) tablet 5 mg (has no administration in time range)   atorvastatin (LIPITOR) tablet 40 mg (has no administration in time range)   metoprolol succinate XL (TOPROL-XL) 24 hr tablet 50 mg (has no administration in time range)   sodium chloride 0.9 % flush 10 mL (has no administration in time range)   sodium chloride 0.9 % flush 10 mL (has no administration in time range)   sodium chloride 0.9 % infusion 40 mL (has no administration in time range)   nitroglycerin (NITROSTAT) SL tablet 0.4 mg (has no administration in time range)   sennosides-docusate (PERICOLACE) 8.6-50 MG per tablet 2 tablet (has no administration in time range)     And   polyethylene glycol (MIRALAX) packet 17 g (has no administration in time range)     And   bisacodyl (DULCOLAX) EC tablet 5 mg (has no administration in time range)     And   bisacodyl (DULCOLAX) suppository 10 mg (has no administration in time range)   arformoterol (BROVANA) nebulizer solution 15 mcg (has no administration in time range)   revefenacin (YUPELRI) nebulizer solution 175 mcg (has no administration in time range)   albuterol (PROVENTIL) nebulizer solution 0.083% 2.5 mg/3mL (has no administration in time range)   dextrose (GLUTOSE) oral gel 15 g (has no administration in time range)   dextrose (D50W) (25 g/50 mL) IV injection 25 g (has no  administration in time range)   glucagon (GLUCAGEN) injection 1 mg (has no administration in time range)   insulin regular (humuLIN R,novoLIN R) injection 2-7 Units ( Subcutaneous Not Given 6/10/25 2200)   dilTIAZem (CARDIZEM) injection 20 mg (20 mg Intravenous Given 6/10/25 1707)        ED Course:    The patient was initially evaluated in the triage area where orders were placed. The patient was later dispositioned by Quan Bates MD.      The patient was advised to stay for completion of workup which includes but is not limited to communication of labs and radiological results, reassessment and plan. The patient was advised that leaving prior to disposition by a provider could result in critical findings that are not communicated to the patient.          Labs:    Lab Results (last 24 hours)       Procedure Component Value Units Date/Time    CBC & Differential [[id number]]  (Abnormal) Collected: 06/10/25 1639    Specimen: Blood from Arm, Left Updated: 06/10/25 1645    Narrative:      The following orders were created for panel order CBC & Differential.  Procedure                               Abnormality         Status                     ---------                               -----------         ------                     CBC Auto Differential[[id number]]        Abnormal            Final result                 Please view results for these tests on the individual orders.    Comprehensive Metabolic Panel [[id number]]  (Abnormal) Collected: 06/10/25 1639    Specimen: Blood from Arm, Left Updated: 06/10/25 1704     Glucose 154 mg/dL      BUN 21.5 mg/dL      Creatinine 1.44 mg/dL      Sodium 138 mmol/L      Potassium 4.1 mmol/L      Chloride 98 mmol/L      CO2 22.1 mmol/L      Calcium 9.4 mg/dL      Total Protein 7.4 g/dL      Albumin 3.9 g/dL      ALT (SGPT) 20 U/L      AST (SGOT) 25 U/L      Alkaline Phosphatase 77 U/L      Total Bilirubin 0.5 mg/dL      Globulin 3.5 gm/dL      A/G Ratio 1.1 g/dL       BUN/Creatinine Ratio 14.9     Anion Gap 17.9 mmol/L      eGFR 39.0 mL/min/1.73     Narrative:      GFR Categories in Chronic Kidney Disease (CKD)              GFR Category          GFR (mL/min/1.73)    Interpretation  G1                    90 or greater        Normal or high (1)  G2                    60-89                Mild decrease (1)  G3a                   45-59                Mild to moderate decrease  G3b                   30-44                Moderate to severe decrease  G4                    15-29                Severe decrease  G5                    14 or less           Kidney failure    (1)In the absence of evidence of kidney disease, neither GFR category G1 or G2 fulfill the criteria for CKD.    eGFR calculation 2021 CKD-EPI creatinine equation, which does not include race as a factor    High Sensitivity Troponin T [[id number]]  (Abnormal) Collected: 06/10/25 1639    Specimen: Blood from Arm, Left Updated: 06/10/25 1704     HS Troponin T 27 ng/L     Narrative:      High Sensitive Troponin T Reference Range:  <14.0 ng/L- Negative Female for AMI  <22.0 ng/L- Negative Male for AMI  >=14 - Abnormal Female indicating possible myocardial injury.  >=22 - Abnormal Male indicating possible myocardial injury.   Clinicians would have to utilize clinical acumen, EKG, Troponin, and serial changes to determine if it is an Acute Myocardial Infarction or myocardial injury due to an underlying chronic condition.         Magnesium [[id number]]  (Normal) Collected: 06/10/25 1639    Specimen: Blood from Arm, Left Updated: 06/10/25 1704     Magnesium 2.0 mg/dL     CBC Auto Differential [[id number]]  (Abnormal) Collected: 06/10/25 1639    Specimen: Blood from Arm, Left Updated: 06/10/25 1645     WBC 12.35 10*3/mm3      RBC 5.41 10*6/mm3      Hemoglobin 16.1 g/dL      Hematocrit 50.2 %      MCV 92.8 fL      MCH 29.8 pg      MCHC 32.1 g/dL      RDW 15.6 %      RDW-SD 52.7 fl      MPV 10.2 fL      Platelets 302 10*3/mm3       Neutrophil % 57.3 %      Lymphocyte % 33.2 %      Monocyte % 6.7 %      Eosinophil % 1.5 %      Basophil % 0.8 %      Immature Grans % 0.5 %      Neutrophils, Absolute 7.08 10*3/mm3      Lymphocytes, Absolute 4.10 10*3/mm3      Monocytes, Absolute 0.83 10*3/mm3      Eosinophils, Absolute 0.18 10*3/mm3      Basophils, Absolute 0.10 10*3/mm3      Immature Grans, Absolute 0.06 10*3/mm3      nRBC 0.0 /100 WBC     High Sensitivity Troponin T 1Hr [[id number]]  (Abnormal) Collected: 06/10/25 1825    Specimen: Blood Updated: 06/10/25 1856     HS Troponin T 23 ng/L      Troponin T Numeric Delta -4 ng/L      Troponin T % Delta -15    Narrative:      High Sensitive Troponin T Reference Range:  <14.0 ng/L- Negative Female for AMI  <22.0 ng/L- Negative Male for AMI  >=14 - Abnormal Female indicating possible myocardial injury.  >=22 - Abnormal Male indicating possible myocardial injury.   Clinicians would have to utilize clinical acumen, EKG, Troponin, and serial changes to determine if it is an Acute Myocardial Infarction or myocardial injury due to an underlying chronic condition.         BNP [[id number]]  (Abnormal) Collected: 06/10/25 1825    Specimen: Blood Updated: 06/10/25 1958     proBNP 933.8 pg/mL     Narrative:      This assay is used as an aid in the diagnosis of individuals suspected of having heart failure. It can be used as an aid in the diagnosis of acute decompensated heart failure (ADHF) in patients presenting with signs and symptoms of ADHF to the emergency department (ED). In addition, NT-proBNP of <300 pg/mL indicates ADHF is not likely.    Age Range Result Interpretation  NT-proBNP Concentration (pg/mL:      <50             Positive            >450                   Gray                 300-450                    Negative             <300    50-75           Positive            >900                  Gray                300-900                  Negative            <300      >75             Positive             >1800                  Pe[place]aloza                300-1800                  Negative            <300    TSH [[id number]] Collected: 06/10/25 1825    Specimen: Blood Updated: 06/10/25 2201    T4, Free [[id number]] Collected: 06/10/25 1825    Specimen: Blood Updated: 06/10/25 2201    Urinalysis With Microscopic If Indicated (No Culture) - Urine, Clean Catch [[id number]]  (Abnormal) Collected: 06/10/25 2137    Specimen: Urine, Clean Catch Updated: 06/10/25 2146     Color, UA Yellow     Appearance, UA Clear     pH, UA <=5.0     Specific Gravity, UA 1.025     Glucose, UA >=1000 mg/dL (3+)     Ketones, UA Negative     Bilirubin, UA Negative     Blood, UA Negative     Protein, UA Negative     Leuk Esterase, UA Negative     Nitrite, UA Negative     Urobilinogen, UA 1.0 E.U./dL    Narrative:      Urine microscopic not indicated.    POC Glucose Once [[id number]]  (Abnormal) Collected: 06/10/25 2141    Specimen: Blood Updated: 06/10/25 2143     Glucose 101 mg/dL      Comment: Serial Number: [id number]:  [id number]                Imaging:    XR Chest 1 View  Result Date: 6/10/2025  XR CHEST 1 VW Date of Exam: 6/10/2025 4:44 PM EDT Indication: Chest pain protocol Comparison: CXR 8/14/2024 Findings: The heart remains normal in size. The lungs are well-expanded. Subsegmental atelectasis and/or linear fibrosis in the left perihilar region is stable. Bony structures appear intact.     Impression: No active disease is seen. Electronically Signed: Sy Joshi MD  6/10/2025 5:34 PM EDT  Workstation ID: [id number]        Differential Diagnosis and Discussion:      Palpitations: Differential diagnosis includes but is not limited to anxiety, atrioventricular blocks, mitral valve disease, hypoxia, coronary artery disease, hypokalemia, anemia, fever, COPD, congestive heart failure, pericarditis, Nilam-Parkinson-White syndrome, pulmonary embolism, SVT, atrial fibrillation, atrial flutter, sinus tachycardia, thyrotoxicosis, and  pheochromocytoma.    PROCEDURES:    Labs were collected in the emergency department and all labs were reviewed and interpreted by me.  X-ray were performed in the emergency department and all X-ray impressions were independently interpreted by me.  An EKG was performed and the EKG was interpreted by me.    ECG 12 Lead Chest Pain   Preliminary Result   HEART RATE=57  bpm   RR Enraslyd=0425  ms   IA Interval=  ms   P Horizontal Axis=  deg   P Front Axis=  deg   QRSD Ypwgydfn=831  ms   QT Drbzaovf=100  ms   AKzA=911  ms   QRS Axis=9  deg   T Wave Axis=10  deg   - ABNORMAL ECG -   Junctional rhythm   Right bundle branch block   Date and Time of Study:2025-06-10 18:23:58      ECG 12 Lead Chest Pain   Preliminary Result   HEART KAVV=307  bpm   RR Ldsrjxnc=224  ms   IA Gauaflig=538  ms   P Horizontal Axis=95  deg   P Front Axis=166  deg   QRSD Rwufraxa=761  ms   QT Durzzykq=697  ms   RNaP=507  ms   QRS Axis=210  deg   T Wave Axis=24  deg   - ABNORMAL ECG -   Extreme tachycardia with wide complex, no further rhythm analysis    attempted   Consider dextrocardia   Date and Time of Study:2025-06-10 16:23:02           Procedures    MDM     The patient´s CBC that was reviewed and interpreted by me shows no abnormalities of critical concern. Of note, there is no anemia requiring a blood transfusion and the platelet count is acceptable.  The patient´s CMP that was reviewed and interpretted by me shows no abnormalities of critical concern. Of note, the patient´s sodium and potassium are acceptable. The patient´s liver enzymes are unremarkable. The patient´s renal function (creatinine) is preserved. The patient has a normal anion gap.  Patient was found to be in atrial fibrillation with rapid ventricular response.  Patient was given Cardizem.    Total Critical Care time of 45 minutes. Total critical care time documented does not include time spent on separately billed procedures for services of nurses or physician assistants. I  personally saw and examined the patient. I have reviewed all diagnostic interpretations and treatment plans as written. I was present for the key portions of any procedures performed and the inclusive time noted in any critical care statement. Critical care time includes patient management by me, time spent at the patients bedside,  time to review lab and imaging results, discussing patient care, documentation in the medical record, and time spent with family or caregiver.          Patient Care Considerations:    PERC: I used the PERC score to risk stratify the patient for PE and a CT of the chest was considered but ultimately not indicated in today's visit.      Consultants/Shared Management Plan:    Case was discussed with Dr. Dandy who agrees with admission.    Social Determinants of Health:    Patient is independent, reliable, and has access to care.       Disposition and Care Coordination:    Admit:   Through independent evaluation of the patient's history, physical, and imperical data, the patient meets criteria for inpatient admission to the hospital.        Final diagnoses:   Atrial flutter, unspecified type        ED Disposition       ED Disposition   Decision to Admit    Condition   --    Comment   Level of Care: Telemetry [5]   Diagnosis: Atrial flutter with rapid ventricular response [[id number]]                 This medical record created using voice recognition software.             Quan Bates MD  06/10/25 9440

## 2025-06-11 ENCOUNTER — APPOINTMENT (OUTPATIENT)
Dept: CARDIOLOGY | Facility: HOSPITAL | Age: 70
End: 2025-06-11
Payer: MEDICARE

## 2025-06-11 LAB
ALBUMIN SERPL-MCNC: 3.4 G/DL (ref 3.5–5.2)
ALBUMIN/GLOB SERPL: 1.1 G/DL
ALP SERPL-CCNC: 65 U/L (ref 39–117)
ALT SERPL W P-5'-P-CCNC: 16 U/L (ref 1–33)
ANION GAP SERPL CALCULATED.3IONS-SCNC: 9.3 MMOL/L (ref 5–15)
AST SERPL-CCNC: 18 U/L (ref 1–32)
BASOPHILS # BLD AUTO: 0.07 10*3/MM3 (ref 0–0.2)
BASOPHILS NFR BLD AUTO: 0.7 % (ref 0–1.5)
BILIRUB SERPL-MCNC: 0.6 MG/DL (ref 0–1.2)
BUN SERPL-MCNC: 18.9 MG/DL (ref 8–23)
BUN/CREAT SERPL: 18.3 (ref 7–25)
CALCIUM SPEC-SCNC: 8.9 MG/DL (ref 8.6–10.5)
CHLORIDE SERPL-SCNC: 101 MMOL/L (ref 98–107)
CO2 SERPL-SCNC: 26.7 MMOL/L (ref 22–29)
CREAT SERPL-MCNC: 1.03 MG/DL (ref 0.57–1)
DEPRECATED RDW RBC AUTO: 53 FL (ref 37–54)
EGFRCR SERPLBLD CKD-EPI 2021: 58.3 ML/MIN/1.73
EOSINOPHIL # BLD AUTO: 0.23 10*3/MM3 (ref 0–0.4)
EOSINOPHIL NFR BLD AUTO: 2.3 % (ref 0.3–6.2)
ERYTHROCYTE [DISTWIDTH] IN BLOOD BY AUTOMATED COUNT: 15.6 % (ref 12.3–15.4)
GLOBULIN UR ELPH-MCNC: 3 GM/DL
GLUCOSE BLDC GLUCOMTR-MCNC: 104 MG/DL (ref 70–99)
GLUCOSE BLDC GLUCOMTR-MCNC: 97 MG/DL (ref 70–99)
GLUCOSE SERPL-MCNC: 95 MG/DL (ref 65–99)
HCT VFR BLD AUTO: 43.8 % (ref 34–46.6)
HGB BLD-MCNC: 14 G/DL (ref 12–15.9)
IMM GRANULOCYTES # BLD AUTO: 0.02 10*3/MM3 (ref 0–0.05)
IMM GRANULOCYTES NFR BLD AUTO: 0.2 % (ref 0–0.5)
LYMPHOCYTES # BLD AUTO: 4.63 10*3/MM3 (ref 0.7–3.1)
LYMPHOCYTES NFR BLD AUTO: 47.1 % (ref 19.6–45.3)
MAGNESIUM SERPL-MCNC: 2.3 MG/DL (ref 1.6–2.4)
MCH RBC QN AUTO: 29.7 PG (ref 26.6–33)
MCHC RBC AUTO-ENTMCNC: 32 G/DL (ref 31.5–35.7)
MCV RBC AUTO: 92.8 FL (ref 79–97)
MONOCYTES # BLD AUTO: 0.81 10*3/MM3 (ref 0.1–0.9)
MONOCYTES NFR BLD AUTO: 8.2 % (ref 5–12)
NEUTROPHILS NFR BLD AUTO: 4.07 10*3/MM3 (ref 1.7–7)
NEUTROPHILS NFR BLD AUTO: 41.5 % (ref 42.7–76)
NRBC BLD AUTO-RTO: 0 /100 WBC (ref 0–0.2)
PHOSPHATE SERPL-MCNC: 4.3 MG/DL (ref 2.5–4.5)
PLATELET # BLD AUTO: 225 10*3/MM3 (ref 140–450)
PMV BLD AUTO: 10.1 FL (ref 6–12)
POTASSIUM SERPL-SCNC: 3.6 MMOL/L (ref 3.5–5.2)
PROT SERPL-MCNC: 6.4 G/DL (ref 6–8.5)
QT INTERVAL: 473 MS
QTC INTERVAL: 459 MS
RBC # BLD AUTO: 4.72 10*6/MM3 (ref 3.77–5.28)
SODIUM SERPL-SCNC: 137 MMOL/L (ref 136–145)
WBC NRBC COR # BLD AUTO: 9.83 10*3/MM3 (ref 3.4–10.8)

## 2025-06-11 PROCEDURE — 83735 ASSAY OF MAGNESIUM: CPT | Performed by: STUDENT IN AN ORGANIZED HEALTH CARE EDUCATION/TRAINING PROGRAM

## 2025-06-11 PROCEDURE — G0378 HOSPITAL OBSERVATION PER HR: HCPCS

## 2025-06-11 PROCEDURE — 96375 TX/PRO/DX INJ NEW DRUG ADDON: CPT

## 2025-06-11 PROCEDURE — 82948 REAGENT STRIP/BLOOD GLUCOSE: CPT

## 2025-06-11 PROCEDURE — 85025 COMPLETE CBC W/AUTO DIFF WBC: CPT | Performed by: STUDENT IN AN ORGANIZED HEALTH CARE EDUCATION/TRAINING PROGRAM

## 2025-06-11 PROCEDURE — 94799 UNLISTED PULMONARY SVC/PX: CPT

## 2025-06-11 PROCEDURE — 99214 OFFICE O/P EST MOD 30 MIN: CPT | Performed by: INTERNAL MEDICINE

## 2025-06-11 PROCEDURE — 93306 TTE W/DOPPLER COMPLETE: CPT | Performed by: INTERNAL MEDICINE

## 2025-06-11 PROCEDURE — 82948 REAGENT STRIP/BLOOD GLUCOSE: CPT | Performed by: STUDENT IN AN ORGANIZED HEALTH CARE EDUCATION/TRAINING PROGRAM

## 2025-06-11 PROCEDURE — 93306 TTE W/DOPPLER COMPLETE: CPT

## 2025-06-11 PROCEDURE — 84100 ASSAY OF PHOSPHORUS: CPT | Performed by: STUDENT IN AN ORGANIZED HEALTH CARE EDUCATION/TRAINING PROGRAM

## 2025-06-11 PROCEDURE — 99232 SBSQ HOSP IP/OBS MODERATE 35: CPT | Performed by: INTERNAL MEDICINE

## 2025-06-11 PROCEDURE — 25010000002 SULFUR HEXAFLUORIDE MICROSPH 60.7-25 MG RECONSTITUTED SUSPENSION: Performed by: INTERNAL MEDICINE

## 2025-06-11 PROCEDURE — 25010000002 FUROSEMIDE PER 20 MG: Performed by: INTERNAL MEDICINE

## 2025-06-11 PROCEDURE — 80053 COMPREHEN METABOLIC PANEL: CPT | Performed by: STUDENT IN AN ORGANIZED HEALTH CARE EDUCATION/TRAINING PROGRAM

## 2025-06-11 RX ORDER — MINOCYCLINE HYDROCHLORIDE 50 MG/1
100 CAPSULE ORAL
Status: DISCONTINUED | OUTPATIENT
Start: 2025-06-11 | End: 2025-06-12 | Stop reason: HOSPADM

## 2025-06-11 RX ORDER — METOPROLOL TARTRATE 25 MG/1
12.5 TABLET, FILM COATED ORAL 2 TIMES DAILY PRN
Status: DISCONTINUED | OUTPATIENT
Start: 2025-06-11 | End: 2025-06-12 | Stop reason: HOSPADM

## 2025-06-11 RX ORDER — GABAPENTIN 300 MG/1
600 CAPSULE ORAL EVERY 8 HOURS SCHEDULED
Status: DISCONTINUED | OUTPATIENT
Start: 2025-06-11 | End: 2025-06-12 | Stop reason: HOSPADM

## 2025-06-11 RX ORDER — LEVOTHYROXINE SODIUM 50 UG/1
50 TABLET ORAL
Status: DISCONTINUED | OUTPATIENT
Start: 2025-06-11 | End: 2025-06-12 | Stop reason: HOSPADM

## 2025-06-11 RX ORDER — FUROSEMIDE 10 MG/ML
40 INJECTION INTRAMUSCULAR; INTRAVENOUS ONCE
Status: COMPLETED | OUTPATIENT
Start: 2025-06-11 | End: 2025-06-11

## 2025-06-11 RX ADMIN — GABAPENTIN 600 MG: 300 CAPSULE ORAL at 13:14

## 2025-06-11 RX ADMIN — Medication 10 ML: at 20:41

## 2025-06-11 RX ADMIN — Medication 10 ML: at 08:21

## 2025-06-11 RX ADMIN — MINOCYCLINE HYDROCHLORIDE 100 MG: 50 CAPSULE ORAL at 10:52

## 2025-06-11 RX ADMIN — METOPROLOL SUCCINATE 50 MG: 50 TABLET, EXTENDED RELEASE ORAL at 08:20

## 2025-06-11 RX ADMIN — LEVOTHYROXINE SODIUM 50 MCG: 0.05 TABLET ORAL at 10:53

## 2025-06-11 RX ADMIN — Medication 5 MG: at 20:43

## 2025-06-11 RX ADMIN — DULOXETINE HYDROCHLORIDE 60 MG: 30 CAPSULE, DELAYED RELEASE ORAL at 08:20

## 2025-06-11 RX ADMIN — ARFORMOTEROL TARTRATE 15 MCG: 15 SOLUTION RESPIRATORY (INHALATION) at 19:03

## 2025-06-11 RX ADMIN — FUROSEMIDE 40 MG: 10 INJECTION, SOLUTION INTRAMUSCULAR; INTRAVENOUS at 18:38

## 2025-06-11 RX ADMIN — APIXABAN 5 MG: 5 TABLET, FILM COATED ORAL at 08:20

## 2025-06-11 RX ADMIN — GABAPENTIN 600 MG: 300 CAPSULE ORAL at 21:05

## 2025-06-11 RX ADMIN — ATORVASTATIN CALCIUM 40 MG: 40 TABLET, FILM COATED ORAL at 20:39

## 2025-06-11 RX ADMIN — DULOXETINE HYDROCHLORIDE 60 MG: 30 CAPSULE, DELAYED RELEASE ORAL at 20:38

## 2025-06-11 RX ADMIN — ROPINIROLE HYDROCHLORIDE 2 MG: 1 TABLET, FILM COATED ORAL at 20:39

## 2025-06-11 RX ADMIN — SULFUR HEXAFLUORIDE 4 ML: KIT at 10:20

## 2025-06-11 RX ADMIN — EMPAGLIFLOZIN 25 MG: 10 TABLET, FILM COATED ORAL at 10:52

## 2025-06-11 RX ADMIN — APIXABAN 5 MG: 5 TABLET, FILM COATED ORAL at 20:39

## 2025-06-11 NOTE — PROGRESS NOTES
[facility]   Hospitalist Progress Note  Date: 2025  Patient Name: Randi Fajardo  : 1955  MRN: [id number]  Date of admission: 6/10/2025  Room/Bed: 214/1      Subjective   Subjective     Chief Complaint: Palpitations    Summary:Randi Fajardo is a 69 y.o. female  with past medical history of hypertension, hyperlipidemia, type 2 diabetes mellitus, hypothyroidism, atrial flutter s/p radiofrequency ablation of cavotricuspid isthmus in 2024, currently on Eliquis, CHF, obesity presented to the ED for evaluation of atrial flutter with rapid ventricular response.  Since the ablation, patient had intermittent episodes of rapid ventricular response with associated dizziness.  On the day of admission she saw her electrophysiologist Dr. James at [facility] and noted to have a heart rate in 170s and was advised to go to the ER for further evaluation.  Patient is not taking Eliquis since last 4 weeks as it was too expensive.  Denies any chest pain, nausea, vomiting, fevers, chills, abdominal pain, dysuria.     Upon arrival, vital signs temperature 99.1, pulse 159, respiratory 17, blood pressure 100/75 on room air saturating around 93%.  EKG showed wide-complex tachycardia, probably atrial flutter with RVR and aberrancy.  Initial troponin 27, repeat 23, creatinine 1.44 baseline around 1.1, rest of the CMP with no significant findings, WBC 12.35, hemoglobin 16.1, platelets 302.  Chest x-ray showed no acute abnormality.  Received IV diltiazem in the ED with improvement in the heart rate.  Repeat EKG showed junctional rhythm.  Case discussed by ER physician with cardiologist on-call Dr. Garibay, agreed to consult.  Patient admitted for further evaluation and management    Interval Followup:   Patient states she is feeling better today.  Patient denies any further palpitations currently however states she has been dealing with this for quite some time  Patient denies any chest pain or shortness of  breath and states that her fast heart rate usually does not bother her it is just more bothersome    Review of Systems    All systems reviewed and negative except for what is outlined above.      Objective   Objective     Vitals:   Temp:  [97.3 °F (36.3 °C)-99.1 °F (37.3 °C)] 97.5 °F (36.4 °C)  Heart Rate:  [] 55  Resp:  [17-22] 22  BP: ()/(60-94) 119/71  Flow (L/min) (Oxygen Therapy):  [2] 2    Physical Exam   General: Awake, alert, NAD resting in bed   HENT: NCAT, MMM  Eyes: pupils equal, no scleral icterus  Cardiovascular: RRR, no murmurs   Pulmonary: CTA bilaterally; no wheezes; no conversational dyspnea  Gastrointestinal: S/ND/NT, +BS  Musculoskeletal: No gross deformities  Skin: No jaundice, no rash on exposed skin appreciated  Neuro: CN II through XII grossly intact; speech clear; no tremor  Psych: Mood and affect appropriate      Result Review    Result Review:  I have personally reviewed these results:  [x]  Laboratory      Lab 06/11/25  0645 06/10/25  1639   WBC 9.83 12.35*   HEMOGLOBIN 14.0 16.1*   HEMATOCRIT 43.8 50.2*   PLATELETS 225 302   NEUTROS ABS 4.07 7.08*   IMMATURE GRANS (ABS) 0.02 0.06*   LYMPHS ABS 4.63* 4.10*   MONOS ABS 0.81 0.83   EOS ABS 0.23 0.18   MCV 92.8 92.8         Lab 06/11/25  0645 06/10/25  1825 06/10/25  1639   SODIUM 137  --  138   POTASSIUM 3.6  --  4.1   CHLORIDE 101  --  98   CO2 26.7  --  22.1   ANION GAP 9.3  --  17.9*   BUN 18.9  --  21.5   CREATININE 1.03*  --  1.44*   EGFR 58.3*  --  39.0*   GLUCOSE 95  --  154*   CALCIUM 8.9  --  9.4   MAGNESIUM 2.3  --  2.0   PHOSPHORUS 4.3  --   --    TSH  --  2.730  --          Lab 06/11/25  0645 06/10/25  1639   TOTAL PROTEIN 6.4 7.4   ALBUMIN 3.4* 3.9   GLOBULIN 3.0 3.5   ALT (SGPT) 16 20   AST (SGOT) 18 25   BILIRUBIN 0.6 0.5   ALK PHOS 65 77         Lab 06/10/25  1825 06/10/25  1639   PROBNP 933.8*  --    HSTROP T 23* 27*                 Brief Urine Lab Results  (Last result in the past 365 days)        Color    Clarity   Blood   Leuk Est   Nitrite   Protein   CREAT   Urine HCG        06/10/25 2137 Yellow   Clear   Negative   Negative   Negative   Negative                 [x]  Microbiology   Microbiology Results (last 10 days)       ** No results found for the last 240 hours. **          [x]  Radiology  XR Chest 1 View  Result Date: 6/10/2025  Impression: No active disease is seen. Electronically Signed: Sy Joshi MD  6/10/2025 5:34 PM EDT  Workstation ID: [id number]    CT Chest Low Dose Cancer Screening WO  Result Date: 6/9/2025  Impression: 1.No suspicious pulmonary nodules. Continue annual screening with low-dose CT in 12 months. 2.Bandlike atelectasis within the lung bases. Recommendation: Continue annual screening with LDCT Lung Rads Assessment: Lung-RADS L1 - Negative, <1% chance of malignancy. Electronically Signed: Willie Middleton MD  6/9/2025 3:00 PM EDT  Workstation ID: [id number]    []  EKG/Telemetry   []  Cardiology/Vascular   []  Pathology  []  Old records  []  Other:    Assessment & Plan   Assessment / Plan     Assessment:  Atrial flutter with RVR now resolved   History of atrial flutter status post ablation in 2024  Hypertension  Hyperlipidemia  Type 2 diabetes mellitus  Hypothyroidism  CHF  COPD  PENNY on CPAP  Restless leg syndrome  Anxiety/depression      Plan:  Patient remains admitted to the medicine service  Cardiology following.  Discussed plan with Dr. Soriano  Echocardiogram is pending  Continue patient on Eliquis  Continue patient on metoprolol  Continue sliding scale insulin for diabetes  Continue patient on her home bronchodilators  Thyroid studies within normal limits  Urinalysis appears fine  Discussed plan with nursing with patient       Discussed with RN.    VTE Prophylaxis:  Pharmacologic & mechanical VTE prophylaxis orders are present.        CODE STATUS:   Code Status (Patient has no pulse and is not breathing): CPR (Attempt to Resuscitate)  Medical Interventions (Patient has pulse or is  breathing): Full Support  Level Of Support Discussed With: Patient      Electronically signed by Marc Laboy DO, 6/11/2025, 09:21 EDT.

## 2025-06-11 NOTE — PLAN OF CARE
Goal Outcome Evaluation:  Plan of Care Reviewed With: patient           Outcome Evaluation: No acute changes this shift. VSS.

## 2025-06-11 NOTE — CASE MANAGEMENT/SOCIAL WORK
Discharge Planning Assessment   Anil     Patient Name: Randi Fajardo  MRN: [id number]  Today's Date: 6/11/2025    Admit Date: 6/10/2025    Plan: Pt lives home alone. Pt states she has good family support. Pt denies issues with transportation, Pt usually drives self. PCP: MAN Sanders Pharm: Mail order or [facility]. Pt states she does have finaincal stressors affording her Jardiance and Eliquis, Pt has not been taking her Eliquis due to the cost. Between both medications her out of pocket cost is about $300.00. SW has alert [facility] pharmacy to see if pt could use assistance programs to help with cost of medications. Pt plans to return home at discharge. SW will continue to follow for needs.   Discharge Needs Assessment       Row Name 06/11/25 1124       Living Environment    People in Home alone    Current Living Arrangements home    Potentially Unsafe Housing Conditions none    In the past 12 months has the electric, gas, oil, or water company threatened to shut off services in your home? No    Primary Care Provided by self    Provides Primary Care For no one    Family Caregiver if Needed none    Quality of Family Relationships helpful;involved    Able to Return to Prior Arrangements yes       Resource/Environmental Concerns    Resource/Environmental Concerns none    Transportation Concerns none       Transition Planning    Patient/Family Anticipates Transition to home    Patient/Family Anticipated Services at Transition none    Transportation Anticipated family or friend will provide;car, drives self       Discharge Needs Assessment    Readmission Within the Last 30 Days no previous admission in last 30 days    Equipment Currently Used at Home cpap;walker, rolling;cane, straight    Concerns to be Addressed discharge planning    Do you want help finding or keeping work or a job? I do not need or want help    Do you want help with school or training? For example, starting or completing job training or getting  a high school diploma, GED or equivalent No    Anticipated Changes Related to Illness none    Equipment Needed After Discharge none    Discharge Coordination/Progress Pt lives home alone. Pt states she has good family support. Pt denies issues with transportation, Pt usually drives self. PCP: MAN Sanders Pharm: Mail order or [facility].  Pt states she does have finaincal stressors affording her Jardiance and Eliquis, Pt has not been taking her Eliquis due to the cost. Between both medications her out of pocket cost is about $300.00. SW has alert [facility] pharmacy to see if pt could use assistance programs to help with cost of medications. Pt plans to return home at discharge. SW will continue to follow for needs.                   Discharge Plan       Row Name 06/11/25 1127       Plan    Plan Pt lives home alone. Pt states she has good family support. Pt denies issues with transportation, Pt usually drives self. PCP: MAN Sanders Pharm: Mail order or [facility]. Pt states she does have finaincal stressors affording her Jardiance and Eliquis, Pt has not been taking her Eliquis due to the cost. Between both medications her out of pocket cost is about $300.00. SW has alert [facility] pharmacy to see if pt could use assistance programs to help with cost of medications. Pt plans to return home at discharge. SW will continue to follow for needs.                       Demographic Summary       Row Name 06/11/25 1123       General Information    Admission Type observation    Arrived From emergency department    Referral Source admission list    Reason for Consult discharge planning    Preferred Language English       Contact Information    Permission Granted to Share Info With permission denied                   Functional Status       Row Name 06/11/25 1123       Functional Status    Usual Activity Tolerance good    Current Activity Tolerance good       Physical Activity    On average, how many days per week do you engage in  moderate to strenuous exercise (like a brisk walk)? 0 days    On average, how many minutes do you engage in exercise at this level? 0 min    Number of minutes of exercise per week 0       Assessment of Health Literacy    How often do you have someone help you read hospital materials? Never    How often do you have problems learning about your medical condition because of difficulty understanding written information? Never    How often do you have a problem understanding what is told to you about your medical condition? Never    How confident are you filling out medical forms by yourself? Quite a bit    Health Literacy Good       Functional Status, IADL    Medications independent    Meal Preparation independent    Housekeeping independent    Laundry independent    Shopping independent    If for any reason you need help with day-to-day activities such as bathing, preparing meals, shopping, managing finances, etc., do you get the help you need? I don't need any help       Mental Status    General Appearance WDL WDL       Mental Status Summary    Recent Changes in Mental Status/Cognitive Functioning no changes       Employment/    Employment Status retired                   Psychosocial    No documentation.                  Abuse/Neglect    No documentation.                  Legal       Row Name 06/11/25 1124       Financial Resource Strain    How hard is it for you to pay for the very basics like food, housing, medical care, and heating? Somewhat       Financial/Legal    Source of Income social security    Application for Public Assistance not applied       Legal    Criminal Activity/Legal Involvement none                   Substance Abuse    No documentation.                  Patient Forms    No documentation.                     Ruthy Lugo

## 2025-06-11 NOTE — CONSULTS
[facility]   INTERVENTIONAL CARDIOLOGY CONSULT NOTE    Patient Name: Randi Fajardo  : 1955  MRN: [id number]    Primary Care Physician:  Mainor Sanders Jr., MD  Date of admission: 6/10/2025    Subjective   Subjective     Chief Complaint: Tachycardia    HPI:  Randi Fajardo is a 69 y.o. female with history of atrial flutter/Afib s/p Pulmonary vein ablation and atrial flutter RFA CTI 2024 by Dr James, diastolic congestive failure with EF of 55 to 60% on 2024, right bundle branch block, hypertension, hyperlipidemia, PENNY on CPAP, who presented to electrophysiology clinic yesterday for follow-up of atrial flutter and pulmonary vein ablation.  Patient was noted to have heart rate in 170s, EKG showing wide-complex tachycardia and patient was advised to go to the ER for further evaluation.  On arrival to the ER, her heart rate was in 160s with EKG showing wide-complex tachycardia likely secondary to SVT with evidence of versus atrial flutter.  Patient received IV diltiazem with improvement in heart rate.  Repeat EKG shows right bundle branch block with junctional rhythm.  Patient feels better today.  Denies chest pain.    Review of Systems  Negative except as mentioned in HPI    Personal History     Past Medical History:   Diagnosis Date    Abnormal ECG     Arrhythmia     Arthritis of back ?    Atrial fibrillation     Cataract     Colon polyp ?    Found during initial colonoscopy    COPD (chronic obstructive pulmonary disease)     Coronary artery disease     Afib and irregular heartbeat    CTS (carpal tunnel syndrome) ?    Have had corrective surgery on both    Depressed     Elevated cholesterol     Fracture of ankle     Plate and screws were used to correct break    GREGG (generalized anxiety disorder)     Gastroesophageal reflux     HTN (hypertension)     Hyperlipidemia     Hypothyroid     Irregular heartbeat     Knee swelling ?    Lesion of neck     Low back strain ?     Has happenened several times    Obesity     Since birth    Osteoarthritis     Pneumonia 1960    RLS (restless legs syndrome)     Shortness of breath Approx. 3 yrs. ago    Sleep apnea in adult     Sleep apnea, obstructive  ?    Sleep with full face mask    Type 2 diabetes mellitus     Visual impairment     Wear glasses for reading       Past Surgical History:   Procedure Laterality Date    ABLATION OF DYSRHYTHMIC FOCUS      ANKLE OPEN REDUCTION INTERNAL FIXATION  1086    Plate and screws corrected compound fracture    ANKLE TENDON REPAIR Right     ankle with hardware    CARDIAC ELECTROPHYSIOLOGY PROCEDURE N/A 2024    Procedure: AF/AFL with PFA & NILA. No CTA. Hold Eliquis morning .;  Surgeon: Luca James MD;  Location: [facility] INVASIVE LOCATION;  Service: Cardiovascular;  Laterality: N/A;    CARDIAC SURGERY  2024    Ablation    CARPAL TUNNEL RELEASE Bilateral      SECTION          COLONOSCOPY      COLONOSCOPY N/A 2024    Procedure: COLONOSCOPY;  Surgeon: Kalin Arriaga MD;  Location: [facility] ENDOSCOPY;  Service: General;  Laterality: N/A;  NORMAL    FRACTURE SURGERY  1984    Plate and screws in right ankle    HAND SURGERY  ?    Carpal tunnel correction on both hands at different dates    NECK SURGERY      ,,; Neck lesion removed, total of 3       Family History: family history includes Arthritis in her maternal aunt, maternal aunt, maternal aunt, maternal aunt, maternal aunt, and mother; COPD in her father; Cancer in her brother, father, maternal aunt, maternal aunt, and maternal aunt; Colon cancer in her father; Depression in her brother; Diabetes in her paternal uncle; Emphysema in her father; Hearing loss in her maternal aunt, maternal aunt, maternal aunt, and maternal aunt; Heart disease in her father; Lung cancer in her father; Rheumatologic disease in her maternal aunt; Stroke in her maternal grandfather; Thyroid disease in her mother; Vision  loss in her father, maternal grandfather, and mother. Otherwise pertinent FHx was reviewed and not pertinent to current issue.    Social History:  reports that she quit smoking about 5 years ago. Her smoking use included cigarettes. She started smoking about 56 years ago. She has a 25.5 pack-year smoking history. She has been exposed to tobacco smoke. She has never used smokeless tobacco. She reports current alcohol use. She reports that she does not use drugs.    Home Medications:  Biotin, Calcium Carb-Cholecalciferol, DULoxetine, Diclofenac Sodium, Potassium Gluconate, Zinc, albuterol sulfate HFA, apixaban, atorvastatin, bumetanide, empagliflozin, famotidine, gabapentin, levothyroxine, magnesium chloride ER, metFORMIN, metoprolol succinate XL, metoprolol tartrate, minocycline, multivitamin with minerals, rOPINIRole, and tiotropium bromide-olodaterol    Allergies:  Allergies   Allergen Reactions    Nexium [Esomeprazole] Hives    Prilosec [Omeprazole] Hives       Objective   Objective     Vitals:   Temp:  [97.3 °F (36.3 °C)-98.4 °F (36.9 °C)] 98.4 °F (36.9 °C)  Heart Rate:  [51-81] 58  Resp:  [17-22] 22  BP: ()/(63-94) 109/74  Flow (L/min) (Oxygen Therapy):  [2] 2    Physical Exam    Constitutional: Awake, alert   Neck: No JVD   Respiratory: Clear to auscultation bilaterally, nonlabored respirations    Cardiovascular: Regular rhythm and normal rate, no murmurs.   Gastrointestinal: Soft, nontender, nondistended   Musculoskeletal: Lower extremity edema 1+ present   Psychiatric: Appropriate affect, cooperative      Result Review    Result Review:  I have personally reviewed the results from the time of this admission to 6/11/2025 17:45 EDT and agree with these findings:  [x]  Laboratory  []  Microbiology  []  Radiology  [x]  EKG/Telemetry   [x]  Cardiology/Vascular   []  Pathology  [x]  Old records  []  Other:      ECG 12 Lead Chest Pain   Final Result   HEART RATE=57  bpm   RR Iibsaqra=3538  ms   ND Interval=   ms   P Horizontal Axis=  deg   P Front Axis=  deg   QRSD Leedrpgz=360  ms   QT Gwcierua=592  ms   ODwR=962  ms   QRS Axis=9  deg   T Wave Axis=10  deg   - ABNORMAL ECG -   Junctional rhythm   Right bundle branch block   When compared with ECG of 10-Marvel-2025 16:23:02,   Significant change in rhythm   Electronically Signed By: Chandrakant Lew ([facility]) 2025-06-11 09:08:43   Date and Time of Study:2025-06-10 18:23:58      ECG 12 Lead Chest Pain   Preliminary Result   HEART TQKH=436  bpm   RR Tgaumlms=733  ms   TN Qymgxumy=503  ms   P Horizontal Axis=95  deg   P Front Axis=166  deg   QRSD Zwkankyw=528  ms   QT Hguzgmtt=589  ms   CPjW=627  ms   QRS Axis=210  deg   T Wave Axis=24  deg   - ABNORMAL ECG -   Extreme tachycardia with wide complex, no further rhythm analysis    attempted   Consider dextrocardia   Date and Time of Study:2025-06-10 16:23:02        Results for orders placed during the hospital encounter of 08/16/24    Adult Transthoracic Echo Complete W/ Cont if Necessary Per Protocol    Interpretation Summary  Right ventricle is dilated whereas remaining chambers appear to be grossly normal in size  LV has normal wall thickness.  LV systolic function and wall motion is normal.  Calculated LVEF is 55 to 60%.  Diastolic function cannot be accurately assessed.  Paradoxical septal motion is noted due to bundle branch block. There is interventricular septal flattening noted predominantly in diastole suggestive of volume overload in the right ventricle  RV systolic function cannot be accurately assessed.  Valves are not well-visualized.  RVSP cannot be calculated due to insufficient TR jet.  IVC is dilated corresponding to a right atrial pressure of 8 to 10 mmHg  No significant pericardial effusion is noted    Compared to study from March 11, 2022.  LV systolic function remains the same.  RV dilatation is noted.  Septal flattening is noted as mentioned above.    No results found for this or any previous visit.    Lab  Results   Component Value Date    TROPONINT 23 (H) 06/10/2025     A1C Last 3 Results          12/5/2024    10:41 3/27/2025    12:05   HGBA1C Last 3 Results   Hemoglobin A1C 6.50  6.70          ASCVD SCORE  The ASCVD Risk score (Jerad GOODMAN, et al., 2019) failed to calculate for the following reasons:    The valid total cholesterol range is 130 to 320 mg/dL        Assessment & Plan   Assessment / Plan     Brief Patient Summary:  Randi Fajardo is a 69 y.o. female who presents with wide-complex tachycardia.    Active Hospital Problems:  Active Hospital Problems    Diagnosis     **Atrial flutter with rapid ventricular response     Type 2 diabetes mellitus without complication, without long-term current use of insulin     Essential hypertension        Plan:   Patient presenting with wide-complex tachycardia likely SVT with evidence of versus atrial flutter RVR.  Subsequently converted to junctional rhythm.  Currently in sinus rhythm with heart rate in upper 50s.  Continue Eliquis 5 mg twice daily, atorvastatin 40 mg daily, Jardiance 25 mg daily, metoprolol succinate 50 mg daily.  Will give Lasix 40 mg IV once.  Continue home dose of oral Bumex 2 mg daily.  Overnight observation, if stable, patient may be discharged home tomorrow.  Case discussed with hospitalist.    Will follow up.       CODE STATUS:    Code Status (Patient has no pulse and is not breathing): CPR (Attempt to Resuscitate)  Medical Interventions (Patient has pulse or is breathing): Full Support  Level Of Support Discussed With: Patient       Susi Soriano MD, [facility]   Interventional Cardiology      I spent 45 minutes caring for this patient on this date of service. This time includes time spent by me in the following activities:preparing for the visit, reviewing tests, obtaining and/or reviewing a separately obtained history, performing a medically appropriate examination and/or evaluation , counseling and educating the patient/family/caregiver, ordering  medications, tests, or procedures, referring and communicating with other health care professionals , documenting information in the medical record, independently interpreting results and communicating that information with the patient/family/caregiver, and care coordination.     This is an acute or chronic illness that poses a threat to life or bodily function. The above treatment plan involves a high risk of complications and/or mortality of patient management.  The patient was seen and examined. Work by the provider also included review and/or ordering of lab tests, review and/or ordering of radiology tests, review and/or ordering of medicine tests, discussion with other physicians or providers, independent review of data, obtaining old records, review/summation of old records, and/or other review.  I have reviewed the family history, social history, and past medical history for this patient. Previous information and data has been reviewed and updated as needed. I have reviewed and verified the chief complaint, history, and other documentation.  The previous observations, recommendations, and conclusions were reviewed including those of other providers.   The plan was discussed with the patient and/or family or Staff. Questions were answered.

## 2025-06-11 NOTE — CONSULTS
Transition of Care Pharmacist Consult Note:     Consulted due to issues with Jardiance and Eliquis cost.     Sample claims processed on 6/11/2025:  Jardiance: $0 copay for 90 day supply  Eliquis: $72.59 for 30 day or 90 day supply.      From claim, it appears pt has moved into catastrophic coverage of medicare plan.  Pt states previous copays were much higher than this.  Pt would like prescriptions filled at our retail pharmacy at discharge if possible.  Order have been pended for discharge for 90 day supply of each.  Pt to receive meds to beds at time of discharge. Retail pharmacy notified to ensure medication is in stock.      Please contact me at ext [phone] for any further needs or questions.  Thank you for the consult.     Samina Eid RPH  Transition of Care Pharmacist

## 2025-06-12 ENCOUNTER — TELEPHONE (OUTPATIENT)
Dept: CARDIOLOGY | Facility: CLINIC | Age: 70
End: 2025-06-12

## 2025-06-12 ENCOUNTER — TELEPHONE (OUTPATIENT)
Dept: CARDIOLOGY | Facility: CLINIC | Age: 70
End: 2025-06-12
Payer: MEDICARE

## 2025-06-12 ENCOUNTER — READMISSION MANAGEMENT (OUTPATIENT)
Dept: CALL CENTER | Facility: HOSPITAL | Age: 70
End: 2025-06-12
Payer: MEDICARE

## 2025-06-12 VITALS
OXYGEN SATURATION: 99 % | SYSTOLIC BLOOD PRESSURE: 104 MMHG | HEIGHT: 63 IN | TEMPERATURE: 97.3 F | WEIGHT: 280.43 LBS | BODY MASS INDEX: 49.69 KG/M2 | RESPIRATION RATE: 18 BRPM | HEART RATE: 60 BPM | DIASTOLIC BLOOD PRESSURE: 80 MMHG

## 2025-06-12 LAB
ANION GAP SERPL CALCULATED.3IONS-SCNC: 9.7 MMOL/L (ref 5–15)
AORTIC DIMENSIONLESS INDEX: 0.58 (DI)
ASCENDING AORTA: 3.4 CM
AV MEAN PRESS GRAD SYS DOP V1V2: 5.7 MMHG
AV VMAX SYS DOP: 160.8 CM/SEC
BH CV ECHO MEAS - AO MAX PG: 10.3 MMHG
BH CV ECHO MEAS - AO ROOT DIAM: 3 CM
BH CV ECHO MEAS - AO V2 VTI: 42.8 CM
BH CV ECHO MEAS - AVA(I,D): 1.83 CM2
BH CV ECHO MEAS - EDV(MOD-SP2): 91.5 ML
BH CV ECHO MEAS - EDV(MOD-SP4): 99.8 ML
BH CV ECHO MEAS - EF(MOD-SP2): 55.7 %
BH CV ECHO MEAS - EF(MOD-SP4): 66.3 %
BH CV ECHO MEAS - ESV(MOD-SP2): 40.5 ML
BH CV ECHO MEAS - ESV(MOD-SP4): 33.6 ML
BH CV ECHO MEAS - IVS/LVPW: 0.92 CM
BH CV ECHO MEAS - IVSD: 1.1 CM
BH CV ECHO MEAS - LA DIMENSION: 3.4 CM
BH CV ECHO MEAS - LAT PEAK E' VEL: 12.2 CM/SEC
BH CV ECHO MEAS - LV DIASTOLIC VOL/BSA (35-75): 44.7 CM2
BH CV ECHO MEAS - LV MAX PG: 4 MMHG
BH CV ECHO MEAS - LV MEAN PG: 2.1 MMHG
BH CV ECHO MEAS - LV SYSTOLIC VOL/BSA (12-30): 15.1 CM2
BH CV ECHO MEAS - LV V1 MAX: 100 CM/SEC
BH CV ECHO MEAS - LV V1 VTI: 24.9 CM
BH CV ECHO MEAS - LVIDD: 4 CM
BH CV ECHO MEAS - LVIDS: 2.7 CM
BH CV ECHO MEAS - LVOT AREA: 3.1 CM2
BH CV ECHO MEAS - LVOT DIAM: 2 CM
BH CV ECHO MEAS - LVPWD: 1.2 CM
BH CV ECHO MEAS - MED PEAK E' VEL: 12.1 CM/SEC
BH CV ECHO MEAS - MV A MAX VEL: 88.1 CM/SEC
BH CV ECHO MEAS - MV DEC SLOPE: 449 CM/SEC2
BH CV ECHO MEAS - MV E MAX VEL: 139.8 CM/SEC
BH CV ECHO MEAS - MV E/A: 1.59
BH CV ECHO MEAS - MV MAX PG: 8.9 MMHG
BH CV ECHO MEAS - MV MEAN PG: 2.8 MMHG
BH CV ECHO MEAS - MV P1/2T: 98 MSEC
BH CV ECHO MEAS - MV V2 VTI: 54.8 CM
BH CV ECHO MEAS - MVA(P1/2T): 2.24 CM2
BH CV ECHO MEAS - MVA(VTI): 1.43 CM2
BH CV ECHO MEAS - RAP SYSTOLE: 8 MMHG
BH CV ECHO MEAS - RVDD: 3.3 CM
BH CV ECHO MEAS - RVSP: 42.8 MMHG
BH CV ECHO MEAS - SV(LVOT): 78.2 ML
BH CV ECHO MEAS - SV(MOD-SP2): 51 ML
BH CV ECHO MEAS - SV(MOD-SP4): 66.2 ML
BH CV ECHO MEAS - SVI(LVOT): 35.1 ML/M2
BH CV ECHO MEAS - SVI(MOD-SP2): 22.9 ML/M2
BH CV ECHO MEAS - SVI(MOD-SP4): 29.7 ML/M2
BH CV ECHO MEAS - TR MAX PG: 34.8 MMHG
BH CV ECHO MEAS - TR MAX VEL: 291.5 CM/SEC
BH CV ECHO MEASUREMENTS AVERAGE E/E' RATIO: 11.51
BUN SERPL-MCNC: 18.3 MG/DL (ref 8–23)
BUN/CREAT SERPL: 19.3 (ref 7–25)
CALCIUM SPEC-SCNC: 8.7 MG/DL (ref 8.6–10.5)
CHLORIDE SERPL-SCNC: 102 MMOL/L (ref 98–107)
CO2 SERPL-SCNC: 26.3 MMOL/L (ref 22–29)
CREAT SERPL-MCNC: 0.95 MG/DL (ref 0.57–1)
EGFRCR SERPLBLD CKD-EPI 2021: 64.2 ML/MIN/1.73
GLUCOSE BLDC GLUCOMTR-MCNC: 103 MG/DL (ref 70–99)
GLUCOSE BLDC GLUCOMTR-MCNC: 90 MG/DL (ref 70–99)
GLUCOSE SERPL-MCNC: 90 MG/DL (ref 65–99)
LEFT ATRIUM VOLUME INDEX: 30.4 ML/M2
LV EF BIPLANE MOD: 61 %
MAGNESIUM SERPL-MCNC: 2.2 MG/DL (ref 1.6–2.4)
POTASSIUM SERPL-SCNC: 3.7 MMOL/L (ref 3.5–5.2)
SODIUM SERPL-SCNC: 138 MMOL/L (ref 136–145)
WHOLE BLOOD HOLD SPECIMEN: NORMAL

## 2025-06-12 PROCEDURE — 99214 OFFICE O/P EST MOD 30 MIN: CPT | Performed by: INTERNAL MEDICINE

## 2025-06-12 PROCEDURE — 94799 UNLISTED PULMONARY SVC/PX: CPT

## 2025-06-12 PROCEDURE — 63710000001 REVEFENACIN 175 MCG/3ML SOLUTION: Performed by: STUDENT IN AN ORGANIZED HEALTH CARE EDUCATION/TRAINING PROGRAM

## 2025-06-12 PROCEDURE — 80048 BASIC METABOLIC PNL TOTAL CA: CPT | Performed by: INTERNAL MEDICINE

## 2025-06-12 PROCEDURE — 99239 HOSP IP/OBS DSCHRG MGMT >30: CPT | Performed by: INTERNAL MEDICINE

## 2025-06-12 PROCEDURE — 82948 REAGENT STRIP/BLOOD GLUCOSE: CPT | Performed by: STUDENT IN AN ORGANIZED HEALTH CARE EDUCATION/TRAINING PROGRAM

## 2025-06-12 PROCEDURE — 94664 DEMO&/EVAL PT USE INHALER: CPT

## 2025-06-12 PROCEDURE — G0378 HOSPITAL OBSERVATION PER HR: HCPCS

## 2025-06-12 PROCEDURE — 83735 ASSAY OF MAGNESIUM: CPT | Performed by: INTERNAL MEDICINE

## 2025-06-12 RX ADMIN — DULOXETINE HYDROCHLORIDE 60 MG: 30 CAPSULE, DELAYED RELEASE ORAL at 08:21

## 2025-06-12 RX ADMIN — REVEFENACIN 175 MCG: 175 SOLUTION RESPIRATORY (INHALATION) at 09:54

## 2025-06-12 RX ADMIN — ARFORMOTEROL TARTRATE 15 MCG: 15 SOLUTION RESPIRATORY (INHALATION) at 09:55

## 2025-06-12 RX ADMIN — METOPROLOL SUCCINATE 50 MG: 50 TABLET, EXTENDED RELEASE ORAL at 08:21

## 2025-06-12 RX ADMIN — GABAPENTIN 600 MG: 300 CAPSULE ORAL at 05:29

## 2025-06-12 RX ADMIN — Medication 10 ML: at 08:22

## 2025-06-12 RX ADMIN — LEVOTHYROXINE SODIUM 50 MCG: 0.05 TABLET ORAL at 05:29

## 2025-06-12 RX ADMIN — MINOCYCLINE HYDROCHLORIDE 100 MG: 50 CAPSULE ORAL at 08:21

## 2025-06-12 RX ADMIN — APIXABAN 5 MG: 5 TABLET, FILM COATED ORAL at 08:21

## 2025-06-12 RX ADMIN — EMPAGLIFLOZIN 25 MG: 10 TABLET, FILM COATED ORAL at 08:21

## 2025-06-12 NOTE — SIGNIFICANT NOTE
06/12/25 0891   Plan   Plan Pt will discharge home today.   Final Discharge Disposition Code 01 - home or self-care   Final Note Pt will discharge home today.

## 2025-06-12 NOTE — PROGRESS NOTES
INTERVENTIONAL CARDIOLOGY  INPATIENT PROGRESS NOTE        PATIENT IDENTIFICATION:  Name:  Randi Fajardo        MRN:  [id number]  69 y.o.  female            Chief Complain:   Tachycardia    SUBJECTIVE:   Patient doing better.  Denies chest pain or tachycardia.  Telemetry reviewed    OBJECTIVE:  Vitals:    06/12/25 0720 06/12/25 0950 06/12/25 0957 06/12/25 1115   BP: 114/63   104/80   BP Location: Right arm   Right arm   Patient Position: Lying   Lying   Pulse: 53 56 56 60   Resp: 16 18 18 18   Temp: 97.3 °F (36.3 °C)   97.3 °F (36.3 °C)   TempSrc: Axillary   Oral   SpO2: 93% 92% 92% 99%   Weight:       Height:               Body mass index is 49.68 kg/m².    Intake/Output Summary (Last 24 hours) at 6/12/2025 1956  Last data filed at 6/12/2025 1115  Gross per 24 hour   Intake 850 ml   Output --   Net 850 ml         Physical Exam  General : Alert, awake, no acute distress  Neck : No jugular venous distention  CVS : Regular rate and rhythm, no murmur  Lungs: Clear to auscultation bilaterally, no crackles or rhonchi  Abdomen: Soft, nontender  Extremities: Warm, well-perfused, 1+ edema bilaterally.  Improved        Allergies   Allergen Reactions    Nexium [Esomeprazole] Hives    Prilosec [Omeprazole] Hives     Scheduled meds:    IV meds:                      No current facility-administered medications for this encounter.      Data Review:  CBC          4/4/2025    17:48 6/10/2025    16:39 6/11/2025    06:45   CBC   WBC 12.99  12.35  9.83    RBC 5.41  5.41  4.72    Hemoglobin 15.7  16.1  14.0    Hematocrit 48.7  50.2  43.8    MCV 90.0  92.8  92.8    MCH 29.0  29.8  29.7    MCHC 32.2  32.1  32.0    RDW 14.8  15.6  15.6    Platelets 291  302  225      CMP          6/10/2025    16:39 6/11/2025    06:45 6/12/2025    06:15   CMP   Glucose 154  95  90    BUN 21.5  18.9  18.3    Creatinine 1.44  1.03  0.95    EGFR 39.0  58.3  64.2    Sodium 138  137  138    Potassium 4.1  3.6  3.7    Chloride 98  101  102    Calcium 9.4  8.9   "8.7    Total Protein 7.4  6.4     Albumin 3.9  3.4     Globulin 3.5  3.0     Total Bilirubin 0.5  0.6     Alkaline Phosphatase 77  65     AST (SGOT) 25  18     ALT (SGPT) 20  16     Albumin/Globulin Ratio 1.1  1.1     BUN/Creatinine Ratio 14.9  18.3  19.3    Anion Gap 17.9  9.3  9.7       CARDIAC LABS:      Lab 06/10/25  1825 06/10/25  1639   PROBNP 933.8*  --    HSTROP T 23* 27*        No results found for: \"DIGOXIN\"   Lab Results   Component Value Date    TSH 2.730 06/10/2025           Invalid input(s): \"LDLCALC\"  No results found for: \"POCTROP\"  Lab Results   Component Value Date    TROPONINT 23 (H) 06/10/2025   (  Lab Results   Component Value Date    MG 2.2 06/12/2025     Results for orders placed during the hospital encounter of 06/10/25    Adult Transthoracic Echo Complete W/ Cont if Necessary Per Protocol    Interpretation Summary    Left ventricular systolic function is normal. Calculated left ventricular EF = 61%    Left ventricular diastolic function is consistent with (grade I) impaired relaxation.    The right ventricular cavity is dilated.    The right atrial cavity is dilated.    There is calcification of the aortic valve.    Estimated right ventricular systolic pressure from tricuspid regurgitation is mildly elevated (35-45 mmHg).           ASSESSMENT:    Atrial flutter with rapid ventricular response    Essential hypertension    Type 2 diabetes mellitus without complication, without long-term current use of insulin        PLAN:  - Patient remains stable.  Telemetry reviewed.  Patient is okay for discharge from cardiac standpoint.  Continue current dose of metoprolol.  Patient needs to follow-up with electrophysiology with Dr. James for further evaluation of SVT versus atrial flutter RVR.  - Can follow-up with me in 2 to 4 weeks post discharge.  - Discussed case with hospitalist        Susi Soriano MD, FACC  6/12/2025    19:56 EDT         I spent 30 minutes caring for this patient on this date of " service. This time includes time spent by me in the following activities:preparing for the visit, reviewing tests, obtaining and/or reviewing a separately obtained history, performing a medically appropriate examination and/or evaluation , counseling and educating the patient/family/caregiver, ordering medications, tests, or procedures, referring and communicating with other health care professionals , documenting information in the medical record, independently interpreting results and communicating that information with the patient/family/caregiver, and care coordination. The patient was seen and examined. Work by the provider also included review and/or ordering of lab tests, review and/or ordering of radiology tests, review and/or ordering of medicine tests, discussion with other physicians or providers, independent review of data, obtaining old records, review/summation of old records, and/or other review.

## 2025-06-12 NOTE — PLAN OF CARE
Goal Outcome Evaluation:  Plan of Care Reviewed With: patient        Progress: improving  Outcome Evaluation: Patient to d/c home. IV and Tele d/c. VSS. Education complete.

## 2025-06-12 NOTE — TELEPHONE ENCOUNTER
Caller: Randi Fajardo    Relationship to patient: Self    Best call back number: [phone]     New or established patient?  [] New  [x] Established    Date of discharge: 6.12.25    Facility discharged from:     Diagnosis/Symptoms: RAPID HEART RATE    Length of stay (If applicable): 2 DAYS    Specialty Only: Did you see a [facility] provider?    [x] Yes  [] No  If so, who? JUAN ANTONIO SAL / LAMAR PATEL    Additional Details: PATIENT STATES LAMAR PATEL SAID FOLLOW UP IN 2 - 4 WKS. NOTHING ON DISCHARGE PAPERWORK ABOUT FOLLOW UP. PLEASE CALL PATIENT TO SCHEDULE. THANK YOU!    DELETE AFTER REVIEWING: Send this encounter to the appropriate pool. See your Call Action Grid or Workflows for direction.

## 2025-06-12 NOTE — PLAN OF CARE
Goal Outcome Evaluation:           Progress: no change  Outcome Evaluation: No acute changes over night. VSS. Pt slept well on her home cpap.

## 2025-06-12 NOTE — TELEPHONE ENCOUNTER
Caller: Deepakaitlyn Randi    Relationship: Self    Best call back number: [phone]     What is the best time to reach you: ANY    Who are you requesting to speak with (clinical staff, provider,  specific staff member): CLINICAL        What was the call regarding: PATIENT STATES SHE WENT IN FOR THE ADENOFINE INJECTION, BUT SINCE SHE WAS NOT ON ELIQUIS THERE WERE UNABLE TO COMPLETE THIS. PLEASE CONTACT PATIENT AND ADVISE HOW YOU WOULD LIKE TO PROCEED.    Is it okay if the provider responds through [facility]: PLEASE CALL

## 2025-06-12 NOTE — OUTREACH NOTE
Prep Survey      Flowsheet Row Responses   [facility] discharged from? [facility]   Is LACE score < 7 ? Yes   Eligibility [facility]   Date of Admission 06/10/25   Date of Discharge 06/12/25   Discharge Disposition Home or Self Care   Discharge diagnosis *Atrial flutter with rapid ventricular response   Does the patient have one of the following disease processes/diagnoses(primary or secondary)? Other   Does the patient have Home health ordered? No   Is there a DME ordered? No   Prep survey completed? Yes            ED BAKER - Registered Nurse

## 2025-06-12 NOTE — TELEPHONE ENCOUNTER
Patient states she was seen in clinic on 6/10/2025 and Dr. James advised her to go to the ER due to elevated HR.     ER records in chart.     Patient would like to know when she needs to follow up with Dr. James.

## 2025-06-12 NOTE — DISCHARGE SUMMARY
[facility]         HOSPITALIST  DISCHARGE SUMMARY    Patient Name: Randi Fajardo  : 1955  MRN: [id number]    Date of Admission: 6/10/2025  Date of Discharge:  2025    Primary Care Physician: Mainor Sanders Jr., MD    Consults       Date and Time Order Name Status Description    6/10/2025  7:10 PM Inpatient Hospitalist Consult      6/10/2025  6:46 PM Inpatient Cardiology Consult Completed             Active and Resolved Hospital Problems:  Active Hospital Problems    Diagnosis POA    **Atrial flutter with rapid ventricular response [I48.92] Yes    Type 2 diabetes mellitus without complication, without long-term current use of insulin [E11.9] Yes    Essential hypertension [I10] Yes      Resolved Hospital Problems   No resolved problems to display.       Hospital Course     Hospital Course:  Randi Fajardo is a 69 y.o. female  with past medical history of hypertension, hyperlipidemia, type 2 diabetes mellitus, hypothyroidism, atrial flutter s/p radiofrequency ablation of cavotricuspid isthmus in 2024, currently on Eliquis, CHF, obesity presented to the ED for evaluation of atrial flutter with rapid ventricular response.  Since the ablation, patient had intermittent episodes of rapid ventricular response with associated dizziness.  On the day of admission she saw her electrophysiologist Dr. James at [facility] and noted to have a heart rate in 170s and was advised to go to the ER for further evaluation.  Patient is not taking Eliquis since last 4 weeks as it was too expensive.  Denies any chest pain, nausea, vomiting, fevers, chills, abdominal pain, dysuria.  Upon arrival, vital signs temperature 99.1, pulse 159, respiratory 17, blood pressure 100/75 on room air saturating around 93%.  EKG showed wide-complex tachycardia, probably atrial flutter with RVR and aberrancy.  Initial troponin 27, repeat 23, creatinine 1.44 baseline around 1.1, rest of the CMP with no  significant findings, WBC 12.35, hemoglobin 16.1, platelets 302.  Chest x-ray showed no acute abnormality.  Received IV diltiazem in the ED with improvement in the heart rate.  Repeat EKG showed junctional rhythm.  Case discussed by ER physician with cardiologist.  Patient admitted for further evaluation and management.  Patient was seen by cardiology.  Patient currently in sinus rhythm.  At this time cardiology recommends for patient to continue her current medication.  Patient was observed overnight and remained stable therefore cardiology is fine with patient discharging home today in stable condition        DISCHARGE Follow Up Recommendations for labs and diagnostics:   Follow-up with cardiology and electrophysiologist as scheduled      Day of Discharge     Vital Signs:  Temp:  [97.3 °F (36.3 °C)-98.4 °F (36.9 °C)] 97.3 °F (36.3 °C)  Heart Rate:  [53-60] 56  Resp:  [16-22] 18  BP: (101-124)/(63-82) 114/63  Flow (L/min) (Oxygen Therapy):  [2] 2  Physical Exam:   General: Awake, alert, NAD resting in bed   HENT: NCAT, MMM  Eyes: pupils equal, no scleral icterus  Cardiovascular: RRR, no murmurs   Pulmonary: CTA bilaterally; no wheezes; no conversational dyspnea  Gastrointestinal: S/ND/NT, +BS  Musculoskeletal: No gross deformities  Skin: No jaundice, no rash on exposed skin appreciated  Neuro: no focal deficits noted   Psych: Mood and affect appropriate      Discharge Details        Discharge Medications        Changes to Medications        Instructions Start Date   apixaban 5 MG tablet tablet  Commonly known as: ELIQUIS  What changed:   when to take this  additional instructions   5 mg, Oral, 2 Times Daily      bumetanide 2 MG tablet  Commonly known as: BUMEX  What changed:   how much to take  how to take this  when to take this   PLEASE SEE ATTACHED FOR DETAILED DIRECTIONS      Diclofenac Sodium 1 % gel gel  Commonly known as: VOLTAREN  What changed:   how much to take  how to take this  when to take this   May  apply to each knee every 6 hours while awake      Stiolto Respimat 2.5-2.5 MCG/ACT aerosol solution inhaler  Generic drug: tiotropium bromide-olodaterol  What changed: See the new instructions.   INHALE TWO PUFFS BY MOUTH EVERY DAY             Continue These Medications        Instructions Start Date   albuterol sulfate  (90 Base) MCG/ACT inhaler  Commonly known as: Ventolin HFA   2 puffs, Inhalation, Every 4 Hours PRN      atorvastatin 40 MG tablet  Commonly known as: LIPITOR   40 mg, Oral, Nightly      BIOTIN PO   1 tablet, Nightly      CALCIUM 600 + D PO   1 tablet, After Dinner      DULoxetine 60 MG capsule  Commonly known as: CYMBALTA   120 mg, Oral, Daily      empagliflozin 25 MG tablet tablet  Commonly known as: Jardiance   25 mg, Oral, Daily      famotidine 40 MG tablet  Commonly known as: Pepcid   40 mg, Oral, 2 Times Daily      gabapentin 600 MG tablet  Commonly known as: NEURONTIN   600 mg, Oral, 3 Times Daily PRN      levothyroxine 50 MCG tablet  Commonly known as: Synthroid   50 mcg, Oral, Daily      magnesium chloride ER 64 MG DR tablet   64 mg, Daily      metFORMIN 500 MG tablet  Commonly known as: GLUCOPHAGE   500 mg, Oral, Daily With Dinner      metoprolol succinate XL 50 MG 24 hr tablet  Commonly known as: TOPROL-XL   50 mg, Oral, Daily      metoprolol tartrate 25 MG tablet  Commonly known as: LOPRESSOR   12.5 mg, Oral, 2 Times Daily PRN      minocycline 100 MG capsule  Commonly known as: MINOCIN,DYNACIN   100 mg, Oral, Daily      multivitamin with minerals tablet tablet   1 tablet, Nightly      rOPINIRole 2 MG tablet  Commonly known as: REQUIP   2 mg, Oral, Nightly      Zinc 50 MG tablet   1 tablet, Nightly               Allergies   Allergen Reactions    Nexium [Esomeprazole] Hives    Prilosec [Omeprazole] Hives       Discharge Disposition:  Home or Self Care    Diet:  Hospital:  Diet Order   Procedures    Diet: Diabetic; Consistent Carbohydrate; Fluid Consistency: Thin (IDDSI 0)        Discharge Activity: as tolerated       CODE STATUS:  Code Status and Medical Interventions: CPR (Attempt to Resuscitate); Full Support   Ordered at: 06/10/25 2029     Code Status (Patient has no pulse and is not breathing):    CPR (Attempt to Resuscitate)     Medical Interventions (Patient has pulse or is breathing):    Full Support     Level Of Support Discussed With:    Patient         Future Appointments   Date Time Provider Department Center   7/14/2025 11:00 AM Ronaldo Chance PA [facility] ORS [facility]   10/28/2025 11:15 AM Susi Soriano MD [facility] CD [facility]   11/4/2025 10:45 AM Jessy Anton APRN [facility] PCC [facility]   11/11/2025 11:00 AM Luca James MD [facility]   12/11/2025 11:15 AM Mainor Sanders Jr., MD [facility] IMP [facility]       Additional Instructions for the Follow-ups that You Need to Schedule       Discharge Follow-up with PCP   As directed       Currently Documented PCP:    Mainor Sanders Jr., MD    PCP Phone Number:    [phone]     Follow Up Details: in 1 week                Pertinent  and/or Most Recent Results     PROCEDURES:   None    LAB RESULTS:      Lab 06/11/25  0645 06/10/25  1639   WBC 9.83 12.35*   HEMOGLOBIN 14.0 16.1*   HEMATOCRIT 43.8 50.2*   PLATELETS 225 302   NEUTROS ABS 4.07 7.08*   IMMATURE GRANS (ABS) 0.02 0.06*   LYMPHS ABS 4.63* 4.10*   MONOS ABS 0.81 0.83   EOS ABS 0.23 0.18   MCV 92.8 92.8         Lab 06/12/25  0615 06/11/25  0645 06/10/25  1825 06/10/25  1639   SODIUM 138 137  --  138   POTASSIUM 3.7 3.6  --  4.1   CHLORIDE 102 101  --  98   CO2 26.3 26.7  --  22.1   ANION GAP 9.7 9.3  --  17.9*   BUN 18.3 18.9  --  21.5   CREATININE 0.95 1.03*  --  1.44*   EGFR 64.2 58.3*  --  39.0*   GLUCOSE 90 95  --  154*   CALCIUM 8.7 8.9  --  9.4   MAGNESIUM 2.2 2.3  --  2.0   PHOSPHORUS  --  4.3  --   --    TSH  --   --  2.730  --          Lab 06/11/25  0645 06/10/25  1639   TOTAL PROTEIN 6.4 7.4   ALBUMIN 3.4* 3.9   GLOBULIN 3.0 3.5   ALT (SGPT)  16 20   AST (SGOT) 18 25   BILIRUBIN 0.6 0.5   ALK PHOS 65 77         Lab 06/10/25  1825 06/10/25  1639   PROBNP 933.8*  --    HSTROP T 23* 27*                 Brief Urine Lab Results  (Last result in the past 365 days)        Color   Clarity   Blood   Leuk Est   Nitrite   Protein   CREAT   Urine HCG        06/10/25 2137 Yellow   Clear   Negative   Negative   Negative   Negative                 Microbiology Results (last 10 days)       ** No results found for the last 240 hours. **            XR Chest 1 View  Result Date: 6/10/2025  Impression: No active disease is seen. Electronically Signed: Sy Joshi MD  6/10/2025 5:34 PM EDT  Workstation ID: [id number]    CT Chest Low Dose Cancer Screening WO  Result Date: 6/9/2025  Impression: 1.No suspicious pulmonary nodules. Continue annual screening with low-dose CT in 12 months. 2.Bandlike atelectasis within the lung bases. Recommendation: Continue annual screening with LDCT Lung Rads Assessment: Lung-RADS L1 - Negative, <1% chance of malignancy. Electronically Signed: Willie Middleton MD  6/9/2025 3:00 PM EDT  Workstation ID: [id number]               Results for orders placed during the hospital encounter of 06/10/25    Adult Transthoracic Echo Complete W/ Cont if Necessary Per Protocol    Interpretation Summary    Left ventricular systolic function is normal. Calculated left ventricular EF = 61%    Left ventricular diastolic function is consistent with (grade I) impaired relaxation.    The right ventricular cavity is dilated.    The right atrial cavity is dilated.    There is calcification of the aortic valve.    Estimated right ventricular systolic pressure from tricuspid regurgitation is mildly elevated (35-45 mmHg).      Labs Pending at Discharge:        Time spent on Discharge including face to face service:  more than 35  minutes    Electronically signed by Marc Laboy DO, 06/12/25, 11:27 AM EDT.

## 2025-06-13 ENCOUNTER — TRANSITIONAL CARE MANAGEMENT TELEPHONE ENCOUNTER (OUTPATIENT)
Dept: CALL CENTER | Facility: HOSPITAL | Age: 70
End: 2025-06-13
Payer: MEDICARE

## 2025-06-13 NOTE — OUTREACH NOTE
Call Center TCM Note      Flowsheet Row Responses   [facility] discharged [facility]? [facility]   Does the patient have one of the following disease processes/diagnoses(primary or secondary)? Other   TCM attempt successful? No   Unsuccessful attempts Attempt 1  [Attempted to reach patient and daughter Carmina, listed on PCP verbal release]   Call Status Left message  [Left message on patient voicemail]            CHUCKY DAUGHERTY - Registered Nurse    6/13/2025, 09:16 EDT

## 2025-06-13 NOTE — OUTREACH NOTE
Call Center TCM Note      Flowsheet Row Responses   [facility] patient discharged from? [facility]   Does the patient have one of the following disease processes/diagnoses(primary or secondary)? Other   TCM attempt successful? No   Unsuccessful attempts Attempt 2  [Attempted to reach patient and daughter Carmina, listed on PCP verbal release]            CHUCKY DAUGHERTY - Registered Nurse    6/13/2025, 12:56 EDT

## 2025-06-14 ENCOUNTER — TRANSITIONAL CARE MANAGEMENT TELEPHONE ENCOUNTER (OUTPATIENT)
Dept: CALL CENTER | Facility: HOSPITAL | Age: 70
End: 2025-06-14
Payer: MEDICARE

## 2025-06-14 NOTE — OUTREACH NOTE
Call Center TCM Note      Flowsheet Row Responses   [facility] facility patient discharged from? [facility]   Does the patient have one of the following disease processes/diagnoses(primary or secondary)? Other   TCM attempt successful? No   Unsuccessful attempts Attempt 3            Lilibeth ADAMSON - Registered Nurse    6/14/2025, 10:31 EDT

## 2025-06-17 NOTE — TELEPHONE ENCOUNTER
Sw pt via phone she is aware of the appt date, time, location & who she will be seeing. Mailed out the appt reminder paper.

## 2025-06-19 LAB
QT INTERVAL: 337 MS
QTC INTERVAL: 547 MS

## 2025-06-23 ENCOUNTER — OFFICE VISIT (OUTPATIENT)
Dept: INTERNAL MEDICINE | Facility: CLINIC | Age: 70
End: 2025-06-23
Payer: MEDICARE

## 2025-06-23 VITALS
HEART RATE: 77 BPM | BODY MASS INDEX: 49.47 KG/M2 | HEIGHT: 63 IN | OXYGEN SATURATION: 92 % | TEMPERATURE: 97.5 F | SYSTOLIC BLOOD PRESSURE: 96 MMHG | WEIGHT: 279.2 LBS | DIASTOLIC BLOOD PRESSURE: 63 MMHG

## 2025-06-23 DIAGNOSIS — I10 ESSENTIAL HYPERTENSION: Primary | ICD-10-CM

## 2025-06-23 DIAGNOSIS — I48.92 ATRIAL FLUTTER WITH RAPID VENTRICULAR RESPONSE: ICD-10-CM

## 2025-06-23 DIAGNOSIS — E11.9 TYPE 2 DIABETES MELLITUS WITHOUT COMPLICATION, WITHOUT LONG-TERM CURRENT USE OF INSULIN: ICD-10-CM

## 2025-06-23 DIAGNOSIS — E03.9 ACQUIRED HYPOTHYROIDISM: ICD-10-CM

## 2025-06-23 PROCEDURE — 3074F SYST BP LT 130 MM HG: CPT | Performed by: INTERNAL MEDICINE

## 2025-06-23 PROCEDURE — 1126F AMNT PAIN NOTED NONE PRSNT: CPT | Performed by: INTERNAL MEDICINE

## 2025-06-23 PROCEDURE — 3078F DIAST BP <80 MM HG: CPT | Performed by: INTERNAL MEDICINE

## 2025-06-23 PROCEDURE — 3044F HG A1C LEVEL LT 7.0%: CPT | Performed by: INTERNAL MEDICINE

## 2025-06-23 PROCEDURE — 99495 TRANSJ CARE MGMT MOD F2F 14D: CPT | Performed by: INTERNAL MEDICINE

## 2025-06-23 PROCEDURE — 1111F DSCHRG MED/CURRENT MED MERGE: CPT | Performed by: INTERNAL MEDICINE

## 2025-06-23 NOTE — PROGRESS NOTES
Transitional Care Follow Up Visit  Subjective     Randi Fajardo is a 69 y.o. female who presents for a transitional care management visit.    Within 48 business hours after discharge our office contacted her via telephone to coordinate her care and needs.      I reviewed and discussed the details of that call along with the discharge summary, hospital problems, inpatient lab results, inpatient diagnostic studies, and consultation reports with Randi.     Current outpatient and discharge medications have been reconciled for the patient.  Reviewed by: Mainor Sanders Jr., MD          6/12/2025     5:08 PM   Date of TCM Phone Call   [facility]   Date of Admission 6/10/2025   Date of Discharge 6/12/2025   Discharge Disposition Home or Self Care     Risk for Readmission (LACE) Score: 6 (6/12/2025  6:00 AM)      History of Present Illness     Chief Complaint   Patient presents with    Hospital Follow Up Visit     Course During Hospital Stay:    History of Present Illness  The patient presents with paroxysmal atrial fibrillation.    Patient was recently hospitalized for atrial fibrillation with rapid ventricular response as determined during her cardiology appointment.  She was sent to the hospital and she was initially given diltiazem IV and her heart rate improved.  Patient was stable to be discharged home without any changes in her medication.      Patient was given Eliquis upon discharge at the hospital.  Patient reported difficulty getting Eliquis prior to her admission.     The patient experiences frequent episodes of irregular cardiac rhythm, which significantly interfere with daily activities. Symptom management includes the use of metoprolol tartrate 25 mg during acute episodes and extended-release metoprolol succinate 50 mg as part of their regular regimen. They carry these medications and report them to be effective. The patient rarely administers an additional dose of metoprolol,  opting instead to allow symptoms to resolve spontaneously. They also employ deep breathing exercises as a non-pharmacological intervention.    The patient has noted episodes of mild hypotension and expresses concern that metoprolol may exacerbate this condition. Their nurse practitioner recommended an alternative dosing strategy, advising the administration of half a dose of metoprolol initially, followed by the remaining half after 30 minutes if symptoms persist. However, the patient occasionally takes the full dose at once.    Patient reports good control of her blood sugars inside and outside the hospital.  Patient without hypoglycemic events.  She is doing well with metformin and Jardiance.    The patient has scheduled appointments with Dr. Soriano during the first week of July 2025, Dr. James during the second week of July 2025, an ophthalmology consultation in the second week of July 2025, and a pulmonary evaluation in July 2025.          The following portions of the patient's history were reviewed and updated as appropriate: allergies, current medications, past family history, past medical history, past social history, past surgical history, and problem list.      Objective   Vitals:    06/23/25 1314   BP: 96/63   Pulse: 77   Temp: 97.5 °F (36.4 °C)   SpO2: 92%     Wt Readings from Last 3 Encounters:   06/23/25 127 kg (279 lb 3.2 oz)   06/10/25 127 kg (280 lb 6.8 oz)   06/10/25 125 kg (276 lb)       Appearance: No acute distress, well-nourished  Head: normocephalic, atraumatic  Eyes: extraocular movements intact, no scleral icterus, no conjunctival injection  Ears, Nose, and Throat: external ears normal, nares patent, moist mucous membranes  Cardiovascular: regular rate and rhythm. no murmurs, rales, or rhonchi. no edema  Respiratory: breathing comfortably, symmetric chest rise, clear to auscultation bilaterally. No wheezes, rales, or rhonchi.  Neuro: alert and oriented to time, place, and person. Normal  gait  Psych: normal mood and affect     Result Review :   The following data was reviewed by: Mainor Sanders Jr, MD on 06/23/2025:  Common labs          6/10/2025    16:39 6/11/2025    06:45 6/12/2025    06:15   Common Labs   Glucose 154  95  90    BUN 21.5  18.9  18.3    Creatinine 1.44  1.03  0.95    Sodium 138  137  138    Potassium 4.1  3.6  3.7    Chloride 98  101  102    Calcium 9.4  8.9  8.7    Albumin 3.9  3.4     Total Bilirubin 0.5  0.6     Alkaline Phosphatase 77  65     AST (SGOT) 25  18     ALT (SGPT) 20  16     WBC 12.35  9.83     Hemoglobin 16.1  14.0     Hematocrit 50.2  43.8     Platelets 302  225         No orders to display       Lab Results   Component Value Date    SARSANTIGEN Not Detected 02/25/2025    COVID19 Not Detected 02/25/2025    FLUAAG Not Detected 02/25/2025    FLUBAG Not Detected 02/25/2025    RAPSCRN Negative 02/25/2025    BILIRUBINUR Negative 06/10/2025       Assessment & Plan     Diagnoses and all orders for this visit:    1. Essential hypertension (Primary)  Comments:  Slightly low today.  May be exacerbated by use of metoprolol for atrial fibrillation control.    2. Atrial flutter with rapid ventricular response  Comments:  Continue close.  Consider use of diltiazem for heart rate control given hypotensive episodes with use of metoprolol.    3. Acquired hypothyroidism  Comments:  Labs reviewed from hospitalization including thyroid, which was in normal limits.    4. Type 2 diabetes mellitus without complication, without long-term current use of insulin  Comments:  Will controlled.        There are no discontinued medications.    Return in about 3 months (around 9/23/2025).           Mainor Sanders Jr, MD  06/23/25  14:08 EDT

## 2025-07-01 ENCOUNTER — OFFICE VISIT (OUTPATIENT)
Dept: CARDIOLOGY | Facility: CLINIC | Age: 70
End: 2025-07-01
Payer: MEDICARE

## 2025-07-01 VITALS
SYSTOLIC BLOOD PRESSURE: 135 MMHG | HEART RATE: 76 BPM | WEIGHT: 279 LBS | BODY MASS INDEX: 49.43 KG/M2 | HEIGHT: 63 IN | OXYGEN SATURATION: 94 % | DIASTOLIC BLOOD PRESSURE: 77 MMHG

## 2025-07-01 DIAGNOSIS — E78.2 MIXED HYPERLIPIDEMIA: ICD-10-CM

## 2025-07-01 DIAGNOSIS — I10 ESSENTIAL HYPERTENSION: ICD-10-CM

## 2025-07-01 DIAGNOSIS — I48.0 PAROXYSMAL ATRIAL FIBRILLATION: Primary | ICD-10-CM

## 2025-07-01 DIAGNOSIS — I50.32 CHRONIC DIASTOLIC (CONGESTIVE) HEART FAILURE: ICD-10-CM

## 2025-07-01 DIAGNOSIS — I48.92 ATRIAL FLUTTER WITH CONTROLLED RESPONSE: ICD-10-CM

## 2025-07-01 NOTE — PROGRESS NOTES
Chief Complaint  Hospital Follow Up Visit, Rapid Heart Rate, Shortness of Breath, and Fatigue    Subjective            Randi Fajardo presents to [facility]  History of Present Illness    Randi is here for hospital follow-up.  She has a history of atrial flutter/A-fib status post pulmonary vein ablation and atrial flutter ablation November 2024, diastolic heart failure, right bundle branch block, hypertension, hyperlipidemia, PENNY.  She was sent to the hospital from electrophysiology clinic due to her heart rate being in the 170s and EKG showing a wide-complex tachycardia.  On arrival EKG showed wide-complex tachycardia likely secondary to SVT versus atrial flutter.  She received IV diltiazem and converted to junctional rhythm.    She has had at least 2 episodes of tachycardia since then.  With the most recent just yesterday lasting about an hour with her heart rate in the 150s.  She also experiences shortness of breath with these episodes.  She follows up with electrophysiology next week for further management.    PMH  Past Medical History:   Diagnosis Date    Abnormal ECG     Arrhythmia     Arthritis of back 2018?    Atrial fibrillation     Cataract 07/22    Colon polyp ?    Found during initial colonoscopy    COPD (chronic obstructive pulmonary disease) 2023    Coronary artery disease 09/24    Afib and irregular heartbeat    CTS (carpal tunnel syndrome) ?    Have had corrective surgery on both    Depressed     Elevated cholesterol     Fracture of ankle 1986    Plate and screws were used to correct break    GREGG (generalized anxiety disorder)     Gastroesophageal reflux     HTN (hypertension)     Hyperlipidemia     Hypothyroid     Irregular heartbeat     Knee swelling ?    Lesion of neck     Low back strain ?    Has happenened several times    Obesity     Since birth    Osteoarthritis     Pneumonia 1960    RLS (restless legs syndrome)     Shortness of breath Approx. 3 yrs. ago    Sleep  apnea in adult     Sleep apnea, obstructive  ?    Sleep with full face mask    Type 2 diabetes mellitus     Visual impairment     Wear glasses for reading         SURGICALHX  Past Surgical History:   Procedure Laterality Date    ABLATION OF DYSRHYTHMIC FOCUS      ANKLE OPEN REDUCTION INTERNAL FIXATION  1086    Plate and screws corrected compound fracture    ANKLE TENDON REPAIR Right     ankle with hardware    CARDIAC ELECTROPHYSIOLOGY PROCEDURE N/A 2024    Procedure: AF/AFL with PFA & NILA. No CTA. Hold Eliquis morning of.;  Surgeon: Luca James MD;  Location: [facility] INVASIVE LOCATION;  Service: Cardiovascular;  Laterality: N/A;    CARDIAC SURGERY  2024    Ablation    CARPAL TUNNEL RELEASE Bilateral      SECTION          COLONOSCOPY      COLONOSCOPY N/A 2024    Procedure: COLONOSCOPY;  Surgeon: Kalin Arriaga MD;  Location: [facility] END[facility];  Service: General;  Laterality: N/A;  NORMAL    FRACTURE SURGERY  1984    Plate and screws in right ankle    HAND SURGERY  ?    Carpal tunnel correction on both hands at different dates    NECK SURGERY      ,,; Neck lesion removed, total of 3        SOC  Social History     Socioeconomic History    Marital status:    Tobacco Use    Smoking status: Former     Current packs/day: 0.00     Average packs/day: 0.5 packs/day for 51.0 years (25.5 ttl pk-yrs)     Types: Cigarettes     Start date: 1969     Quit date:      Years since quittin.5     Passive exposure: Past    Smokeless tobacco: Never    Tobacco comments:     Was off and on during the 40 years   Vaping Use    Vaping status: Never Used   Substance and Sexual Activity    Alcohol use: Yes     Comment: Socially on occasion    Drug use: Never    Sexual activity: Not Currently     Birth control/protection: None         FAMHX  Family History   Problem Relation Age of Onset    Arthritis Mother     Thyroid disease Mother     Vision loss Mother          Wore glasses; cataract surgery; detached retina    Lung cancer Father     Heart disease Father         Had pacemaker    Colon cancer Father     Cancer Father         Lung, colon    Emphysema Father     COPD Father     Vision loss Father         Wore glasses; cataract surgery    Cancer Brother         Lung, pancreas    Depression Brother         Undiagnosed    Cancer Maternal Aunt         Breast cancer    Arthritis Maternal Aunt     Hearing loss Maternal Aunt     Rheumatologic disease Maternal Aunt         Rheumatoid Arthritis    Arthritis Maternal Aunt     Hearing loss Maternal Aunt     Cancer Maternal Aunt     Vision loss Maternal Grandfather     Stroke Maternal Grandfather     Diabetes Paternal Uncle     Hearing loss Maternal Aunt     Arthritis Maternal Aunt     Arthritis Maternal Aunt     Hearing loss Maternal Aunt     Cancer Maternal Aunt         Cancer    Arthritis Maternal Aunt           ALLERGY  Allergies   Allergen Reactions    Nexium [Esomeprazole] Hives    Prilosec [Omeprazole] Hives        MEDSCURRENT    Current Outpatient Medications:     albuterol sulfate HFA (Ventolin HFA) 108 (90 Base) MCG/ACT inhaler, Inhale 2 puffs Every 4 (Four) Hours As Needed for Wheezing or Shortness of Air., Disp: 18 g, Rfl: 5    apixaban (ELIQUIS) 5 MG tablet tablet, Take 1 tablet by mouth 2 (Two) Times a Day., Disp: 180 tablet, Rfl: 0    atorvastatin (LIPITOR) 40 MG tablet, Take 1 tablet by mouth Every Night., Disp: 90 tablet, Rfl: 3    BIOTIN PO, Take 1 tablet by mouth Every Night., Disp: , Rfl:     bumetanide (BUMEX) 2 MG tablet, PLEASE SEE ATTACHED FOR DETAILED DIRECTIONS (Patient taking differently: Take 1 tablet by mouth Daily. Please see attached for detailed directions), Disp: 180 tablet, Rfl: 1    Calcium Carb-Cholecalciferol (CALCIUM 600 + D PO), Take 1 tablet by mouth After Dinner., Disp: , Rfl:     Diclofenac Sodium (VOLTAREN) 1 % gel gel, May apply to each knee every 6 hours while awake (Patient taking  "differently: Apply 4 g topically to the appropriate area as directed 4 (Four) Times a Day. May apply to each knee every 6 hours while awake), Disp: 350 g, Rfl: 1    DULoxetine (CYMBALTA) 60 MG capsule, Take 2 capsules by mouth Daily., Disp: 180 capsule, Rfl: 3    empagliflozin (Jardiance) 25 MG tablet tablet, Take 1 tablet by mouth Daily., Disp: 90 tablet, Rfl: 0    famotidine (Pepcid) 40 MG tablet, Take 1 tablet by mouth 2 (Two) Times a Day., Disp: 90 tablet, Rfl: 2    gabapentin (NEURONTIN) 600 MG tablet, Take 1 tablet by mouth 3 (Three) Times a Day As Needed (pain)., Disp: 90 tablet, Rfl: 0    levothyroxine (Synthroid) 50 MCG tablet, Take 1 tablet by mouth Daily., Disp: 90 tablet, Rfl: 1    magnesium chloride ER 64 MG DR tablet, Take 1 tablet by mouth Daily., Disp: , Rfl:     metFORMIN (GLUCOPHAGE) 500 MG tablet, Take 1 tablet by mouth Daily With Dinner., Disp: 90 tablet, Rfl: 0    metoprolol succinate XL (TOPROL-XL) 50 MG 24 hr tablet, Take 1 tablet by mouth Daily., Disp: 90 tablet, Rfl: 2    metoprolol tartrate (LOPRESSOR) 25 MG tablet, Take 0.5 tablets by mouth 2 (Two) Times a Day As Needed (for sustained HR >120 bpm)., Disp: 30 tablet, Rfl: 11    minocycline (MINOCIN,DYNACIN) 100 MG capsule, TAKE ONE CAPSULE BY MOUTH EVERY DAY, Disp: 90 capsule, Rfl: 3    multivitamin with minerals tablet tablet, Take 1 tablet by mouth Every Night. 1 GUMMIE AT NIGHT, Disp: , Rfl:     rOPINIRole (REQUIP) 2 MG tablet, TAKE 1 TABLET BY MOUTH EVERY NIGHT., Disp: 90 tablet, Rfl: 3    Stiolto Respimat 2.5-2.5 MCG/ACT aerosol solution inhaler, INHALE TWO PUFFS BY MOUTH EVERY DAY (Patient taking differently: Inhale 2 puffs Daily.), Disp: 4 g, Rfl: 1    Zinc 50 MG tablet, Take 1 tablet by mouth Every Night. CAPSULE, NOT TAB (WITH D3), Disp: , Rfl:       Review of Systems   Cardiovascular:  Positive for dyspnea on exertion and palpitations. Negative for chest pain.        Objective     /77   Pulse 76   Ht 160 cm (63\")   Wt 127 " kg (279 lb)   SpO2 94%   BMI 49.42 kg/m²       General Appearance:   well developed  well nourished  HENT:   oropharynx moist  lips not cyanotic  Neck:  thyroid not enlarged  supple  Respiratory:  no respiratory distress  normal breath sounds  no rales  Cardiovascular:  no jugular venous distention  regular rhythm  apical impulse normal  S1 normal, S2 normal  no S3, no S4   no murmur  no rub, no thrill  carotid pulses normal; no bruit  pedal pulses normal  lower extremity edema: none    Musculoskeletal:  no clubbing of fingers.   normocephalic, head atraumatic  Skin:   warm, dry  Psychiatric:  judgement and insight appropriate  normal mood and affect      Result Review :     The following data was reviewed by: EDDIE Arias on 07/01/2025:    CMP          6/10/2025    16:39 6/11/2025    06:45 6/12/2025    06:15   CMP   Glucose 154  95  90    BUN 21.5  18.9  18.3    Creatinine 1.44  1.03  0.95    EGFR 39.0  58.3  64.2    Sodium 138  137  138    Potassium 4.1  3.6  3.7    Chloride 98  101  102    Calcium 9.4  8.9  8.7    Total Protein 7.4  6.4     Albumin 3.9  3.4     Globulin 3.5  3.0     Total Bilirubin 0.5  0.6     Alkaline Phosphatase 77  65     AST (SGOT) 25  18     ALT (SGPT) 20  16     Albumin/Globulin Ratio 1.1  1.1     BUN/Creatinine Ratio 14.9  18.3  19.3    Anion Gap 17.9  9.3  9.7      CBC          4/4/2025    17:48 6/10/2025    16:39 6/11/2025    06:45   CBC   WBC 12.99  12.35  9.83    RBC 5.41  5.41  4.72    Hemoglobin 15.7  16.1  14.0    Hematocrit 48.7  50.2  43.8    MCV 90.0  92.8  92.8    MCH 29.0  29.8  29.7    MCHC 32.2  32.1  32.0    RDW 14.8  15.6  15.6    Platelets 291  302  225      Lipid Panel          12/5/2024    10:41 3/27/2025    12:05   Lipid Panel   Total Cholesterol 110  115    Triglycerides 112  126    HDL Cholesterol 41  38    VLDL Cholesterol 21  23    LDL Cholesterol  48  54    LDL/HDL Ratio 1.14  1.36      TSH          12/5/2024    10:41 3/27/2025    12:05 6/10/2025     18:25   TSH   TSH 2.570  1.780  2.730        Data reviewed : Cardiology studies Hospital records reviewedecho showed normal LVEF with calcified aortic valve and diastolic dysfunction.    Procedures      Randi Fajardo  reports that she quit smoking about 5 years ago. Her smoking use included cigarettes. She started smoking about 56 years ago. She has a 25.5 pack-year smoking history. She has been exposed to tobacco smoke. She has never used smokeless tobacco. I have educated her on the risk of diseases from using tobacco products such as cancer, COPD, and heart disease.                Assessment and Plan        ASSESSMENT:  Encounter Diagnoses   Name Primary?    Paroxysmal atrial fibrillation Yes    Atrial flutter with controlled response     Essential hypertension     Mixed hyperlipidemia     Chronic diastolic (congestive) heart failure          PLAN:    1.  Atrial flutter, status post ablation November 2024.  Recent hospitalization due to tachycardia, wide-complex tachycardia SVT versus atrial flutter.  She converted back to a junctional rhythm prior to discharge.  She sounds regular on exam today.  She had an episode just yesterday of elevated heart rate in the 150s.  This lasted about an hour.  She follows up again with electrophysiology next week for further evaluation and management.  Continue anticoagulation and beta-blocker.  2.  Diastolic heart failure, volume status stable.  Continue current medical therapy.  3.  Essential hypertension, blood pressure 135/77.  Stable.  Continue current medical therapy.  4.  Mixed hyperlipidemia stable on statin therapy, continue.    Follow-up in October with our clinic as scheduled.  Follow-up with Dr. James next week.    Patient was given instructions and counseling regarding her condition or for health maintenance advice. Please see specific information pulled into the AVS if appropriate.           Lilibeth Hernandez, APRN   7/1/2025  13:57 EDT

## 2025-07-08 ENCOUNTER — OFFICE VISIT (OUTPATIENT)
Dept: CARDIOLOGY | Facility: CLINIC | Age: 70
End: 2025-07-08
Payer: MEDICARE

## 2025-07-08 VITALS
HEART RATE: 69 BPM | BODY MASS INDEX: 46.78 KG/M2 | SYSTOLIC BLOOD PRESSURE: 106 MMHG | HEIGHT: 64 IN | OXYGEN SATURATION: 94 % | WEIGHT: 274 LBS | DIASTOLIC BLOOD PRESSURE: 66 MMHG

## 2025-07-08 DIAGNOSIS — I10 ESSENTIAL HYPERTENSION: Chronic | ICD-10-CM

## 2025-07-08 DIAGNOSIS — I48.0 PAROXYSMAL ATRIAL FIBRILLATION: Primary | Chronic | ICD-10-CM

## 2025-07-08 DIAGNOSIS — I48.92 ATRIAL FLUTTER WITH RAPID VENTRICULAR RESPONSE: Chronic | ICD-10-CM

## 2025-07-08 NOTE — PROGRESS NOTES
Randi Fajardo  [id number]  1955      [facility] CARDIOLOGY     Referring Provider: No ref. provider found     Mainor Sanders Jr., MD  [address]  [place] [address]    Chief Complaint   Patient presents with    Paroxysmal atrial fibrillation       Problem List  Atrial flutter  [id number] (age, sex, CHF, CAD, HTN, DM), anticoagulated on Eliqui  Diagnosed in 8/2024  Monitor, 8/30/2024: Predominant sinus rhythm minimum 49 bpm, max 113 bpm, average 78 bpm, frequent PACs 15% burden  PVA, posterior wall isolation, AFL RFA with CTI, 11/4/24 by Dr. James  Diastolic heart failure  Echo, 8/16/24: EF 55-60% paradoxical septal motion noted due to bundle branch block, is not well-visualized  Coronary artery disease  MPS, 7/20/2022: medium area of mild to moderate perfusion defect in the distal anterior and apical segments with partial reversibility on resting images which could represent myocardial ischemia in the right clinical setting, EF 70%  Hypertension  Hyperlipidemia  Diabetes  Hypothyroid  COPD  PENNY on CPAP  Osteoarthritis  Restless leg syndrome  Anxiety/depression    History of Present Illness   Randi Fajardo is a 69 y.o. female who presents to my electrophysiology clinic for follow up of AFL. Patient underwent PVA in November. Since the procedure, patient is still having episodes of AF. Episodes are shorter in frequency and less often. She denies SOB. She will have occasional LH and dizziness with quick position changes. Anticoagulated with eliquis. Denies any bleeding complications or stroke like symptoms.    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     UPDATE 7/2025: unfortunately ER did not give adenosine. Unclear if AVNRT vs AFL. Still having episodes which is making her SOB and tired. Current ECG showing NSR with RBBB      Outpatient Medications Marked as Taking for the 7/8/25 encounter (Office Visit)  with Luca James MD   Medication Sig Dispense Refill    albuterol sulfate HFA (Ventolin HFA) 108 (90 Base) MCG/ACT inhaler Inhale 2 puffs Every 4 (Four) Hours As Needed for Wheezing or Shortness of Air. 18 g 5    apixaban (ELIQUIS) 5 MG tablet tablet Take 1 tablet by mouth 2 (Two) Times a Day. 180 tablet 0    atorvastatin (LIPITOR) 40 MG tablet Take 1 tablet by mouth Every Night. 90 tablet 3    BIOTIN PO Take 1 tablet by mouth Every Night.      bumetanide (BUMEX) 2 MG tablet PLEASE SEE ATTACHED FOR DETAILED DIRECTIONS (Patient taking differently: Take 1 tablet by mouth Daily. Please see attached for detailed directions) 180 tablet 1    Calcium Carb-Cholecalciferol (CALCIUM 600 + D PO) Take 1 tablet by mouth After Dinner.      Diclofenac Sodium (VOLTAREN) 1 % gel gel May apply to each knee every 6 hours while awake (Patient taking differently: Apply 4 g topically to the appropriate area as directed 4 (Four) Times a Day. May apply to each knee every 6 hours while awake) 350 g 1    DULoxetine (CYMBALTA) 60 MG capsule Take 2 capsules by mouth Daily. 180 capsule 3    empagliflozin (Jardiance) 25 MG tablet tablet Take 1 tablet by mouth Daily. 90 tablet 0    famotidine (Pepcid) 40 MG tablet Take 1 tablet by mouth 2 (Two) Times a Day. 90 tablet 2    gabapentin (NEURONTIN) 600 MG tablet Take 1 tablet by mouth 3 (Three) Times a Day As Needed (pain). 90 tablet 0    levothyroxine (Synthroid) 50 MCG tablet Take 1 tablet by mouth Daily. 90 tablet 1    magnesium chloride ER 64 MG DR tablet Take 1 tablet by mouth Daily.      metFORMIN (GLUCOPHAGE) 500 MG tablet Take 1 tablet by mouth Daily With Dinner. 90 tablet 0    metoprolol succinate XL (TOPROL-XL) 50 MG 24 hr tablet Take 1 tablet by mouth Daily. 90 tablet 2    metoprolol tartrate (LOPRESSOR) 25 MG tablet Take 0.5 tablets by mouth 2 (Two) Times a Day As Needed (for sustained HR >120 bpm). 30 tablet 11    minocycline (MINOCIN,DYNACIN) 100 MG capsule TAKE ONE CAPSULE BY MOUTH  "EVERY DAY 90 capsule 3    multivitamin with minerals tablet tablet Take 1 tablet by mouth Every Night. 1 GUMMIE AT NIGHT      rOPINIRole (REQUIP) 2 MG tablet TAKE 1 TABLET BY MOUTH EVERY NIGHT. 90 tablet 3    Stiolto Respimat 2.5-2.5 MCG/ACT aerosol solution inhaler INHALE TWO PUFFS BY MOUTH EVERY DAY (Patient taking differently: Inhale 2 puffs Daily.) 4 g 1    Zinc 50 MG tablet Take 1 tablet by mouth Every Night. CAPSULE, NOT TAB (WITH D3)              Physical Exam  Vitals:    07/08/25 1459   BP: 106/66   BP Location: Left arm   Patient Position: Sitting   Pulse: 69   SpO2: 94%   Weight: 124 kg (274 lb)   Height: 161.3 cm (63.5\")     Body mass index is 47.78 kg/m².  Constitutional:       Appearance: Healthy appearance.   Neck:      Vascular: JVD normal.   Pulmonary:      Effort: Pulmonary effort is normal.      Breath sounds: Normal breath sounds.   Cardiovascular:      Normal rate. Regular rhythm. Normal S1. Normal S2.       Murmurs: There is no murmur.      No gallop.  No rub.   Pulses:     Intact distal pulses.   Edema:     Peripheral edema absent.   Neurological:      General: No focal deficit present.          Diagnostic Data  Procedures    Lab Results   Component Value Date    GLUCOSE 90 06/12/2025    CALCIUM 8.7 06/12/2025     06/12/2025    K 3.7 06/12/2025    CO2 26.3 06/12/2025     06/12/2025    BUN 18.3 06/12/2025    CREATININE 0.95 06/12/2025    EGFRIFNONA 59 (L) 09/02/2021    BCR 19.3 06/12/2025    ANIONGAP 9.7 06/12/2025     Lab Results   Component Value Date    WBC 9.83 06/11/2025    HGB 14.0 06/11/2025    HCT 43.8 06/11/2025    MCV 92.8 06/11/2025     06/11/2025     No results found for: \"INR\", \"PROTIME\"  Lab Results   Component Value Date    TSH 2.730 06/10/2025       I personally viewed and interpreted the patient's EKG/Telemetry/lab data    Randi Scotty  reports that she quit smoking about 5 years ago. Her smoking use included cigarettes. She started smoking about 56 years " ago. She has a 25.5 pack-year smoking history. She has been exposed to tobacco smoke. She has never used smokeless tobacco. I have educated her on the risk of diseases from using tobacco products such as cancer, COPD, and heart disease.       ACP discussion was declined by the patient. Patient does not have an advance directive, declines further assistance.    Assessment and Plan  Diagnoses and all orders for this visit:    1. Paroxysmal atrial fibrillation (Primary)    2. Atrial flutter with rapid ventricular response    3. Essential hypertension        Atrial flutter and atrial fibrillation  -[id number] 6 (age, sex, CHF, CAD, HTN, DM), anticoagulated on Eliquis  -s/p PVA and CTI, 11/2024  -recurrent AFL vs possible AVNRT with aberrancy  -patient would like to try sotalol; will need inpatient loading  - will decrease her metoprolol at the time of her loading  - call patient to coordinate scheduling     Diastolic heart failure  -Echo, 8/16/24: EF 55-60% paradoxical septal motion noted due to bundle branch block, is not well-visualized  -Continue GDMT: jardiance, mavis, toprol and bumex  -Follows with Dr. Soriano     Hypertension  -Well controlled, continue current medication regimen      Follow-Up  Return for Next scheduled follow up.      Thank you for allowing me to participate in the care of your patient. Please to not hesitate to contact me with additional questions or concerns.

## 2025-07-26 DIAGNOSIS — G62.9 NEUROPATHY: ICD-10-CM

## 2025-07-28 NOTE — TELEPHONE ENCOUNTER
Refill Request for Controlled Substance    Date of Request: 7/28/2025  Medication Requested: Gabapentin   Last UDS: 12/05/2024  Last Consent: 06/09/2025  Last OV: 06/23/2025  Next OV: 12/11/2025

## 2025-07-29 RX ORDER — GABAPENTIN 600 MG/1
600 TABLET ORAL 3 TIMES DAILY PRN
Qty: 90 TABLET | Refills: 0 | Status: SHIPPED | OUTPATIENT
Start: 2025-07-29

## 2025-08-04 DIAGNOSIS — I48.92 ATRIAL FLUTTER WITH CONTROLLED RESPONSE: Primary | ICD-10-CM

## 2025-08-04 DIAGNOSIS — I48.0 PAROXYSMAL ATRIAL FIBRILLATION: ICD-10-CM

## 2025-08-06 RX ORDER — LEVOTHYROXINE SODIUM 50 UG/1
50 TABLET ORAL DAILY
Qty: 90 TABLET | Refills: 1 | Status: SHIPPED | OUTPATIENT
Start: 2025-08-06

## 2025-08-11 DIAGNOSIS — E11.9 TYPE 2 DIABETES MELLITUS WITHOUT COMPLICATION, WITHOUT LONG-TERM CURRENT USE OF INSULIN: ICD-10-CM

## 2025-08-19 RX ORDER — FAMOTIDINE 40 MG/1
40 TABLET, FILM COATED ORAL 2 TIMES DAILY
Qty: 90 TABLET | Refills: 2 | Status: ON HOLD | OUTPATIENT
Start: 2025-08-19

## 2025-08-25 ENCOUNTER — HOSPITAL ENCOUNTER (INPATIENT)
Facility: HOSPITAL | Age: 70
LOS: 3 days | Discharge: HOME | End: 2025-08-28
Attending: INTERNAL MEDICINE | Admitting: INTERNAL MEDICINE
Payer: MEDICARE

## 2025-08-25 DIAGNOSIS — I50.32 CHRONIC DIASTOLIC (CONGESTIVE) HEART FAILURE: ICD-10-CM

## 2025-08-25 DIAGNOSIS — I48.92 PAROXYSMAL ATRIAL FLUTTER: Primary | ICD-10-CM

## 2025-08-25 LAB
ANION GAP SERPL CALCULATED.3IONS-SCNC: 13 MMOL/L (ref 5–15)
BUN SERPL-MCNC: 23.9 MG/DL (ref 8–23)
BUN/CREAT SERPL: 20.4 (ref 7–25)
CALCIUM SPEC-SCNC: 9.4 MG/DL (ref 8.6–10.5)
CHLORIDE SERPL-SCNC: 102 MMOL/L (ref 98–107)
CO2 SERPL-SCNC: 27 MMOL/L (ref 22–29)
CREAT SERPL-MCNC: 1.17 MG/DL (ref 0.57–1)
EGFRCR SERPLBLD CKD-EPI 2021: 50 ML/MIN/1.73
GLUCOSE BLDC GLUCOMTR-MCNC: 129 MG/DL (ref 70–130)
GLUCOSE SERPL-MCNC: 122 MG/DL (ref 65–99)
MAGNESIUM SERPL-MCNC: 2.4 MG/DL (ref 1.6–2.4)
POTASSIUM SERPL-SCNC: 4.3 MMOL/L (ref 3.5–5.2)
SODIUM SERPL-SCNC: 142 MMOL/L (ref 136–145)

## 2025-08-25 PROCEDURE — 63710000001 REVEFENACIN 175 MCG/3ML SOLUTION: Performed by: HOSPITALIST

## 2025-08-25 PROCEDURE — 93005 ELECTROCARDIOGRAM TRACING: CPT | Performed by: HOSPITALIST

## 2025-08-25 PROCEDURE — 80048 BASIC METABOLIC PNL TOTAL CA: CPT | Performed by: HOSPITALIST

## 2025-08-25 PROCEDURE — 25810000003 LACTATED RINGERS SOLUTION: Performed by: HOSPITALIST

## 2025-08-25 PROCEDURE — 94640 AIRWAY INHALATION TREATMENT: CPT

## 2025-08-25 PROCEDURE — 83735 ASSAY OF MAGNESIUM: CPT | Performed by: HOSPITALIST

## 2025-08-25 PROCEDURE — 82948 REAGENT STRIP/BLOOD GLUCOSE: CPT

## 2025-08-25 PROCEDURE — 93005 ELECTROCARDIOGRAM TRACING: CPT | Performed by: INTERNAL MEDICINE

## 2025-08-25 RX ORDER — LEVOTHYROXINE SODIUM 50 UG/1
50 TABLET ORAL DAILY
Status: DISCONTINUED | OUTPATIENT
Start: 2025-08-26 | End: 2025-08-28 | Stop reason: HOSPADM

## 2025-08-25 RX ORDER — FAMOTIDINE 20 MG/1
40 TABLET, FILM COATED ORAL 2 TIMES DAILY
Status: DISCONTINUED | OUTPATIENT
Start: 2025-08-25 | End: 2025-08-27

## 2025-08-25 RX ORDER — NITROGLYCERIN 0.4 MG/1
0.4 TABLET SUBLINGUAL
Status: DISCONTINUED | OUTPATIENT
Start: 2025-08-25 | End: 2025-08-28 | Stop reason: HOSPADM

## 2025-08-25 RX ORDER — ATORVASTATIN CALCIUM 40 MG/1
40 TABLET, FILM COATED ORAL NIGHTLY
Status: DISCONTINUED | OUTPATIENT
Start: 2025-08-25 | End: 2025-08-28 | Stop reason: HOSPADM

## 2025-08-25 RX ORDER — BUMETANIDE 1 MG/1
1 TABLET ORAL 2 TIMES DAILY
Status: DISCONTINUED | OUTPATIENT
Start: 2025-08-25 | End: 2025-08-28 | Stop reason: HOSPADM

## 2025-08-25 RX ORDER — ACETAMINOPHEN 325 MG/1
650 TABLET ORAL EVERY 6 HOURS PRN
Status: DISCONTINUED | OUTPATIENT
Start: 2025-08-25 | End: 2025-08-28 | Stop reason: HOSPADM

## 2025-08-25 RX ORDER — MULTIPLE VITAMINS W/ MINERALS TAB 9MG-400MCG
1 TAB ORAL NIGHTLY
Status: DISCONTINUED | OUTPATIENT
Start: 2025-08-25 | End: 2025-08-28 | Stop reason: HOSPADM

## 2025-08-25 RX ORDER — DULOXETIN HYDROCHLORIDE 60 MG/1
120 CAPSULE, DELAYED RELEASE ORAL DAILY
Status: DISCONTINUED | OUTPATIENT
Start: 2025-08-25 | End: 2025-08-28 | Stop reason: HOSPADM

## 2025-08-25 RX ORDER — SOTALOL HYDROCHLORIDE 80 MG/1
80 TABLET ORAL EVERY 24 HOURS
Status: DISCONTINUED | OUTPATIENT
Start: 2025-08-25 | End: 2025-08-26

## 2025-08-25 RX ORDER — METOPROLOL TARTRATE 25 MG/1
25 TABLET, FILM COATED ORAL 2 TIMES DAILY PRN
Status: DISCONTINUED | OUTPATIENT
Start: 2025-08-25 | End: 2025-08-28 | Stop reason: HOSPADM

## 2025-08-25 RX ORDER — GABAPENTIN 300 MG/1
600 CAPSULE ORAL EVERY 8 HOURS PRN
Status: DISCONTINUED | OUTPATIENT
Start: 2025-08-25 | End: 2025-08-28 | Stop reason: HOSPADM

## 2025-08-25 RX ORDER — ARFORMOTEROL TARTRATE 15 UG/2ML
15 SOLUTION RESPIRATORY (INHALATION)
Status: DISCONTINUED | OUTPATIENT
Start: 2025-08-25 | End: 2025-08-28 | Stop reason: HOSPADM

## 2025-08-25 RX ORDER — ALBUTEROL SULFATE 0.83 MG/ML
2.5 SOLUTION RESPIRATORY (INHALATION) EVERY 6 HOURS PRN
Status: DISCONTINUED | OUTPATIENT
Start: 2025-08-25 | End: 2025-08-28 | Stop reason: HOSPADM

## 2025-08-25 RX ORDER — METOPROLOL SUCCINATE 50 MG/1
50 TABLET, EXTENDED RELEASE ORAL DAILY
Status: DISCONTINUED | OUTPATIENT
Start: 2025-08-26 | End: 2025-08-26

## 2025-08-25 RX ORDER — ROPINIROLE 1 MG/1
2 TABLET, FILM COATED ORAL NIGHTLY
Status: DISCONTINUED | OUTPATIENT
Start: 2025-08-25 | End: 2025-08-28 | Stop reason: HOSPADM

## 2025-08-25 RX ADMIN — ARFORMOTEROL TARTRATE 15 MCG: 15 SOLUTION RESPIRATORY (INHALATION) at 20:58

## 2025-08-25 RX ADMIN — APIXABAN 5 MG: 5 TABLET, FILM COATED ORAL at 20:36

## 2025-08-25 RX ADMIN — SOTALOL HYDROCHLORIDE 80 MG: 80 TABLET ORAL at 21:30

## 2025-08-25 RX ADMIN — REVEFENACIN 175 MCG: 175 SOLUTION RESPIRATORY (INHALATION) at 20:58

## 2025-08-25 RX ADMIN — METOPROLOL TARTRATE 25 MG: 25 TABLET, FILM COATED ORAL at 18:24

## 2025-08-25 RX ADMIN — ATORVASTATIN CALCIUM 40 MG: 40 TABLET, FILM COATED ORAL at 20:37

## 2025-08-25 RX ADMIN — FAMOTIDINE 40 MG: 20 TABLET, FILM COATED ORAL at 20:36

## 2025-08-25 RX ADMIN — Medication 1 TABLET: at 20:37

## 2025-08-25 RX ADMIN — Medication 5 MG: at 23:35

## 2025-08-25 RX ADMIN — SODIUM CHLORIDE, POTASSIUM CHLORIDE, SODIUM LACTATE AND CALCIUM CHLORIDE 500 ML: 600; 310; 30; 20 INJECTION, SOLUTION INTRAVENOUS at 19:36

## 2025-08-25 RX ADMIN — ROPINIROLE HYDROCHLORIDE 2 MG: 1 TABLET, FILM COATED ORAL at 23:35

## 2025-08-25 RX ADMIN — GABAPENTIN 600 MG: 300 CAPSULE ORAL at 23:35

## 2025-08-25 RX ADMIN — DULOXETINE 120 MG: 60 CAPSULE, DELAYED RELEASE ORAL at 17:50

## 2025-08-25 RX ADMIN — METFORMIN HYDROCHLORIDE 500 MG: 500 TABLET ORAL at 17:50

## 2025-08-26 LAB
ANION GAP SERPL CALCULATED.3IONS-SCNC: 8.1 MMOL/L (ref 5–15)
BUN SERPL-MCNC: 24.2 MG/DL (ref 8–23)
BUN/CREAT SERPL: 19.5 (ref 7–25)
CALCIUM SPEC-SCNC: 9.3 MG/DL (ref 8.6–10.5)
CHLORIDE SERPL-SCNC: 100 MMOL/L (ref 98–107)
CO2 SERPL-SCNC: 29.9 MMOL/L (ref 22–29)
CREAT SERPL-MCNC: 1.24 MG/DL (ref 0.57–1)
EGFRCR SERPLBLD CKD-EPI 2021: 46.6 ML/MIN/1.73
GLUCOSE SERPL-MCNC: 76 MG/DL (ref 65–99)
MAGNESIUM SERPL-MCNC: 2.4 MG/DL (ref 1.6–2.4)
POTASSIUM SERPL-SCNC: 4 MMOL/L (ref 3.5–5.2)
QT INTERVAL: 328 MS
QT INTERVAL: 334 MS
QT INTERVAL: 446 MS
QT INTERVAL: 464 MS
QT INTERVAL: 538 MS
QTC INTERVAL: 460 MS
QTC INTERVAL: 467 MS
QTC INTERVAL: 480 MS
QTC INTERVAL: 535 MS
QTC INTERVAL: 561 MS
SODIUM SERPL-SCNC: 138 MMOL/L (ref 136–145)

## 2025-08-26 PROCEDURE — 94664 DEMO&/EVAL PT USE INHALER: CPT

## 2025-08-26 PROCEDURE — 94799 UNLISTED PULMONARY SVC/PX: CPT

## 2025-08-26 PROCEDURE — 83735 ASSAY OF MAGNESIUM: CPT | Performed by: HOSPITALIST

## 2025-08-26 PROCEDURE — 80048 BASIC METABOLIC PNL TOTAL CA: CPT | Performed by: HOSPITALIST

## 2025-08-26 PROCEDURE — 93005 ELECTROCARDIOGRAM TRACING: CPT | Performed by: INTERNAL MEDICINE

## 2025-08-26 PROCEDURE — 63710000001 REVEFENACIN 175 MCG/3ML SOLUTION: Performed by: HOSPITALIST

## 2025-08-26 PROCEDURE — 93005 ELECTROCARDIOGRAM TRACING: CPT | Performed by: HOSPITALIST

## 2025-08-26 PROCEDURE — 93005 ELECTROCARDIOGRAM TRACING: CPT

## 2025-08-26 RX ORDER — SOTALOL HYDROCHLORIDE 80 MG/1
80 TABLET ORAL EVERY 12 HOURS SCHEDULED
Status: DISCONTINUED | OUTPATIENT
Start: 2025-08-26 | End: 2025-08-28 | Stop reason: HOSPADM

## 2025-08-26 RX ADMIN — SOTALOL HYDROCHLORIDE 80 MG: 80 TABLET ORAL at 20:19

## 2025-08-26 RX ADMIN — GABAPENTIN 600 MG: 300 CAPSULE ORAL at 22:46

## 2025-08-26 RX ADMIN — Medication 1 TABLET: at 20:19

## 2025-08-26 RX ADMIN — ATORVASTATIN CALCIUM 40 MG: 40 TABLET, FILM COATED ORAL at 20:19

## 2025-08-26 RX ADMIN — BUMETANIDE 1 MG: 1 TABLET ORAL at 08:32

## 2025-08-26 RX ADMIN — FAMOTIDINE 40 MG: 20 TABLET, FILM COATED ORAL at 20:19

## 2025-08-26 RX ADMIN — BUMETANIDE 1 MG: 1 TABLET ORAL at 20:19

## 2025-08-26 RX ADMIN — APIXABAN 5 MG: 5 TABLET, FILM COATED ORAL at 20:19

## 2025-08-26 RX ADMIN — SOTALOL HYDROCHLORIDE 80 MG: 80 TABLET ORAL at 09:09

## 2025-08-26 RX ADMIN — FAMOTIDINE 40 MG: 20 TABLET, FILM COATED ORAL at 08:31

## 2025-08-26 RX ADMIN — Medication 5 MG: at 22:46

## 2025-08-26 RX ADMIN — METFORMIN HYDROCHLORIDE 500 MG: 500 TABLET ORAL at 17:41

## 2025-08-26 RX ADMIN — LEVOTHYROXINE SODIUM 50 MCG: 50 TABLET ORAL at 09:09

## 2025-08-26 RX ADMIN — DULOXETINE 120 MG: 60 CAPSULE, DELAYED RELEASE ORAL at 08:31

## 2025-08-26 RX ADMIN — ARFORMOTEROL TARTRATE 15 MCG: 15 SOLUTION RESPIRATORY (INHALATION) at 07:45

## 2025-08-26 RX ADMIN — REVEFENACIN 175 MCG: 175 SOLUTION RESPIRATORY (INHALATION) at 07:45

## 2025-08-26 RX ADMIN — EMPAGLIFLOZIN 25 MG: 25 TABLET, FILM COATED ORAL at 08:31

## 2025-08-26 RX ADMIN — APIXABAN 5 MG: 5 TABLET, FILM COATED ORAL at 08:32

## 2025-08-26 RX ADMIN — ROPINIROLE HYDROCHLORIDE 2 MG: 1 TABLET, FILM COATED ORAL at 22:46

## 2025-08-26 RX ADMIN — ARFORMOTEROL TARTRATE 15 MCG: 15 SOLUTION RESPIRATORY (INHALATION) at 19:00

## 2025-08-27 LAB
ANION GAP SERPL CALCULATED.3IONS-SCNC: 6 MMOL/L (ref 5–15)
BUN SERPL-MCNC: 25.7 MG/DL (ref 8–23)
BUN/CREAT SERPL: 24 (ref 7–25)
CALCIUM SPEC-SCNC: 9.2 MG/DL (ref 8.6–10.5)
CHLORIDE SERPL-SCNC: 104 MMOL/L (ref 98–107)
CO2 SERPL-SCNC: 33 MMOL/L (ref 22–29)
CREAT SERPL-MCNC: 1.07 MG/DL (ref 0.57–1)
EGFRCR SERPLBLD CKD-EPI 2021: 55.6 ML/MIN/1.73
GLUCOSE SERPL-MCNC: 84 MG/DL (ref 65–99)
MAGNESIUM SERPL-MCNC: 2.4 MG/DL (ref 1.6–2.4)
POTASSIUM SERPL-SCNC: 4.5 MMOL/L (ref 3.5–5.2)
QT INTERVAL: 528 MS
QT INTERVAL: 548 MS
QTC INTERVAL: 481 MS
QTC INTERVAL: 495 MS
SODIUM SERPL-SCNC: 143 MMOL/L (ref 136–145)

## 2025-08-27 PROCEDURE — 63710000001 REVEFENACIN 175 MCG/3ML SOLUTION: Performed by: HOSPITALIST

## 2025-08-27 PROCEDURE — 94799 UNLISTED PULMONARY SVC/PX: CPT

## 2025-08-27 PROCEDURE — 80048 BASIC METABOLIC PNL TOTAL CA: CPT | Performed by: HOSPITALIST

## 2025-08-27 PROCEDURE — 83735 ASSAY OF MAGNESIUM: CPT | Performed by: HOSPITALIST

## 2025-08-27 PROCEDURE — 93005 ELECTROCARDIOGRAM TRACING: CPT | Performed by: INTERNAL MEDICINE

## 2025-08-27 PROCEDURE — 94664 DEMO&/EVAL PT USE INHALER: CPT

## 2025-08-27 PROCEDURE — 93005 ELECTROCARDIOGRAM TRACING: CPT | Performed by: HOSPITALIST

## 2025-08-27 RX ORDER — FAMOTIDINE 20 MG/1
40 TABLET, FILM COATED ORAL DAILY
Status: DISCONTINUED | OUTPATIENT
Start: 2025-08-28 | End: 2025-08-28 | Stop reason: HOSPADM

## 2025-08-27 RX ADMIN — DULOXETINE 120 MG: 60 CAPSULE, DELAYED RELEASE ORAL at 08:40

## 2025-08-27 RX ADMIN — ROPINIROLE HYDROCHLORIDE 2 MG: 1 TABLET, FILM COATED ORAL at 20:46

## 2025-08-27 RX ADMIN — METFORMIN HYDROCHLORIDE 500 MG: 500 TABLET ORAL at 17:40

## 2025-08-27 RX ADMIN — BUMETANIDE 1 MG: 1 TABLET ORAL at 08:41

## 2025-08-27 RX ADMIN — BUMETANIDE 1 MG: 1 TABLET ORAL at 20:47

## 2025-08-27 RX ADMIN — Medication 1 TABLET: at 20:47

## 2025-08-27 RX ADMIN — SOTALOL HYDROCHLORIDE 80 MG: 80 TABLET ORAL at 08:41

## 2025-08-27 RX ADMIN — ARFORMOTEROL TARTRATE 15 MCG: 15 SOLUTION RESPIRATORY (INHALATION) at 19:07

## 2025-08-27 RX ADMIN — REVEFENACIN 175 MCG: 175 SOLUTION RESPIRATORY (INHALATION) at 09:23

## 2025-08-27 RX ADMIN — Medication 5 MG: at 20:47

## 2025-08-27 RX ADMIN — EMPAGLIFLOZIN 25 MG: 25 TABLET, FILM COATED ORAL at 08:41

## 2025-08-27 RX ADMIN — APIXABAN 5 MG: 5 TABLET, FILM COATED ORAL at 20:47

## 2025-08-27 RX ADMIN — ARFORMOTEROL TARTRATE 15 MCG: 15 SOLUTION RESPIRATORY (INHALATION) at 09:23

## 2025-08-27 RX ADMIN — SOTALOL HYDROCHLORIDE 80 MG: 80 TABLET ORAL at 20:47

## 2025-08-27 RX ADMIN — APIXABAN 5 MG: 5 TABLET, FILM COATED ORAL at 08:41

## 2025-08-27 RX ADMIN — ATORVASTATIN CALCIUM 40 MG: 40 TABLET, FILM COATED ORAL at 20:46

## 2025-08-28 ENCOUNTER — APPOINTMENT (OUTPATIENT)
Dept: GENERAL RADIOLOGY | Facility: HOSPITAL | Age: 70
End: 2025-08-28
Payer: MEDICARE

## 2025-08-28 ENCOUNTER — APPOINTMENT (OUTPATIENT)
Dept: CT IMAGING | Facility: HOSPITAL | Age: 70
End: 2025-08-28
Payer: MEDICARE

## 2025-08-28 ENCOUNTER — HOSPITAL ENCOUNTER (EMERGENCY)
Facility: HOSPITAL | Age: 70
Discharge: HOME | End: 2025-08-28
Attending: EMERGENCY MEDICINE
Payer: MEDICARE

## 2025-08-28 ENCOUNTER — READMISSION MANAGEMENT (OUTPATIENT)
Dept: CALL CENTER | Facility: HOSPITAL | Age: 70
End: 2025-08-28
Payer: MEDICARE

## 2025-08-28 VITALS
HEART RATE: 76 BPM | SYSTOLIC BLOOD PRESSURE: 112 MMHG | TEMPERATURE: 98 F | RESPIRATION RATE: 16 BRPM | BODY MASS INDEX: 48.73 KG/M2 | HEIGHT: 63 IN | OXYGEN SATURATION: 94 % | DIASTOLIC BLOOD PRESSURE: 48 MMHG | WEIGHT: 275 LBS

## 2025-08-28 VITALS
HEART RATE: 58 BPM | WEIGHT: 274 LBS | SYSTOLIC BLOOD PRESSURE: 121 MMHG | BODY MASS INDEX: 48.55 KG/M2 | HEIGHT: 63 IN | RESPIRATION RATE: 14 BRPM | DIASTOLIC BLOOD PRESSURE: 59 MMHG | TEMPERATURE: 98.9 F | OXYGEN SATURATION: 100 %

## 2025-08-28 DIAGNOSIS — Z23 NEED FOR TDAP VACCINATION: ICD-10-CM

## 2025-08-28 DIAGNOSIS — S80.01XA CONTUSION OF RIGHT KNEE, INITIAL ENCOUNTER: ICD-10-CM

## 2025-08-28 DIAGNOSIS — S60.222A CONTUSION OF LEFT HAND, INITIAL ENCOUNTER: ICD-10-CM
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
## 2025-08-28 DIAGNOSIS — S40.012A CONTUSION OF LEFT SHOULDER, INITIAL ENCOUNTER: Primary | ICD-10-CM

## 2025-08-28 DIAGNOSIS — T07.XXXA MULTIPLE ABRASIONS: ICD-10-CM

## 2025-08-28 LAB
ANION GAP SERPL CALCULATED.3IONS-SCNC: 9 MMOL/L (ref 5–15)
BUN SERPL-MCNC: 26.3 MG/DL (ref 8–23)
BUN/CREAT SERPL: 25.5 (ref 7–25)
CALCIUM SPEC-SCNC: 9 MG/DL (ref 8.6–10.5)
CHLORIDE SERPL-SCNC: 101 MMOL/L (ref 98–107)
CO2 SERPL-SCNC: 31 MMOL/L (ref 22–29)
CREAT SERPL-MCNC: 1.03 MG/DL (ref 0.57–1)
EGFRCR SERPLBLD CKD-EPI 2021: 58.3 ML/MIN/1.73
GLUCOSE SERPL-MCNC: 79 MG/DL (ref 65–99)
MAGNESIUM SERPL-MCNC: 2.3 MG/DL (ref 1.6–2.4)
POTASSIUM SERPL-SCNC: 4.1 MMOL/L (ref 3.5–5.2)
QT INTERVAL: 552 MS
QT INTERVAL: 574 MS
QT INTERVAL: 584 MS
QTC INTERVAL: 508 MS
QTC INTERVAL: 512 MS
QTC INTERVAL: 558 MS
SODIUM SERPL-SCNC: 141 MMOL/L (ref 136–145)

## 2025-08-28 PROCEDURE — 73130 X-RAY EXAM OF HAND: CPT

## 2025-08-28 PROCEDURE — 73560 X-RAY EXAM OF KNEE 1 OR 2: CPT

## 2025-08-28 PROCEDURE — 63710000001 REVEFENACIN 175 MCG/3ML SOLUTION: Performed by: HOSPITALIST

## 2025-08-28 PROCEDURE — 90471 IMMUNIZATION ADMIN: CPT | Performed by: EMERGENCY MEDICINE

## 2025-08-28 PROCEDURE — 25010000002 TETANUS-DIPHTH-ACELL PERTUSSIS 5-2.5-18.5 LF-MCG/0.5 SUSPENSION PREFILLED SYRINGE: Performed by: EMERGENCY MEDICINE

## 2025-08-28 PROCEDURE — 73030 X-RAY EXAM OF SHOULDER: CPT

## 2025-08-28 PROCEDURE — 93005 ELECTROCARDIOGRAM TRACING: CPT | Performed by: HOSPITALIST

## 2025-08-28 PROCEDURE — 80048 BASIC METABOLIC PNL TOTAL CA: CPT | Performed by: INTERNAL MEDICINE

## 2025-08-28 PROCEDURE — 70450 CT HEAD/BRAIN W/O DYE: CPT

## 2025-08-28 PROCEDURE — 94799 UNLISTED PULMONARY SVC/PX: CPT

## 2025-08-28 PROCEDURE — 90715 TDAP VACCINE 7 YRS/> IM: CPT | Performed by: EMERGENCY MEDICINE

## 2025-08-28 PROCEDURE — 99284 EMERGENCY DEPT VISIT MOD MDM: CPT

## 2025-08-28 PROCEDURE — 83735 ASSAY OF MAGNESIUM: CPT | Performed by: INTERNAL MEDICINE

## 2025-08-28 PROCEDURE — 94664 DEMO&/EVAL PT USE INHALER: CPT

## 2025-08-28 RX ORDER — ACETAMINOPHEN 500 MG
1000 TABLET ORAL ONCE
Status: COMPLETED | OUTPATIENT
Start: 2025-08-28 | End: 2025-08-28

## 2025-08-28 RX ORDER — SOTALOL HYDROCHLORIDE 80 MG/1
80 TABLET ORAL EVERY 12 HOURS SCHEDULED
Qty: 60 TABLET | Refills: 11 | Status: SHIPPED | OUTPATIENT
Start: 2025-08-28

## 2025-08-28 RX ADMIN — LEVOTHYROXINE SODIUM 50 MCG: 50 TABLET ORAL at 08:18

## 2025-08-28 RX ADMIN — TETANUS TOXOID, REDUCED DIPHTHERIA TOXOID AND ACELLULAR PERTUSSIS VACCINE, ADSORBED 0.5 ML: 5; 2.5; 8; 8; 2.5 SUSPENSION INTRAMUSCULAR at 13:17

## 2025-08-28 RX ADMIN — FAMOTIDINE 40 MG: 20 TABLET, FILM COATED ORAL at 08:18

## 2025-08-28 RX ADMIN — BUMETANIDE 1 MG: 1 TABLET ORAL at 08:18

## 2025-08-28 RX ADMIN — EMPAGLIFLOZIN 25 MG: 25 TABLET, FILM COATED ORAL at 08:18

## 2025-08-28 RX ADMIN — ACETAMINOPHEN 1000 MG: 500 TABLET, FILM COATED ORAL at 13:13

## 2025-08-28 RX ADMIN — ARFORMOTEROL TARTRATE 15 MCG: 15 SOLUTION RESPIRATORY (INHALATION) at 08:37

## 2025-08-28 RX ADMIN — APIXABAN 5 MG: 5 TABLET, FILM COATED ORAL at 08:18

## 2025-08-28 RX ADMIN — REVEFENACIN 175 MCG: 175 SOLUTION RESPIRATORY (INHALATION) at 08:36

## 2025-08-28 RX ADMIN — DULOXETINE 120 MG: 60 CAPSULE, DELAYED RELEASE ORAL at 08:18

## 2025-08-28 RX ADMIN — SOTALOL HYDROCHLORIDE 80 MG: 80 TABLET ORAL at 08:18

## 2025-08-29 ENCOUNTER — TRANSITIONAL CARE MANAGEMENT TELEPHONE ENCOUNTER (OUTPATIENT)
Dept: CALL CENTER | Facility: HOSPITAL | Age: 70
End: 2025-08-29
Payer: MEDICARE

## 2025-08-29 LAB
QT INTERVAL: 518 MS
QTC INTERVAL: 486 MS

## (undated) DEVICE — STERILE POLYISOPRENE POWDER-FREE SURGICAL GLOVES: Brand: PROTEXIS

## (undated) DEVICE — PERCLOSE PROGLIDE™ SUTURE-MEDIATED CLOSURE SYSTEM: Brand: PERCLOSE PROGLIDE™

## (undated) DEVICE — CONN JET HYDRA H20 AUXILIARY DISP

## (undated) DEVICE — PINNACLE INTRODUCER SHEATH: Brand: PINNACLE

## (undated) DEVICE — Device: Brand: DEFENDO AIR/WATER/SUCTION AND BIOPSY VALVE

## (undated) DEVICE — CATH ULTRASND ECHO ACUNAV FOR ACUSON 8F 90CM

## (undated) DEVICE — SET PRIMARY GRVTY 10DP MALE LL 104IN

## (undated) DEVICE — CATH ADVISOR HD GRID MAP SENSR ENABLED

## (undated) DEVICE — GOWN,REINFRCE,POLY,SIRUS,BREATH SLV,XXLG: Brand: MEDLINE

## (undated) DEVICE — SOL IRRG H2O PL/BG 1000ML STRL

## (undated) DEVICE — Device

## (undated) DEVICE — ST EXT IV SMARTSITE PINCH/CLMP 5ML 46CM

## (undated) DEVICE — Device: Brand: MEDEX

## (undated) DEVICE — 1 X VERSACROSS CONNECT TRANSSEPTAL DILATOR;  1 X VERSACROSS RF WIRE (INCLUDING 1 X CONNECTOR CABLE (SINGLE USE)): Brand: VERSACROSS CONNECT ACCESS SOLUTION FOR FARADRIVE

## (undated) DEVICE — ST INF PRI SMRTSTE 20DRP 2VLV 24ML 117

## (undated) DEVICE — CATHETER CONNECTION CABLE: Brand: FARASTAR™

## (undated) DEVICE — LINER SURG CANSTR SXN S/RIGD 1500CC

## (undated) DEVICE — SOLIDIFIER LIQLOC PLS 1500CC BT

## (undated) DEVICE — SOL NACL 0.9PCT 1000ML

## (undated) DEVICE — KT ELECTRD EP SURF ENSITE/X ADHS/PTCH 1P/U NS

## (undated) DEVICE — DECANT BG O JET

## (undated) DEVICE — LEX ELECTRO PHYSIOLOGY: Brand: MEDLINE INDUSTRIES, INC.

## (undated) DEVICE — STEERABLE SHEATH CLEAR: Brand: FARADRIVE™

## (undated) DEVICE — SOL IRR NACL 0.9PCT BT 1000ML

## (undated) DEVICE — ADULT, W/LG. BACK PAD, RADIOTRANSPARENT ELEMENT AND LEAD WIRE COMPATIBLE W/: Brand: DEFIBRILLATION ELECTRODES

## (undated) DEVICE — GW INQWIRE ROSEN/TIP PTFE FC J/TP/1.5MM 0.035IN 180CM

## (undated) DEVICE — ST EXT IV SMRTSTE 2VLV FIX M LL 6ML 41

## (undated) DEVICE — STERILE (15.2 TAPERED TO 7.6 X 183CM) POLYETHYLENE ACCORDION-FOLDED COVER FOR USE WITH SIEMENS ACUNAV ULTRASOUND CATHETER FAMILY CONNECTOR: Brand: SWIFTLINK TRANSDUCER COVER

## (undated) DEVICE — ELECTRD RETRN/GRND MEGADYNE SGL/PLT W/CORD 9FT DISP

## (undated) DEVICE — PULSED FIELD ABLATION CATHETER: Brand: FARAWAVE™